# Patient Record
Sex: FEMALE | Race: WHITE | NOT HISPANIC OR LATINO | URBAN - METROPOLITAN AREA
[De-identification: names, ages, dates, MRNs, and addresses within clinical notes are randomized per-mention and may not be internally consistent; named-entity substitution may affect disease eponyms.]

---

## 2021-01-13 ENCOUNTER — EMERGENCY (EMERGENCY)
Facility: HOSPITAL | Age: 81
LOS: 0 days | Discharge: HOME | End: 2021-01-13
Attending: EMERGENCY MEDICINE | Admitting: EMERGENCY MEDICINE
Payer: MEDICAID

## 2021-01-13 VITALS — DIASTOLIC BLOOD PRESSURE: 83 MMHG | SYSTOLIC BLOOD PRESSURE: 176 MMHG | HEART RATE: 83 BPM

## 2021-01-13 VITALS
HEART RATE: 82 BPM | RESPIRATION RATE: 20 BRPM | TEMPERATURE: 98 F | SYSTOLIC BLOOD PRESSURE: 194 MMHG | WEIGHT: 149.91 LBS | OXYGEN SATURATION: 96 % | DIASTOLIC BLOOD PRESSURE: 100 MMHG

## 2021-01-13 DIAGNOSIS — M79.669 PAIN IN UNSPECIFIED LOWER LEG: ICD-10-CM

## 2021-01-13 DIAGNOSIS — M79.606 PAIN IN LEG, UNSPECIFIED: ICD-10-CM

## 2021-01-13 DIAGNOSIS — E11.9 TYPE 2 DIABETES MELLITUS WITHOUT COMPLICATIONS: ICD-10-CM

## 2021-01-13 DIAGNOSIS — R60.0 LOCALIZED EDEMA: ICD-10-CM

## 2021-01-13 DIAGNOSIS — I10 ESSENTIAL (PRIMARY) HYPERTENSION: ICD-10-CM

## 2021-01-13 LAB
ALBUMIN SERPL ELPH-MCNC: 4.2 G/DL — SIGNIFICANT CHANGE UP (ref 3.5–5.2)
ALP SERPL-CCNC: 71 U/L — SIGNIFICANT CHANGE UP (ref 30–115)
ALT FLD-CCNC: 14 U/L — SIGNIFICANT CHANGE UP (ref 0–41)
ANION GAP SERPL CALC-SCNC: 9 MMOL/L — SIGNIFICANT CHANGE UP (ref 7–14)
APTT BLD: 30 SEC — SIGNIFICANT CHANGE UP (ref 27–39.2)
AST SERPL-CCNC: 18 U/L — SIGNIFICANT CHANGE UP (ref 0–41)
BASOPHILS # BLD AUTO: 0.03 K/UL — SIGNIFICANT CHANGE UP (ref 0–0.2)
BASOPHILS NFR BLD AUTO: 0.4 % — SIGNIFICANT CHANGE UP (ref 0–1)
BILIRUB SERPL-MCNC: 0.4 MG/DL — SIGNIFICANT CHANGE UP (ref 0.2–1.2)
BUN SERPL-MCNC: 23 MG/DL — HIGH (ref 10–20)
CALCIUM SERPL-MCNC: 9.5 MG/DL — SIGNIFICANT CHANGE UP (ref 8.5–10.1)
CHLORIDE SERPL-SCNC: 101 MMOL/L — SIGNIFICANT CHANGE UP (ref 98–110)
CO2 SERPL-SCNC: 29 MMOL/L — SIGNIFICANT CHANGE UP (ref 17–32)
CREAT SERPL-MCNC: 1.2 MG/DL — SIGNIFICANT CHANGE UP (ref 0.7–1.5)
EOSINOPHIL # BLD AUTO: 0.24 K/UL — SIGNIFICANT CHANGE UP (ref 0–0.7)
EOSINOPHIL NFR BLD AUTO: 3 % — SIGNIFICANT CHANGE UP (ref 0–8)
GLUCOSE SERPL-MCNC: 161 MG/DL — HIGH (ref 70–99)
HCT VFR BLD CALC: 38.9 % — SIGNIFICANT CHANGE UP (ref 37–47)
HGB BLD-MCNC: 12.6 G/DL — SIGNIFICANT CHANGE UP (ref 12–16)
IMM GRANULOCYTES NFR BLD AUTO: 0.4 % — HIGH (ref 0.1–0.3)
INR BLD: 1.08 RATIO — SIGNIFICANT CHANGE UP (ref 0.65–1.3)
LYMPHOCYTES # BLD AUTO: 1.3 K/UL — SIGNIFICANT CHANGE UP (ref 1.2–3.4)
LYMPHOCYTES # BLD AUTO: 16 % — LOW (ref 20.5–51.1)
MCHC RBC-ENTMCNC: 28.3 PG — SIGNIFICANT CHANGE UP (ref 27–31)
MCHC RBC-ENTMCNC: 32.4 G/DL — SIGNIFICANT CHANGE UP (ref 32–37)
MCV RBC AUTO: 87.2 FL — SIGNIFICANT CHANGE UP (ref 81–99)
MONOCYTES # BLD AUTO: 0.57 K/UL — SIGNIFICANT CHANGE UP (ref 0.1–0.6)
MONOCYTES NFR BLD AUTO: 7 % — SIGNIFICANT CHANGE UP (ref 1.7–9.3)
NEUTROPHILS # BLD AUTO: 5.93 K/UL — SIGNIFICANT CHANGE UP (ref 1.4–6.5)
NEUTROPHILS NFR BLD AUTO: 73.2 % — SIGNIFICANT CHANGE UP (ref 42.2–75.2)
NRBC # BLD: 0 /100 WBCS — SIGNIFICANT CHANGE UP (ref 0–0)
NT-PROBNP SERPL-SCNC: 735 PG/ML — HIGH (ref 0–300)
PLATELET # BLD AUTO: 199 K/UL — SIGNIFICANT CHANGE UP (ref 130–400)
POTASSIUM SERPL-MCNC: 4.1 MMOL/L — SIGNIFICANT CHANGE UP (ref 3.5–5)
POTASSIUM SERPL-SCNC: 4.1 MMOL/L — SIGNIFICANT CHANGE UP (ref 3.5–5)
PROT SERPL-MCNC: 6.6 G/DL — SIGNIFICANT CHANGE UP (ref 6–8)
PROTHROM AB SERPL-ACNC: 12.4 SEC — SIGNIFICANT CHANGE UP (ref 9.95–12.87)
RBC # BLD: 4.46 M/UL — SIGNIFICANT CHANGE UP (ref 4.2–5.4)
RBC # FLD: 13.1 % — SIGNIFICANT CHANGE UP (ref 11.5–14.5)
SODIUM SERPL-SCNC: 139 MMOL/L — SIGNIFICANT CHANGE UP (ref 135–146)
WBC # BLD: 8.1 K/UL — SIGNIFICANT CHANGE UP (ref 4.8–10.8)
WBC # FLD AUTO: 8.1 K/UL — SIGNIFICANT CHANGE UP (ref 4.8–10.8)

## 2021-01-13 PROCEDURE — 93970 EXTREMITY STUDY: CPT | Mod: 26

## 2021-01-13 PROCEDURE — 99285 EMERGENCY DEPT VISIT HI MDM: CPT

## 2021-01-13 RX ORDER — LISINOPRIL 2.5 MG/1
5 TABLET ORAL DAILY
Refills: 0 | Status: DISCONTINUED | OUTPATIENT
Start: 2021-01-13 | End: 2021-01-13

## 2021-01-13 RX ADMIN — LISINOPRIL 5 MILLIGRAM(S): 2.5 TABLET ORAL at 16:11

## 2021-01-13 NOTE — ED PROVIDER NOTE - NS ED ROS FT
Review of Systems:  	•	CONSTITUTIONAL - no fever, no diaphoresis, no chills  	•	SKIN - no rash  	•	HEMATOLOGIC - no bleeding, no bruising  	•	EYES - no eye pain, no blurry vision  	•	ENT - no congestion  	•	RESPIRATORY - no shortness of breath, no cough  	•	CARDIAC - no chest pain, no palpitations  	•	GI - no abd pain, no nausea, no vomiting, no diarrhea, no constipation  	•	GENITO-URINARY - no dysuria; no hematuria, no increased urinary frequency  	•	MUSCULOSKELETAL - +leg pain, +leg swelling, no redness  	•	NEUROLOGIC - no weakness, no headache, no paresthesias, no LOC  	All other ROS are negative except as documented in HPI.

## 2021-01-13 NOTE — ED ADULT NURSE NOTE - OBJECTIVE STATEMENT
Patient presents to ED with RLE swelling for 1week sent in by PMD to r/o DVT. No CP, SOB, or any other symptoms.

## 2021-01-13 NOTE — ED PROVIDER NOTE - PROGRESS NOTE DETAILS
Duplex prelim negative Cas: Spoke to patient's pmd Dr. Jama Martinez who sent patient in to rule out dvt Discussed results with patient's son and pmd.

## 2021-01-13 NOTE — ED PROVIDER NOTE - PATIENT PORTAL LINK FT
You can access the FollowMyHealth Patient Portal offered by [facility] by registering at the following website: [email or web address]. By joining [employer]’s FollowMyHealth portal, you will also be able to view your health information using other applications (apps) compatible with our system.

## 2021-01-13 NOTE — ED ADULT TRIAGE NOTE - RESPIRATORY RATE (BREATHS/MIN)
C/o cough with fever going on for years, feel  He has temp did not take it, just feels warm no difficulty breathing has up coming appointments will need covid testing 20

## 2021-01-13 NOTE — ED PROVIDER NOTE - PHYSICAL EXAMINATION
VITAL SIGNS: I have reviewed nursing notes and confirm.  CONSTITUTIONAL: Well-developed; well-nourished; in no acute distress.  SKIN: Skin exam is warm and dry, no acute rash.  HEAD: Normocephalic; atraumatic.  EYES: PERRL, EOM intact; conjunctiva and sclera clear.  ENT: No nasal discharge; airway clear.   NECK: Supple; non tender.  CARD: S1, S2 normal; no murmurs, gallops, or rubs. Regular rate and rhythm.  RESP: Clear to auscultation bilaterally. No wheezes, rales or rhonchi.  ABD: Normal bowel sounds; soft; non-distended; non-tender.   EXT: Normal ROM. Bilateral LE edema R>L 1+ pitting edema. No calf tenderness.   LYMPH: No acute cervical adenopathy.  NEURO: Alert, oriented. Grossly unremarkable. No focal deficits.  PSYCH: Cooperative, appropriate.

## 2021-01-13 NOTE — ED PROVIDER NOTE - ATTENDING CONTRIBUTION TO CARE
JOSE Cardozo as  who received son assisting with hx. 80yoF with h/o HTN, HLD, DM, presents with RLE swelling x 1wk, sent in by PMD to r/o DVT. No CP, SOB, or any other symptoms. On exam, afebrile, hemodynamically stable, saturating well, NAD, well appearing, laying comfortably in bed, head NCAT, EOMI grossly, anicteric, MMM, no JVD, RRR, nml S1/S2, no m/r/g, lungs CTAB, no w/r/r, abd soft, NT, ND, nml BS, no rebound or guarding, AAO, CN's 3-12 grossly intact, CISNEROS spontaneously, b/l LE 1+ pitting edema R>L slightly, 2+ DP Pulses, <2sec cap refill, skin warm, dry, no rashes or hives. No e/o cellulitis, acute PAD. Neurovascularly intact extremity. Low suspicion for systemic fluid overload by exam. DVT US _______ JSOE Cardozo as  who received son assisting with hx. 80yoF with h/o HTN, HLD, DM, presents with RLE swelling x 1wk, sent in by PMD to r/o DVT. No CP, SOB, or any other symptoms. On exam, afebrile, hemodynamically stable, saturating well, NAD, well appearing, laying comfortably in bed, head NCAT, EOMI grossly, anicteric, MMM, no JVD, RRR, nml S1/S2, no m/r/g, lungs CTAB, no w/r/r, abd soft, NT, ND, nml BS, no rebound or guarding, AAO, CN's 3-12 grossly intact, CISNEROS spontaneously, b/l LE 1+ pitting edema R>L slightly, 2+ DP Pulses, <2sec cap refill, skin warm, dry, no rashes or hives. No e/o cellulitis, acute PAD. Neurovascularly intact extremity. Low suspicion for systemic fluid overload by exam. DVT US negative. Patient is well appearing, NAD, afebrile, hemodynamically stable. Any available tests and studies were discussed with patient and family. Discharged with instructions in further symptomatic care, return precautions, and need for PMD f/u.

## 2021-01-13 NOTE — ED PROVIDER NOTE - OBJECTIVE STATEMENT
79 yo F with pmhx of DM, HTN sent by pmd to rule out DVT after being found to have LE swelling at pmd office today. 81 yo F with pmhx of DM, HTN sent by pmd to rule out DVT after being found to have LE swelling at pmd office today. Reports having leg pain and increased swelling x 1 week. Symptoms are moderate. No alleviating/aggravating factors. No cp, sob, fevers, chills, paresthesias, or weakness.

## 2021-01-13 NOTE — ED PROVIDER NOTE - CLINICAL SUMMARY MEDICAL DECISION MAKING FREE TEXT BOX
JOSE Cardozo as  who received son assisting with hx. 80yoF with h/o HTN, HLD, DM, presents with RLE swelling x 1wk, sent in by PMD to r/o DVT. No CP, SOB, or any other symptoms. On exam, afebrile, hemodynamically stable, saturating well, NAD, well appearing, laying comfortably in bed, head NCAT, EOMI grossly, anicteric, MMM, no JVD, RRR, nml S1/S2, no m/r/g, lungs CTAB, no w/r/r, abd soft, NT, ND, nml BS, no rebound or guarding, AAO, CN's 3-12 grossly intact, CISNEROS spontaneously, b/l LE 1+ pitting edema R>L slightly, 2+ DP Pulses, <2sec cap refill, skin warm, dry, no rashes or hives. No e/o cellulitis, acute PAD. Neurovascularly intact extremity. Low suspicion for systemic fluid overload by exam. DVT US negative. Patient is well appearing, NAD, afebrile, hemodynamically stable. Any available tests and studies were discussed with patient and family. Discharged with instructions in further symptomatic care, return precautions, and need for PMD f/u.

## 2021-06-10 ENCOUNTER — INPATIENT (INPATIENT)
Facility: HOSPITAL | Age: 81
LOS: 7 days | Discharge: HOME HEALTH | End: 2021-06-18
Attending: HOSPITALIST | Admitting: HOSPITALIST
Payer: MEDICAID

## 2021-06-10 VITALS
RESPIRATION RATE: 18 BRPM | OXYGEN SATURATION: 95 % | HEART RATE: 91 BPM | TEMPERATURE: 98 F | WEIGHT: 139.99 LBS | HEIGHT: 67 IN | SYSTOLIC BLOOD PRESSURE: 173 MMHG | DIASTOLIC BLOOD PRESSURE: 89 MMHG

## 2021-06-10 DIAGNOSIS — Z98.890 OTHER SPECIFIED POSTPROCEDURAL STATES: Chronic | ICD-10-CM

## 2021-06-10 LAB
ALBUMIN SERPL ELPH-MCNC: 4.3 G/DL — SIGNIFICANT CHANGE UP (ref 3.5–5.2)
ALP SERPL-CCNC: 67 U/L — SIGNIFICANT CHANGE UP (ref 30–115)
ALT FLD-CCNC: 13 U/L — SIGNIFICANT CHANGE UP (ref 0–41)
ANION GAP SERPL CALC-SCNC: 11 MMOL/L — SIGNIFICANT CHANGE UP (ref 7–14)
APPEARANCE UR: CLEAR — SIGNIFICANT CHANGE UP
APTT BLD: 30 SEC — SIGNIFICANT CHANGE UP (ref 27–39.2)
AST SERPL-CCNC: 17 U/L — SIGNIFICANT CHANGE UP (ref 0–41)
BACTERIA # UR AUTO: NEGATIVE — SIGNIFICANT CHANGE UP
BASOPHILS # BLD AUTO: 0.03 K/UL — SIGNIFICANT CHANGE UP (ref 0–0.2)
BASOPHILS NFR BLD AUTO: 0.4 % — SIGNIFICANT CHANGE UP (ref 0–1)
BILIRUB SERPL-MCNC: 0.2 MG/DL — SIGNIFICANT CHANGE UP (ref 0.2–1.2)
BILIRUB UR-MCNC: NEGATIVE — SIGNIFICANT CHANGE UP
BLD GP AB SCN SERPL QL: SIGNIFICANT CHANGE UP
BUN SERPL-MCNC: 38 MG/DL — HIGH (ref 10–20)
CALCIUM SERPL-MCNC: 9.6 MG/DL — SIGNIFICANT CHANGE UP (ref 8.5–10.1)
CHLORIDE SERPL-SCNC: 103 MMOL/L — SIGNIFICANT CHANGE UP (ref 98–110)
CO2 SERPL-SCNC: 26 MMOL/L — SIGNIFICANT CHANGE UP (ref 17–32)
COLOR SPEC: SIGNIFICANT CHANGE UP
COMMENT - URINE: SIGNIFICANT CHANGE UP
COMMENT - URINE: SIGNIFICANT CHANGE UP
CREAT SERPL-MCNC: 1.3 MG/DL — SIGNIFICANT CHANGE UP (ref 0.7–1.5)
DIFF PNL FLD: NEGATIVE — SIGNIFICANT CHANGE UP
EOSINOPHIL # BLD AUTO: 0.25 K/UL — SIGNIFICANT CHANGE UP (ref 0–0.7)
EOSINOPHIL NFR BLD AUTO: 3.6 % — SIGNIFICANT CHANGE UP (ref 0–8)
EPI CELLS # UR: 0 /HPF — SIGNIFICANT CHANGE UP (ref 0–5)
GLUCOSE SERPL-MCNC: 148 MG/DL — HIGH (ref 70–99)
GLUCOSE UR QL: NEGATIVE — SIGNIFICANT CHANGE UP
HCT VFR BLD CALC: 37.9 % — SIGNIFICANT CHANGE UP (ref 37–47)
HGB BLD-MCNC: 11.9 G/DL — LOW (ref 12–16)
HYALINE CASTS # UR AUTO: 0 /LPF — SIGNIFICANT CHANGE UP (ref 0–7)
IMM GRANULOCYTES NFR BLD AUTO: 0.4 % — HIGH (ref 0.1–0.3)
INR BLD: 1.1 RATIO — SIGNIFICANT CHANGE UP (ref 0.65–1.3)
KETONES UR-MCNC: NEGATIVE — SIGNIFICANT CHANGE UP
LEUKOCYTE ESTERASE UR-ACNC: ABNORMAL
LYMPHOCYTES # BLD AUTO: 1.27 K/UL — SIGNIFICANT CHANGE UP (ref 1.2–3.4)
LYMPHOCYTES # BLD AUTO: 18.4 % — LOW (ref 20.5–51.1)
MCHC RBC-ENTMCNC: 27.1 PG — SIGNIFICANT CHANGE UP (ref 27–31)
MCHC RBC-ENTMCNC: 31.4 G/DL — LOW (ref 32–37)
MCV RBC AUTO: 86.3 FL — SIGNIFICANT CHANGE UP (ref 81–99)
MONOCYTES # BLD AUTO: 0.52 K/UL — SIGNIFICANT CHANGE UP (ref 0.1–0.6)
MONOCYTES NFR BLD AUTO: 7.5 % — SIGNIFICANT CHANGE UP (ref 1.7–9.3)
NEUTROPHILS # BLD AUTO: 4.82 K/UL — SIGNIFICANT CHANGE UP (ref 1.4–6.5)
NEUTROPHILS NFR BLD AUTO: 69.7 % — SIGNIFICANT CHANGE UP (ref 42.2–75.2)
NITRITE UR-MCNC: NEGATIVE — SIGNIFICANT CHANGE UP
NRBC # BLD: 0 /100 WBCS — SIGNIFICANT CHANGE UP (ref 0–0)
PH UR: 6 — SIGNIFICANT CHANGE UP (ref 5–8)
PLATELET # BLD AUTO: 214 K/UL — SIGNIFICANT CHANGE UP (ref 130–400)
POTASSIUM SERPL-MCNC: 4.5 MMOL/L — SIGNIFICANT CHANGE UP (ref 3.5–5)
POTASSIUM SERPL-SCNC: 4.5 MMOL/L — SIGNIFICANT CHANGE UP (ref 3.5–5)
PROT SERPL-MCNC: 6.5 G/DL — SIGNIFICANT CHANGE UP (ref 6–8)
PROT UR-MCNC: ABNORMAL
PROTHROM AB SERPL-ACNC: 12.7 SEC — SIGNIFICANT CHANGE UP (ref 9.95–12.87)
RBC # BLD: 4.39 M/UL — SIGNIFICANT CHANGE UP (ref 4.2–5.4)
RBC # FLD: 14.1 % — SIGNIFICANT CHANGE UP (ref 11.5–14.5)
RBC CASTS # UR COMP ASSIST: 1 /HPF — SIGNIFICANT CHANGE UP (ref 0–4)
SARS-COV-2 RNA SPEC QL NAA+PROBE: SIGNIFICANT CHANGE UP
SODIUM SERPL-SCNC: 140 MMOL/L — SIGNIFICANT CHANGE UP (ref 135–146)
SP GR SPEC: 1.01 — SIGNIFICANT CHANGE UP (ref 1.01–1.03)
UROBILINOGEN FLD QL: SIGNIFICANT CHANGE UP
WBC # BLD: 6.92 K/UL — SIGNIFICANT CHANGE UP (ref 4.8–10.8)
WBC # FLD AUTO: 6.92 K/UL — SIGNIFICANT CHANGE UP (ref 4.8–10.8)
WBC UR QL: 24 /HPF — HIGH (ref 0–5)

## 2021-06-10 PROCEDURE — 72170 X-RAY EXAM OF PELVIS: CPT | Mod: 26

## 2021-06-10 PROCEDURE — 71260 CT THORAX DX C+: CPT | Mod: 26,MA

## 2021-06-10 PROCEDURE — 72125 CT NECK SPINE W/O DYE: CPT | Mod: 26,MA

## 2021-06-10 PROCEDURE — 99223 1ST HOSP IP/OBS HIGH 75: CPT

## 2021-06-10 PROCEDURE — 99284 EMERGENCY DEPT VISIT MOD MDM: CPT

## 2021-06-10 PROCEDURE — 99285 EMERGENCY DEPT VISIT HI MDM: CPT

## 2021-06-10 PROCEDURE — 70450 CT HEAD/BRAIN W/O DYE: CPT | Mod: 26,MA

## 2021-06-10 PROCEDURE — 93010 ELECTROCARDIOGRAM REPORT: CPT

## 2021-06-10 PROCEDURE — 71045 X-RAY EXAM CHEST 1 VIEW: CPT | Mod: 26

## 2021-06-10 PROCEDURE — 74177 CT ABD & PELVIS W/CONTRAST: CPT | Mod: 26,MA

## 2021-06-10 RX ORDER — ACETAMINOPHEN 500 MG
650 TABLET ORAL ONCE
Refills: 0 | Status: COMPLETED | OUTPATIENT
Start: 2021-06-10 | End: 2021-06-10

## 2021-06-10 RX ORDER — ACETAMINOPHEN 500 MG
650 TABLET ORAL EVERY 6 HOURS
Refills: 0 | Status: DISCONTINUED | OUTPATIENT
Start: 2021-06-10 | End: 2021-06-18

## 2021-06-10 RX ORDER — ATORVASTATIN CALCIUM 80 MG/1
20 TABLET, FILM COATED ORAL AT BEDTIME
Refills: 0 | Status: DISCONTINUED | OUTPATIENT
Start: 2021-06-10 | End: 2021-06-10

## 2021-06-10 RX ORDER — LISINOPRIL 2.5 MG/1
5 TABLET ORAL DAILY
Refills: 0 | Status: DISCONTINUED | OUTPATIENT
Start: 2021-06-10 | End: 2021-06-11

## 2021-06-10 RX ORDER — ENOXAPARIN SODIUM 100 MG/ML
40 INJECTION SUBCUTANEOUS DAILY
Refills: 0 | Status: DISCONTINUED | OUTPATIENT
Start: 2021-06-10 | End: 2021-06-14

## 2021-06-10 RX ORDER — ASPIRIN/CALCIUM CARB/MAGNESIUM 324 MG
81 TABLET ORAL DAILY
Refills: 0 | Status: DISCONTINUED | OUTPATIENT
Start: 2021-06-10 | End: 2021-06-18

## 2021-06-10 RX ORDER — ATORVASTATIN CALCIUM 80 MG/1
40 TABLET, FILM COATED ORAL AT BEDTIME
Refills: 0 | Status: DISCONTINUED | OUTPATIENT
Start: 2021-06-10 | End: 2021-06-18

## 2021-06-10 RX ADMIN — Medication 650 MILLIGRAM(S): at 22:15

## 2021-06-10 NOTE — CONSULT NOTE ADULT - SUBJECTIVE AND OBJECTIVE BOX
Stroke Consult Note:    ARIAMYNOR JIMAMBERRIAZ    1. Chief Complaint: s/p mechanical fall    HPI:  80 year old female with past medical history of DM II, HTN, and DLD, on asa, presents after a fall.     Spoke with help of Syriac .  As per family and patient she was trying to sit on the bed and missed, fell on a chair and then to floor, hit lower back. She denies any head trauma, loss of consciousness, neck pain, abdominal pain, palpitations, nausea, vomiting, diarrhea, chest pain, fever, rigors or chills. As per the son she has had balance issues for some time now and has had recurrent falls, the only difference now is that this fall was worse and had her ended up in the hospital. He also endorse mild dementia and some forgetfulness but patient able to ambulate around with cane and perform all ADLs.    In ED patient hemodynamically stable, afebrile, trauma workup shows a T11 fracture (No further intervention as per Neurosurgery) and some Focal hypodensities noted within the inferior right cerebellar hemisphere possibly representing age-indeterminate infarcts. At time of interview patient endorses some hip and back pain. (10 Ye 2021 21:44)      2. Relevant PMH:   Prior ischemic stroke/TIA[ ], Afib [ ], CAD [ ], HTN [ ], DLD [ ], DM [ ], PVD [ ], Obesity [ ],   Sedentary lifestyle [ ], CHF [ ], KALI [ ], Cancer Hx [ ].    3. Social History: Smoking [ ], Drug Use [ ], Alcohol Use [ ], Other [ ]    4. Possible Location of Stroke:    5. Relevant Brain Tissue Imaging: < from: CT Head No Cont (06.10.21 @ 15:48) >    EXAM:  CT CERVICAL SPINE        EXAM:  CT BRAIN            PROCEDURE DATE:  06/10/2021            INTERPRETATION:  CLINICAL INDICATIONS:  Status post fall.    TECHNIQUE:  Noncontrast head and cervical spine CT was performed.    Multiple contiguous axial images were obtained from the skull base to the vertex without the use of intravenous contrast. Reformatted coronal and sagittal images were were subsequently obtained and reviewed.    Axial images were obtained through the cervical spine using multislice helical technique.  Reformatted coronal and sagittal images were were subsequently obtained and reviewed.    COMPARISON: None.    FINDINGS:    CT BRAIN:    There is no evidence for acute intracranial hemorrhage, mass effect or midline shift.    The basal cisterns are patent. There is no hydrocephalus.    There are focal hypodensities noted within the inferior right cerebellar hemisphere, best seen on sagittal image 8 image 55.    There is periventricular and scattered subcortical white matter hypodensity. There are bilateral basal ganglia hypodensities most consistent with age-indeterminate lacunar infarcts.    There is no extra-axial collection.    The visualized orbits are unremarkable.    The calvarium is intact, without depressed fracture.    There is a left sphenoid sinus mucous retention cyst or polyp. Visualized paranasal sinuses and tympanomastoid cavities are otherwise unremarkable. There is soft tissue filling the right external auditory canal likely represents cerumen.    CT CERVICAL SPINE:    There is no prevertebral soft tissue swelling. There is no splaying of the spinous processes. The occipital condyles are normal. Lateral masses of C1 align normally with C2. No lucent fracture line is identified. There is nospondylolisthesis.    Multilevel degenerative osteoarthritis and degenerative disc disease are present. Findings include marginal osteophytes, uncovertebral spurring, loss of normal disc space height, endplate sclerosis, and facet joint arthrosis.    At C2-C3 there is central disc herniation indenting on the ventral thecal sac without significant spinal canal or neuroforaminal narrowing.    At C3-C4 there is bilateral uncinate joint spurring, right greater than left as well as bilateral facet arthrosis resulting in severe right neuroforaminal narrowing and moderate left neuroforaminal narrowing. There is no spinal canal stenosis at this level.    At C4-C5 there is disc osteophyte ridging, bilateral uncinate joint spurring and left facet arthrosis resulting in severe bilateral neuroforaminal narrowing. There is no spinal canal stenosis at this level.    At C5-C6 there is left paracentral disc herniation with bilateral uncinate joint spurring resulting in moderate left neuroforaminal narrowing and mild right neuroforaminal narrowing. There is moderate spinal canal stenosis at this level.    At C6-C7 there is bilateral uncinate joint spurring and mild facet arthrosis resulting in moderate bilateral neuroforaminal narrowing.    There is no high-grade spinal canal stenosis, although the spinal canal contents are suboptimally evaluated inherent to CT technique.    The visualized lung apices are within normal limits.    Miscellaneous:  None.    IMPRESSION:    CT HEAD:  Focal hypodensities noted within the inferior right cerebellar hemisphere possibly representing age-indeterminate infarcts. Further evaluation can be obtained with MRI of the brain if not clinically contraindicated.    Moderate chronic microvascular-type changes as well as bilateral age-indeterminate basal ganglia lacunar infarcts.    CT CERVICAL SPINE:  No acute fracture or subluxation.    Multilevel degenerative changes as detailed above.            < end of copied text >      6. Relevant Cerebrovascular Imagin. Relevant blood tests:      8. Relevant cardiac rhythm monitorin. Relevant Cardiac Structure: (TTE/EVITA +/-):[ ]No intracardiac thrombus/[ ] no vegetation/[ ]no akynesia/EF:    Home Medications:  aspirin 81 mg oral tablet, chewable: 1 tab(s) orally once a day (10 Ye 2021 21:44)  atorvastatin 20 mg tablet: TAKE ONE TABLET BY MOUTH EVERY DAY (10 Ye 2021 21:44)  lisinopril 5 mg tablet: TAKE ONE TABLET every day (10 Ye 2021 21:44)  metformin 500 mg tablet: TAKE ONE TABLET BY MOUTH TWICE DAILY WITH MEALS (10 Ye 2021 21:44)      MEDICATIONS  (STANDING):  aspirin  chewable 81 milliGRAM(s) Oral daily  atorvastatin 40 milliGRAM(s) Oral at bedtime  enoxaparin Injectable 40 milliGRAM(s) SubCutaneous daily  lisinopril 5 milliGRAM(s) Oral daily      10. PT/OT/Speech/Rehab/S&Sw/ Cognitive eval results and recommendations:    11. Exam:    Vital Signs Last 24 Hrs  T(C): 36.8 (10 Ye 2021 20:44), Max: 36.9 (10 Ye 2021 18:58)  T(F): 98.2 (10 Ye 2021 20:44), Max: 98.4 (10 Ye 2021 18:58)  HR: 84 (10 Ye 2021 23:30) (84 - 91)  BP: 179/89 (10 Ye 2021 23:30) (173/89 - 193/99)  BP(mean): --  RR: 18 (10 Ye 2021 23:30) (18 - 18)  SpO2: 96% (10 Ye 2021 23:30) (95% - 96%)    12.   NIH STROKE SCALE  Item	                                                        Score  1 a.	Level of Consciousness	               	0  1 b. LOC Questions	                                2  1 c.	LOC Commands	                               	0  2.	Best Gaze	                                        0  3.	Visual	                                                0  4.	Facial Palsy	                                        0  5 a.	Motor Arm - Left	                                0  5 b.	Motor Arm - Right	                        0  6 a.	Motor Leg - Left	                                0  6 b.	Motor Leg - Right	                                0  7.	Limb Ataxia	                                        0  8.	Sensory	                                                0  9.	Language	                                        1  10.	Dysarthria	                                        0  11.	Extinction and Inattention  	        0  ______________________________________  TOTAL	                                                        0    Total NIHSS on admission:      NIHSS yesterday:      NIHSS today: 3 (baseline dementia)    mRS:  0 No symptoms at all  1 No significant disability despite symptoms; able to carry out all usual duties and activities without assistance  2 Slight disability; unable to carry out all previous activities, but able to look after own affairs  3 Moderate disability; requiring some help, but able to walk without assistance  4 Moderately severe disability; unable to walk without assistance and unable to attend to own bodily needs without assistance  5 Severe disability; bedridden, incontinent and requiring constant nursing care and attention  6 Dead      13. Impression: 80 year old female with past medical history of DM II, HTN, and DLD, on asa, presents after a fall. CTH with focal hypodensities noted within the inferior right cerebellar hemisphere possibly representing age-indeterminate infarcts as well as moderate chronic microvascular-type changes and bilateral age-indeterminate basal ganglia lacunar infarcts. Patient had difficulty with balance over the past 2 years as per family and has mild dementia. Family is not aware of stroke history.    Plan:  MRI brain NC  MRA head and neck  Echo  Lipid profile, HA1C  PT eval and treat  Continue Aspirin and Statin        Neuroattending note will follow                14. Probable cause/s of Stroke:      15. Suggestions:   Routine stroke workup includin. Disposition:   Stroke Consult Note:    ARIAMYNOR JIMAMBERRIAZ    1. Chief Complaint: s/p mechanical fall    HPI:  80 year old female with past medical history of DM II, HTN, and DLD, on asa, presents after a fall.     Spoke with help of Croatian .  As per family and patient she was trying to sit on the bed and missed, fell on a chair and then to floor, hit lower back. She denies any head trauma, loss of consciousness, neck pain, abdominal pain, palpitations, nausea, vomiting, diarrhea, chest pain, fever, rigors or chills. As per the son she has had balance issues for some time now and has had recurrent falls, the only difference now is that this fall was worse and had her ended up in the hospital. He also endorse mild dementia and some forgetfulness but patient able to ambulate around with cane and perform all ADLs.    In ED patient hemodynamically stable, afebrile, trauma workup shows a T11 fracture (No further intervention as per Neurosurgery) and some Focal hypodensities noted within the inferior right cerebellar hemisphere possibly representing age-indeterminate infarcts. At time of interview patient endorses some hip and back pain. (10 Ye 2021 21:44)    2. Relevant PMH:   Prior ischemic stroke/TIA[ ], Afib [ ], CAD [ ], HTN [ ], DLD [ ], DM [ ], PVD [ ], Obesity [ ],   Sedentary lifestyle [ ], CHF [ ], KALI [ ], Cancer Hx [ ].    3. Social History: Smoking [ ], Drug Use [ ], Alcohol Use [ ], Other [ ]    4. Possible Location of Stroke:    5. Relevant Brain Tissue Imaging: < from: CT Head No Cont (06.10.21 @ 15:48) >    EXAM:  CT CERVICAL SPINE        EXAM:  CT BRAIN          PROCEDURE DATE:  06/10/2021      INTERPRETATION:  CLINICAL INDICATIONS:  Status post fall.    TECHNIQUE:  Noncontrast head and cervical spine CT was performed.    Multiple contiguous axial images were obtained from the skull base to the vertex without the use of intravenous contrast. Reformatted coronal and sagittal images were were subsequently obtained and reviewed.    Axial images were obtained through the cervical spine using multislice helical technique.  Reformatted coronal and sagittal images were were subsequently obtained and reviewed.    COMPARISON: None.    FINDINGS:    CT BRAIN:    There is no evidence for acute intracranial hemorrhage, mass effect or midline shift.    The basal cisterns are patent. There is no hydrocephalus.    There are focal hypodensities noted within the inferior right cerebellar hemisphere, best seen on sagittal image 8 image 55.    There is periventricular and scattered subcortical white matter hypodensity. There are bilateral basal ganglia hypodensities most consistent with age-indeterminate lacunar infarcts.    There is no extra-axial collection.    The visualized orbits are unremarkable.    The calvarium is intact, without depressed fracture.    There is a left sphenoid sinus mucous retention cyst or polyp. Visualized paranasal sinuses and tympanomastoid cavities are otherwise unremarkable. There is soft tissue filling the right external auditory canal likely represents cerumen.    CT CERVICAL SPINE:    There is no prevertebral soft tissue swelling. There is no splaying of the spinous processes. The occipital condyles are normal. Lateral masses of C1 align normally with C2. No lucent fracture line is identified. There is nospondylolisthesis.    Multilevel degenerative osteoarthritis and degenerative disc disease are present. Findings include marginal osteophytes, uncovertebral spurring, loss of normal disc space height, endplate sclerosis, and facet joint arthrosis.    At C2-C3 there is central disc herniation indenting on the ventral thecal sac without significant spinal canal or neuroforaminal narrowing.    At C3-C4 there is bilateral uncinate joint spurring, right greater than left as well as bilateral facet arthrosis resulting in severe right neuroforaminal narrowing and moderate left neuroforaminal narrowing. There is no spinal canal stenosis at this level.    At C4-C5 there is disc osteophyte ridging, bilateral uncinate joint spurring and left facet arthrosis resulting in severe bilateral neuroforaminal narrowing. There is no spinal canal stenosis at this level.    At C5-C6 there is left paracentral disc herniation with bilateral uncinate joint spurring resulting in moderate left neuroforaminal narrowing and mild right neuroforaminal narrowing. There is moderate spinal canal stenosis at this level.    At C6-C7 there is bilateral uncinate joint spurring and mild facet arthrosis resulting in moderate bilateral neuroforaminal narrowing.    There is no high-grade spinal canal stenosis, although the spinal canal contents are suboptimally evaluated inherent to CT technique.    The visualized lung apices are within normal limits.    Miscellaneous:  None.    IMPRESSION:    CT HEAD:  Focal hypodensities noted within the inferior right cerebellar hemisphere possibly representing age-indeterminate infarcts. Further evaluation can be obtained with MRI of the brain if not clinically contraindicated.    Moderate chronic microvascular-type changes as well as bilateral age-indeterminate basal ganglia lacunar infarcts.    CT CERVICAL SPINE:  No acute fracture or subluxation.    Multilevel degenerative changes as detailed above.    < end of copied text >    6. Relevant Cerebrovascular Imagin. Relevant blood tests:      8. Relevant cardiac rhythm monitorin. Relevant Cardiac Structure: (TTE/EVITA +/-):[ ]No intracardiac thrombus/[ ] no vegetation/[ ]no akynesia/EF:    Home Medications:  aspirin 81 mg oral tablet, chewable: 1 tab(s) orally once a day (10 Ye 2021 21:44)  atorvastatin 20 mg tablet: TAKE ONE TABLET BY MOUTH EVERY DAY (10 Ye 2021 21:44)  lisinopril 5 mg tablet: TAKE ONE TABLET every day (10 Ye 2021 21:44)  metformin 500 mg tablet: TAKE ONE TABLET BY MOUTH TWICE DAILY WITH MEALS (10 Ye 2021 21:44)    MEDICATIONS  (STANDING):  aspirin  chewable 81 milliGRAM(s) Oral daily  atorvastatin 40 milliGRAM(s) Oral at bedtime  enoxaparin Injectable 40 milliGRAM(s) SubCutaneous daily  lisinopril 5 milliGRAM(s) Oral daily    10. PT/OT/Speech/Rehab/S&Sw/ Cognitive eval results and recommendations:    11. Exam:    Vital Signs Last 24 Hrs  T(C): 36.8 (10 Ye 2021 20:44), Max: 36.9 (10 Ye 2021 18:58)  T(F): 98.2 (10 Ye 2021 20:44), Max: 98.4 (10 Ye 2021 18:58)  HR: 84 (10 Ye 2021 23:30) (84 - 91)  BP: 179/89 (10 Ye 2021 23:30) (173/89 - 193/99)  BP(mean): --  RR: 18 (10 Ye 2021 23:30) (18 - 18)  SpO2: 96% (10 Ye 2021 23:30) (95% - 96%)    12.   NIH STROKE SCALE  Item	                                                        Score  1 a.	Level of Consciousness	               	0  1 b. LOC Questions	                                2  1 c.	LOC Commands	                               	0  2.	Best Gaze	                                        0  3.	Visual	                                                0  4.	Facial Palsy	                                        0  5 a.	Motor Arm - Left	                                0  5 b.	Motor Arm - Right	                        0  6 a.	Motor Leg - Left	                                0  6 b.	Motor Leg - Right	                                0  7.	Limb Ataxia	                                        0  8.	Sensory	                                                0  9.	Language	                                        1  10.	Dysarthria	                                        0  11.	Extinction and Inattention  	        0  ______________________________________  TOTAL	                                                        0    Total NIHSS on admission:      NIHSS yesterday:      NIHSS today: 3 (baseline dementia)    mRS:  0 No symptoms at all  1 No significant disability despite symptoms; able to carry out all usual duties and activities without assistance  2 Slight disability; unable to carry out all previous activities, but able to look after own affairs  3 Moderate disability; requiring some help, but able to walk without assistance  4 Moderately severe disability; unable to walk without assistance and unable to attend to own bodily needs without assistance  5 Severe disability; bedridden, incontinent and requiring constant nursing care and attention  6 Dead

## 2021-06-10 NOTE — H&P ADULT - ASSESSMENT
80 year old female with past medical history of DM II, HTN, and DLD, on asa, presents after a fall.     Patient has an age indeterminate fracture of T11, will get PT eval for frequent falls and will order an MRI for age indeterminate lesions on the CT scan. Spoke with son over the phone and answered all questions    # Frequent falls  # Age indeterminate T11 fracture  - Mechanical fall with history of poor balance  - CT Abdomen showed Age-indeterminate T11 superior endplate compression deformity.  - Rest of trauma work up negative except for cervical spinal stenosis and degenerative changes  - Will give Tylenol for pain  - No intervention as per Neurosurgery  - PT Evaluation    # Focal hypodensities in inferior right cerebellar hemisphere  # Multiple Lacunar infarcts  - CT Head remarkable for lacunar infarcts in periventricular area and focal hypodensities in inferior right cerebellar hemisphere  - Might explain the persistent poor balance that patient has  - Will increase statin to 40 daily  - Will continue with Aspirin  - Will order MR head (no contraindications as per son)  - Will get Neurology eval    # DM II  - On Metformin at home  - Son saying doctor told them to hold it  - Monitor fingersticks    # HTN  - Continue Lisinopril 5    # DLD  - Continue Statin    DVT: Lovenox  GI: Not indicated  Dispo: Pending work up 80 year old female with past medical history of DM II, HTN, and DLD, on asa, presents after a fall.     Patient has an age indeterminate fracture of T11, will get PT eval for frequent falls and will order an MRI for age indeterminate lesions on the CT scan. Spoke with son over the phone and answered all questions    # Frequent falls; physical deconditioning  # Age indeterminate T11 fracture  - Mechanical fall with history of poor balance  - CT Abdomen showed Age-indeterminate T11 superior endplate compression deformity.  - Rest of trauma work up negative except for cervical spinal stenosis and degenerative changes  - Will give Tylenol for pain  - No intervention as per Neurosurgery  - PT Evaluation    # Focal hypodensities in inferior right cerebellar hemisphere  # Multiple Lacunar infarcts  - CT Head remarkable for lacunar infarcts in periventricular area and focal hypodensities in inferior right cerebellar hemisphere  - Might explain the persistent poor balance that patient has  - Will increase statin to 40 daily  - Will continue with Aspirin  - Will order MR head (no contraindications as per son)  - Will get Neurology eval    # DM II w/ hyperglycemia & possibly neuropathy  - On Metformin at home  - Son saying doctor told them to hold it  - Monitor fingersticks    # HTN   - Continue Lisinopril 5    # DLD  - Continue Statin    # Possible CKD 3 likely diabetic nephropathy  - Stable renal function    DVT: Lovenox  GI: Not indicated  Dispo: Pending work up

## 2021-06-10 NOTE — ED PROVIDER NOTE - PHYSICAL EXAMINATION
CONST: Well appearing in NAD  EYES: PERRL, EOMI, Sclera and conjunctiva clear.   ENT: No nasal discharge. Oropharynx normal appearing, no erythema or exudates. No abscess or swelling. Uvula midline.   NECK: Non-tender, no meningeal signs. normal ROM. supple   CARD: Normal S1 S2; Normal rate and rhythm  RESP: Equal BS B/L, No wheezes, rhonchi or rales. No distress  GI: Soft, non-tender, non-distended. no cva tenderness. normal BS  MS: + tenderness to bilateral hips, and lower back. Normal ROM in all extremities. No midline spinal tenderness. pulses 2 +. no calf tenderness or swelling  SKIN: ~ 5 x 4 cm area of ecchymosis to left lower back.  Warm, dry, no acute rashes. Good turgor  NEURO: A&Ox3, No focal deficits. Strength 5/5 with no sensory deficits.

## 2021-06-10 NOTE — CONSULT NOTE ADULT - ATTENDING COMMENTS
I have personally seen and examined this patient.  I have fully participated in the care of this patient.  I have reviewed all pertinent clinical information, including history, physical exam, plan and note.   I have reviewed all pertinent clinical information and reviewed all relevant imaging and diagnostic studies personally.  Pt w/ ataxia x 2 years with ? age-determinant cerebellar infarcts on HCT.  Recommendations as above.  Agree with above assessment except as noted.

## 2021-06-10 NOTE — H&P ADULT - NSHPPHYSICALEXAM_GEN_ALL_CORE
GENERAL: NAD, lying in bed comfortably  HEAD:  Atraumatic, Normocephalic  EYES: EOMI, PERRLA, conjunctiva and sclera clear  HEART: Regular rate and rhythm  ABDOMEN: Bowel sounds present; Soft, Nontender, Nondistended  EXTREMITIES: Tenderness to palpation of right hip and thigh  NERVOUS SYSTEM:  Alert & Oriented X3, no focal deficit  MSK: FROM all 4 extremities, full and equal strength Vital Signs Last 24 Hrs  T(C): 36.8 (10 Ye 2021 20:44), Max: 36.9 (10 Ye 2021 18:58)  T(F): 98.2 (10 Ye 2021 20:44), Max: 98.4 (10 Ye 2021 18:58)  HR: 89 (10 Ye 2021 20:44) (89 - 91)  BP: 193/99 (10 Ye 2021 20:44) (173/89 - 193/99)  BP(mean): --  RR: 18 (10 Ye 2021 20:44) (18 - 18)  SpO2: 95% (10 Ye 2021 20:44) (95% - 95%)    GENERAL: lying in bed comfortably  HEAD:  Atraumatic, Normocephalic  EYES: EOMI, PERRLA, conjunctiva and sclera clear  HEART: Regular rate and rhythm; S1/S2; no MRG's  ABDOMEN: Bowel sounds present; Soft, Nontender, Nondistended  EXTREMITIES: Tenderness to palpation of right hip and thigh  PSYCH:  Alert & Oriented X3, appropriate affect  NEURO: Able to move all extremities; CN's grossly intact  MSK: FROM all 4 extremities, full and equal strength

## 2021-06-10 NOTE — H&P ADULT - NSHPLABSRESULTS_GEN_ALL_CORE
11.9   6.92  )-----------( 214      ( 10 Ye 2021 15:32 )             37.9       06-10    140  |  103  |  38<H>  ----------------------------<  148<H>  4.5   |  26  |  1.3    Ca    9.6      10 Ye 2021 15:32    TPro  6.5  /  Alb  4.3  /  TBili  0.2  /  DBili  x   /  AST  17  /  ALT  13  /  AlkPhos  67  06-10              Urinalysis Basic - ( 10 Ye 2021 16:42 )    Color: Light Yellow / Appearance: Clear / S.014 / pH: x  Gluc: x / Ketone: Negative  / Bili: Negative / Urobili: <2 mg/dL   Blood: x / Protein: 30 mg/dL / Nitrite: Negative   Leuk Esterase: Small / RBC: 1 /HPF / WBC 24 /HPF   Sq Epi: x / Non Sq Epi: 0 /HPF / Bacteria: Negative        PT/INR - ( 10 Ye 2021 15:32 )   PT: 12.70 sec;   INR: 1.10 ratio         PTT - ( 10 Ye 2021 15:32 )  PTT:30.0 sec          CAPILLARY BLOOD GLUCOSE      POCT Blood Glucose.: 157 mg/dL (10 Ye 2021 14:55) 11.9   6.92  )-----------( 214      ( 10 Ye 2021 15:32 )             37.9       06-10    140  |  103  |  38<H>  ----------------------------<  148<H>  4.5   |  26  |  1.3    Ca    9.6      10 Ye 2021 15:32    TPro  6.5  /  Alb  4.3  /  TBili  0.2  /  DBili  x   /  AST  17  /  ALT  13  /  AlkPhos  67  06-10              Urinalysis Basic - ( 10 Ye 2021 16:42 )    Color: Light Yellow / Appearance: Clear / S.014 / pH: x  Gluc: x / Ketone: Negative  / Bili: Negative / Urobili: <2 mg/dL   Blood: x / Protein: 30 mg/dL / Nitrite: Negative   Leuk Esterase: Small / RBC: 1 /HPF / WBC 24 /HPF   Sq Epi: x / Non Sq Epi: 0 /HPF / Bacteria: Negative        PT/INR - ( 10 Ye 2021 15:32 )   PT: 12.70 sec;   INR: 1.10 ratio         PTT - ( 10 Ye 2021 15:32 )  PTT:30.0 sec          CAPILLARY BLOOD GLUCOSE      POCT Blood Glucose.: 157 mg/dL (10 Ye 2021 14:55)      Imaging reviewed:     CT CHEST/ABD/PELVIS:  Age-indeterminate T11 superior endplate compression deformity. Please correlate for point tenderness. No pneumothorax or hemothorax. No evidence for acute abdominal trauma.    CT HEAD:  Focal hypodensities noted within the inferior right cerebellar hemisphere possibly representing age-indeterminate infarcts. Further evaluation can be obtained with MRI of the brain if not clinically contraindicated. Moderate chronic microvascular-type changes as well as bilateral age-indeterminate basal ganglia lacunar infarcts.    CT CERVICAL SPINE:  No acute fracture or subluxation. Multilevel degenerative changes as detailed above.      EKG reviewed:   Sinus rhythm with Premature supraventricular complexes  Otherwise normal ECG

## 2021-06-10 NOTE — ED ADULT TRIAGE NOTE - CHIEF COMPLAINT QUOTE
s/p mechanical fall yesterday morning, fell on her right hip, denies use of anticoagulant and head injury

## 2021-06-10 NOTE — CONSULT NOTE ADULT - ASSESSMENT
13. Impression: 80 year old female with past medical history of DM II, HTN, and DLD, on asa, presents after a fall. CTH with focal hypodensities noted within the inferior right cerebellar hemisphere possibly representing age-indeterminate infarcts as well as moderate chronic microvascular-type changes and bilateral age-indeterminate basal ganglia lacunar infarcts. Patient had difficulty with balance over the past 2 years as per family and has mild dementia. Family is not aware of stroke history.    Plan:  MRI brain NC  MRA head and neck  Echo  Lipid profile, HA1C  PT eval and treat  Continue Aspirin and Statin    Neuroattending note will follow

## 2021-06-10 NOTE — ED ADULT NURSE NOTE - OBJECTIVE STATEMENT
pt presents to ed after fall last night. pt tried to sit on her bed, and missed bed and fell onto chair, then to floor, hit lower back. complaining of lower back pain, and hip pain. pt denies head trauma, loc, neck pain, abd pain, nausea, vomiting, diarrhea, chest pain, sob, or fever.

## 2021-06-10 NOTE — ED PROVIDER NOTE - OBJECTIVE STATEMENT
80 year old female with pmhx of dm, htn, and hld, on asa, presents to ed after fall last night. pt tried to sit on her bed, and missed bed and fell onto chair, then to floor, hit lower back. complaining of lower back pain, and hip pain. pt denies head trauma, loc, neck pain, abd pain, nausea, vomiting, diarrhea, chest pain, sob, or fever.

## 2021-06-10 NOTE — CONSULT NOTE ADULT - SUBJECTIVE AND OBJECTIVE BOX
TRAUMA ACTIVATION LEVEL:  CODE / ALERT  / CONSULT  ACTIVATED BY: EMS**  /  ED**  INTUBATED: YES** / NO**    MECHANISM OF INJURY:   [] Blunt     [] MVC	  [X] Fall	  [] Pedestrian Struck	  [] Motorcycle     [] Assault     [] Bicycle collision    [] Sports injury    [] Penetrating    [] Gun Shot Wound      [] Stab Wound    GCS: 15 	E: 4	V: 5	M: 6    HPI:    80yF w/ PMHx of DM, HTN, and HLD seen as Trauma Consul s/p mechanical fall w/ -HT, -LOC, +aspirin 81. Trauma assessment in ED: ABCs intact , GCS 15 , AAOx3. Pt was trying to sit on her bed, missed, and fell onto a chair. External signs of injury: L flank hematoma. Complaints of R hip and lower back pain. Pan scan only remarkable for age-indeterminate T11 superior endplate compression deformity. No point tenderness on exam.     Allergies  No Known Allergies    ROS: 10-system review is otherwise negative except HPI above.      Primary Survey:    A - airway intact  B - bilateral breath sounds and good chest rise  C - palpable pulses in all extremities  D - GCS 15 on arrival, CISNEROS  Exposure obtained    Vital Signs Last 24 Hrs  T(C): 36.9 (10 Ye 2021 18:58), Max: 36.9 (10 Ye 2021 18:58)  T(F): 98.4 (10 Ye 2021 18:58), Max: 98.4 (10 Ye 2021 18:58)  HR: 89 (10 Ye 2021 18:58) (89 - 91)  BP: 192/115 (10 Ye 2021 18:58) (173/89 - 192/115)  RR: 18 (10 Ye 2021 18:58) (18 - 18)  SpO2: 95% (10 Ye 2021 18:58) (95% - 95%)    Secondary Survey:   General: NAD  HEENT: Normocephalic, atraumatic, EOMI, PEERLA. no scalp lacerations  Neck: Soft, midline trachea. no c-spine tenderness  Chest: No chest wall tenderness, no subcutaneous emphysema  Cardiac: S1, S2, RRR  Respiratory: Bilateral breath sounds, clear and equal bilaterally  Abdomen: Soft, non-distended, non-tender, no rebound, no guarding  Groin: Normal appearing, pelvis stable   Ext:  Moving b/l upper and lower extremities. Palpable Radial b/l UE, b/l DP palpable in LE.   Back: No T/L/S spine tenderness, No palpable runoff/stepoff/deformity    ACCESS / DEVICES:  [X] Peripheral IV  [ ] Central Venous Line	[ ] R	[ ] L	[ ] IJ	[ ] Fem	[ ] SC	Placed:   [ ] Arterial Line		[ ] R	[ ] L	[ ] Fem	[ ] Rad	[ ] Ax	Placed:   [ ] PICC:					[ ] Mediport  [ ] Urinary Catheter,  Date Placed:   [ ] Chest tube: [ ] Right, [ ] Left  [ ] JOYCELYN/Otf Drains    Labs:  POCT Blood Glucose.: 157 mg/dL (10 Ye 2021 14:55)                        11.9   6.92  )-----------( 214      ( 10 Ye 2021 15:32 )             37.9       Auto Neutrophil %: 69.7 % (06-10-21 @ 15:32)  Auto Immature Granulocyte %: 0.4 % (06-10-21 @ 15:32)    06-10    140  |  103  |  38<H>  ----------------------------<  148<H>  4.5   |  26  |  1.3    Calcium, Total Serum: 9.6 mg/dL (06-10-21 @ 15:32)    LFTs:             6.5  | 0.2  | 17       ------------------[67      ( 10 Ye 2021 15:32 )  4.3  | x    | 13          Coags:     12.70  ----< 1.10    ( 10 Ye 2021 15:32 )     30.0       Urinalysis Basic - ( 10 Ye 2021 16:42 )    Color: Light Yellow / Appearance: Clear / S.014 / pH: x  Gluc: x / Ketone: Negative  / Bili: Negative / Urobili: <2 mg/dL   Blood: x / Protein: 30 mg/dL / Nitrite: Negative   Leuk Esterase: Small / RBC: 1 /HPF / WBC 24 /HPF   Sq Epi: x / Non Sq Epi: 0 /HPF / Bacteria: Negative      RADIOLOGY & ADDITIONAL STUDIES:  ---------------------------------------------------------------------------------------    < from: CT Abdomen and Pelvis w/ IV Cont (06.10.21 @ 17:36) >  IMPRESSION: Age-indeterminate T11 superior endplate compression deformity. Please correlate for point tenderness.  No pneumothorax or hemothorax.  No evidence for acute abdominal trauma.  < end of copied text >      < from: Xray Pelvis AP only (06.10.21 @ 17:01) >  FINDINGS/IMPRESSION:  Osteopenia. No evidence of acute displaced fracture. Degenerative changes of the spine and hips.  < end of copied text >      < from: CT Cervical Spine No Cont (06.10.21 @ 15:48) >  IMPRESSION:    CT HEAD:  Focal hypodensities noted within the inferior right cerebellar hemisphere possibly representing age-indeterminate infarcts. Further evaluation can be obtained with MRI of the brain if not clinically contraindicated.  Moderate chronic microvascular-type changes as well as bilateral age-indeterminate basal ganglia lacunar infarcts.    CT CERVICAL SPINE:  No acute fracture or subluxation.  Multilevel degenerative changes as detailed above.  < end of copied text >   TRAUMA ACTIVATION LEVEL:  CODE / ALERT  / CONSULT  ACTIVATED BY: EMS**  /  ED**  INTUBATED: YES** / NO**    MECHANISM OF INJURY:   [] Blunt     [] MVC	  [X] Fall	  [] Pedestrian Struck	  [] Motorcycle     [] Assault     [] Bicycle collision    [] Sports injury    [] Penetrating    [] Gun Shot Wound      [] Stab Wound    GCS: 15 	E: 4	V: 5	M: 6    HPI:  80yF w/ PMHx of DM, HTN, and HLD seen as Trauma Consul s/p mechanical fall w/ -HT, -LOC, +aspirin 81. Trauma assessment in ED: ABCs intact , GCS 15 , AAOx3. Pt was trying to sit on her bed, missed, and fell onto a chair. External signs of injury: L flank hematoma. Complaints of R hip and lower back pain. Pan scan only remarkable for age-indeterminate T11 superior endplate compression deformity. No point tenderness on exam.     Allergies  No Known Allergies    ROS: 10-system review is otherwise negative except HPI above.      Primary Survey:    A - airway intact  B - bilateral breath sounds and good chest rise  C - palpable pulses in all extremities  D - GCS 15 on arrival, CISNEROS  Exposure obtained    Vital Signs Last 24 Hrs  T(C): 36.9 (10 Ye 2021 18:58), Max: 36.9 (10 Ye 2021 18:58)  T(F): 98.4 (10 Ye 2021 18:58), Max: 98.4 (10 Ye 2021 18:58)  HR: 89 (10 Ye 2021 18:58) (89 - 91)  BP: 192/115 (10 Ye 2021 18:58) (173/89 - 192/115)  RR: 18 (10 Ye 2021 18:58) (18 - 18)  SpO2: 95% (10 Ye 2021 18:58) (95% - 95%)    Secondary Survey:   General: NAD  HEENT: Normocephalic, atraumatic, EOMI, PEERLA. no scalp lacerations  Neck: Soft, midline trachea. no c-spine tenderness  Chest: No chest wall tenderness, no subcutaneous emphysema  Cardiac: S1, S2, RRR  Respiratory: Bilateral breath sounds, clear and equal bilaterally  Abdomen: Soft, non-distended, non-tender, no rebound, no guarding  Groin: Normal appearing, pelvis stable   Ext:  Moving b/l upper and lower extremities. Palpable Radial b/l UE, b/l DP palpable in LE.   Back: No T/L/S spine tenderness, No palpable runoff/stepoff/deformity    ACCESS / DEVICES:  [X] Peripheral IV  [ ] Central Venous Line	[ ] R	[ ] L	[ ] IJ	[ ] Fem	[ ] SC	Placed:   [ ] Arterial Line		[ ] R	[ ] L	[ ] Fem	[ ] Rad	[ ] Ax	Placed:   [ ] PICC:					[ ] Mediport  [ ] Urinary Catheter,  Date Placed:   [ ] Chest tube: [ ] Right, [ ] Left  [ ] JOYCELYN/Otf Drains    Labs:  POCT Blood Glucose.: 157 mg/dL (10 Ye 2021 14:55)                        11.9   6.92  )-----------( 214      ( 10 Ye 2021 15:32 )             37.9       Auto Neutrophil %: 69.7 % (06-10-21 @ 15:32)  Auto Immature Granulocyte %: 0.4 % (06-10-21 @ 15:32)    06-10    140  |  103  |  38<H>  ----------------------------<  148<H>  4.5   |  26  |  1.3    Calcium, Total Serum: 9.6 mg/dL (06-10-21 @ 15:32)    LFTs:             6.5  | 0.2  | 17       ------------------[67      ( 10 Ye 2021 15:32 )  4.3  | x    | 13          Coags:     12.70  ----< 1.10    ( 10 Ye 2021 15:32 )     30.0       Urinalysis Basic - ( 10 Ye 2021 16:42 )    Color: Light Yellow / Appearance: Clear / S.014 / pH: x  Gluc: x / Ketone: Negative  / Bili: Negative / Urobili: <2 mg/dL   Blood: x / Protein: 30 mg/dL / Nitrite: Negative   Leuk Esterase: Small / RBC: 1 /HPF / WBC 24 /HPF   Sq Epi: x / Non Sq Epi: 0 /HPF / Bacteria: Negative      RADIOLOGY & ADDITIONAL STUDIES:  ---------------------------------------------------------------------------------------    < from: CT Abdomen and Pelvis w/ IV Cont (06.10.21 @ 17:36) >  IMPRESSION: Age-indeterminate T11 superior endplate compression deformity. Please correlate for point tenderness.  No pneumothorax or hemothorax.  No evidence for acute abdominal trauma.  < end of copied text >      < from: Xray Pelvis AP only (06.10.21 @ 17:01) >  FINDINGS/IMPRESSION:  Osteopenia. No evidence of acute displaced fracture. Degenerative changes of the spine and hips.  < end of copied text >      < from: CT Cervical Spine No Cont (06.10.21 @ 15:48) >  IMPRESSION:    CT HEAD:  Focal hypodensities noted within the inferior right cerebellar hemisphere possibly representing age-indeterminate infarcts. Further evaluation can be obtained with MRI of the brain if not clinically contraindicated.  Moderate chronic microvascular-type changes as well as bilateral age-indeterminate basal ganglia lacunar infarcts.    CT CERVICAL SPINE:  No acute fracture or subluxation.  Multilevel degenerative changes as detailed above.  < end of copied text >

## 2021-06-10 NOTE — ED ADULT NURSE NOTE - NSFALLRSKASSESSTYPE_ED_ALL_ED
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      In the ED pt was intubated for airway protection.  He was given 2.2 L NS, 200 mcg phenylephrine, started on levophed gtt, started on vanc and cefepime.  Labs notable for WBC 11.65, VBG 7.18/45/56/15/77.7. lactate 10.8  CXR showed grossly clear lungs, view partly blocked by pacer pads, U/A negative, blood and urine cultures pending    Per sister (OB physician), pt was supposed to be on lifelong Bactrim for brain infection however Bactrim was discontinued at [place].  Pt received neurosurgical care at [facility] w/ Dr. Patrice Cantu ([phone])   (16 Dec 2020 18:16)      RADIOLOGY: FINDINGS:    Status post left parietal craniotomy with subjacent postsurgical changes. Aright frontal approach ventriculostomy terminates in the right lateral ventricle at the level of the foramen of Monro. Increased ventricular size as compared to prior study 12/22/2019. Basal cisterns are patent.    Left parietotemporal encephalomalacia and gliosis with associated ex vacuo dilatation of the left lateral ventricle compatible with postsurgical changes.    There is no acute intracranial mass-effect, hemorrhage, midline shift, or abnormal extra-axial fluid collection.    Mild paranasal sinus mucosal thickening. The mastoid air cells are clear.    IMPRESSION:    Status post left parietal craniotomy with subjacent left parietal temporal encephalomalacia and gliosis compatible with post surgical changes.    Right frontal approach ventriculostomy with increased ventricular size as compared to prior study 12/22/2019.    No acute intracranial hemorrhage, mass effect or midline shift.      MEDS:  chlorhexidine 0.12% Liquid 15 milliLiter(s) Oral Mucosa every 12 hours  chlorhexidine 4% Liquid 1 Application(s) Topical <User Schedule>  dexAMETHasone  Injectable 1 milliGRAM(s) IV Push daily  fentaNYL   Infusion. 0.5 MICROgram(s)/kG/Hr IV Continuous <Continuous>  heparin   Injectable 5000 Unit(s) SubCutaneous every 8 hours  influenza   Vaccine 0.5 milliLiter(s) IntraMuscular once  meropenem  IVPB 2000 milliGRAM(s) IV Intermittent every 8 hours  norepinephrine Infusion 0.05 MICROgram(s)/kG/Min IV Continuous <Continuous>  pantoprazole   Suspension 40 milliGRAM(s) Oral daily  propofol Infusion 30 MICROgram(s)/kG/Min IV Continuous <Continuous>  senna 2 Tablet(s) Oral at bedtime      PHYSICAL EXAM:  intubated sedated proopfol/ fentanyl  perrl  w/d in all 4e  incision clean, dry healed  Vital Signs Last 24 Hrs  T(C): 36.6 (16 Dec 2020 20:00), Max: 37.4 (16 Dec 2020 16:30)  T(F): 97.9 (16 Dec 2020 20:00), Max: 99.4 (16 Dec 2020 16:30)  HR: 132 (16 Dec 2020 20:30) (111 - 150)  BP: 98/77 (16 Dec 2020 20:30) (43/20 - 158/139)  BP(mean): 85 (16 Dec 2020 20:30) (51 - 87)  RR: 20 (16 Dec 2020 20:30) (20 - 30)  SpO2: 98% (16 Dec 2020 20:30) (98% - 100%)    LABS:                        13.3   11.65 )-----------( 262      ( 16 Dec 2020 16:26 )             41.2     12-16    143  |  108<H>  |  14  ----------------------------<  86  3.2<L>   |  17<L>  |  1.45<H>    Ca    9.0      16 Dec 2020 16:26    TPro  5.0<L>  /  Alb  3.2<L>  /  TBili  0.4  /  DBili  x   /  AST  15  /  ALT  18  /  AlkPhos  47  12-16    PT/INR - ( 16 Dec 2020 16:26 )   PT: 13.2 sec;   INR: 1.17 ratio         PTT - ( 16 Dec 2020 16:26 )  PTT:30.5 sec           Initial (On Arrival)

## 2021-06-10 NOTE — ED ADULT NURSE NOTE - CHPI ED NUR SYMPTOMS NEG
no abrasion/no bleeding/no confusion/no deformity/no fever/no loss of consciousness/no tingling/no vomiting/no weakness

## 2021-06-10 NOTE — H&P ADULT - ATTENDING COMMENTS
Patient seen and evaluated. Chart reviewed. Ms. Arroyo is coming in after a mechanical fall (witnessed by her son). She has a history of recurrent falls but this one prompted her to come in as she had associated back pain & difficulty ambulating (states that she was not able to move her RLE due to the pain). At the time of this evaluation, she reported symptomatic improvement and had no new concerns or complaints except as detailed above. Exam unchanged (again as detailed above). Blood work reviewed - unrevealing (stable renal function). Findings on imaging noted (cerebellar hypodensities on head CT). EKG unrevealing.    Impression:   Fall multifactorial likely 2/2 physical deconditioning, likely underlying peripheral neuropathy & possibly associated w/ a degree of dementia; cerebral ischemic disease (chronic microvascular, chronic infarcts & acute hypodensities as noted on head CT) also likely contributory    Plan:  Can obtain a MRI of the brain (if the patient is able to tolerate the test). However, management is primarily supportive regardless of MRI results: pain control and physical therapy. Future care can be aimed at risk factor reduction to the extent that is possible (long term BP control, glycemic control, cholesterol reduction, a healthy diet, exercise as tolerated & adequate sleep). Fall risk could possibly be mitigated to some extent but it cannot be obviated. Physical therapy can help.    Case and plan of care was discussed with the patient and with her son (at bedside) at length. Any questions were answered and concerns were addressed as they arose. Anticipate a need for 48 hrs of in-pt care pending MRI and PT eval. Ms. Arroyo is coming in after a mechanical fall (witnessed by her son). She has a history of recurrent falls but this one prompted her to come in as she had associated back pain & difficulty ambulating (states that she was not able to move her RLE due to the pain). At the time of this evaluation, she reported symptomatic improvement and had no new concerns or complaints except as detailed above. Exam unchanged (again as detailed above). Blood work reviewed - unrevealing (stable renal function). Findings on imaging noted (cerebellar hypodensities on head CT). EKG unrevealing.    Impression:   Fall multifactorial likely 2/2 physical deconditioning, likely underlying peripheral neuropathy & possibly associated w/ a degree of dementia; cerebral ischemic disease (chronic microvascular, chronic infarcts & ?hypodensities as noted on head CT) is also contributory    Plan:  Can obtain a MRI of the brain (if the patient is able to tolerate the test). However, management is primarily supportive regardless of MRI results: pain control and physical therapy. Future care can be aimed at risk factor reduction to the extent that is possible (long term BP control, glycemic control, cholesterol reduction, a healthy diet, exercise as tolerated & adequate sleep). Fall risk could possibly be mitigated to some extent but it cannot be obviated. Physical therapy can help.    Case and plan of care was discussed with the patient and with her son (at bedside) at length. Any questions were answered and concerns were addressed as they arose. Anticipate a need for 48 hrs of in-pt care pending MRI and PT eval.

## 2021-06-10 NOTE — ED PROVIDER NOTE - PROGRESS NOTE DETAILS
Trauma consult called neurosx consulted endorsed to mar trauma recommends admission to medicine. also consulted neurology regarding ct head findings -  recommends admission to medicine and will follow

## 2021-06-10 NOTE — H&P ADULT - HISTORY OF PRESENT ILLNESS
80 year old female with past medical history of DM II, HTN, and DLD, on asa, presents after a fall.     Spoke with help of Wagner .  As per family and patient she was trying to sit on the bed and missed, fell on a chair and then to floor, hit lower back. She denies any head trauma, loss of consciousness, neck pain, abdominal pain, palpitations, nausea, vomiting, diarrhea, chest pain, fever, rigors or chills. As per the son she has had balance issues for some time now and has had recurrent falls, the only difference now is that this fall was worse and had her ended up in the hospital. He also endorse mild dementia and some forgetfulness but patient able to ambulate around with cane and perform all ADLs.    In ED patient hemodynamically stable, afebrile, trauma workup shows a T11 fracture (No further intervention as per Neurosurgery) and some Focal hypodensities noted within the inferior right cerebellar hemisphere possibly representing age-indeterminate infarcts. At time of interview patient endorses some hip and back pain.

## 2021-06-10 NOTE — ED PROVIDER NOTE - ATTENDING CONTRIBUTION TO CARE
82 y/o female with above stated PMHx presents to ED s/p fall.  As per family and patient she was trying to sit on the bed and missed, fell on a chair and then to floor, hit lower back. She denies any head trauma, loss of consciousness, neck pain, abdominal pain, palpitations, nausea, vomiting, diarrhea, chest pain, fever, rigors or chills. As per the son she has had balance issues for some time now and has had recurrent falls, the only difference now is that this fall was worse and had her ended up in the hospital. He also endorse mild dementia and some forgetfulness but patient able to ambulate around with cane and perform all ADLs.  PE:  agree with above.  A/P:  Fall, r/o fracture.  Labs, meds and imaging.     In ED patient hemodynamically stable, afebrile, trauma workup shows a T11 fracture (No further intervention as per Neurosurgery) and some Focal hypodensities noted within the inferior right cerebellar hemisphere possibly representing age-indeterminate infarcts.

## 2021-06-10 NOTE — H&P ADULT - NSHPSOCIALHISTORY_GEN_ALL_CORE
Lives with family. Family reports no alcohol or illicit drug use Lives with family. Family reports no smoking, alcohol or illicit drug use

## 2021-06-10 NOTE — CONSULT NOTE ADULT - TIME BILLING
Majority of encounter utilized in review of chart, history, imaging, exam of patient and discussion of plan with PA.
examine the patient, review labs and images, discuss the plan of care

## 2021-06-10 NOTE — CONSULT NOTE ADULT - ASSESSMENT
ASSESSMENT:  80y Female  w/ PMHx of DM, HTN, and HLD seen as Trauma Consult s/p mechanical fall w/ -HT, -LOC, +aspirin 80 with complaint of R hip and lower back pain, external signs of trauma include L flank hematoma. Trauma assessment in ED: ABCs intact , GCS 15 , AAOx3,  CISNEROS.     Injuries identified:   - age-indeterminate T11 superior endplate compression deformity    PLAN:   - Trauma Labs: (CBC, BMP, Coags, T&S, UA, EtOH level)  Additional studies:  EKG    Trauma Imaging to include the following:  - CXR, Pelvic Xray  - CT Head,  CT C-spine, CT Chest, CT Abd/Pelvis  - Extremity films: None    Additional consultations:  - Neurosurgery: no acute intervention    Disposition pending results of above labs and imaging  Above plan discussed with Trauma attending, Dr. Rosales , patient, patient family, and ED team  --------------------------------------------------------------------------------------  06-10-21 @ 20:02 ASSESSMENT:  80y Female  w/ PMHx of DM, HTN, and HLD seen as Trauma Consult s/p mechanical fall w/ -HT, -LOC, +aspirin 80 with complaint of R hip and lower back pain, external signs of trauma include L flank hematoma. Trauma assessment in ED: ABCs intact , GCS 15 , AAOx3,  CISNEROS.     Injuries identified:   - age-indeterminate T11 superior endplate compression deformity    PLAN:   - Trauma Labs: (CBC, BMP, Coags, T&S, UA, EtOH level)  Additional studies:  EKG    Trauma Imaging to include the following:  - CXR, Pelvic Xray  - CT Head,  CT C-spine, CT Chest, CT Abd/Pelvis  - Extremity films: None    Additional consultations:  - Neurosurgery: no acute intervention    Disposition pending results of above labs and imaging  Above plan discussed with Trauma attending, Dr. Rosales, patient, patient family, and ED team  --------------------------------------------------------------------------------------  06-10-21 @ 20:02    CTs reviewed, as above. No acute traumatic findings  No acute trauma surgical intervention  Dispo as per ED  If pt admitted, trauma team to perform TTS in AM  Surgical Attending aware and agrees with plan

## 2021-06-10 NOTE — ED PROVIDER NOTE - CLINICAL SUMMARY MEDICAL DECISION MAKING FREE TEXT BOX
In ED patient hemodynamically stable, afebrile, trauma workup shows a T11 fracture (No further intervention as per Neurosurgery) and some Focal hypodensities noted within the inferior right cerebellar hemisphere possibly representing age-indeterminate infarcts. Case d/w Trauma team.  ADMIT medicine.

## 2021-06-10 NOTE — H&P ADULT - NSICDXPASTMEDICALHX_GEN_ALL_CORE_FT
PAST MEDICAL HISTORY:  Dyslipidemia     Hypertension     Recurrent falls     Type II diabetes mellitus

## 2021-06-10 NOTE — CONSULT NOTE ADULT - ATTENDING COMMENTS
This is 79 y/o female  w/ PMHx of DM, HTN, and HLD seen as Trauma Consul s/p mechanical fall w/ -HT, -LOC, +aspirin 80. Trauma assessment in ED: ABCs intact , GCS 15 , AAOx3. Pt was trying to sit on her bed, missed, and fell onto a chair. External signs of injury: L flank hematoma. Complaints of R hip and lower back pain. Pan scan only remarkable for age-indeterminate T11 superior endplate compression deformity. No point tenderness on exam.     PE:  AAO x3  GCS 15.  Neck : no pain to motion    Neuro: moves all 4 extremities.  Chest: clear.  CV : RRR  Abdomen: soft, nontender  Extr: no edema.    All images and labs were reviewed.    ASSESSMENT.  79 y/o female, S/P Fall.  Mild left flank hematoma.'    PLAN:  Patient was observed in ED over several hours and remained hemodynamically and neurologically stable.  Further disposition as per ED. This is 81 y/o female  w/ PMHx of DM, HTN, and HLD seen as Trauma Consul s/p mechanical fall w/ -HT, -LOC, +aspirin 80. Trauma assessment in ED: ABCs intact , GCS 15 , AAOx3. Pt was trying to sit on her bed, missed, and fell onto a chair. External signs of injury: L flank hematoma. Complaints of R hip and lower back pain. Pan scan only remarkable for age-indeterminate T11 superior endplate compression deformity. No point tenderness on exam.     PE:  AAO x3  GCS 15.  Neck : no pain to motion    Neuro: moves all 4 extremities.  Chest: clear.  CV : RRR  Abdomen: soft, nontender  Extr: no edema.    All images and labs were reviewed.    ASSESSMENT.  81 y/o female, S/P Fall.  Mild left flank hematoma.'    PLAN:  Patient was observed in ED over several hours and remained hemodynamically and neurologically stable.  Further disposition as per ED.    This note reflects my exam and care of this patient on 06/10/2021

## 2021-06-10 NOTE — ED PROVIDER NOTE - NS ED ROS FT
Review of Systems:  	•	CONSTITUTIONAL - no fever, no diaphoresis, no chills  	•	SKIN - no rash  	•	HEMATOLOGIC - no bleeding, no bruising  	•	EYES - no eye pain, no blurry vision  	•	ENT - no change in hearing, no sore throat, no ear pain or tinnitus  	•	RESPIRATORY - no shortness of breath, no cough  	•	CARDIAC - no chest pain, no palpitations  	•	GI - no abd pain, no nausea, no vomiting, no diarrhea, no constipation  	•	GENITO-URINARY - no discharge, no dysuria; no hematuria, no increased urinary frequency  	•	MUSCULOSKELETAL - + hip and back pain  	•	NEUROLOGIC - no weakness, no headache, no paresthesias, no LOC  	•	PSYCH - no anxiety, non suicidal, non homicidal, no hallucination, no depression

## 2021-06-10 NOTE — ED PROVIDER NOTE - CARE PLAN
Principal Discharge DX:	Compression fracture of T11 vertebra  Secondary Diagnosis:	Ambulatory dysfunction

## 2021-06-10 NOTE — CONSULT NOTE ADULT - SUBJECTIVE AND OBJECTIVE BOX
HPI:  80 year old female with pmhx of dm, htn, and hld, on asa, presents to ed after fall last night. pt tried to sit on her bed, and missed bed and fell onto chair, then to floor, hit lower back. complaining of lower back pain, and hip pain. pt denies head trauma, loc, neck pain, abd pain, nausea, vomiting, diarrhea.     - Neurosurgery consult placed for evaluation of T11 compression Fx.     PAST MEDICAL & SURGICAL HISTORY:    FAMILY HISTORY:    Allergies :   No Known Allergies    REVIEW OF SYSTEMS : All systems negative other than those listed in HPI  General:	-  Skin/Breast: -  Ophthalmologic: -   ENMT: -  Respiratory and Thorax: -  Cardiovascular: -	  Gastrointestinal: -  Genitourinary:-  Musculoskeletal:	-  Neurological:	-  Psychiatric:	-  Hematology/Lymphatics:	-  Endocrine:-  Allergic/Immunologic:	-    MEDICATIONS  (STANDING):    MEDICATIONS  (PRN):    Vital Signs Last 24 Hrs  T(C): 36.9 (10 Ye 2021 18:58), Max: 36.9 (10 Ye 2021 18:58)  T(F): 98.4 (10 Ye 2021 18:58), Max: 98.4 (10 Ye 2021 18:58)  HR: 89 (10 Ye 2021 18:58) (89 - 91)  BP: 192/115 (10 Ye 2021 18:58) (173/89 - 192/115)  BP(mean): --  RR: 18 (10 Ye 2021 18:58) (18 - 18)  SpO2: 95% (10 Ye 2021 18:58) (95% - 95%)    Physical Exam :  General :   A&O x  Tongue midline  Facial features symmetric, No droop  Speech clear and appropriate, no slur   Pt speaking in full sentences   Follows all commands   Occular :   PERRLA, no aniscoria or nystagmus  EOMI, no deviation or gaze preference   VA intact no diplopia   Motor :   MAEx4 b/l  strength 5/5  Shoulder shrug intact   Full ROM of neck   No spinal point tenderness   Withdraws / Extends to painful stimuli  Sensory :  Intact bilaterally   Cerbellar :  ALEA intact  Finger to nose intact   Pronator Drift :   Hoffmans :     LABS:                        11.9   6.92  )-----------( 214      ( 10 Ye 2021 15:32 )             37.9     06-10    140  |  103  |  38<H>  ----------------------------<  148<H>  4.5   |  26  |  1.3    Ca    9.6      10 Ye 2021 15:32    TPro  6.5  /  Alb  4.3  /  TBili  0.2  /  DBili  x   /  AST  17  /  ALT  13  /  AlkPhos  67  06-10  PT/INR - ( 10 Ye 2021 15:32 )   PT: 12.70 sec;   INR: 1.10 ratio    PTT - ( 10 Ye 2021 15:32 )  PTT:30.0 sec    Urinalysis Basic - ( 10 Ye 2021 16:42 )    Color: Light Yellow / Appearance: Clear / S.014 / pH: x  Gluc: x / Ketone: Negative  / Bili: Negative / Urobili: <2 mg/dL   Blood: x / Protein: 30 mg/dL / Nitrite: Negative   Leuk Esterase: Small / RBC: 1 /HPF / WBC 24 /HPF   Sq Epi: x / Non Sq Epi: 0 /HPF / Bacteria: Negative    RADIOLOGY & ADDITIONAL STUDIES:  < from: CT Head No Cont (06.10.21 @ 15:48) >  IMPRESSION:    CT HEAD:  Focal hypodensities noted within the inferior right cerebellar hemisphere possibly representing age-indeterminate infarcts. Further evaluation can be obtained with MRI of the brain if not clinically contraindicated.    Moderate chronic microvascular-type changes as well as bilateral age-indeterminate basal ganglia lacunar infarcts.    CT CERVICAL SPINE:  No acute fracture or subluxation.    Multilevel degenerative changes as detailed above.    ANETA LR M.D., ATTENDING RADIOLOGIST  This document has been electronically signed. Ye 10 2021  4:13PM    < end of copied text >  < from: CT Cervical Spine No Cont (06.10.21 @ 15:48) >    CT Chest w/ IV Cont   IMPRESSION: Age-indeterminate T11 superior endplate compression deformity. Please correlate for point tenderness.    No pneumothorax or hemothorax.  No evidence for acute abdominal trauma.    REBEKA JAIN MD; Attending Radiologist  This document has been electronically signed. Ye 10 2021  5:59PM    < end of copied text >    Assessment / Plan:   - No acute neurosurgical intervention indicated at this time  - Conservative therapy Pt, pain control   - Rec Neurology consult for new focal hyperdensities likely age indeterm infarcts noted on CTH.   - Will discuss with attending

## 2021-06-10 NOTE — CONSULT NOTE ADULT - ATTENDING COMMENTS
Patient seen and examined.     Significant thoracolumbar pain on palpation. Neurologically intact Lower extremity exam.     Imaging reviewed.  Recommend TLSO brace when OOB/weightbearing, pain control, PT.     IF fails conservative treatment and patient is open to surgical options, then can offer kyphoplasty.

## 2021-06-11 ENCOUNTER — TRANSCRIPTION ENCOUNTER (OUTPATIENT)
Age: 81
End: 2021-06-11

## 2021-06-11 LAB
A1C WITH ESTIMATED AVERAGE GLUCOSE RESULT: 7.1 % — HIGH (ref 4–5.6)
ALBUMIN SERPL ELPH-MCNC: 3.9 G/DL — SIGNIFICANT CHANGE UP (ref 3.5–5.2)
ALP SERPL-CCNC: 71 U/L — SIGNIFICANT CHANGE UP (ref 30–115)
ALT FLD-CCNC: 12 U/L — SIGNIFICANT CHANGE UP (ref 0–41)
ANION GAP SERPL CALC-SCNC: 10 MMOL/L — SIGNIFICANT CHANGE UP (ref 7–14)
AST SERPL-CCNC: 16 U/L — SIGNIFICANT CHANGE UP (ref 0–41)
BASOPHILS # BLD AUTO: 0.03 K/UL — SIGNIFICANT CHANGE UP (ref 0–0.2)
BASOPHILS NFR BLD AUTO: 0.4 % — SIGNIFICANT CHANGE UP (ref 0–1)
BILIRUB SERPL-MCNC: 0.3 MG/DL — SIGNIFICANT CHANGE UP (ref 0.2–1.2)
BUN SERPL-MCNC: 29 MG/DL — HIGH (ref 10–20)
CALCIUM SERPL-MCNC: 9.2 MG/DL — SIGNIFICANT CHANGE UP (ref 8.5–10.1)
CHLORIDE SERPL-SCNC: 102 MMOL/L — SIGNIFICANT CHANGE UP (ref 98–110)
CHOLEST SERPL-MCNC: 177 MG/DL — SIGNIFICANT CHANGE UP
CO2 SERPL-SCNC: 27 MMOL/L — SIGNIFICANT CHANGE UP (ref 17–32)
COVID-19 SPIKE DOMAIN AB INTERP: POSITIVE
COVID-19 SPIKE DOMAIN ANTIBODY RESULT: >250 U/ML — HIGH
CREAT SERPL-MCNC: 1 MG/DL — SIGNIFICANT CHANGE UP (ref 0.7–1.5)
EOSINOPHIL # BLD AUTO: 0.35 K/UL — SIGNIFICANT CHANGE UP (ref 0–0.7)
EOSINOPHIL NFR BLD AUTO: 4.5 % — SIGNIFICANT CHANGE UP (ref 0–8)
ESTIMATED AVERAGE GLUCOSE: 157 MG/DL — HIGH (ref 68–114)
GLUCOSE BLDC GLUCOMTR-MCNC: 128 MG/DL — HIGH (ref 70–99)
GLUCOSE BLDC GLUCOMTR-MCNC: 136 MG/DL — HIGH (ref 70–99)
GLUCOSE BLDC GLUCOMTR-MCNC: 197 MG/DL — HIGH (ref 70–99)
GLUCOSE BLDC GLUCOMTR-MCNC: 289 MG/DL — HIGH (ref 70–99)
GLUCOSE SERPL-MCNC: 116 MG/DL — HIGH (ref 70–99)
HCT VFR BLD CALC: 39.6 % — SIGNIFICANT CHANGE UP (ref 37–47)
HDLC SERPL-MCNC: 48 MG/DL — LOW
HGB BLD-MCNC: 12.6 G/DL — SIGNIFICANT CHANGE UP (ref 12–16)
IMM GRANULOCYTES NFR BLD AUTO: 0.3 % — SIGNIFICANT CHANGE UP (ref 0.1–0.3)
LIPID PNL WITH DIRECT LDL SERPL: 109 MG/DL — HIGH
LYMPHOCYTES # BLD AUTO: 1.31 K/UL — SIGNIFICANT CHANGE UP (ref 1.2–3.4)
LYMPHOCYTES # BLD AUTO: 16.8 % — LOW (ref 20.5–51.1)
MAGNESIUM SERPL-MCNC: 2 MG/DL — SIGNIFICANT CHANGE UP (ref 1.8–2.4)
MCHC RBC-ENTMCNC: 27.5 PG — SIGNIFICANT CHANGE UP (ref 27–31)
MCHC RBC-ENTMCNC: 31.8 G/DL — LOW (ref 32–37)
MCV RBC AUTO: 86.3 FL — SIGNIFICANT CHANGE UP (ref 81–99)
MONOCYTES # BLD AUTO: 0.6 K/UL — SIGNIFICANT CHANGE UP (ref 0.1–0.6)
MONOCYTES NFR BLD AUTO: 7.7 % — SIGNIFICANT CHANGE UP (ref 1.7–9.3)
NEUTROPHILS # BLD AUTO: 5.51 K/UL — SIGNIFICANT CHANGE UP (ref 1.4–6.5)
NEUTROPHILS NFR BLD AUTO: 70.3 % — SIGNIFICANT CHANGE UP (ref 42.2–75.2)
NON HDL CHOLESTEROL: 129 MG/DL — SIGNIFICANT CHANGE UP
NRBC # BLD: 0 /100 WBCS — SIGNIFICANT CHANGE UP (ref 0–0)
PHOSPHATE SERPL-MCNC: 3.7 MG/DL — SIGNIFICANT CHANGE UP (ref 2.1–4.9)
PLATELET # BLD AUTO: 208 K/UL — SIGNIFICANT CHANGE UP (ref 130–400)
POTASSIUM SERPL-MCNC: 4.3 MMOL/L — SIGNIFICANT CHANGE UP (ref 3.5–5)
POTASSIUM SERPL-SCNC: 4.3 MMOL/L — SIGNIFICANT CHANGE UP (ref 3.5–5)
PROT SERPL-MCNC: 6.2 G/DL — SIGNIFICANT CHANGE UP (ref 6–8)
RBC # BLD: 4.59 M/UL — SIGNIFICANT CHANGE UP (ref 4.2–5.4)
RBC # FLD: 14.1 % — SIGNIFICANT CHANGE UP (ref 11.5–14.5)
SARS-COV-2 IGG+IGM SERPL QL IA: >250 U/ML — HIGH
SARS-COV-2 IGG+IGM SERPL QL IA: POSITIVE
SODIUM SERPL-SCNC: 139 MMOL/L — SIGNIFICANT CHANGE UP (ref 135–146)
TRIGL SERPL-MCNC: 128 MG/DL — SIGNIFICANT CHANGE UP
WBC # BLD: 7.82 K/UL — SIGNIFICANT CHANGE UP (ref 4.8–10.8)
WBC # FLD AUTO: 7.82 K/UL — SIGNIFICANT CHANGE UP (ref 4.8–10.8)

## 2021-06-11 PROCEDURE — 70549 MR ANGIOGRAPH NECK W/O&W/DYE: CPT | Mod: 26

## 2021-06-11 PROCEDURE — 70544 MR ANGIOGRAPHY HEAD W/O DYE: CPT | Mod: 26,59

## 2021-06-11 PROCEDURE — 99233 SBSQ HOSP IP/OBS HIGH 50: CPT

## 2021-06-11 PROCEDURE — 99254 IP/OBS CNSLTJ NEW/EST MOD 60: CPT

## 2021-06-11 PROCEDURE — 70551 MRI BRAIN STEM W/O DYE: CPT | Mod: 26

## 2021-06-11 RX ORDER — ATORVASTATIN CALCIUM 80 MG/1
1 TABLET, FILM COATED ORAL
Qty: 30 | Refills: 0
Start: 2021-06-11 | End: 2021-07-10

## 2021-06-11 RX ORDER — LISINOPRIL 2.5 MG/1
5 TABLET ORAL ONCE
Refills: 0 | Status: COMPLETED | OUTPATIENT
Start: 2021-06-11 | End: 2021-06-11

## 2021-06-11 RX ORDER — METHOCARBAMOL 500 MG/1
1 TABLET, FILM COATED ORAL
Qty: 28 | Refills: 0
Start: 2021-06-11 | End: 2021-06-24

## 2021-06-11 RX ORDER — ACETAMINOPHEN 500 MG
2 TABLET ORAL
Qty: 112 | Refills: 0
Start: 2021-06-11 | End: 2021-06-24

## 2021-06-11 RX ORDER — LISINOPRIL 2.5 MG/1
10 TABLET ORAL DAILY
Refills: 0 | Status: DISCONTINUED | OUTPATIENT
Start: 2021-06-12 | End: 2021-06-15

## 2021-06-11 RX ORDER — HYDRALAZINE HCL 50 MG
10 TABLET ORAL ONCE
Refills: 0 | Status: COMPLETED | OUTPATIENT
Start: 2021-06-11 | End: 2021-06-11

## 2021-06-11 RX ORDER — METHOCARBAMOL 500 MG/1
500 TABLET, FILM COATED ORAL
Refills: 0 | Status: DISCONTINUED | OUTPATIENT
Start: 2021-06-11 | End: 2021-06-18

## 2021-06-11 RX ORDER — ATORVASTATIN CALCIUM 80 MG/1
0 TABLET, FILM COATED ORAL
Qty: 0 | Refills: 3 | DISCHARGE

## 2021-06-11 RX ADMIN — LISINOPRIL 5 MILLIGRAM(S): 2.5 TABLET ORAL at 05:16

## 2021-06-11 RX ADMIN — Medication 10 MILLIGRAM(S): at 02:37

## 2021-06-11 RX ADMIN — ENOXAPARIN SODIUM 40 MILLIGRAM(S): 100 INJECTION SUBCUTANEOUS at 12:16

## 2021-06-11 RX ADMIN — ATORVASTATIN CALCIUM 40 MILLIGRAM(S): 80 TABLET, FILM COATED ORAL at 21:54

## 2021-06-11 RX ADMIN — Medication 81 MILLIGRAM(S): at 12:16

## 2021-06-11 RX ADMIN — LISINOPRIL 5 MILLIGRAM(S): 2.5 TABLET ORAL at 09:15

## 2021-06-11 RX ADMIN — METHOCARBAMOL 500 MILLIGRAM(S): 500 TABLET, FILM COATED ORAL at 17:11

## 2021-06-11 NOTE — PHYSICAL THERAPY INITIAL EVALUATION ADULT - ADDITIONAL COMMENTS
Pt has 4 steps to enter, 0 Step inside. Pt used Standard Cane at home. Will require rolling walker. Son present for translation.

## 2021-06-11 NOTE — DISCHARGE NOTE PROVIDER - NSDCCPCAREPLAN_GEN_ALL_CORE_FT
PRINCIPAL DISCHARGE DIAGNOSIS  Diagnosis: Compression fracture of T11 vertebra  Assessment and Plan of Treatment: You presented for a fall on your back. Imaging showed a fracture of t11 vertebra. Neurosurgeons assessed you and there was no need for surgical intervention at this point. You will managed conservatilvely, however of you have any difficulty walking or moving your lower extremities please head to the nearest ED.      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 This area of the brain is responsible for the movement coordination and that might contribute to your falls. The neurologist assessed you and ordered few more images.     PRINCIPAL DISCHARGE DIAGNOSIS  Diagnosis: Compression fracture of T11 vertebra  Assessment and Plan of Treatment: You presented for a fall on your back. Imaging showed a fracture of t11 vertebra. Neurosurgeons assessed you and there was no need for surgical intervention at this point. You will managed conservatilvely, however of you have any difficulty walking or moving your lower extremities please head to the nearest ED. Please keep the abdominal binder and follow up with neuerosurgery.      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 This area of the brain is responsible for the movement coordination and that might contribute to your falls. The neurologist assessed you and ordered few more images.     PRINCIPAL DISCHARGE DIAGNOSIS  Diagnosis: Compression fracture of T11 vertebra  Assessment and Plan of Treatment: You presented for a fall on your back. Imaging showed a fracture of T11 vertebra. Neurosurgeons assessed you and there was no need for surgical intervention at this point. You will be managed conservatilvely. However, if you have any difficulty walking or moving your lower extremities, please head to the nearest ED. You will be discharged with a TLSO brace.      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 No intervention was able to done at this time. Please follow up with neurosurgery, neuroendovascular, and vascular surgery as an outpatient.

## 2021-06-11 NOTE — DISCHARGE NOTE PROVIDER - PROVIDER TOKENS
PROVIDER:[TOKEN:[[id number]],FOLLOWUP:[Routine]],PROVIDER:[TOKEN:[[id number]],FOLLOWUP:[Routine]] PROVIDER:[TOKEN:[[id number]],FOLLOWUP:[2 weeks],ESTABLISHEDPATIENT:[T]],PROVIDER:[TOKEN:[[id number]],FOLLOWUP:[2 weeks],ESTABLISHEDPATIENT:[T]],PROVIDER:[TOKEN:[[id number]],FOLLOWUP:[1 week],ESTABLISHEDPATIENT:[T]],FREE:[LAST:[Primary Care Provider],PHONE:[(   )    -],FAX:[(   )    -],FOLLOWUP:[1 week],ESTABLISHEDPATIENT:[T]]

## 2021-06-11 NOTE — CHART NOTE - NSCHARTNOTEFT_GEN_A_CORE
Tertiary Trauma Survey (TTS)    Date of TTS: 21 @ 12:21   Admit Date: 06-10-21  Trauma Activation: Trauma Consult   Admit GCS:   15        HPI:  80 year old female with past medical history of DM II, HTN, and DLD, on asa, presents after a fall -HT, -LOC, -AC.    Spoke to patient with Son present who speaks english. Vietnamese  used.   As per son who was present during fall- patient was trying to sit on the bed and missed, fell on a chair and then to floor, hit lower back. She denies any head trauma, loss of consciousness, neck pain, abdominal pain, palpitations, nausea, vomiting, diarrhea, chest pain, fever, rigors or chills. As per the son she has had balance issues for some time now and has had recurrent falls, the only difference now is that this fall was worse and had her ended up in the hospital. He also endorse mild dementia and some forgetfulness but patient able to ambulate around with cane and perform all ADLs.    In ED patient hemodynamically stable, afebrile, trauma workup shows a T11 fracture (No further intervention as per Neurosurgery) and some Focal hypodensities noted within the inferior right cerebellar hemisphere possibly representing age-indeterminate infarcts. At time of interview patient endorses some hip and back pain. (10 Ye 2021 21:44)    Patient seen and examined.     PHYSICAL EXAM:  General: NAD  HEENT: Normocephalic, atraumatic, EOMI, PEERLA. no scalp lacerations   Neck: Soft, midline trachea. no c-spine tenderness, full ROM of neck   Chest: No chest wall tenderness, no subcutaneous emphysema   Cardiac: S1, S2, RRR  Respiratory: Bilateral breath sounds, clear and equal bilaterally, symmetric chest rise, no labored breathing   Abdomen: Soft, non-distended, non-tender, no rebound, no guarding.  Groin: Normal appearing, pelvis stable   Ext:  Moving b/l upper and lower extremities. Palpable Radial b/l UE, b/l DP palpable in LE.   Back: No T/L/S spine tenderness, No palpable runoff/stepoff/deformity      Vital Signs Last 24 Hrs  T(C): 36.3 (2021 08:36), Max: 36.9 (10 Ye 2021 18:58)  T(F): 97.4 (2021 08:36), Max: 98.4 (10 Ye 2021 18:58)  HR: 88 (2021 09:14) (74 - 91)  BP: 158/81 (2021 09:14) (158/81 - 202/100)  RR: 18 (2021 09:14) (18 - 18)  SpO2: 96% (10 Ye 2021 23:30) (95% - 96%)    Labs:  CAPILLARY BLOOD GLUCOSE      POCT Blood Glucose.: 289 mg/dL (2021 11:22)  POCT Blood Glucose.: 128 mg/dL (2021 08:09)  POCT Blood Glucose.: 157 mg/dL (10 Ye 2021 14:55)                          12.6   7.82  )-----------( 208      ( 2021 06:02 )             39.6       Auto Neutrophil %: 70.3 % (21 @ 06:02)  Auto Immature Granulocyte %: 0.3 % (21 @ 06:02)  Auto Neutrophil %: 69.7 % (06-10-21 @ 15:32)  Auto Immature Granulocyte %: 0.4 % (06-10-21 @ 15:32)        139  |  102  |  29<H>  ----------------------------<  116<H>  4.3   |  27  |  1.0      Calcium, Total Serum: 9.2 mg/dL (21 @ 06:02)      LFTs:             6.2  | 0.3  | 16       ------------------[71      ( 2021 06:02 )  3.9  | x    | 12          Lipase:x      Amylase:x          Coags:     12.70  ----< 1.10    ( 10 Ye 2021 15:32 )     30.0        Urinalysis Basic - ( 10 Ye 2021 16:42 )    Color: Light Yellow / Appearance: Clear / S.014 / pH: x  Gluc: x / Ketone: Negative  / Bili: Negative / Urobili: <2 mg/dL   Blood: x / Protein: 30 mg/dL / Nitrite: Negative   Leuk Esterase: Small / RBC: 1 /HPF / WBC 24 /HPF   Sq Epi: x / Non Sq Epi: 0 /HPF / Bacteria: Negative      RADIOLOGICAL FINDINGS REVIEW:  CXR: < from: Xray Chest 1 View-PORTABLE IMMEDIATE (Xray Chest 1 View-PORTABLE IMMEDIATE .) (06.10.21 @ 17:01) >  Left midlung linear atelectasis.      Pelvis Films: < from: Xray Pelvis AP only (06.10.21 @ 17:01) >  Osteopenia. No evidence of acute displaced fracture. Degenerative changes of the spine and hips.    C-Spine Films: < from: CT Cervical Spine No Cont (06.10.21 @ 15:48) >    CT HEAD:  Focal hypodensities noted within the inferior right cerebellar hemisphere possibly representing age-indeterminate infarcts. Further evaluation can be obtained with MRI of the brain if not clinically contraindicated.    Moderate chronic microvascular-type changes as well as bilateral age-indeterminate basal ganglia lacunar infarcts.    CT CERVICAL SPINE:  No acute fracture or subluxation.  Multilevel degenerative changes as detailed above.      Chest CT: < from: CT Chest w/ IV Cont (06.10.21 @ 17:35) >    IMPRESSION: Age-indeterminate T11 superior endplate compression deformity. Please correlate for point tenderness.  No pneumothorax or hemothorax.  No evidence for acute abdominal trauma.        ASSESSMENT/ PLAN:   80yF  seen as  Trauma Consult s/p fall from standing with -HT, -LOC, -AC.  Trauma assessment in ED: ABCs intact , GCS 15 , AAOx3 , with physical exam findings, imaging, and labs as documented above, injuries are identified: age indeterminate T11 superior endplate compression fx.       - All images/reports reviewed. No further traumatic work-up warranted.

## 2021-06-11 NOTE — DISCHARGE NOTE PROVIDER - NSDCFUADDINST_GEN_ALL_CORE_FT
Please follow up with your primary care provider within 1 week of discharge.    Please follow up with neuroendovascular Dr. Byers within 1 week of discharge.    Please follow up with neurosurgery Dr. Guadarrama within 1-2 weeks of discharge.    Please follow up with vascular surgery Dr. Trimble within 1-2 weeks of discharge.    Return to the ED immediately if you experience chest pain, shortness of breath, severe headache, slurred speech, facial droop, weakness, numbness, difficulty ambulating, or another fall.

## 2021-06-11 NOTE — PHYSICAL THERAPY INITIAL EVALUATION ADULT - PERTINENT HX OF CURRENT PROBLEM, REHAB EVAL
Pt adm for fall at home when she tried to turn and sit in the bed, workup + T11 Compression fx, no intervention as per NS.

## 2021-06-11 NOTE — DISCHARGE NOTE NURSING/CASE MANAGEMENT/SOCIAL WORK - PATIENT PORTAL LINK FT
You can access the FollowMyHealth Patient Portal offered by [facility] by registering at the following website: [email or web address]. By joining [facility]’s FollowMyHealth portal, you will also be able to view your health information using other applications (apps) compatible with our system.

## 2021-06-11 NOTE — DISCHARGE NOTE PROVIDER - NSDCMRMEDTOKEN_GEN_ALL_CORE_FT
aspirin 81 mg oral tablet, chewable: 1 tab(s) orally once a day  atorvastatin 40 mg oral tablet: 1 tab(s) orally once a day (at bedtime)  lisinopril 5 mg tablet: TAKE ONE TABLET every day  metformin 500 mg tablet: TAKE ONE TABLET BY MOUTH TWICE DAILY WITH MEALS  methocarbamol 500 mg oral tablet: 1 tab(s) orally 2 times a day  Tylenol 325 mg oral tablet: 2 tab(s) orally every 6 hours, As needed, Moderate Pain (4 - 6)   aspirin 81 mg oral tablet, chewable: 1 tab(s) orally once a day  atorvastatin 40 mg oral tablet: 1 tab(s) orally once a day (at bedtime)  lisinopril 10 mg oral tablet: 1 tab(s) orally once a day  metformin 500 mg tablet: TAKE ONE TABLET BY MOUTH TWICE DAILY WITH MEALS  methocarbamol 500 mg oral tablet: 1 tab(s) orally 2 times a day  NIFEdipine 30 mg oral tablet, extended release: 1 tab(s) orally once a day  Tylenol 325 mg oral tablet: 2 tab(s) orally every 6 hours, As needed, Moderate Pain (4 - 6)

## 2021-06-11 NOTE — PROGRESS NOTE ADULT - ASSESSMENT
· Assessment	  80 year old female with past medical history of DM II, HTN, and DLD, on asa, presents after a fall.     Patient has an age indeterminate fracture of T11, will get PT eval for frequent falls and will order an MRI for age indeterminate lesions on the CT scan. Spoke with son over the phone and answered all questions    # Frequent falls; physical deconditioning  # Age indeterminate T11 fracture  - Mechanical fall with history of poor balance  - CT Abdomen showed Age-indeterminate T11 superior endplate compression deformity.  - Rest of trauma work up negative except for cervical spinal stenosis and degenerative changes  -pain management   - No intervention as per Neurosurgery  - PT Evaluation    # Focal hypodensities in inferior right cerebellar hemisphere  # Multiple Lacunar infarcts  - CT Head remarkable for lacunar infarcts in periventricular area and focal hypodensities in inferior right cerebellar hemisphere  - Might explain the persistent poor balance that patient has  - on aspirin   cs neurology   mri , mra and stroke w/up     # DM II w/ hyperglycemia & possibly neuropathy  - On Metformin at home  - Son saying doctor told them to hold it  - Monitor fingersticks    # HTN   - Continue Lisinopril 5    # DLD  - Continue Statin    # Possible CKD 3 likely diabetic nephropathy  - Stable renal function    DVT: Lovenox  GI: Not indicated  Dispo: Pending work up for strokes

## 2021-06-11 NOTE — DISCHARGE NOTE PROVIDER - CARE PROVIDER_API CALL
Stewart Carson)  Neuromuscular Medicine  [address] [place] [address], [address]  [place], [place] [id number]  Phone: [phone]  Fax: [phone]  Follow Up Time: Routine    Jennifer Guadarrama)  [facility]  [address] [place] [address]  Phone: [phone]  Fax: [phone]  Follow Up Time: Routine   Jennifer Guadarrama)  [facility]  [facility] [place] [address]  Phone: [phone]  Fax: [phone]  Established Patient  Follow Up Time: 2 weeks    Iam Trimble  Vascular Surgery  [address] [place] [address]  Phone: [phone]  Fax: [phone]  Established Patient  Follow Up Time: 2 weeks    Rajinder Byers)  Neurology; Vascular Neurology  [address] [place] [address], [address] [place] [id number]  Phone: [phone]  Fax: [phone]  Established Patient  Follow Up Time: 1 week    Primary Care Provider,   Phone: (   )    -  Fax: (   )    -  Established Patient  Follow Up Time: 1 week

## 2021-06-11 NOTE — DISCHARGE NOTE PROVIDER - CARE PROVIDERS DIRECT ADDRESSES
,[email or web address],DirectAddress_Unknown ,DirectAddress_Unknown,[email or web address],DirectAddress_Unknown

## 2021-06-11 NOTE — DISCHARGE NOTE PROVIDER - HOSPITAL COURSE
80 year old female with past medical history of DM II, HTN, and DLD, on asa, presents after a fall.     Spoke with help of [place] .  As per family and patient she was trying to sit on the bed and missed, fell on a chair and then to floor, hit lower back. She denies any head trauma, loss of consciousness, neck pain, abdominal pain, palpitations, nausea, vomiting, diarrhea, chest pain, fever, rigors or chills. As per the son she has had balance issues for some time now and has had recurrent falls, the only difference now is that this fall was worse and had her ended up in the hospital. He also endorse mild dementia and some forgetfulness but patient able to ambulate around with cane and perform all ADLs.    In ED patient hemodynamically stable, afebrile, trauma workup shows a T11 fracture (No further intervention as per Neurosurgery) and some Focal hypodensities noted within the inferior right cerebellar hemisphere possibly representing age-indeterminate infarcts. At time of interview patient endorses some hip and back pain.   80 F with PMH of HTN, HLD, and DM presented after a mechanical fall (witnessed by her son). Admitted to medicine with an age-indeterminate T11 superior endplate compression deformity. Evaluated by neurosurgery -> no acute neurosurgical intervention. Evaluated by PT and will be discharged with TLSO brace. CT head N/C showed focal hypodensities in inferior right cerebellar hemisphere but MRI brain N/C showed no evidence of recent infarct or acute intracranial hemorrhage. Found to have a 12 mm left MCA aneurysm as part of stroke workup. Neuroendovascular was consulted. Went for intracranial aneurysm embolization with neuro-IR but no intervention was done. Patient to follow up with neurology, vascular surgery, and neurosurgery as outpatient. The patient is medically stable for discharge. Medication reconciliation and plan discussed with attending on day of discharge.               80 F with PMH of HTN, HLD, and DM presented after a mechanical fall (witnessed by her son). Admitted to medicine with an age-indeterminate T11 superior endplate compression deformity. Evaluated by neurosurgery -> no acute neurosurgical intervention. Evaluated by PT and will be discharged with TLSO brace. CT head N/C showed focal hypodensities in inferior right cerebellar hemisphere but MRI brain N/C showed no evidence of recent infarct or acute intracranial hemorrhage. Found to have a 12 mm left MCA aneurysm on CTA as part of stroke workup. Neuroendovascular was consulted. Went for intracranial aneurysm embolization with neuro-IR but no intervention was done. Patient to follow up with neurology, vascular surgery, and neurosurgery as outpatient. The patient is medically stable for discharge. Medication reconciliation and plan discussed with attending on day of discharge.               80 F with PMH of HTN, HLD, and DM presented after a mechanical fall (witnessed by her son). Admitted to medicine with an age-indeterminate T11 superior endplate compression deformity. Evaluated by neurosurgery -> no acute neurosurgical intervention. Evaluated by PT and will be discharged with TLSO brace. CT head N/C showed focal hypodensities in inferior right cerebellar hemisphere but MRI brain N/C showed no evidence of recent infarct or acute intracranial hemorrhage. Found to have a 12 mm left MCA aneurysm on CTA as part of stroke workup. Neuroendovascular was consulted. Went for intracranial aneurysm embolization with neuro-IR but no intervention was done. Patient to follow up with neurology, vascular surgery, and neurosurgery as outpatient. The patient is medically stable for discharge. Medication reconciliation and plan discussed with attending on day of discharge.  Attending Attestation:  Patient was seen & examined independently. At least 10 systems were reviewed in ROS. All systems reviewed  are within normal limits. Latest vital signs and labs were reviewed today. Case was discussed with house staff in morning rounds for assessment and plan.  Patient is medically stable for discharge . About 32 mins spent on discharge disposition.

## 2021-06-11 NOTE — PHYSICAL THERAPY INITIAL EVALUATION ADULT - PLANNED THERAPY INTERVENTIONS, PT EVAL
balance training/bed mobility training/gait training/joint mobilization/ROM/strengthening/transfer training

## 2021-06-11 NOTE — PHYSICAL THERAPY INITIAL EVALUATION ADULT - IMPAIRMENTS FOUND, PT EVAL
aerobic capacity/endurance/fine motor/gait, locomotion, and balance/gross motor/integumentary integrity/muscle strength

## 2021-06-12 LAB
ALBUMIN SERPL ELPH-MCNC: 4.1 G/DL — SIGNIFICANT CHANGE UP (ref 3.5–5.2)
ALP SERPL-CCNC: 73 U/L — SIGNIFICANT CHANGE UP (ref 30–115)
ALT FLD-CCNC: 12 U/L — SIGNIFICANT CHANGE UP (ref 0–41)
ANION GAP SERPL CALC-SCNC: 9 MMOL/L — SIGNIFICANT CHANGE UP (ref 7–14)
AST SERPL-CCNC: 16 U/L — SIGNIFICANT CHANGE UP (ref 0–41)
BILIRUB SERPL-MCNC: 0.4 MG/DL — SIGNIFICANT CHANGE UP (ref 0.2–1.2)
BUN SERPL-MCNC: 31 MG/DL — HIGH (ref 10–20)
CALCIUM SERPL-MCNC: 9.4 MG/DL — SIGNIFICANT CHANGE UP (ref 8.5–10.1)
CHLORIDE SERPL-SCNC: 97 MMOL/L — LOW (ref 98–110)
CO2 SERPL-SCNC: 29 MMOL/L — SIGNIFICANT CHANGE UP (ref 17–32)
CREAT SERPL-MCNC: 1.2 MG/DL — SIGNIFICANT CHANGE UP (ref 0.7–1.5)
GLUCOSE BLDC GLUCOMTR-MCNC: 134 MG/DL — HIGH (ref 70–99)
GLUCOSE BLDC GLUCOMTR-MCNC: 144 MG/DL — HIGH (ref 70–99)
GLUCOSE BLDC GLUCOMTR-MCNC: 173 MG/DL — HIGH (ref 70–99)
GLUCOSE BLDC GLUCOMTR-MCNC: 214 MG/DL — HIGH (ref 70–99)
GLUCOSE SERPL-MCNC: 134 MG/DL — HIGH (ref 70–99)
HCT VFR BLD CALC: 40.7 % — SIGNIFICANT CHANGE UP (ref 37–47)
HGB BLD-MCNC: 12.9 G/DL — SIGNIFICANT CHANGE UP (ref 12–16)
MCHC RBC-ENTMCNC: 27.1 PG — SIGNIFICANT CHANGE UP (ref 27–31)
MCHC RBC-ENTMCNC: 31.7 G/DL — LOW (ref 32–37)
MCV RBC AUTO: 85.5 FL — SIGNIFICANT CHANGE UP (ref 81–99)
NRBC # BLD: 0 /100 WBCS — SIGNIFICANT CHANGE UP (ref 0–0)
PLATELET # BLD AUTO: 216 K/UL — SIGNIFICANT CHANGE UP (ref 130–400)
POTASSIUM SERPL-MCNC: 4.4 MMOL/L — SIGNIFICANT CHANGE UP (ref 3.5–5)
POTASSIUM SERPL-SCNC: 4.4 MMOL/L — SIGNIFICANT CHANGE UP (ref 3.5–5)
PROT SERPL-MCNC: 6.5 G/DL — SIGNIFICANT CHANGE UP (ref 6–8)
RBC # BLD: 4.76 M/UL — SIGNIFICANT CHANGE UP (ref 4.2–5.4)
RBC # FLD: 14 % — SIGNIFICANT CHANGE UP (ref 11.5–14.5)
SODIUM SERPL-SCNC: 135 MMOL/L — SIGNIFICANT CHANGE UP (ref 135–146)
WBC # BLD: 8.57 K/UL — SIGNIFICANT CHANGE UP (ref 4.8–10.8)
WBC # FLD AUTO: 8.57 K/UL — SIGNIFICANT CHANGE UP (ref 4.8–10.8)

## 2021-06-12 PROCEDURE — 93306 TTE W/DOPPLER COMPLETE: CPT | Mod: 26

## 2021-06-12 PROCEDURE — 70496 CT ANGIOGRAPHY HEAD: CPT | Mod: 26

## 2021-06-12 PROCEDURE — 99233 SBSQ HOSP IP/OBS HIGH 50: CPT

## 2021-06-12 PROCEDURE — 70498 CT ANGIOGRAPHY NECK: CPT | Mod: 26

## 2021-06-12 RX ORDER — SODIUM CHLORIDE 9 MG/ML
1000 INJECTION INTRAMUSCULAR; INTRAVENOUS; SUBCUTANEOUS
Refills: 0 | Status: DISCONTINUED | OUTPATIENT
Start: 2021-06-12 | End: 2021-06-13

## 2021-06-12 RX ADMIN — SODIUM CHLORIDE 70 MILLILITER(S): 9 INJECTION INTRAMUSCULAR; INTRAVENOUS; SUBCUTANEOUS at 19:05

## 2021-06-12 RX ADMIN — Medication 81 MILLIGRAM(S): at 11:04

## 2021-06-12 RX ADMIN — METHOCARBAMOL 500 MILLIGRAM(S): 500 TABLET, FILM COATED ORAL at 05:16

## 2021-06-12 RX ADMIN — LISINOPRIL 10 MILLIGRAM(S): 2.5 TABLET ORAL at 05:16

## 2021-06-12 RX ADMIN — ATORVASTATIN CALCIUM 40 MILLIGRAM(S): 80 TABLET, FILM COATED ORAL at 21:07

## 2021-06-12 RX ADMIN — ENOXAPARIN SODIUM 40 MILLIGRAM(S): 100 INJECTION SUBCUTANEOUS at 11:05

## 2021-06-12 RX ADMIN — METHOCARBAMOL 500 MILLIGRAM(S): 500 TABLET, FILM COATED ORAL at 17:00

## 2021-06-12 NOTE — CHART NOTE - NSCHARTNOTEFT_GEN_A_CORE
PT seen and examined by myself and Dr. Guadarrama at bedside. PTs exam remains unchanged from ED.   - Recommend TLSO brace   - PT may follow up outpatient with Dr. Guadarrama after discharge  - No neurosurgical intervention indicated at this time  - Spoke with medical resident and made aware

## 2021-06-12 NOTE — PROGRESS NOTE ADULT - ASSESSMENT
80 year old female with past medical history of DM II, HTN, and DLD, on asa, presents after a fall.     Patient has an age indeterminate fracture of T11, will get PT eval for frequent falls and will order an MRI for age indeterminate lesions on the CT scan. Spoke with son over the phone and answered all questions    # Frequent falls; physical deconditioning  # Age indeterminate T11 fracture  - Mechanical fall with history of poor balance  - CT Abdomen showed Age-indeterminate T11 superior endplate compression deformity.  - Rest of trauma work up negative except for cervical spinal stenosis and degenerative changes  -pain management   - No intervention as per Neurosurgery  - PT Evaluation    # Focal hypodensities in inferior right cerebellar hemisphere  # Multiple Lacunar infarcts  - CT Head remarkable for lacunar infarcts in periventricular area and focal hypodensities in inferior right cerebellar hemisphere  - Might explain the persistent poor balance that patient has  - on aspirin   cs neurology   mri , mra and stroke w/up  pending results     # DM II w/ hyperglycemia & possibly neuropathy  - On Metformin at home  - Son saying doctor told them to hold it  - Monitor fingersticks    # HTN   - Continue Lisinopril 5    # DLD  - Continue Statin    # Possible CKD 3 likely diabetic nephropathy  - Stable renal function    DVT: Lovenox  GI: Not indicated  Dispo: Pending work up for strokes

## 2021-06-12 NOTE — CONSULT NOTE ADULT - SUBJECTIVE AND OBJECTIVE BOX
GENERAL SURGERY CONSULT NOTE    Patient: REYNA FOWLER , 80y (39)Female   MRN: [id number]  Location: [address]  Visit: 06-10-21 Inpatient  Date: 21 @ 13:54    HPI:  80 year old female with past medical history of DM II, HTN, and DLD, on asa, presents after a fall.     Spoke with help of Ukrainian .  As per family and patient she was trying to sit on the bed and missed, fell on a chair and then to floor, hit lower back. She denies any head trauma, loss of consciousness, neck pain, abdominal pain, palpitations, nausea, vomiting, diarrhea, chest pain, fever, rigors or chills. As per the son she has had balance issues for some time now and has had recurrent falls, the only difference now is that this fall was worse and had her ended up in the hospital. He also endorse mild dementia and some forgetfulness but patient able to ambulate around with cane and perform all ADLs.    In ED patient hemodynamically stable, afebrile, trauma workup shows a T11 fracture (No further intervention as per Neurosurgery) and some Focal hypodensities noted within the inferior right cerebellar hemisphere possibly representing age-indeterminate infarcts. At time of interview patient endorses some hip and back pain. (10 Ye 2021 21:44)      PAST MEDICAL & SURGICAL HISTORY:  Type II diabetes mellitus    Hypertension    Dyslipidemia    Recurrent falls    History of hernia surgery        Home Medications:  aspirin 81 mg oral tablet, chewable: 1 tab(s) orally once a day (10 Ye 2021 21:44)  lisinopril 5 mg tablet: TAKE ONE TABLET every day (10 Ye 2021 21:44)  metformin 500 mg tablet: TAKE ONE TABLET BY MOUTH TWICE DAILY WITH MEALS (10 Ye 2021 21:44)        VITALS:  T(F): 97.5 (21 @ 08:15), Max: 97.7 (21 @ 00:00)  HR: 80 (21 @ 08:15) (78 - 86)  BP: 150/87 (21 @ 08:15) (134/71 - 150/87)  RR: 19 (21 @ 08:15) (18 - 19)  SpO2: --    PHYSICAL EXAM:  General: NAD, AAOx3, calm and cooperative  HEENT: NCAT, ABBY, EOMI, Trachea ML, Neck supple  Cardiac: RRR S1, S2, no Murmurs, rubs or gallops  Respiratory: CTAB, normal respiratory effort, breath sounds equal BL, no wheeze, rhonchi or crackles  Abdomen: Soft, non-distended, non-tender, no rebound, no guarding. +BS.      MEDICATIONS  (STANDING):  aspirin  chewable 81 milliGRAM(s) Oral daily  atorvastatin 40 milliGRAM(s) Oral at bedtime  enoxaparin Injectable 40 milliGRAM(s) SubCutaneous daily  lisinopril 10 milliGRAM(s) Oral daily  methocarbamol 500 milliGRAM(s) Oral two times a day    MEDICATIONS  (PRN):  acetaminophen   Tablet .. 650 milliGRAM(s) Oral every 6 hours PRN Moderate Pain (4 - 6)      LAB/STUDIES:                        12.9   8.57  )-----------( 216      ( 2021 07:35 )             40.7     06-12    135  |  97<L>  |  31<H>  ----------------------------<  134<H>  4.4   |  29  |  1.2    Ca    9.4      2021 07:35  Phos  3.7     06-11  Mg     2.0     06-11    TPro  6.5  /  Alb  4.1  /  TBili  0.4  /  DBili  x   /  AST  16  /  ALT  12  /  AlkPhos  73  06-12    PT/INR - ( 10 Ye 2021 15:32 )   PT: 12.70 sec;   INR: 1.10 ratio         PTT - ( 10 Ye 2021 15:32 )  PTT:30.0 sec  LIVER FUNCTIONS - ( 2021 07:35 )  Alb: 4.1 g/dL / Pro: 6.5 g/dL / ALK PHOS: 73 U/L / ALT: 12 U/L / AST: 16 U/L / GGT: x           Urinalysis Basic - ( 10 Ye 2021 16:42 )    Color: Light Yellow / Appearance: Clear / S.014 / pH: x  Gluc: x / Ketone: Negative  / Bili: Negative / Urobili: <2 mg/dL   Blood: x / Protein: 30 mg/dL / Nitrite: Negative   Leuk Esterase: Small / RBC: 1 /HPF / WBC 24 /HPF   Sq Epi: x / Non Sq Epi: 0 /HPF / Bacteria: Negative                  IMAGING:  < from: MR Angio Neck w/wo IV Cont (21 @ 18:49) >  IMPRESSION:    1.  MRI brain-  -No evidence of recent infarct or acute intracranial hemorrhage.  -9 mm superficial ovoid structure within the left anterior/lateral temporal region adjacent to a left MCA M2 branch suspicious for aneurysm, as above. Recommend CTA of the brain for further confirmation.  -Moderate/severe chronic microvascular changes and multiple small chronic infarcts (white matter, deep gray nuclei, right cerebellar hemisphere).    2.  MRA brain- No evidence of major vascular stenosis or occlusion.    3.  MRA neck- Technically limited study. Probable moderate/severe stenosis of the distal left common carotid artery extending into the ICA origin.  Probable mild stenosis of the right ICA origin.    4.  Other- Left sphenoid sinus polypoid opacity with signal suggesting inspissated or fungal contents.        < end of copied text >

## 2021-06-13 LAB
ALBUMIN SERPL ELPH-MCNC: 4 G/DL — SIGNIFICANT CHANGE UP (ref 3.5–5.2)
ALP SERPL-CCNC: 75 U/L — SIGNIFICANT CHANGE UP (ref 30–115)
ALT FLD-CCNC: 11 U/L — SIGNIFICANT CHANGE UP (ref 0–41)
ANION GAP SERPL CALC-SCNC: 12 MMOL/L — SIGNIFICANT CHANGE UP (ref 7–14)
AST SERPL-CCNC: 16 U/L — SIGNIFICANT CHANGE UP (ref 0–41)
BILIRUB SERPL-MCNC: 0.4 MG/DL — SIGNIFICANT CHANGE UP (ref 0.2–1.2)
BUN SERPL-MCNC: 31 MG/DL — HIGH (ref 10–20)
CALCIUM SERPL-MCNC: 9.3 MG/DL — SIGNIFICANT CHANGE UP (ref 8.5–10.1)
CHLORIDE SERPL-SCNC: 102 MMOL/L — SIGNIFICANT CHANGE UP (ref 98–110)
CO2 SERPL-SCNC: 24 MMOL/L — SIGNIFICANT CHANGE UP (ref 17–32)
CREAT SERPL-MCNC: 1.2 MG/DL — SIGNIFICANT CHANGE UP (ref 0.7–1.5)
GLUCOSE BLDC GLUCOMTR-MCNC: 135 MG/DL — HIGH (ref 70–99)
GLUCOSE BLDC GLUCOMTR-MCNC: 141 MG/DL — HIGH (ref 70–99)
GLUCOSE BLDC GLUCOMTR-MCNC: 178 MG/DL — HIGH (ref 70–99)
GLUCOSE BLDC GLUCOMTR-MCNC: 182 MG/DL — HIGH (ref 70–99)
GLUCOSE SERPL-MCNC: 147 MG/DL — HIGH (ref 70–99)
HCT VFR BLD CALC: 40.8 % — SIGNIFICANT CHANGE UP (ref 37–47)
HGB BLD-MCNC: 13.1 G/DL — SIGNIFICANT CHANGE UP (ref 12–16)
MAGNESIUM SERPL-MCNC: 2 MG/DL — SIGNIFICANT CHANGE UP (ref 1.8–2.4)
MCHC RBC-ENTMCNC: 27.2 PG — SIGNIFICANT CHANGE UP (ref 27–31)
MCHC RBC-ENTMCNC: 32.1 G/DL — SIGNIFICANT CHANGE UP (ref 32–37)
MCV RBC AUTO: 84.6 FL — SIGNIFICANT CHANGE UP (ref 81–99)
NRBC # BLD: 0 /100 WBCS — SIGNIFICANT CHANGE UP (ref 0–0)
PLATELET # BLD AUTO: 203 K/UL — SIGNIFICANT CHANGE UP (ref 130–400)
POTASSIUM SERPL-MCNC: 4.9 MMOL/L — SIGNIFICANT CHANGE UP (ref 3.5–5)
POTASSIUM SERPL-SCNC: 4.9 MMOL/L — SIGNIFICANT CHANGE UP (ref 3.5–5)
PROT SERPL-MCNC: 6.5 G/DL — SIGNIFICANT CHANGE UP (ref 6–8)
RBC # BLD: 4.82 M/UL — SIGNIFICANT CHANGE UP (ref 4.2–5.4)
RBC # FLD: 14.2 % — SIGNIFICANT CHANGE UP (ref 11.5–14.5)
SODIUM SERPL-SCNC: 138 MMOL/L — SIGNIFICANT CHANGE UP (ref 135–146)
WBC # BLD: 7.75 K/UL — SIGNIFICANT CHANGE UP (ref 4.8–10.8)
WBC # FLD AUTO: 7.75 K/UL — SIGNIFICANT CHANGE UP (ref 4.8–10.8)

## 2021-06-13 PROCEDURE — 99233 SBSQ HOSP IP/OBS HIGH 50: CPT

## 2021-06-13 RX ORDER — NIFEDIPINE 30 MG
30 TABLET, EXTENDED RELEASE 24 HR ORAL DAILY
Refills: 0 | Status: DISCONTINUED | OUTPATIENT
Start: 2021-06-13 | End: 2021-06-15

## 2021-06-13 RX ADMIN — METHOCARBAMOL 500 MILLIGRAM(S): 500 TABLET, FILM COATED ORAL at 05:24

## 2021-06-13 RX ADMIN — METHOCARBAMOL 500 MILLIGRAM(S): 500 TABLET, FILM COATED ORAL at 17:14

## 2021-06-13 RX ADMIN — Medication 81 MILLIGRAM(S): at 11:18

## 2021-06-13 RX ADMIN — ATORVASTATIN CALCIUM 40 MILLIGRAM(S): 80 TABLET, FILM COATED ORAL at 21:09

## 2021-06-13 RX ADMIN — LISINOPRIL 10 MILLIGRAM(S): 2.5 TABLET ORAL at 05:24

## 2021-06-13 RX ADMIN — ENOXAPARIN SODIUM 40 MILLIGRAM(S): 100 INJECTION SUBCUTANEOUS at 11:18

## 2021-06-13 RX ADMIN — Medication 30 MILLIGRAM(S): at 17:14

## 2021-06-13 NOTE — PROGRESS NOTE ADULT - ASSESSMENT
80 yr old female presented after a mechanical fall (witnessed by her son). She has a history of recurrent falls but this one prompted her to come in as she had associated back pain & difficulty ambulating (states that she was not able to move her RLE due to the pain).    # T11 fracture--- can get TLSO brace and PT eval -- continue tylenol and added methocarbamol.  # HTN--  lisinopril to 10mg daily now added nifedipine XL 30mg daily    #DM-- blood glucose is mildly elevated-- takes metformin at home.  # r/o stroke-- CT showed focal hypodensities in inf rt cerebellar hemisphere-- MRI of head-- < from: MR Head No Cont (06.11.21 @ 18:48) >     MRI brain-  -No evidence of recent infarct or acute intracranial hemorrhage.  -9 mm superficial ovoid structure within the left anterior/lateral temporal region adjacent to a left MCA M2 branch suspicious for aneurysm, as above. Recommend CTA of the brain for further confirmation.  -Moderate/severe chronic microvascular changes and multiple small chronic infarcts (white matter, deep gray nuclei, right cerebellar hemisphere).  2.  MRA brain- No evidence of major vascular stenosis or occlusion.  3.  MRA neck- Technically limited study. Probable moderate/severe stenosis of the distal left common carotid artery extending into the ICA origin.  Probable mild stenosis of the right ICA origin.  4.  Other- Left sphenoid sinus polypoid opacity with signal suggesting inspissated or fungal contents.      vascular surgery eval done and they wanted CT angio--  c< from: CT Angio Head w/ IV Cont (06.12.21 @ 23:26) >  CTA neck: Atherosclerotic disease involving the origin of the left internal carotid artery results in approximately 38% stenosis. Atherosclerotic disease involving the origin of the right internal carotid artery results in approximately 22% stenosis. No dissection.    CTA brain: There is a 1 cm saccular laterally-directed aneurysm arising from the left M1 terminus, incorporating the two M2 branches. No stenosis.     I discussed case with neurosurgery and they wanted neurovascular consult        takes lipitor at home and is unsteady in her gait.    case discussed with son at bedside.

## 2021-06-14 LAB
ALBUMIN SERPL ELPH-MCNC: 3.7 G/DL — SIGNIFICANT CHANGE UP (ref 3.5–5.2)
ALP SERPL-CCNC: 69 U/L — SIGNIFICANT CHANGE UP (ref 30–115)
ALT FLD-CCNC: 15 U/L — SIGNIFICANT CHANGE UP (ref 0–41)
ANION GAP SERPL CALC-SCNC: 12 MMOL/L — SIGNIFICANT CHANGE UP (ref 7–14)
AST SERPL-CCNC: 18 U/L — SIGNIFICANT CHANGE UP (ref 0–41)
BILIRUB SERPL-MCNC: 0.4 MG/DL — SIGNIFICANT CHANGE UP (ref 0.2–1.2)
BUN SERPL-MCNC: 35 MG/DL — HIGH (ref 10–20)
CALCIUM SERPL-MCNC: 9.2 MG/DL — SIGNIFICANT CHANGE UP (ref 8.5–10.1)
CHLORIDE SERPL-SCNC: 101 MMOL/L — SIGNIFICANT CHANGE UP (ref 98–110)
CO2 SERPL-SCNC: 24 MMOL/L — SIGNIFICANT CHANGE UP (ref 17–32)
CREAT SERPL-MCNC: 1.1 MG/DL — SIGNIFICANT CHANGE UP (ref 0.7–1.5)
GLUCOSE BLDC GLUCOMTR-MCNC: 118 MG/DL — HIGH (ref 70–99)
GLUCOSE BLDC GLUCOMTR-MCNC: 174 MG/DL — HIGH (ref 70–99)
GLUCOSE BLDC GLUCOMTR-MCNC: 210 MG/DL — HIGH (ref 70–99)
GLUCOSE BLDC GLUCOMTR-MCNC: 218 MG/DL — HIGH (ref 70–99)
GLUCOSE SERPL-MCNC: 130 MG/DL — HIGH (ref 70–99)
HCT VFR BLD CALC: 39.1 % — SIGNIFICANT CHANGE UP (ref 37–47)
HGB BLD-MCNC: 12.3 G/DL — SIGNIFICANT CHANGE UP (ref 12–16)
MAGNESIUM SERPL-MCNC: 2.1 MG/DL — SIGNIFICANT CHANGE UP (ref 1.8–2.4)
MCHC RBC-ENTMCNC: 27 PG — SIGNIFICANT CHANGE UP (ref 27–31)
MCHC RBC-ENTMCNC: 31.5 G/DL — LOW (ref 32–37)
MCV RBC AUTO: 85.7 FL — SIGNIFICANT CHANGE UP (ref 81–99)
NRBC # BLD: 0 /100 WBCS — SIGNIFICANT CHANGE UP (ref 0–0)
PLATELET # BLD AUTO: 199 K/UL — SIGNIFICANT CHANGE UP (ref 130–400)
POTASSIUM SERPL-MCNC: 4.6 MMOL/L — SIGNIFICANT CHANGE UP (ref 3.5–5)
POTASSIUM SERPL-SCNC: 4.6 MMOL/L — SIGNIFICANT CHANGE UP (ref 3.5–5)
PROT SERPL-MCNC: 6 G/DL — SIGNIFICANT CHANGE UP (ref 6–8)
RBC # BLD: 4.56 M/UL — SIGNIFICANT CHANGE UP (ref 4.2–5.4)
RBC # FLD: 14.2 % — SIGNIFICANT CHANGE UP (ref 11.5–14.5)
SODIUM SERPL-SCNC: 137 MMOL/L — SIGNIFICANT CHANGE UP (ref 135–146)
WBC # BLD: 7.77 K/UL — SIGNIFICANT CHANGE UP (ref 4.8–10.8)
WBC # FLD AUTO: 7.77 K/UL — SIGNIFICANT CHANGE UP (ref 4.8–10.8)

## 2021-06-14 PROCEDURE — 99232 SBSQ HOSP IP/OBS MODERATE 35: CPT

## 2021-06-14 PROCEDURE — 99223 1ST HOSP IP/OBS HIGH 75: CPT

## 2021-06-14 RX ORDER — GLUCAGON INJECTION, SOLUTION 0.5 MG/.1ML
1 INJECTION, SOLUTION SUBCUTANEOUS ONCE
Refills: 0 | Status: DISCONTINUED | OUTPATIENT
Start: 2021-06-14 | End: 2021-06-18

## 2021-06-14 RX ORDER — DEXTROSE 50 % IN WATER 50 %
25 SYRINGE (ML) INTRAVENOUS ONCE
Refills: 0 | Status: DISCONTINUED | OUTPATIENT
Start: 2021-06-14 | End: 2021-06-18

## 2021-06-14 RX ORDER — LISINOPRIL 2.5 MG/1
1 TABLET ORAL
Qty: 30 | Refills: 0
Start: 2021-06-14 | End: 2021-07-13

## 2021-06-14 RX ORDER — ENOXAPARIN SODIUM 100 MG/ML
40 INJECTION SUBCUTANEOUS DAILY
Refills: 0 | Status: DISCONTINUED | OUTPATIENT
Start: 2021-06-15 | End: 2021-06-18

## 2021-06-14 RX ORDER — DEXTROSE 50 % IN WATER 50 %
12.5 SYRINGE (ML) INTRAVENOUS ONCE
Refills: 0 | Status: DISCONTINUED | OUTPATIENT
Start: 2021-06-14 | End: 2021-06-18

## 2021-06-14 RX ORDER — NIFEDIPINE 30 MG
1 TABLET, EXTENDED RELEASE 24 HR ORAL
Qty: 30 | Refills: 0
Start: 2021-06-14 | End: 2021-07-13

## 2021-06-14 RX ORDER — SODIUM CHLORIDE 9 MG/ML
1000 INJECTION, SOLUTION INTRAVENOUS
Refills: 0 | Status: DISCONTINUED | OUTPATIENT
Start: 2021-06-14 | End: 2021-06-18

## 2021-06-14 RX ORDER — INSULIN LISPRO 100/ML
VIAL (ML) SUBCUTANEOUS
Refills: 0 | Status: DISCONTINUED | OUTPATIENT
Start: 2021-06-14 | End: 2021-06-18

## 2021-06-14 RX ORDER — DEXTROSE 50 % IN WATER 50 %
15 SYRINGE (ML) INTRAVENOUS ONCE
Refills: 0 | Status: DISCONTINUED | OUTPATIENT
Start: 2021-06-14 | End: 2021-06-18

## 2021-06-14 RX ORDER — LISINOPRIL 2.5 MG/1
0 TABLET ORAL
Qty: 0 | Refills: 1 | DISCHARGE

## 2021-06-14 RX ADMIN — Medication 2: at 11:41

## 2021-06-14 RX ADMIN — METHOCARBAMOL 500 MILLIGRAM(S): 500 TABLET, FILM COATED ORAL at 17:01

## 2021-06-14 RX ADMIN — ENOXAPARIN SODIUM 40 MILLIGRAM(S): 100 INJECTION SUBCUTANEOUS at 11:01

## 2021-06-14 RX ADMIN — Medication 81 MILLIGRAM(S): at 11:01

## 2021-06-14 RX ADMIN — Medication 30 MILLIGRAM(S): at 05:14

## 2021-06-14 RX ADMIN — LISINOPRIL 10 MILLIGRAM(S): 2.5 TABLET ORAL at 05:14

## 2021-06-14 RX ADMIN — METHOCARBAMOL 500 MILLIGRAM(S): 500 TABLET, FILM COATED ORAL at 05:14

## 2021-06-14 RX ADMIN — ATORVASTATIN CALCIUM 40 MILLIGRAM(S): 80 TABLET, FILM COATED ORAL at 21:42

## 2021-06-14 NOTE — PROGRESS NOTE ADULT - ASSESSMENT
80 F with PMH of HTN, HLD, and DM presented after a mechanical fall (witnessed by her son). Admitted to medicine with an age-indeterminate T11 superior endplate compression deformity. Found to have a 1 cm saccular cerebral aneurysm.    #Saccular cerebral aneurysm  - As seen on CTA head/neck  - Evaluated by vascular surgery -> no acute vascular surgery intervention at this time, follow up with vascular surgery Dr. Trimble as outpatient  - Pending neurovascular eval    #R/O stroke  - CT head N/C showed focal hypodensities in inferior right cerebellar hemisphere but MRI brain N/C showed no evidence of recent infarct or acute intracranial hemorrhage    #T11 compression deformity  - Evaluated by PT  - Can get TLSO brace  - C/w Tylenol and methocarbamol  - As per son, pt has poor balance    #HTN, controlled  - C/w lisinopril 10 QD and Procardia 30 QD    #DM  - On metformin at home  - A1C 7.1 on 6/11/21  - Will give insulin sliding scale  - Monitor FS    #HLD  - C/w Lipitor 40    #Misc  Diet: DASH/TLC  DVT PPx: Lovenox  Dispo: pending neurovascular eval 80 F with PMH of HTN, HLD, and DM presented after a mechanical fall (witnessed by her son). Admitted to medicine with an age-indeterminate T11 superior endplate compression deformity. Found to have a 1 cm saccular cerebral aneurysm.    #Saccular cerebral aneurysm  - As seen on CTA head/neck  - Evaluated by vascular surgery -> no acute vascular surgery intervention at this time, follow up with vascular surgery Dr. Trimble as outpatient  - Evaluated by neurosurgery -> no acute neurosurgical intervention at this time, can follow up with neurosurgery Dr. Guadarrama as outpatient  - Pending neurovascular eval    #R/O stroke  - CT head N/C showed focal hypodensities in inferior right cerebellar hemisphere but MRI brain N/C showed no evidence of recent infarct or acute intracranial hemorrhage    #T11 compression deformity  - Evaluated by PT  - Can get TLSO brace  - C/w Tylenol and methocarbamol  - As per son, pt has poor balance    #HTN, controlled  - C/w lisinopril 10 QD and Procardia 30 QD    #DM  - On metformin at home  - A1C 7.1 on 6/11/21  - Will give insulin sliding scale  - Monitor FS    #HLD  - C/w Lipitor 40    #Misc  Diet: DASH/TLC  DVT PPx: Lovenox  Dispo: pending neurovascular eval

## 2021-06-14 NOTE — CHART NOTE - NSCHARTNOTEFT_GEN_A_CORE
CTA neck reviewed:    < from: CT Angio Neck w/ IV Cont (06.12.21 @ 23:25) >    CTA neck: Atherosclerotic disease involving the origin of the left internal carotid artery results in approximately 38% stenosis. Atherosclerotic disease involving the origin of the right internal carotid artery results in approximately 22% stenosis. No dissection.    CTA brain: There is a 1 cm saccular laterally-directed aneurysm arising from the left M1 terminus, incorporating the two M2 branches. No stenosis.        No further intervention at this time    Pt should follow up with Dr. Trimble as outpt in 2-3 weeks after discharge

## 2021-06-15 LAB
ALBUMIN SERPL ELPH-MCNC: 3.6 G/DL — SIGNIFICANT CHANGE UP (ref 3.5–5.2)
ALP SERPL-CCNC: 68 U/L — SIGNIFICANT CHANGE UP (ref 30–115)
ALT FLD-CCNC: 27 U/L — SIGNIFICANT CHANGE UP (ref 0–41)
ANION GAP SERPL CALC-SCNC: 9 MMOL/L — SIGNIFICANT CHANGE UP (ref 7–14)
APTT BLD: 35.4 SEC — SIGNIFICANT CHANGE UP (ref 27–39.2)
AST SERPL-CCNC: 27 U/L — SIGNIFICANT CHANGE UP (ref 0–41)
BILIRUB SERPL-MCNC: 0.3 MG/DL — SIGNIFICANT CHANGE UP (ref 0.2–1.2)
BLD GP AB SCN SERPL QL: SIGNIFICANT CHANGE UP
BUN SERPL-MCNC: 35 MG/DL — HIGH (ref 10–20)
CALCIUM SERPL-MCNC: 9.3 MG/DL — SIGNIFICANT CHANGE UP (ref 8.5–10.1)
CHLORIDE SERPL-SCNC: 102 MMOL/L — SIGNIFICANT CHANGE UP (ref 98–110)
CO2 SERPL-SCNC: 27 MMOL/L — SIGNIFICANT CHANGE UP (ref 17–32)
CREAT SERPL-MCNC: 1.4 MG/DL — SIGNIFICANT CHANGE UP (ref 0.7–1.5)
GLUCOSE BLDC GLUCOMTR-MCNC: 125 MG/DL — HIGH (ref 70–99)
GLUCOSE BLDC GLUCOMTR-MCNC: 128 MG/DL — HIGH (ref 70–99)
GLUCOSE BLDC GLUCOMTR-MCNC: 208 MG/DL — HIGH (ref 70–99)
GLUCOSE BLDC GLUCOMTR-MCNC: 210 MG/DL — HIGH (ref 70–99)
GLUCOSE SERPL-MCNC: 135 MG/DL — HIGH (ref 70–99)
HCT VFR BLD CALC: 38.3 % — SIGNIFICANT CHANGE UP (ref 37–47)
HGB BLD-MCNC: 11.8 G/DL — LOW (ref 12–16)
INR BLD: 1.04 RATIO — SIGNIFICANT CHANGE UP (ref 0.65–1.3)
MAGNESIUM SERPL-MCNC: 2.1 MG/DL — SIGNIFICANT CHANGE UP (ref 1.8–2.4)
MCHC RBC-ENTMCNC: 26.5 PG — LOW (ref 27–31)
MCHC RBC-ENTMCNC: 30.8 G/DL — LOW (ref 32–37)
MCV RBC AUTO: 86.1 FL — SIGNIFICANT CHANGE UP (ref 81–99)
NRBC # BLD: 0 /100 WBCS — SIGNIFICANT CHANGE UP (ref 0–0)
PLATELET # BLD AUTO: 197 K/UL — SIGNIFICANT CHANGE UP (ref 130–400)
POTASSIUM SERPL-MCNC: 4.7 MMOL/L — SIGNIFICANT CHANGE UP (ref 3.5–5)
POTASSIUM SERPL-SCNC: 4.7 MMOL/L — SIGNIFICANT CHANGE UP (ref 3.5–5)
PROT SERPL-MCNC: 5.9 G/DL — LOW (ref 6–8)
PROTHROM AB SERPL-ACNC: 12 SEC — SIGNIFICANT CHANGE UP (ref 9.95–12.87)
RBC # BLD: 4.45 M/UL — SIGNIFICANT CHANGE UP (ref 4.2–5.4)
RBC # FLD: 14.1 % — SIGNIFICANT CHANGE UP (ref 11.5–14.5)
SODIUM SERPL-SCNC: 138 MMOL/L — SIGNIFICANT CHANGE UP (ref 135–146)
WBC # BLD: 6.72 K/UL — SIGNIFICANT CHANGE UP (ref 4.8–10.8)
WBC # FLD AUTO: 6.72 K/UL — SIGNIFICANT CHANGE UP (ref 4.8–10.8)

## 2021-06-15 PROCEDURE — 99233 SBSQ HOSP IP/OBS HIGH 50: CPT

## 2021-06-15 RX ORDER — NIFEDIPINE 30 MG
60 TABLET, EXTENDED RELEASE 24 HR ORAL DAILY
Refills: 0 | Status: DISCONTINUED | OUTPATIENT
Start: 2021-06-16 | End: 2021-06-18

## 2021-06-15 RX ORDER — LINEZOLID 600 MG/300ML
1 INJECTION, SOLUTION INTRAVENOUS
Qty: 14 | Refills: 0
Start: 2021-06-15 | End: 2021-06-21

## 2021-06-15 RX ORDER — SODIUM CHLORIDE 9 MG/ML
1000 INJECTION, SOLUTION INTRAVENOUS
Refills: 0 | Status: DISCONTINUED | OUTPATIENT
Start: 2021-06-15 | End: 2021-06-17

## 2021-06-15 RX ADMIN — METHOCARBAMOL 500 MILLIGRAM(S): 500 TABLET, FILM COATED ORAL at 17:00

## 2021-06-15 RX ADMIN — METHOCARBAMOL 500 MILLIGRAM(S): 500 TABLET, FILM COATED ORAL at 06:06

## 2021-06-15 RX ADMIN — SODIUM CHLORIDE 100 MILLILITER(S): 9 INJECTION, SOLUTION INTRAVENOUS at 17:07

## 2021-06-15 RX ADMIN — Medication 2: at 11:38

## 2021-06-15 RX ADMIN — LISINOPRIL 10 MILLIGRAM(S): 2.5 TABLET ORAL at 06:05

## 2021-06-15 RX ADMIN — Medication 30 MILLIGRAM(S): at 06:05

## 2021-06-15 RX ADMIN — ATORVASTATIN CALCIUM 40 MILLIGRAM(S): 80 TABLET, FILM COATED ORAL at 21:07

## 2021-06-15 RX ADMIN — ENOXAPARIN SODIUM 40 MILLIGRAM(S): 100 INJECTION SUBCUTANEOUS at 11:38

## 2021-06-15 RX ADMIN — Medication 81 MILLIGRAM(S): at 11:38

## 2021-06-15 NOTE — CONSULT NOTE ADULT - CONSULT REQUESTED DATE/TIME
10-Ye-2021 20:02
11-Jun-2021 00:01
10-Ye-2021 19:47
15-Ye-2021 12:22
12-Jun-2021 13:53
15-Ye-2021 16:11

## 2021-06-15 NOTE — CONSULT NOTE ADULT - ASSESSMENT
Impression;  80 year old female with past medical history of DM II, HTN, and DLD, on asa, presents after a fall.   As per family and patient she was trying to sit on the bed and missed, fell on a chair and then to floor, hit lower back. Found to have left MCA saccular aneurysm on CTA. Called by medicine to address intracranial aneurysm.   Discussed with patient and son, risks and benefits explained and they agree to undergo procedure    Suggestion:  NPO after midnight except meds for intracranial aneurysm embolization tomorrow.   While NPO please make sure patient is on IV fluids if medically able.     Paul Singh NP  [id number]

## 2021-06-15 NOTE — PROGRESS NOTE ADULT - ASSESSMENT
80 yr old female presented after a mechanical fall (witnessed by her son). She has a history of recurrent falls but this one prompted her to come in as she had associated back pain & difficulty ambulating (states that she was not able to move her RLE due to the pain).    # T11 fracture--- can get TLSO brace and PT eval -- continue tylenol and added methocarbamol.  # HTN--  lisinopril to 10mg daily, added nifedipine XL 30mg daily-- BP better today    #DM-- blood glucose is mildly elevated-- takes metformin at home. hbA1c is 7.1  # r/o stroke-- CT showed focal hypodensities in inf rt cerebellar hemisphere-- MRI of head-- < from: MR Head No Cont (06.11.21 @ 18:48) >     MRI brain-  -No evidence of recent infarct or acute intracranial hemorrhage.  -9 mm superficial ovoid structure within the left anterior/lateral temporal region adjacent to a left MCA M2 branch suspicious for aneurysm, as above. Recommend CTA of the brain for further confirmation.  -Moderate/severe chronic microvascular changes and multiple small chronic infarcts (white matter, deep gray nuclei, right cerebellar hemisphere).  2.  MRA brain- No evidence of major vascular stenosis or occlusion.  3.  MRA neck- Technically limited study. Probable moderate/severe stenosis of the distal left common carotid artery extending into the ICA origin.  Probable mild stenosis of the right ICA origin.  4.  Other- Left sphenoid sinus polypoid opacity with signal suggesting inspissated or fungal contents.      vascular surgery eval done and they wanted CT angio--  < from: CT Angio Head w/ IV Cont (06.12.21 @ 23:26) >  CTA neck: Atherosclerotic disease involving the origin of the left internal carotid artery results in approximately 38% stenosis. Atherosclerotic disease involving the origin of the right internal carotid artery results in approximately 22% stenosis. No dissection.    CTA brain: There is a 1 cm saccular laterally-directed aneurysm arising from the left M1 terminus, incorporating the two M2 branches. No stenosis.    # MCA aneurysm-- neurovascular consult planning to do diagnostic cerebral angiogram +/- embolization AM  discussed with Dr Jones and son at bedside.    # COLE-- Dr Jones wants saline at 100cc/ HR  -- TLSO brace is to be delivered at bedside needs a prescription.              case discussed with son at bedside.

## 2021-06-15 NOTE — CONSULT NOTE ADULT - SUBJECTIVE AND OBJECTIVE BOX
NeuroENDOvascular Consult    Patient is a 80y old  Female who presents with a chief complaint of fall (14 Jun 2021 11:10)      HPI:  80 year old female with past medical history of DM II, HTN, and DLD, on asa, presents after a fall.   As per family and patient she was trying to sit on the bed and missed, fell on a chair and then to floor, hit lower back. Found to have left MCA saccular aneurysm on CTA. Called by medicine to address intracranial aneurysm.       PAST MEDICAL & SURGICAL HISTORY:  Type II diabetes mellitus    Hypertension    Dyslipidemia    Recurrent falls    History of hernia surgery        FAMILY HISTORY:  FHx: diabetes mellitus        Social History: (-) x 3    Allergies    No Known Allergies    Intolerances        MEDICATIONS  (STANDING):  aspirin  chewable 81 milliGRAM(s) Oral daily  atorvastatin 40 milliGRAM(s) Oral at bedtime  dextrose 40% Gel 15 Gram(s) Oral once  dextrose 5%. 1000 milliLiter(s) (50 mL/Hr) IV Continuous <Continuous>  dextrose 5%. 1000 milliLiter(s) (100 mL/Hr) IV Continuous <Continuous>  dextrose 50% Injectable 25 Gram(s) IV Push once  dextrose 50% Injectable 12.5 Gram(s) IV Push once  dextrose 50% Injectable 25 Gram(s) IV Push once  enoxaparin Injectable 40 milliGRAM(s) SubCutaneous daily  glucagon  Injectable 1 milliGRAM(s) IntraMuscular once  insulin lispro (ADMELOG) corrective regimen sliding scale   SubCutaneous three times a day before meals  lisinopril 10 milliGRAM(s) Oral daily  methocarbamol 500 milliGRAM(s) Oral two times a day  NIFEdipine XL 30 milliGRAM(s) Oral daily    MEDICATIONS  (PRN):  acetaminophen   Tablet .. 650 milliGRAM(s) Oral every 6 hours PRN Moderate Pain (4 - 6)      Vital Signs Last 24 Hrs  T(C): 36.3 (15 Ye 2021 07:59), Max: 36.3 (14 Jun 2021 16:00)  T(F): 97.4 (15 Ye 2021 07:59), Max: 97.4 (14 Jun 2021 16:00)  HR: 69 (15 Ye 2021 07:59) (69 - 91)  BP: 157/84 (15 Ye 2021 07:59) (146/72 - 164/84)  BP(mean): --  RR: 18 (15 Ye 2021 07:59) (18 - 18)  SpO2: --    Examination: Prefers son for translation   Cognitive/Language:  The patient is oriented to person, place, time and date.  Recent and remote memory intact.  Fund of knowledge is intact and normal.  Language with normal repetition, comprehension and naming.  Nondysarthric.    Eyes: intact VA, VFF.  EOMI w/o nystagmus, skew or reported double vision.  PERRL.  Face:  Facial sensation normal V1 - 3, no facial asymmetry.    Formal Muscle Strength Testing: (MRC grade R/L) 5/5 UE; 5/5 LE.  No observable drift.  Sensory examination:   Intact to light touch in all extremities.  Cerebellum:   FTN/HKS intact with normal ALEA in all limbs.  No dysmetria or dysdiadokinesia.  Gait unsteady    Labs:   CBC Full  -  ( 15 Ye 2021 07:18 )  WBC Count : 6.72 K/uL  RBC Count : 4.45 M/uL  Hemoglobin : 11.8 g/dL  Hematocrit : 38.3 %  Platelet Count - Automated : 197 K/uL  Mean Cell Volume : 86.1 fL  Mean Cell Hemoglobin : 26.5 pg  Mean Cell Hemoglobin Concentration : 30.8 g/dL  Auto Neutrophil # : x  Auto Lymphocyte # : x  Auto Monocyte # : x  Auto Eosinophil # : x  Auto Basophil # : x  Auto Neutrophil % : x  Auto Lymphocyte % : x  Auto Monocyte % : x  Auto Eosinophil % : x  Auto Basophil % : x    06-15    138  |  102  |  35<H>  ----------------------------<  135<H>  4.7   |  27  |  1.4    Ca    9.3      15 Ye 2021 07:18  Mg     2.1     06-15    TPro  5.9<L>  /  Alb  3.6  /  TBili  0.3  /  DBili  x   /  AST  27  /  ALT  27  /  AlkPhos  68  06-15    LIVER FUNCTIONS - ( 15 Ye 2021 07:18 )  Alb: 3.6 g/dL / Pro: 5.9 g/dL / ALK PHOS: 68 U/L / ALT: 27 U/L / AST: 27 U/L / GGT: x                   Neuroimaging:  NCHCT:   < from: CT Angio Head w/ IV Cont (06.12.21 @ 23:26) >  IMPRESSION:  CTA neck: Atherosclerotic disease involving the origin of the left internal carotid artery results in approximately 38% stenosis. Atherosclerotic disease involving the origin of the right internal carotid artery results in approximately 22% stenosis. No dissection.    CTA brain: There is a 1 cm saccular laterally-directed aneurysm arising from the left M1 terminus, incorporating the two M2 branches. No stenosis.    COMMENT:  Reference per NASCET criteria for degree of stenosis: Mild: less than 50% stenosis. Moderate: 50-69% stenosis. Severe: 70-94% stenosis. Near occlusion: 95-99% stenosis.          < end of copied text >    06-15-21 @ 12:23

## 2021-06-15 NOTE — PROGRESS NOTE ADULT - ATTENDING COMMENTS
80 yr old female presented after a mechanical fall (witnessed by her son). She has a history of recurrent falls but this one prompted her to come in as she had associated back pain & difficulty ambulating (states that she was not able to move her RLE due to the pain).    # T11 fracture--- can get TLSO brace and PT eval -- continue tylenol and added methocarbamol.  # HTN--  lisinopril to 10mg daily now added nifedipine XL 30mg daily-- better today-- can increase nifedipine XL to 60mg    #DM-- blood glucose is mildly elevated-- takes metformin at home.  # r/o stroke-- CT showed focal hypodensities in inf rt cerebellar hemisphere-- MRI of head-- < from: MR Head No Cont (06.11.21 @ 18:48) >     MRI brain-  -No evidence of recent infarct or acute intracranial hemorrhage.  -9 mm superficial ovoid structure within the left anterior/lateral temporal region adjacent to a left MCA M2 branch suspicious for aneurysm, as above. Recommend CTA of the brain for further confirmation.  -Moderate/severe chronic microvascular changes and multiple small chronic infarcts (white matter, deep gray nuclei, right cerebellar hemisphere).  2.  MRA brain- No evidence of major vascular stenosis or occlusion.  3. MRA neck-- suggested carotid stenosis    vascular surgery eval done and they wanted CT angio--  < from: CT Angio Head w/ IV Cont (06.12.21 @ 23:26) >  CTA neck: Atherosclerotic disease involving the origin of the left internal carotid artery results in approximately 38% stenosis. Atherosclerotic disease involving the origin of the right internal carotid artery results in approximately 22% stenosis. No dissection.    CTA brain: There is a 1 cm saccular laterally-directed aneurysm arising from the left M1 terminus, incorporating the two M2 branches. No stenosis.  # MCA aneurysm-- seen by neuroendvascular--  diagnostic cerebral angiogram +/- embolization IN AM  # COLE-- started on fluids-- Dr Jones IS aware-- had CT with contrast x2-- though was hydrated before. DC lisinopril.  plan discussed with son and grandson at bedside.  cardiology consult requested for clearance    patient needs TLSO brace for ambulation as has T11 fracture-- please give script to nurse manager tomorrow.
80 yr old female presented after a mechanical fall (witnessed by her son). She has a history of recurrent falls but this one prompted her to come in as she had associated back pain & difficulty ambulating (states that she was not able to move her RLE due to the pain).    # T11 fracture--- can get TLSO brace and PT eval -- continue tylenol and added methocarbamol.  # HTN-- increased dose of lisinopril to 10mg daily    #DM-- blood glucose is mildly elevated-- takes metformin at home.  # r/o stroke-- CT showed focal hypodensities in inf rt cerebellar hemisphere-- MRI of head-- < from: MR Head No Cont (06.11.21 @ 18:48) >     MRI brain-  -No evidence of recent infarct or acute intracranial hemorrhage.  -9 mm superficial ovoid structure within the left anterior/lateral temporal region adjacent to a left MCA M2 branch suspicious for aneurysm, as above. Recommend CTA of the brain for further confirmation.  -Moderate/severe chronic microvascular changes and multiple small chronic infarcts (white matter, deep gray nuclei, right cerebellar hemisphere).  2.  MRA brain- No evidence of major vascular stenosis or occlusion.  3.  MRA neck- Technically limited study. Probable moderate/severe stenosis of the distal left common carotid artery extending into the ICA origin.  Probable mild stenosis of the right ICA origin.  4.  Other- Left sphenoid sinus polypoid opacity with signal suggesting inspissated or fungal contents.     will get vascular surgery eval.-- I spoke with them    takes lipitor at home and is unsteady in her gait.    case discussed with son at bedside.  PT eval.
80 yr old female presented after a mechanical fall (witnessed by her son). She has a history of recurrent falls but this one prompted her to come in as she had associated back pain & difficulty ambulating (states that she was not able to move her RLE due to the pain).    # T11 fracture--- can get TLSO brace and PT cathyal -- continue tylenol and added methocarbamol.  # HTN-- increased dose of lisinopril to 10mg daily    #DM-- blood glucose is mildly elevated-- takes metformin at home.  # r/o stroke-- CT showed focal hypodensities in inf rt cerebellar hemisphere-- will get MRI of head-- takes lipitor at home and is unstaedy in her gait.    case discussed with son at bedside.  PT edilberto.
80 yr old female presented after a mechanical fall (witnessed by her son). She has a history of recurrent falls but this one prompted her to come in as she had associated back pain & difficulty ambulating (states that she was not able to move her RLE due to the pain).    # T11 fracture--- can get TLSO brace and PT eval -- continue tylenol and added methocarbamol.  # HTN--  lisinopril to 10mg daily, added nifedipine XL 30mg daily-- BP better today    #DM-- blood glucose is mildly elevated-- takes metformin at home. hbA1c is 7.1  # r/o stroke-- CT showed focal hypodensities in inf rt cerebellar hemisphere-- MRI of head-- < from: MR Head No Cont (06.11.21 @ 18:48) >     MRI brain-  -No evidence of recent infarct or acute intracranial hemorrhage.  -9 mm superficial ovoid structure within the left anterior/lateral temporal region adjacent to a left MCA M2 branch suspicious for aneurysm, as above. Recommend CTA of the brain for further confirmation.  -Moderate/severe chronic microvascular changes and multiple small chronic infarcts (white matter, deep gray nuclei, right cerebellar hemisphere).  2.  MRA brain- No evidence of major vascular stenosis or occlusion.  3.  MRA neck- Technically limited study. Probable moderate/severe stenosis of the distal left common carotid artery extending into the ICA origin.  Probable mild stenosis of the right ICA origin.  4.  Other- Left sphenoid sinus polypoid opacity with signal suggesting inspissated or fungal contents.      vascular surgery eval done and they wanted CT angio--  c< from: CT Angio Head w/ IV Cont (06.12.21 @ 23:26) >  CTA neck: Atherosclerotic disease involving the origin of the left internal carotid artery results in approximately 38% stenosis. Atherosclerotic disease involving the origin of the right internal carotid artery results in approximately 22% stenosis. No dissection.    CTA brain: There is a 1 cm saccular laterally-directed aneurysm arising from the left M1 terminus, incorporating the two M2 branches. No stenosis.     neurovascular consult is pending  PT eval is also not complete-- TLSO brace is to be delivered.  DC planning--maynot happen today.        takes lipitor at home and is unsteady in her gait.    case discussed with son at bedside.

## 2021-06-15 NOTE — CONSULT NOTE ADULT - CONSULT REASON
Pre- Op Risk Stratification
left common carotid artery stenosis
trauma consult
cerebral aneurysm
age indeterminate infarcts on CTH
T11 Compression Deformity

## 2021-06-15 NOTE — PROGRESS NOTE ADULT - ASSESSMENT
80 F with PMH of HTN, HLD, and DM presented after a mechanical fall (witnessed by her son). Admitted to medicine with an age-indeterminate T11 superior endplate compression deformity. Found to have a 12 mm left MCA aneurysm. Plan for intracranial aneurysm embolization tomorrow.    #Left MCA 12 mm aneurysm  - As seen on CTA head/neck  - Evaluated by vascular surgery -> no acute vascular surgery intervention at this time, follow up with vascular surgery Dr. Trimble as outpatient  - Evaluated by neurosurgery -> no acute neurosurgical intervention at this time, can follow up with neurosurgery Dr. Guadarrama as outpatient  - Evaluated by neuroendovascular -> plan for intracranial aneurysm embolization tomorrow    #R/O stroke  - CT head N/C showed focal hypodensities in inferior right cerebellar hemisphere but MRI brain N/C showed no evidence of recent infarct or acute intracranial hemorrhage    #T11 compression deformity  - Evaluated by PT  - Can get TLSO brace  - C/w Tylenol and methocarbamol  - As per son, pt has poor balance    #HTN, fairly controlled  - C/w lisinopril 10 QD and Procardia 30 QD    #DM  - On metformin at home  - A1C 7.1 on 6/11/21  - Will give insulin sliding scale  - Monitor FS    #HLD  - C/w Lipitor 40    #Misc  Diet: DASH/TLC, NPO after MN  DVT PPx: Lovenox  Dispo: pending intracranial aneurysm embolization tomorrow 80 F with PMH of HTN, HLD, and DM presented after a mechanical fall (witnessed by her son). Admitted to medicine with an age-indeterminate T11 superior endplate compression deformity. Found to have a 12 mm left MCA aneurysm. Plan for intracranial aneurysm embolization tomorrow.    #Left MCA 12 mm aneurysm  - As seen on CTA head/neck  - Evaluated by vascular surgery -> no acute vascular surgery intervention at this time, follow up with vascular surgery Dr. Trimble as outpatient  - Evaluated by neurosurgery -> no acute neurosurgical intervention at this time, can follow up with neurosurgery Dr. Guadarrama as outpatient  - Evaluated by neuroendovascular -> plan for intracranial aneurysm embolization tomorrow  - Cardiology consulted for risk stratification  - Recent ECHO on file -> EF 60-65%, G1DD    #R/O stroke  - CT head N/C showed focal hypodensities in inferior right cerebellar hemisphere but MRI brain N/C showed no evidence of recent infarct or acute intracranial hemorrhage    #T11 compression deformity  - Evaluated by PT  - Can get TLSO brace  - C/w Tylenol and methocarbamol  - As per son, pt has poor balance    #HTN, fairly controlled  - C/w lisinopril 10 QD and Procardia 30 QD    #DM  - On metformin at home  - A1C 7.1 on 6/11/21  - Will give insulin sliding scale  - Monitor FS    #HLD  - C/w Lipitor 40    #Misc  Diet: DASH/TLC, NPO after MN  DVT PPx: Lovenox  Dispo: pending intracranial aneurysm embolization tomorrow 80 F with PMH of HTN, HLD, and DM presented after a mechanical fall (witnessed by her son). Admitted to medicine with an age-indeterminate T11 superior endplate compression deformity. Found to have a 12 mm left MCA aneurysm. Plan for intracranial aneurysm embolization tomorrow.    #Left MCA 12 mm aneurysm  - As seen on CTA head/neck  - Evaluated by vascular surgery -> no acute vascular surgery intervention at this time, follow up with vascular surgery Dr. Trimble as outpatient  - Evaluated by neurosurgery -> no acute neurosurgical intervention at this time, can follow up with neurosurgery Dr. Guadarrama as outpatient  - Evaluated by neuroendovascular -> plan for intracranial aneurysm embolization tomorrow  - Cardiology consulted for risk stratification  - Recent ECHO on file -> EF 60-65%, G1DD  - LR at 75 cc/hr while NPO    #R/O stroke  - CT head N/C showed focal hypodensities in inferior right cerebellar hemisphere but MRI brain N/C showed no evidence of recent infarct or acute intracranial hemorrhage    #T11 compression deformity  - Evaluated by PT  - Can get TLSO brace  - C/w Tylenol and methocarbamol  - As per son, pt has poor balance    #HTN, fairly controlled  - C/w lisinopril 10 QD and Procardia 30 QD    #DM  - On metformin at home  - A1C 7.1 on 6/11/21  - Will give insulin sliding scale  - Monitor FS    #HLD  - C/w Lipitor 40    #Misc  Diet: DASH/TLC, NPO after MN  DVT PPx: Lovenox  Dispo: pending intracranial aneurysm embolization tomorrow 80 F with PMH of HTN, HLD, and DM presented after a mechanical fall (witnessed by her son). Admitted to medicine with an age-indeterminate T11 superior endplate compression deformity. Found to have a 12 mm left MCA aneurysm. Plan for intracranial aneurysm embolization tomorrow.    #Left MCA 12 mm aneurysm  - As seen on CTA head/neck  - Evaluated by vascular surgery -> no acute vascular surgery intervention at this time, follow up with vascular surgery Dr. Trimble as outpatient  - Evaluated by neurosurgery -> no acute neurosurgical intervention at this time, can follow up with neurosurgery Dr. Guadarrama as outpatient  - Evaluated by neuroendovascular -> plan for intracranial aneurysm embolization tomorrow  - Cardiology consulted for risk stratification  - Recent ECHO on file -> EF 60-65%, G1DD    #COLE  - Started on 0.45% NS at 100 cc/hr  - Monitor BMP    #R/O stroke  - CT head N/C showed focal hypodensities in inferior right cerebellar hemisphere but MRI brain N/C showed no evidence of recent infarct or acute intracranial hemorrhage    #T11 compression deformity  - Evaluated by PT  - Can get TLSO brace  - C/w Tylenol and methocarbamol  - As per son, pt has poor balance    #HTN, fairly controlled  - C/w lisinopril 10 QD and Procardia 30 QD    #DM  - On metformin at home  - A1C 7.1 on 6/11/21  - Will give insulin sliding scale  - Monitor FS    #HLD  - C/w Lipitor 40    #Misc  Diet: DASH/TLC, NPO after MN  DVT PPx: Lovenox  Dispo: pending intracranial aneurysm embolization tomorrow 80 F with PMH of HTN, HLD, and DM presented after a mechanical fall (witnessed by her son). Admitted to medicine with an age-indeterminate T11 superior endplate compression deformity. Found to have a 12 mm left MCA aneurysm. Plan for intracranial aneurysm embolization tomorrow.    #Left MCA 12 mm aneurysm  - As seen on CTA head/neck  - Evaluated by vascular surgery -> no acute vascular surgery intervention at this time, follow up with vascular surgery Dr. Trimble as outpatient  - Evaluated by neurosurgery -> no acute neurosurgical intervention at this time, can follow up with neurosurgery Dr. Guadarrama as outpatient  - Evaluated by neuroendovascular -> plan for intracranial aneurysm embolization tomorrow  - Cardiology consulted for risk stratification  - Recent ECHO on file -> EF 60-65%, G1DD    #COLE  - Started on 0.45% NS at 100 cc/hr  - Monitor BMP    #R/O stroke  - CT head N/C showed focal hypodensities in inferior right cerebellar hemisphere but MRI brain N/C showed no evidence of recent infarct or acute intracranial hemorrhage    #T11 compression deformity  - Evaluated by PT  - Can get TLSO brace  - C/w Tylenol and methocarbamol  - As per son, pt has poor balance    #HTN, fairly controlled  - Hold lisinopril 10mg QD for COLE  - Increase Procardia to 60mg QD starting tomorrow    #DM  - On metformin at home  - A1C 7.1 on 6/11/21  - Will give insulin sliding scale  - Monitor FS    #HLD  - C/w Lipitor 40    #Misc  Diet: DASH/TLC, NPO after MN  DVT PPx: Lovenox  Dispo: pending intracranial aneurysm embolization tomorrow

## 2021-06-15 NOTE — CONSULT NOTE ADULT - SUBJECTIVE AND OBJECTIVE BOX
HPI:  80 year old female with past medical history of DM II, HTN, and DLD, on asa, presents after a fall.     Spoke with help of [place] .  As per family and patient she was trying to sit on the bed and missed, fell on a chair and then to floor, hit lower back. She denies any head trauma, loss of consciousness, neck pain, abdominal pain, palpitations, nausea, vomiting, diarrhea, chest pain, fever, rigors or chills. As per the son she has had balance issues for some time now and has had recurrent falls, the only difference now is that this fall was worse and had her ended up in the hospital. He also endorse mild dementia and some forgetfulness but patient able to ambulate around with cane and perform all ADLs.    In ED patient hemodynamically stable, afebrile, trauma workup shows a T11 fracture (No further intervention as per Neurosurgery) and some Focal hypodensities noted within the inferior right cerebellar hemisphere possibly representing age-indeterminate infarcts. At time of interview patient endorses some hip and back pain. (10 Ey 2021 21:44)      PAST MEDICAL & SURGICAL HISTORY  Type II diabetes mellitus    Hypertension    Dyslipidemia    Recurrent falls    History of hernia surgery        FAMILY HISTORY:  FAMILY HISTORY:  FHx: diabetes mellitus        SOCIAL HISTORY:  []smoker  []Alcohol  []Drug    ALLERGIES:  No Known Allergies      MEDICATIONS:  MEDICATIONS  (STANDING):  aspirin  chewable 81 milliGRAM(s) Oral daily  atorvastatin 40 milliGRAM(s) Oral at bedtime  dextrose 40% Gel 15 Gram(s) Oral once  dextrose 5%. 1000 milliLiter(s) (50 mL/Hr) IV Continuous <Continuous>  dextrose 5%. 1000 milliLiter(s) (100 mL/Hr) IV Continuous <Continuous>  dextrose 50% Injectable 25 Gram(s) IV Push once  dextrose 50% Injectable 12.5 Gram(s) IV Push once  dextrose 50% Injectable 25 Gram(s) IV Push once  enoxaparin Injectable 40 milliGRAM(s) SubCutaneous daily  glucagon  Injectable 1 milliGRAM(s) IntraMuscular once  insulin lispro (ADMELOG) corrective regimen sliding scale   SubCutaneous three times a day before meals  lisinopril 10 milliGRAM(s) Oral daily  methocarbamol 500 milliGRAM(s) Oral two times a day  NIFEdipine XL 30 milliGRAM(s) Oral daily    MEDICATIONS  (PRN):  acetaminophen   Tablet .. 650 milliGRAM(s) Oral every 6 hours PRN Moderate Pain (4 - 6)      HOME MEDICATIONS:  Home Medications:  aspirin 81 mg oral tablet, chewable: 1 tab(s) orally once a day (10 Ye 2021 21:44)  metformin 500 mg tablet: TAKE ONE TABLET BY MOUTH TWICE DAILY WITH MEALS (10 Ye 2021 21:44)      VITALS:   T(F): 97.4 (06-15 @ 07:59), Max: 98.6 (06-12 @ 16:27)  HR: 69 (06-15 @ 07:59) (69 - 101)  BP: 157/84 (06-15 @ 07:59) (122/66 - 164/84)  BP(mean): --  RR: 18 (06-15 @ 07:59) (16 - 18)  SpO2: --    I&O's Summary    14 Jun 2021 07:01  -  15 Ye 2021 07:00  --------------------------------------------------------  IN: 840 mL / OUT: 0 mL / NET: 840 mL        REVIEW OF SYSTEMS:  CONSTITUTIONAL: No weakness, fevers or chills  EYES: No visual changes  ENT: No vertigo or throat pain   NECK: No pain or stiffness  RESPIRATORY: No cough, wheezing, hemoptysis; No shortness of breath  CARDIOVASCULAR: No chest pain or palpitations  GASTROINTESTINAL: No abdominal or epigastric pain. No nausea, vomiting, or hematemesis; No diarrhea or constipation. No melena or hematochezia.  GENITOURINARY: No dysuria, frequency or hematuria  NEUROLOGICAL: No numbness or weakness  SKIN: No itching, no rashes  MSK: no    PHYSICAL EXAM:  NEURO: patient is awake , alert and oriented  GEN: Not in acute distress  NECK: no thyroid enlargement, no JVD  LUNGS: Clear to auscultation bilaterally   CARDIOVASCULAR: S1/S2 present, RRR , no murmurs or rubs, no carotid bruits,  + PP bilaterally  ABD: Soft, non-tender, non-distended, +BS  EXT: No RENEE  SKIN: Intact    LABS:                        11.8   6.72  )-----------( 197      ( 15 Ye 2021 07:18 )             38.3     06-15    138  |  102  |  35<H>  ----------------------------<  135<H>  4.7   |  27  |  1.4    Ca    9.3      15 Ye 2021 07:18  Mg     2.1     06-15    TPro  5.9<L>  /  Alb  3.6  /  TBili  0.3  /  DBili  x   /  AST  27  /  ALT  27  /  AlkPhos  68  06-15              Troponin trend:      06-11 Chol 177 LDL -- HDL 48<L> Trig 128      RADIOLOGY:  -CXR:  < from: Xray Chest 1 View-PORTABLE IMMEDIATE (Xray Chest 1 View-PORTABLE IMMEDIATE .) (06.10.21 @ 17:01) >  Skeleton/soft tissues: No acute abnormality.    Impression:    Left midlung linear atelectasis.    < end of copied text >    -TTE:  `< from: TTE Echo Complete w/o Contrast w/ Doppler (06.12.21 @ 13:42) >    Summary:   1. Left ventricular ejection fraction, by visualestimation, is 60 to 65%.   2. Normal global left ventricular systolic function.   3. Mild left ventricular hypertrophy.   4. Spectral Doppler shows impaired relaxation pattern of left ventricular myocardial filling (Grade I diastolic dysfunction).  5. Moderately enlarged left atrium.   6. Sclerotic aortic valve with normal opening.   7. Moderate mitral annular calcification.   8. Moderate thickening and calcification of the anterior mitral valve leaflet.   9. Mild mitral regurgitation.  10. Mild tricuspid regurgitation.    < end of copied text >    ECG: NSR    TELEMETRY EVENTS:   HPI:  80 year old female with past medical history of DM II, HTN, and DLD, on asa, presents after a fall.      Wagner Son  for further  information,  phone does not contain dialect   As per family and patient she was trying to sit on the bed and missed, fell on a chair and then to floor, hit lower back. She denies any head trauma, loss of consciousness, neck pain, abdominal pain, palpitations, nausea, vomiting, diarrhea, chest pain, fever, rigors or chills. As per the son she has had balance issues for some time now and has had recurrent falls, the only difference now is that this fall was worse and had her ended up in the hospital. He also endorse mild dementia and some forgetfulness but patient able to ambulate around with cane and perform all ADLs.    In ED patient hemodynamically stable, afebrile, trauma workup shows a T11 fracture (No further intervention as per Neurosurgery) and some Focal hypodensity noted within the inferior right cerebellar hemisphere possibly representing age-indeterminate infarcts. At time of interview patient endorses some hip and back pain. (10 Ye 2021 21:44)      PAST MEDICAL & SURGICAL HISTORY  Type II diabetes mellitus    Hypertension    Dyslipidemia    Recurrent falls    History of hernia surgery        FAMILY HISTORY:  FAMILY HISTORY:  FHx: diabetes mellitus        SOCIAL HISTORY:  [x] Quit smoking 5 years ago   []Alcohol  []Drug    ALLERGIES:  No Known Allergies      MEDICATIONS:  MEDICATIONS  (STANDING):  aspirin  chewable 81 milliGRAM(s) Oral daily  atorvastatin 40 milliGRAM(s) Oral at bedtime  dextrose 40% Gel 15 Gram(s) Oral once  dextrose 5%. 1000 milliLiter(s) (50 mL/Hr) IV Continuous <Continuous>  dextrose 5%. 1000 milliLiter(s) (100 mL/Hr) IV Continuous <Continuous>  dextrose 50% Injectable 25 Gram(s) IV Push once  dextrose 50% Injectable 12.5 Gram(s) IV Push once  dextrose 50% Injectable 25 Gram(s) IV Push once  enoxaparin Injectable 40 milliGRAM(s) SubCutaneous daily  glucagon  Injectable 1 milliGRAM(s) IntraMuscular once  insulin lispro (ADMELOG) corrective regimen sliding scale   SubCutaneous three times a day before meals  lisinopril 10 milliGRAM(s) Oral daily  methocarbamol 500 milliGRAM(s) Oral two times a day  NIFEdipine XL 30 milliGRAM(s) Oral daily    MEDICATIONS  (PRN):  acetaminophen   Tablet .. 650 milliGRAM(s) Oral every 6 hours PRN Moderate Pain (4 - 6)      HOME MEDICATIONS:  Home Medications:  aspirin 81 mg oral tablet, chewable: 1 tab(s) orally once a day (10 Ye 2021 21:44)  metformin 500 mg tablet: TAKE ONE TABLET BY MOUTH TWICE DAILY WITH MEALS (10 Ye 2021 21:44)      VITALS:   T(F): 97.4 (06-15 @ 07:59), Max: 98.6 (06-12 @ 16:27)  HR: 69 (06-15 @ 07:59) (69 - 101)  BP: 157/84 (06-15 @ 07:59) (122/66 - 164/84)  BP(mean): --  RR: 18 (06-15 @ 07:59) (16 - 18)  SpO2: --    I&O's Summary    14 Jun 2021 07:01  -  15 Ye 2021 07:00  --------------------------------------------------------  IN: 840 mL / OUT: 0 mL / NET: 840 mL        REVIEW OF SYSTEMS:  RESPIRATORY:  No shortness of breath  CARDIOVASCULAR: No chest pain or palpitations    PHYSICAL EXAM:  NEURO: patient is awake , alert and oriented  GEN: Not in acute distress  NECK: no thyroid enlargement, no JVD  LUNGS: Clear to auscultation bilaterally   CARDIOVASCULAR: S1/S2 present, RRR , no murmurs or rubs, no carotid bruits,  + PP bilaterally  ABD: Soft, non-tender, non-distended, +BS  EXT: No RENEE  SKIN: Intact    LABS:                        11.8   6.72  )-----------( 197      ( 15 Ye 2021 07:18 )             38.3     06-15    138  |  102  |  35<H>  ----------------------------<  135<H>  4.7   |  27  |  1.4    Ca    9.3      15 Ye 2021 07:18  Mg     2.1     06-15    TPro  5.9<L>  /  Alb  3.6  /  TBili  0.3  /  DBili  x   /  AST  27  /  ALT  27  /  AlkPhos  68  06-15              Troponin trend:      06-11 Chol 177 LDL -- HDL 48<L> Trig 128      RADIOLOGY:  -CXR:  < from: Xray Chest 1 View-PORTABLE IMMEDIATE (Xray Chest 1 View-PORTABLE IMMEDIATE .) (06.10.21 @ 17:01) >  Skeleton/soft tissues: No acute abnormality.    Impression:    Left midlung linear atelectasis.    < end of copied text >    -TTE:  `< from: TTE Echo Complete w/o Contrast w/ Doppler (06.12.21 @ 13:42) >    Summary:   1. Left ventricular ejection fraction, by visualestimation, is 60 to 65%.   2. Normal global left ventricular systolic function.   3. Mild left ventricular hypertrophy.   4. Spectral Doppler shows impaired relaxation pattern of left ventricular myocardial filling (Grade I diastolic dysfunction).  5. Moderately enlarged left atrium.   6. Sclerotic aortic valve with normal opening.   7. Moderate mitral annular calcification.   8. Moderate thickening and calcification of the anterior mitral valve leaflet.   9. Mild mitral regurgitation.  10. Mild tricuspid regurgitation.    < end of copied text >    ECG: NSR

## 2021-06-15 NOTE — CONSULT NOTE ADULT - ATTENDING COMMENTS
80 year old female with past medical history of smoking, DM II, HTN, and DLD with incidental finding of left MCA 12mm aneurysm was seen and examined today. We spoke to his son the risk of the aneurysm and risk and benefits of diagnostic cerebral angiogram +/- embolization. He spoke to his mother and then both agreed to undergo the procedure.

## 2021-06-15 NOTE — CONSULT NOTE ADULT - ASSESSMENT
IMPRESSION  * SUMMARY:    * Patient-based characteristics (Functional capacity)  Patient is able to achieve more than 4 MET (walk 4 blocks, climb 2 flights of stairs, etc...)          Y [X] / N []    High-risk patient:   Recent (<30 days) or active MI          Y [] / N [X]                                    Unstable or severe angina          Y [] / N [X]                                    Decompensated heart failure, or worsening or new-onset heart failure          Y [] / N [X]                                    Severe valvular disease          Y [] / N [X]                                    Significant arrhythmia (Tachy- or Bradyarrhythmia)          Y [] / N [X]    * Surgery/Procedure-based characteristics (Type of surgery)  - Low-risk procedure (outpatient procedure, elective, endoscopy, etc...)          Y [] / N []  - Elevated or Moderate-risk procedure (Inpatient)          Y [] / N []  - High-risk procedure (urgent/emergent procedure, Intrathoracic, vascular, etc...)          Y [] / N []    * Revised Cardiac Risk Index (RCRI)  1- History of ischemic heart disease          Y [] / N [X]  2- History of congestive heart failure          Y [] / N [X]  3- History of stroke/TIA          Y [] / N [X]  4- History of insulin-dependent diabetes          Y [] / N [X]  5- Chronic kidney disease (Cr >2mg/dL)          Y [] / N [X]  6- Undergoing suprainguinal vascular, intraperitoneal, or intrathoracic surgery          Y [] / N [X]    Class I risk (Zero factors) --> 3.9% (30-day risk of death, MI, or cardiac arrest)  Class II risk (One factor) --> 6% risk  Class III risk (Two factors) --> 10.1% risk  Class IV risk (Three factors or more) --> >15% risk    * IMPRESSION & RECOMMENDATIONS:  Low (<1%) / Moderate / High (>25%) -risk patient for a Low (<1%) / Moderate / High (>7%) -risk surgery/procedure  No further cardiac work-up is needed at the moment. There are no current cardiac contraindications to prevent from proceeding with the scheduled surgery/procedure.    - This consult serves only as a [place]-operative cardiac risk stratification and evaluation to predict 30-days cardiac complications risk and mortality. The decision to proceed with the surgery/procedure is made by the performing physician and the patient -   IMPRESSION Information gathered from family via Phone  Pre -Op  Risk For Cerebral Aneurysm  Embolization     Patient is able to achieve more than 4 MET (walk 4 blocks, climb 2 flights of stairs, etc...)          Y [X] / N []    High-risk patient:   Recent (<30 days) or active MI          Y [] / N [X]                                    Unstable or severe angina          Y [] / N [X]                                    Decompensated heart failure, or worsening or new-onset heart failure          Y [] / N [X]                                    Severe valvular disease          Y [] / N [X]                                    Significant arrhythmia (Tachy- or Bradyarrhythmia)          Y [] / N [X]    High-risk procedure (urgent/emergent procedure, Intrathoracic, vascular, etc...)          Y [X] / N []    * Revised Cardiac Risk Index (RCRI)  1- History of ischemic heart disease          Y [] / N [X]  2- History of congestive heart failure          Y [] / N [X]  3- History of stroke/TIA          Y [] / N [X]  4- History of insulin-dependent diabetes          Y [x] / N []  5- Chronic kidney disease (Cr >2mg/dL)          Y [] / N [X]  6- Undergoing suprainguinal vascular, intraperitoneal, or intrathoracic surgery          Y [] / N [X]    Class II risk (One factor) --> 6% risk    IMPRESSION & RECOMMENDATIONS:  Low (<1%) -risk patient for a Moderate / High (>7%) -risk surgery/procedure  No further cardiac work-up is needed at the moment. There are no current cardiac contraindications to prevent from proceeding with the scheduled surgery/procedure.    - This consult serves only as a [place]-operative cardiac risk stratification and evaluation to predict 30-days cardiac complications risk and mortality. The decision to proceed with the surgery/procedure is made by the performing physician and the patient -

## 2021-06-16 ENCOUNTER — APPOINTMENT (OUTPATIENT)
Dept: NEUROLOGY | Facility: HOSPITAL | Age: 81
End: 2021-06-16
Payer: MEDICAID

## 2021-06-16 PROCEDURE — 36224 PLACE CATH CAROTD ART: CPT | Mod: LT

## 2021-06-16 PROCEDURE — 76937 US GUIDE VASCULAR ACCESS: CPT | Mod: 26

## 2021-06-16 PROCEDURE — 76377 3D RENDER W/INTRP POSTPROCES: CPT | Mod: 26

## 2021-06-16 PROCEDURE — ZZZZZ: CPT

## 2021-06-16 PROCEDURE — 36226 PLACE CATH VERTEBRAL ART: CPT | Mod: LT

## 2021-06-16 RX ADMIN — METHOCARBAMOL 500 MILLIGRAM(S): 500 TABLET, FILM COATED ORAL at 21:23

## 2021-06-16 RX ADMIN — Medication 650 MILLIGRAM(S): at 05:08

## 2021-06-16 RX ADMIN — METHOCARBAMOL 500 MILLIGRAM(S): 500 TABLET, FILM COATED ORAL at 05:08

## 2021-06-16 RX ADMIN — ATORVASTATIN CALCIUM 40 MILLIGRAM(S): 80 TABLET, FILM COATED ORAL at 21:23

## 2021-06-16 RX ADMIN — Medication 60 MILLIGRAM(S): at 05:08

## 2021-06-16 NOTE — CHART NOTE - NSCHARTNOTEFT_GEN_A_CORE
NeuroENDOvascular.     Patient post diagnostic cerebral angiogram. Patient did not undergo embolization of intracranial aneurysm.   NIHSS 0 post procedure.   Right groin intact.     Suggestion:  Blood pressure should be kept less than 140/80   Discussed with son.   Can follow as outpatient.       Paul Singh NP  [phone]

## 2021-06-16 NOTE — PROGRESS NOTE ADULT - ASSESSMENT
80 F with PMH of HTN, HLD, and DM presented after a mechanical fall (witnessed by her son). Admitted to medicine with an age-indeterminate T11 superior endplate compression deformity. Found to have a 12 mm left MCA aneurysm. Went for intracranial aneurysm embolization with neuro-IR today but no intervention was done.    #12mm left MCA aneurysm  - Went for intracranial aneurysm embolization with neuro-IR today but no intervention was done  - Maintain normotension  - Evaluated by vascular surgery -> no acute vascular surgery intervention at this time, follow up with vascular surgery Dr. Trimble as outpatient  - Evaluated by neurosurgery -> no acute neurosurgical intervention at this time, can follow up with neurosurgery Dr. Guadarrama as outpatient  - Recent ECHO on file -> EF 60-65%, G1DD    #COLE  - C/w 0.45% NS at 100 cc/hr  - Monitor BMP    #R/O stroke  - CT head N/C showed focal hypodensities in inferior right cerebellar hemisphere but MRI brain N/C showed no evidence of recent infarct or acute intracranial hemorrhage    #T11 compression deformity  - Evaluated by PT  - Can get TLSO brace  - C/w Tylenol and methocarbamol  - As per son, pt has poor balance    #HTN, fairly controlled  - Hold lisinopril 10mg QD for COLE  - Increased Procardia to 60mg QD    #DM  - On metformin at home  - A1C 7.1 on 6/11/21  - C/w insulin sliding scale  - Monitor FS    #HLD  - C/w Lipitor 40    #Misc  Diet: DASH/TLC  DVT PPx: Lovenox  Dispo: anticipated for discharge

## 2021-06-16 NOTE — PROCEDURE NOTE - PLAN
Bedrest and right leg strait for 4 hours.   Neuro checks and right lower extremity neurovascular checks per institutional protocol.

## 2021-06-16 NOTE — CHART NOTE - NSCHARTNOTEFT_GEN_A_CORE
Transfer Note    Transfer from: 4B  Transfer to: MICU  Accepting physician:    KATRINA COURSE:    80 F with PMH of HTN, HLD, and DM presented after a mechanical fall (witnessed by her son). Admitted to medicine with an age-indeterminate T11 superior endplate compression deformity. Found to have a 12 mm left MCA aneurysm. S/p intracranial aneurysm embolization on 6/16/21. To be upgraded to MICU for monitoring post-op and neuro checks.    ASSESSMENT AND PLAN:    #12mm left MCA aneurysm  - S/p intracranial aneurysm embolization on 6/16/21  - Evaluated by vascular surgery -> no acute vascular surgery intervention at this time, follow up with vascular surgery Dr. Trimble as outpatient  - Evaluated by neurosurgery -> no acute neurosurgical intervention at this time, can follow up with neurosurgery Dr. Guadarrama as outpatient  - Recent ECHO on file -> EF 60-65%, G1DD  - To be upgraded to MICU for monitoring post-op and neuro checks    #COLE  - Started on 0.45% NS at 100 cc/hr  - Monitor BMP    #R/O stroke  - CT head N/C showed focal hypodensities in inferior right cerebellar hemisphere but MRI brain N/C showed no evidence of recent infarct or acute intracranial hemorrhage    #T11 compression deformity  - Evaluated by PT  - Can get TLSO brace  - C/w Tylenol and methocarbamol  - As per son, pt has poor balance    #HTN, fairly controlled  - Hold lisinopril 10mg QD for COLE  - Increased Procardia to 60mg QD    #DM  - On metformin at home  - A1C 7.1 on 6/11/21  - Will give insulin sliding scale  - Monitor FS    #HLD  - C/w Lipitor 40    #Misc  Diet: DASH/TLC  DVT PPx: Lovenox  Dispo: upgrade to MICU for monitoring post-op and neuro checks      For Follow-Up:          Vital Signs Last 24 Hrs  T(C): 36.9 (16 Jun 2021 01:15), Max: 36.9 (16 Jun 2021 01:15)  T(F): 98.4 (16 Jun 2021 01:15), Max: 98.4 (16 Jun 2021 01:15)  HR: 80 (16 Jun 2021 07:23) (80 - 87)  BP: 163/84 (16 Jun 2021 07:23) (148/78 - 175/89)  BP(mean): --  RR: 18 (16 Jun 2021 01:15) (18 - 18)  SpO2: --  I&O's Summary    15 Ye 2021 07:01  -  16 Jun 2021 07:00  --------------------------------------------------------  IN: 900 mL / OUT: 0 mL / NET: 900 mL          MEDICATIONS  (STANDING):  aspirin  chewable 81 milliGRAM(s) Oral daily  atorvastatin 40 milliGRAM(s) Oral at bedtime  dextrose 40% Gel 15 Gram(s) Oral once  dextrose 5%. 1000 milliLiter(s) (50 mL/Hr) IV Continuous <Continuous>  dextrose 5%. 1000 milliLiter(s) (100 mL/Hr) IV Continuous <Continuous>  dextrose 50% Injectable 25 Gram(s) IV Push once  dextrose 50% Injectable 12.5 Gram(s) IV Push once  dextrose 50% Injectable 25 Gram(s) IV Push once  enoxaparin Injectable 40 milliGRAM(s) SubCutaneous daily  glucagon  Injectable 1 milliGRAM(s) IntraMuscular once  insulin lispro (ADMELOG) corrective regimen sliding scale   SubCutaneous three times a day before meals  methocarbamol 500 milliGRAM(s) Oral two times a day  NIFEdipine XL 60 milliGRAM(s) Oral daily  sodium chloride 0.45%. 1000 milliLiter(s) (100 mL/Hr) IV Continuous <Continuous>    MEDICATIONS  (PRN):  acetaminophen   Tablet .. 650 milliGRAM(s) Oral every 6 hours PRN Moderate Pain (4 - 6)        LABS        ( 06-15 @ 18:54 )   PT: 12.00 sec;   INR: 1.04 ratio  aPTT: 35.4 sec Transfer Note    Transfer from: 4B  Transfer to: MICU  Accepting physician:    KATRINA COURSE:    80 F with PMH of HTN, HLD, and DM presented after a mechanical fall (witnessed by her son). Admitted to medicine with an age-indeterminate T11 superior endplate compression deformity. Found to have a 12 mm left MCA aneurysm. S/p intracranial aneurysm embolization on 6/16/21. To be upgraded to MICU for monitoring post-op and neuro checks.    ASSESSMENT AND PLAN:    #12mm left MCA aneurysm  - S/p intracranial aneurysm embolization on 6/16/21  - BP parameters and neuro checks as per neuroendovascular  - Evaluated by vascular surgery -> no acute vascular surgery intervention at this time, follow up with vascular surgery Dr. Trimble as outpatient  - Evaluated by neurosurgery -> no acute neurosurgical intervention at this time, can follow up with neurosurgery Dr. Guadarrama as outpatient  - Recent ECHO on file -> EF 60-65%, G1DD  - To be upgraded to MICU for monitoring post-op and neuro checks    #COLE  - Started on 0.45% NS at 100 cc/hr  - Monitor BMP    #R/O stroke  - CT head N/C showed focal hypodensities in inferior right cerebellar hemisphere but MRI brain N/C showed no evidence of recent infarct or acute intracranial hemorrhage    #T11 compression deformity  - Evaluated by PT  - Can get TLSO brace  - C/w Tylenol and methocarbamol  - As per son, pt has poor balance    #HTN, fairly controlled  - Hold lisinopril 10mg QD for COLE  - Increased Procardia to 60mg QD    #DM  - On metformin at home  - A1C 7.1 on 6/11/21  - Will give insulin sliding scale  - Monitor FS    #HLD  - C/w Lipitor 40    #Misc  Diet: DASH/TLC  DVT PPx: Lovenox  Dispo: upgrade to MICU for monitoring post-op and neuro checks      For Follow-Up:          Vital Signs Last 24 Hrs  T(C): 36.9 (16 Jun 2021 01:15), Max: 36.9 (16 Jun 2021 01:15)  T(F): 98.4 (16 Jun 2021 01:15), Max: 98.4 (16 Jun 2021 01:15)  HR: 80 (16 Jun 2021 07:23) (80 - 87)  BP: 163/84 (16 Jun 2021 07:23) (148/78 - 175/89)  BP(mean): --  RR: 18 (16 Jun 2021 01:15) (18 - 18)  SpO2: --  I&O's Summary    15 Ye 2021 07:01  -  16 Jun 2021 07:00  --------------------------------------------------------  IN: 900 mL / OUT: 0 mL / NET: 900 mL          MEDICATIONS  (STANDING):  aspirin  chewable 81 milliGRAM(s) Oral daily  atorvastatin 40 milliGRAM(s) Oral at bedtime  dextrose 40% Gel 15 Gram(s) Oral once  dextrose 5%. 1000 milliLiter(s) (50 mL/Hr) IV Continuous <Continuous>  dextrose 5%. 1000 milliLiter(s) (100 mL/Hr) IV Continuous <Continuous>  dextrose 50% Injectable 25 Gram(s) IV Push once  dextrose 50% Injectable 12.5 Gram(s) IV Push once  dextrose 50% Injectable 25 Gram(s) IV Push once  enoxaparin Injectable 40 milliGRAM(s) SubCutaneous daily  glucagon  Injectable 1 milliGRAM(s) IntraMuscular once  insulin lispro (ADMELOG) corrective regimen sliding scale   SubCutaneous three times a day before meals  methocarbamol 500 milliGRAM(s) Oral two times a day  NIFEdipine XL 60 milliGRAM(s) Oral daily  sodium chloride 0.45%. 1000 milliLiter(s) (100 mL/Hr) IV Continuous <Continuous>    MEDICATIONS  (PRN):  acetaminophen   Tablet .. 650 milliGRAM(s) Oral every 6 hours PRN Moderate Pain (4 - 6)        LABS        ( 06-15 @ 18:54 )   PT: 12.00 sec;   INR: 1.04 ratio  aPTT: 35.4 sec Transfer Note    Transfer from: 4B  Transfer to: MICU  Accepting physician:    KATRINA COURSE:    80 F with PMH of HTN, HLD, and DM presented after a mechanical fall (witnessed by her son). Admitted to medicine with an age-indeterminate T11 superior endplate compression deformity. Found to have a 12 mm left MCA aneurysm. S/p intracranial aneurysm embolization on 6/16/21. To be upgraded to MICU for monitoring post-op and neuro checks.    ASSESSMENT AND PLAN:    #12mm left MCA aneurysm  - S/p intracranial aneurysm embolization on 6/16/21  - BP parameters and neuro checks as per neuroendovascular  - Evaluated by vascular surgery -> no acute vascular surgery intervention at this time, follow up with vascular surgery Dr. Trimble as outpatient  - Evaluated by neurosurgery -> no acute neurosurgical intervention at this time, can follow up with neurosurgery Dr. Guadarrama as outpatient  - Recent ECHO on file -> EF 60-65%, G1DD  - To be upgraded to MICU for monitoring post-op and neuro checks    #COLE  - Started on 0.45% NS at 100 cc/hr  - Monitor BMP    #R/O stroke  - CT head N/C showed focal hypodensities in inferior right cerebellar hemisphere but MRI brain N/C showed no evidence of recent infarct or acute intracranial hemorrhage    #T11 compression deformity  - Evaluated by PT  - Can get TLSO brace  - C/w Tylenol and methocarbamol  - As per son, pt has poor balance    #HTN, fairly controlled  - Hold lisinopril 10mg QD for COLE  - Increased Procardia to 60mg QD    #DM  - On metformin at home  - A1C 7.1 on 6/11/21  - Will give insulin sliding scale  - Monitor FS    #HLD  - C/w Lipitor 40    #Misc  Diet: DASH/TLC  DVT PPx: Lovenox  Dispo: upgrade to MICU for monitoring post-op and neuro checks      For Follow-Up:          Vital Signs Last 24 Hrs  T(C): 36.9 (16 Jun 2021 01:15), Max: 36.9 (16 Jun 2021 01:15)  T(F): 98.4 (16 Jun 2021 01:15), Max: 98.4 (16 Jun 2021 01:15)  HR: 80 (16 Jun 2021 07:23) (80 - 87)  BP: 163/84 (16 Jun 2021 07:23) (148/78 - 175/89)  BP(mean): --  RR: 18 (16 Jun 2021 01:15) (18 - 18)  SpO2: --  I&O's Summary    15 Ye 2021 07:01  -  16 Jun 2021 07:00  --------------------------------------------------------  IN: 900 mL / OUT: 0 mL / NET: 900 mL          MEDICATIONS  (STANDING):  aspirin  chewable 81 milliGRAM(s) Oral daily  atorvastatin 40 milliGRAM(s) Oral at bedtime  dextrose 40% Gel 15 Gram(s) Oral once  dextrose 5%. 1000 milliLiter(s) (50 mL/Hr) IV Continuous <Continuous>  dextrose 5%. 1000 milliLiter(s) (100 mL/Hr) IV Continuous <Continuous>  dextrose 50% Injectable 25 Gram(s) IV Push once  dextrose 50% Injectable 12.5 Gram(s) IV Push once  dextrose 50% Injectable 25 Gram(s) IV Push once  enoxaparin Injectable 40 milliGRAM(s) SubCutaneous daily  glucagon  Injectable 1 milliGRAM(s) IntraMuscular once  insulin lispro (ADMELOG) corrective regimen sliding scale   SubCutaneous three times a day before meals  methocarbamol 500 milliGRAM(s) Oral two times a day  NIFEdipine XL 60 milliGRAM(s) Oral daily  sodium chloride 0.45%. 1000 milliLiter(s) (100 mL/Hr) IV Continuous <Continuous>    MEDICATIONS  (PRN):  acetaminophen   Tablet .. 650 milliGRAM(s) Oral every 6 hours PRN Moderate Pain (4 - 6)        LABS        ( 06-15 @ 18:54 )   PT: 12.00 sec;   INR: 1.04 ratio  aPTT: 35.4 sec    Sign out given to Dr. Urbina (Spectra [phone]) at [phone]

## 2021-06-16 NOTE — CHART NOTE - NSCHARTNOTEFT_GEN_A_CORE
Neuroendovascular Pre op    Patient NIHSS 0 and at baseline as per son at bedside.     Pre op Intracranial aneurysm embolization    Paul Singh NP  [phone] [Normal Growth] : growth [Normal Development] : development [None] : No known medical problems [No Elimination Concerns] : elimination [No Feeding Concerns] : feeding [No Skin Concerns] : skin [Normal Sleep Pattern] : sleep [No Medications] : ~He/She~ is not on any medications [Patient] : patient [School] : school [Development and Mental Health] : development and mental health [Nutrition and Physical Activity] : nutrition and physical activity [Oral Health] : oral health [Safety] : safety [Full Activity without restrictions including Physical Education & Athletics] : Full Activity without restrictions including Physical Education & Athletics [I have examined the above-named student and completed the preparticipation physical evaluation. The athlete does not present apparent clinical contraindications to practice and participate in sport(s) as outlined above. A copy of the physical exam is on r] : I have examined the above-named student and completed the preparticipation physical evaluation. The athlete does not present apparent clinical contraindications to practice and participate in sport(s) as outlined above. A copy of the physical exam is on record in my office and can be made available to the school at the request of the parents. If conditions arise after the athlete has been cleared for participation, the physician may rescind the clearance until the problem is resolved and the potential consequences are completely explained to the athlete (and parents/guardians). [] : The components of the vaccine(s) to be administered today are listed in the plan of care. The disease(s) for which the vaccine(s) are intended to prevent and the risks have been discussed with the caretaker.  The risks are also included in the appropriate vaccination information statements which have been provided to the patient's caregiver.  The caregiver has given consent to vaccinate. [FreeTextEntry1] : Routine care was discussed. Yearly exam. sooner if any concerns. Followup with dermatology.

## 2021-06-16 NOTE — PROCEDURE NOTE - PROCEDURE FINDINGS AND DETAILS
Left CCA angiogram demonstrated focal tight stenosis of distal CCA, just proximal to the bifurcation. 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 Multiple angiograms demonstrated M2 branches arising from the aneurysm, and it was deemed unsafe for coil placement. No intervention was performed. Right groin hemostasis achieved with Angioseal closure device.

## 2021-06-16 NOTE — CHART NOTE - NSCHARTNOTEFT_GEN_A_CORE
PACU ANESTHESIA ADMISSION NOTE      Procedure:   Post op diagnosis:      ____  Intubated  TV:______       Rate: ______      FiO2: ______    _x___  Patent Airway    __x__  Full return of protective reflexes    _x___  Full recovery from anesthesia / back to baseline     Mental Status:  ___x_ Awake   _____ Alert   _____ Drowsy   _____ Sedated    Nausea/Vomiting:  __x__ NO  ______Yes,   See Post - Op Orders          Pain Scale (0-10):  0____    Treatment: ____ None    ___x_ See Post - Op/PCA Orders    Post - Operative Fluids:   ____ Oral   ___x_ See Post - Op Orders    Plan: Discharge:   ____Home       _____Floor     _____Critical Care    _____  Other:_________________    Comments: Pt has recovered from anesthesia, no known complications.     Vitals:   T:        98.1   R:         16         BP:         132/73         Sat:    100               P: 74

## 2021-06-17 LAB
ALBUMIN SERPL ELPH-MCNC: 3.8 G/DL — SIGNIFICANT CHANGE UP (ref 3.5–5.2)
ALP SERPL-CCNC: 76 U/L — SIGNIFICANT CHANGE UP (ref 30–115)
ALT FLD-CCNC: 30 U/L — SIGNIFICANT CHANGE UP (ref 0–41)
ANION GAP SERPL CALC-SCNC: 12 MMOL/L — SIGNIFICANT CHANGE UP (ref 7–14)
AST SERPL-CCNC: 27 U/L — SIGNIFICANT CHANGE UP (ref 0–41)
BILIRUB SERPL-MCNC: 0.4 MG/DL — SIGNIFICANT CHANGE UP (ref 0.2–1.2)
BUN SERPL-MCNC: 27 MG/DL — HIGH (ref 10–20)
CALCIUM SERPL-MCNC: 9.3 MG/DL — SIGNIFICANT CHANGE UP (ref 8.5–10.1)
CHLORIDE SERPL-SCNC: 103 MMOL/L — SIGNIFICANT CHANGE UP (ref 98–110)
CO2 SERPL-SCNC: 25 MMOL/L — SIGNIFICANT CHANGE UP (ref 17–32)
CREAT SERPL-MCNC: 1.3 MG/DL — SIGNIFICANT CHANGE UP (ref 0.7–1.5)
GLUCOSE BLDC GLUCOMTR-MCNC: 130 MG/DL — HIGH (ref 70–99)
GLUCOSE BLDC GLUCOMTR-MCNC: 187 MG/DL — HIGH (ref 70–99)
GLUCOSE SERPL-MCNC: 130 MG/DL — HIGH (ref 70–99)
HCT VFR BLD CALC: 39.9 % — SIGNIFICANT CHANGE UP (ref 37–47)
HGB BLD-MCNC: 12.6 G/DL — SIGNIFICANT CHANGE UP (ref 12–16)
MCHC RBC-ENTMCNC: 27.1 PG — SIGNIFICANT CHANGE UP (ref 27–31)
MCHC RBC-ENTMCNC: 31.6 G/DL — LOW (ref 32–37)
MCV RBC AUTO: 85.8 FL — SIGNIFICANT CHANGE UP (ref 81–99)
NRBC # BLD: 0 /100 WBCS — SIGNIFICANT CHANGE UP (ref 0–0)
PLATELET # BLD AUTO: 197 K/UL — SIGNIFICANT CHANGE UP (ref 130–400)
POTASSIUM SERPL-MCNC: 4.6 MMOL/L — SIGNIFICANT CHANGE UP (ref 3.5–5)
POTASSIUM SERPL-SCNC: 4.6 MMOL/L — SIGNIFICANT CHANGE UP (ref 3.5–5)
PROT SERPL-MCNC: 6.3 G/DL — SIGNIFICANT CHANGE UP (ref 6–8)
RBC # BLD: 4.65 M/UL — SIGNIFICANT CHANGE UP (ref 4.2–5.4)
RBC # FLD: 14.6 % — HIGH (ref 11.5–14.5)
SODIUM SERPL-SCNC: 140 MMOL/L — SIGNIFICANT CHANGE UP (ref 135–146)
WBC # BLD: 8.39 K/UL — SIGNIFICANT CHANGE UP (ref 4.8–10.8)
WBC # FLD AUTO: 8.39 K/UL — SIGNIFICANT CHANGE UP (ref 4.8–10.8)

## 2021-06-17 PROCEDURE — 99233 SBSQ HOSP IP/OBS HIGH 50: CPT

## 2021-06-17 RX ADMIN — METHOCARBAMOL 500 MILLIGRAM(S): 500 TABLET, FILM COATED ORAL at 17:35

## 2021-06-17 RX ADMIN — METHOCARBAMOL 500 MILLIGRAM(S): 500 TABLET, FILM COATED ORAL at 05:26

## 2021-06-17 RX ADMIN — Medication 1: at 11:50

## 2021-06-17 RX ADMIN — Medication 81 MILLIGRAM(S): at 11:28

## 2021-06-17 RX ADMIN — ENOXAPARIN SODIUM 40 MILLIGRAM(S): 100 INJECTION SUBCUTANEOUS at 11:28

## 2021-06-17 RX ADMIN — ATORVASTATIN CALCIUM 40 MILLIGRAM(S): 80 TABLET, FILM COATED ORAL at 21:04

## 2021-06-17 RX ADMIN — Medication 1: at 17:35

## 2021-06-17 RX ADMIN — Medication 60 MILLIGRAM(S): at 05:26

## 2021-06-17 NOTE — PROGRESS NOTE ADULT - ASSESSMENT
80 F with PMH of HTN, HLD, and DM presented after a mechanical fall (witnessed by her son). Admitted to medicine with an age-indeterminate T11 superior endplate compression deformity. Found to have a 12 mm left MCA aneurysm. Went for intracranial aneurysm embolization with neuro-IR but no intervention was done.    #12mm left MCA aneurysm  - Went for intracranial aneurysm embolization with neuro-IR but no intervention was done  - BP should be kept less than 140/80, as per neurology  - F/u with neurology as outpatient  - Evaluated by vascular surgery -> no acute vascular surgery intervention at this time, follow up with vascular surgery Dr. Trimble as outpatient  - Spoke with neurosurgery today -> pt can follow up with neurosurgery Dr. Sanchez as outpatient within 2 weeks  - Recent ECHO on file -> EF 60-65%, G1DD    #COLE, improving  - S/p IVF 0.45% NS at 100 cc/hr  - Monitor BMP    #R/O stroke  - CT head N/C showed focal hypodensities in inferior right cerebellar hemisphere but MRI brain N/C showed no evidence of recent infarct or acute intracranial hemorrhage    #T11 compression deformity  - Evaluated by PT  - Evaluated by neurosurgery -> no acute neurosurgical intervention, f/u with Dr. Guadarrama as outpatient  - Can get TLSO brace -> script given to CM  - C/w Tylenol and methocarbamol  - As per son, pt has poor balance  - Pt needs to ambulate with PT while wearing TLSO brace    #HTN, controlled  - Holding lisinopril 10mg QD for COLE  - Increased Procardia to 60mg QD    #DM  - On metformin at home  - A1C 7.1 on 6/11/21  - C/w insulin sliding scale  - Monitor FS    #HLD  - C/w Lipitor 40    #Misc  Diet: DASH/TLC  DVT PPx: Lovenox  Dispo: anticipated for discharge; keep BP less than 140/80

## 2021-06-18 VITALS
HEART RATE: 93 BPM | DIASTOLIC BLOOD PRESSURE: 68 MMHG | TEMPERATURE: 97 F | RESPIRATION RATE: 16 BRPM | SYSTOLIC BLOOD PRESSURE: 137 MMHG

## 2021-06-18 LAB
ANION GAP SERPL CALC-SCNC: 10 MMOL/L — SIGNIFICANT CHANGE UP (ref 7–14)
BUN SERPL-MCNC: 27 MG/DL — HIGH (ref 10–20)
CALCIUM SERPL-MCNC: 9.5 MG/DL — SIGNIFICANT CHANGE UP (ref 8.5–10.1)
CHLORIDE SERPL-SCNC: 104 MMOL/L — SIGNIFICANT CHANGE UP (ref 98–110)
CO2 SERPL-SCNC: 26 MMOL/L — SIGNIFICANT CHANGE UP (ref 17–32)
CREAT SERPL-MCNC: 1.1 MG/DL — SIGNIFICANT CHANGE UP (ref 0.7–1.5)
GLUCOSE BLDC GLUCOMTR-MCNC: 128 MG/DL — HIGH (ref 70–99)
GLUCOSE BLDC GLUCOMTR-MCNC: 149 MG/DL — HIGH (ref 70–99)
GLUCOSE BLDC GLUCOMTR-MCNC: 185 MG/DL — HIGH (ref 70–99)
GLUCOSE SERPL-MCNC: 143 MG/DL — HIGH (ref 70–99)
HCT VFR BLD CALC: 37.6 % — SIGNIFICANT CHANGE UP (ref 37–47)
HGB BLD-MCNC: 11.9 G/DL — LOW (ref 12–16)
MCHC RBC-ENTMCNC: 27.3 PG — SIGNIFICANT CHANGE UP (ref 27–31)
MCHC RBC-ENTMCNC: 31.6 G/DL — LOW (ref 32–37)
MCV RBC AUTO: 86.2 FL — SIGNIFICANT CHANGE UP (ref 81–99)
NRBC # BLD: 0 /100 WBCS — SIGNIFICANT CHANGE UP (ref 0–0)
PLATELET # BLD AUTO: 194 K/UL — SIGNIFICANT CHANGE UP (ref 130–400)
POTASSIUM SERPL-MCNC: 4.9 MMOL/L — SIGNIFICANT CHANGE UP (ref 3.5–5)
POTASSIUM SERPL-SCNC: 4.9 MMOL/L — SIGNIFICANT CHANGE UP (ref 3.5–5)
RBC # BLD: 4.36 M/UL — SIGNIFICANT CHANGE UP (ref 4.2–5.4)
RBC # FLD: 14.5 % — SIGNIFICANT CHANGE UP (ref 11.5–14.5)
SODIUM SERPL-SCNC: 140 MMOL/L — SIGNIFICANT CHANGE UP (ref 135–146)
WBC # BLD: 7.33 K/UL — SIGNIFICANT CHANGE UP (ref 4.8–10.8)
WBC # FLD AUTO: 7.33 K/UL — SIGNIFICANT CHANGE UP (ref 4.8–10.8)

## 2021-06-18 PROCEDURE — 99239 HOSP IP/OBS DSCHRG MGMT >30: CPT

## 2021-06-18 RX ORDER — LISINOPRIL 2.5 MG/1
1 TABLET ORAL
Qty: 30 | Refills: 0
Start: 2021-06-18 | End: 2021-07-17

## 2021-06-18 RX ADMIN — ENOXAPARIN SODIUM 40 MILLIGRAM(S): 100 INJECTION SUBCUTANEOUS at 12:06

## 2021-06-18 RX ADMIN — Medication 81 MILLIGRAM(S): at 12:06

## 2021-06-18 RX ADMIN — METHOCARBAMOL 500 MILLIGRAM(S): 500 TABLET, FILM COATED ORAL at 05:17

## 2021-06-18 RX ADMIN — Medication 1: at 12:06

## 2021-06-18 RX ADMIN — Medication 60 MILLIGRAM(S): at 05:17

## 2021-06-18 RX ADMIN — Medication 650 MILLIGRAM(S): at 09:02

## 2021-06-18 RX ADMIN — Medication 650 MILLIGRAM(S): at 09:32

## 2021-06-18 NOTE — CHART NOTE - NSCHARTNOTESELECT_GEN_ALL_CORE
Neurosurgery/Event Note
Discharge/Event Note
Event Note
Transfer Note
Trauma
Upgrade to MICU/Transfer Note
vascular surgery - addendum/Event Note

## 2021-06-18 NOTE — DIETITIAN INITIAL EVALUATION ADULT. - OTHER CALCULATIONS
Dosing wt: 140#/64kg-->Calories: 5376-8187 kcal/day (MSJ x 1-1.2 AF)--advanced age considered; Protein: 58-70 gm/day (0.9-1.1 gm/kg CBW)--same as above; Fluid: 1 ml/kcal

## 2021-06-18 NOTE — PROGRESS NOTE ADULT - SUBJECTIVE AND OBJECTIVE BOX
SUBJECTIVE:    Patient is a 80y old Female who presents with a chief complaint of fall (13 Jun 2021 16:20)    Currently admitted to medicine with the primary diagnosis of Compression fracture of T11 vertebra, cerebral aneurysm     Today is hospital day 5d. Patient seen and examined at bedside this AM. No acute overnight events.     PAST MEDICAL & SURGICAL HISTORY  Type II diabetes mellitus    Hypertension    Dyslipidemia    Recurrent falls    History of hernia surgery      SOCIAL HISTORY:  Negative for smoking/alcohol/drug use.     ALLERGIES:  No Known Allergies    MEDICATIONS:  STANDING MEDICATIONS  aspirin  chewable 81 milliGRAM(s) Oral daily  atorvastatin 40 milliGRAM(s) Oral at bedtime  enoxaparin Injectable 40 milliGRAM(s) SubCutaneous daily  lisinopril 10 milliGRAM(s) Oral daily  methocarbamol 500 milliGRAM(s) Oral two times a day  NIFEdipine XL 30 milliGRAM(s) Oral daily    PRN MEDICATIONS  acetaminophen   Tablet .. 650 milliGRAM(s) Oral every 6 hours PRN    VITALS:   T(F): 96.2  HR: 91  BP: 138/73  RR: 18  SpO2: --    LABS:                        12.3   7.77  )-----------( 199      ( 14 Jun 2021 06:47 )             39.1     06-14    137  |  101  |  35<H>  ----------------------------<  130<H>  4.6   |  24  |  1.1    Ca    9.2      14 Jun 2021 06:47  Mg     2.1     06-14    TPro  6.0  /  Alb  3.7  /  TBili  0.4  /  DBili  x   /  AST  18  /  ALT  15  /  AlkPhos  69  06-14    PHYSICAL EXAM:  GEN: No acute distress  LUNGS: bilateral breath sounds, no respiratory distress  HEART: S1/S2 present, not tachycardic  ABD: Soft, non-tender, non-distended  EXT: no LE edema  NEURO: awake and alert    Intravenous access:   NG tube:   Manzano Catheter:       
SUBJECTIVE:    Patient is a 80y old Female who presents with a chief complaint of fall (15 Ye 2021 15:47)    Currently admitted to medicine with the primary diagnosis of Compression fracture of T11 vertebra       Today is hospital day 5d.     PAST MEDICAL & SURGICAL HISTORY  Type II diabetes mellitus    Hypertension    Dyslipidemia    Recurrent falls    History of hernia surgery      ALLERGIES:  No Known Allergies    MEDICATIONS:  STANDING MEDICATIONS  aspirin  chewable 81 milliGRAM(s) Oral daily  atorvastatin 40 milliGRAM(s) Oral at bedtime  dextrose 40% Gel 15 Gram(s) Oral once  dextrose 5%. 1000 milliLiter(s) IV Continuous <Continuous>  dextrose 5%. 1000 milliLiter(s) IV Continuous <Continuous>  dextrose 50% Injectable 25 Gram(s) IV Push once  dextrose 50% Injectable 12.5 Gram(s) IV Push once  dextrose 50% Injectable 25 Gram(s) IV Push once  enoxaparin Injectable 40 milliGRAM(s) SubCutaneous daily  glucagon  Injectable 1 milliGRAM(s) IntraMuscular once  insulin lispro (ADMELOG) corrective regimen sliding scale   SubCutaneous three times a day before meals  lisinopril 10 milliGRAM(s) Oral daily  methocarbamol 500 milliGRAM(s) Oral two times a day  NIFEdipine XL 30 milliGRAM(s) Oral daily    PRN MEDICATIONS  acetaminophen   Tablet .. 650 milliGRAM(s) Oral every 6 hours PRN    VITALS:   T(F): 97.4  HR: 69  BP: 157/84  RR: 18  SpO2: --    LABS:                        11.8   6.72  )-----------( 197      ( 15 Ye 2021 07:18 )             38.3     06-15    138  |  102  |  35<H>  ----------------------------<  135<H>  4.7   |  27  |  1.4    Ca    9.3      15 Ye 2021 07:18  Mg     2.1     06-15    TPro  5.9<L>  /  Alb  3.6  /  TBili  0.3  /  DBili  x   /  AST  27  /  ALT  27  /  AlkPhos  68  06-15                  RADIOLOGY:    PHYSICAL EXAM:  GEN: No acute distress  LUNGS: Clear to auscultation bilaterally   HEART: S1/S2 present. RRR.   ABD/ GI: Soft, non-tender, non-distended. Bowel sounds present  EXT: NC/NC/NE/2+PP/CISNEROS  NEURO: AAOX3    
SUBJECTIVE:    Patient is a 80y old Female who presents with a chief complaint of fall (2021 12:58)    Currently admitted to medicine with the primary diagnosis of Compression fracture of T11 vertebra       Today is hospital day 2d. This morning she is resting comfortably in bed and reports no new issues or overnight events.     PAST MEDICAL & SURGICAL HISTORY  Type II diabetes mellitus    Hypertension    Dyslipidemia    Recurrent falls    History of hernia surgery      SOCIAL HISTORY:  Negative for smoking/alcohol/drug use.     ALLERGIES:  No Known Allergies    MEDICATIONS:  STANDING MEDICATIONS  aspirin  chewable 81 milliGRAM(s) Oral daily  atorvastatin 40 milliGRAM(s) Oral at bedtime  enoxaparin Injectable 40 milliGRAM(s) SubCutaneous daily  lisinopril 10 milliGRAM(s) Oral daily  methocarbamol 500 milliGRAM(s) Oral two times a day    PRN MEDICATIONS  acetaminophen   Tablet .. 650 milliGRAM(s) Oral every 6 hours PRN    VITALS:   T(F): 97.5  HR: 80  BP: 150/87  RR: 19  SpO2: --    LABS:                        12.6   7.82  )-----------( 208      ( 2021 06:02 )             39.6     06-11    139  |  102  |  29<H>  ----------------------------<  116<H>  4.3   |  27  |  1.0    Ca    9.2      2021 06:02  Phos  3.7     06-11  Mg     2.0     06-11    TPro  6.2  /  Alb  3.9  /  TBili  0.3  /  DBili  x   /  AST  16  /  ALT  12  /  AlkPhos  71  06-11    PT/INR - ( 10 Ye 2021 15:32 )   PT: 12.70 sec;   INR: 1.10 ratio         PTT - ( 10 Ye 2021 15:32 )  PTT:30.0 sec  Urinalysis Basic - ( 10 Ye 2021 16:42 )    Color: Light Yellow / Appearance: Clear / S.014 / pH: x  Gluc: x / Ketone: Negative  / Bili: Negative / Urobili: <2 mg/dL   Blood: x / Protein: 30 mg/dL / Nitrite: Negative   Leuk Esterase: Small / RBC: 1 /HPF / WBC 24 /HPF   Sq Epi: x / Non Sq Epi: 0 /HPF / Bacteria: Negative                RADIOLOGY:    PHYSICAL EXAM:  GEN: No acute distress  LUNGS: Clear to auscultation bilaterally   HEART: S1/S2 present. RRR.   ABD: Soft, non-tender, non-distended. Bowel sounds present  EXT: NC/NC/NE/2+PP/CISNEROS/Skin Intact.   NEURO: AAOX3    Intravenous access:   NG tube:   Manzano Catheter:         
Progress Note:  Provider Speciality                            Hospitalist      REYNA FOWLER MRN-[id number] 80y Female     CHIEF PRESENTING COMPLAINT:  Patient is a 80y old  Female who presents with a chief complaint of fall (16 Jun 2021 16:56)        SUBJECTIVE:  Patient was seen and examined at bedside. No new complaints described by patient in morning rounds.   . No significant overnight events reported.     HISTORY OF PRESENTING ILLNESS:  HPI:  80 year old female with past medical history of DM II, HTN, and DLD, on asa, presents after a fall.     Spoke with help of [place] .  As per family and patient she was trying to sit on the bed and missed, fell on a chair and then to floor, hit lower back. She denies any head trauma, loss of consciousness, neck pain, abdominal pain, palpitations, nausea, vomiting, diarrhea, chest pain, fever, rigors or chills. As per the son she has had balance issues for some time now and has had recurrent falls, the only difference now is that this fall was worse and had her ended up in the hospital. He also endorse mild dementia and some forgetfulness but patient able to ambulate around with cane and perform all ADLs.    In ED patient hemodynamically stable, afebrile, trauma workup shows a T11 fracture (No further intervention as per Neurosurgery) and some Focal hypodensities noted within the inferior right cerebellar hemisphere possibly representing age-indeterminate infarcts. At time of interview patient endorses some hip and back pain. (10 Ye 2021 21:44)        REVIEW OF SYSTEMS:  Patient denies any headache, any vision complaints, runny nose, fever, chills, sore throat. Denies chest pain, shortness of breath, palpitation. Denies nausea, vomiting, abdominal pain, diarrhoea, Denies urinary burning, urgency, frequency, dysuria.  At least 10 systems were reviewed in ROS. All systems reviewed  are within normal limits except for the complaints as described in Subjective.    PAST MEDICAL & SURGICAL HISTORY:  PAST MEDICAL & SURGICAL HISTORY:  Type II diabetes mellitus    Hypertension    Dyslipidemia    Recurrent falls    History of hernia surgery            VITAL SIGNS:  Vital Signs Last 24 Hrs  T(C): 36.4 (17 Jun 2021 08:00), Max: 36.4 (17 Jun 2021 08:00)  T(F): 97.5 (17 Jun 2021 08:00), Max: 97.5 (17 Jun 2021 08:00)  HR: 87 (17 Jun 2021 08:00) (86 - 87)  BP: 136/61 (17 Jun 2021 08:00) (136/61 - 148/84)  BP(mean): --  RR: 19 (17 Jun 2021 08:00) (18 - 19)  SpO2: --          PHYSICAL EXAMINATION:  Not in acute distress  General: No pallor, no icterus  HEENT:   EOMI, no JVD.  Heart: S1+S2 audible  Lungs: bilateral  fair air entry, no wheezing, no crepitations.  Abdomen: Soft, non-tender, non-distended , no  rigidity or guarding.  CNS: Awake alert, CN  grossly intact.  Extremities:  No edema            CONSULTS:  Consultant(s) Notes Reviewed by me.   Care Discussed with Consultants/Other Providers where required.        MEDICATIONS:  MEDICATIONS  (STANDING):  aspirin  chewable 81 milliGRAM(s) Oral daily  atorvastatin 40 milliGRAM(s) Oral at bedtime  dextrose 40% Gel 15 Gram(s) Oral once  dextrose 5%. 1000 milliLiter(s) (50 mL/Hr) IV Continuous <Continuous>  dextrose 5%. 1000 milliLiter(s) (100 mL/Hr) IV Continuous <Continuous>  dextrose 50% Injectable 25 Gram(s) IV Push once  dextrose 50% Injectable 12.5 Gram(s) IV Push once  dextrose 50% Injectable 25 Gram(s) IV Push once  enoxaparin Injectable 40 milliGRAM(s) SubCutaneous daily  glucagon  Injectable 1 milliGRAM(s) IntraMuscular once  insulin lispro (ADMELOG) corrective regimen sliding scale   SubCutaneous three times a day before meals  methocarbamol 500 milliGRAM(s) Oral two times a day  NIFEdipine XL 60 milliGRAM(s) Oral daily  sodium chloride 0.45%. 1000 milliLiter(s) (100 mL/Hr) IV Continuous <Continuous>    MEDICATIONS  (PRN):  acetaminophen   Tablet .. 650 milliGRAM(s) Oral every 6 hours PRN Moderate Pain (4 - 6)            ASSESSMENT:      80 F with PMH of HTN, HLD, and DM presented after a mechanical fall (witnessed by her son). Admitted to medicine with an age-indeterminate T11 superior endplate compression deformity. Found to have a 12 mm left MCA aneurysm. Went for intracranial aneurysm embolization with neuro-IR today but no intervention was done.    Left MCA aneurysm  T11 compression deformity  Stroke ruled out  Dm type 2  Dyslipidemia    S/p diagnostic cerebral angiogram. Patient did not undergo embolization of intracranial   Neurosurgery as outpatient if warranted  Vascular surgery -> no acute intervention  Maintain bP systolic <140  MRI brain showed no evidence of recent infarct or acute intracranial hemorrhage  T11 compression deformity-TLSO brace  Evaluation by PT after getting brace  TN-well controlled  Handoff: anticipated for discharge, possible HHA tomorrow vs STR depending on PT recs        
SUBJECTIVE:    Patient is a 80y old Female who presents with a chief complaint of fall (12 Jun 2021 13:53)    Currently admitted to medicine with the primary diagnosis of Compression fracture of T11 vertebra       Today is hospital day 3d.     PAST MEDICAL & SURGICAL HISTORY  Type II diabetes mellitus    Hypertension    Dyslipidemia    Recurrent falls    History of hernia surgery      ALLERGIES:  No Known Allergies    MEDICATIONS:  STANDING MEDICATIONS  aspirin  chewable 81 milliGRAM(s) Oral daily  atorvastatin 40 milliGRAM(s) Oral at bedtime  enoxaparin Injectable 40 milliGRAM(s) SubCutaneous daily  lisinopril 10 milliGRAM(s) Oral daily  methocarbamol 500 milliGRAM(s) Oral two times a day    PRN MEDICATIONS  acetaminophen   Tablet .. 650 milliGRAM(s) Oral every 6 hours PRN    VITALS:   T(F): 98.4  HR: 77  BP: 163/86  RR: 18  SpO2: --    LABS:                        13.1   7.75  )-----------( 203      ( 13 Jun 2021 05:44 )             40.8     06-13    138  |  102  |  31<H>  ----------------------------<  147<H>  4.9   |  24  |  1.2    Ca    9.3      13 Jun 2021 05:44  Mg     2.0     06-13    TPro  6.5  /  Alb  4.0  /  TBili  0.4  /  DBili  x   /  AST  16  /  ALT  11  /  AlkPhos  75  06-13                  RADIOLOGY:    PHYSICAL EXAM:  GEN: No acute distress  LUNGS: Clear to auscultation bilaterally   HEART: S1/S2 present. RRR.   ABD/ GI: Soft, non-tender, non-distended. Bowel sounds present  EXT: NC/NC/NE/2+PP/CISNEROS  NEURO: AAOX3    
Progress Note:  Provider Speciality                            Hospitalist      REYNA FOWLER MRN-[id number] 80y Female     CHIEF PRESENTING COMPLAINT:  Patient is a 80y old  Female who presents with a chief complaint of fall (16 Jun 2021 16:56)        SUBJECTIVE:  Patient was seen and examined at bedside. No new complaints described by patient in morning rounds.   . No significant overnight events reported.     HISTORY OF PRESENTING ILLNESS:  HPI:  80 year old female with past medical history of DM II, HTN, and DLD, on asa, presents after a fall.     Spoke with help of [place] .  As per family and patient she was trying to sit on the bed and missed, fell on a chair and then to floor, hit lower back. She denies any head trauma, loss of consciousness, neck pain, abdominal pain, palpitations, nausea, vomiting, diarrhea, chest pain, fever, rigors or chills. As per the son she has had balance issues for some time now and has had recurrent falls, the only difference now is that this fall was worse and had her ended up in the hospital. He also endorse mild dementia and some forgetfulness but patient able to ambulate around with cane and perform all ADLs.    In ED patient hemodynamically stable, afebrile, trauma workup shows a T11 fracture (No further intervention as per Neurosurgery) and some Focal hypodensities noted within the inferior right cerebellar hemisphere possibly representing age-indeterminate infarcts. At time of interview patient endorses some hip and back pain. (10 Ye 2021 21:44)        REVIEW OF SYSTEMS:  Patient denies any headache, any vision complaints, runny nose, fever, chills, sore throat. Denies chest pain, shortness of breath, palpitation. Denies nausea, vomiting, abdominal pain, diarrhoea, Denies urinary burning, urgency, frequency, dysuria.  At least 10 systems were reviewed in ROS. All systems reviewed  are within normal limits except for the complaints as described in Subjective.    PAST MEDICAL & SURGICAL HISTORY:  PAST MEDICAL & SURGICAL HISTORY:  Type II diabetes mellitus    Hypertension    Dyslipidemia    Recurrent falls    History of hernia surgery            VITAL SIGNS:  Vital Signs Last 24 Hrs  T(C): 36.3 (18 Jun 2021 07:40), Max: 36.8 (18 Jun 2021 00:00)  T(F): 97.4 (18 Jun 2021 07:40), Max: 98.2 (18 Jun 2021 00:00)  HR: 82 (18 Jun 2021 07:40) (80 - 84)  BP: 149/69 (18 Jun 2021 07:40) (131/78 - 149/69)  BP(mean): --  RR: 18 (18 Jun 2021 07:40) (17 - 18)  SpO2: --        PHYSICAL EXAMINATION:  Not in acute distress  General: No pallor, no icterus  HEENT:   EOMI, no JVD.  Heart: S1+S2 audible  Lungs: bilateral  fair air entry, no wheezing, no crepitations.  Abdomen: Soft, non-tender, non-distended , no  rigidity or guarding.  CNS: Awake alert, CN  grossly intact.  Extremities:  No edema            CONSULTS:  Consultant(s) Notes Reviewed by me.   Care Discussed with Consultants/Other Providers where required.        MEDICATIONS:  MEDICATIONS  (STANDING):  aspirin  chewable 81 milliGRAM(s) Oral daily  atorvastatin 40 milliGRAM(s) Oral at bedtime  dextrose 40% Gel 15 Gram(s) Oral once  dextrose 5%. 1000 milliLiter(s) (50 mL/Hr) IV Continuous <Continuous>  dextrose 5%. 1000 milliLiter(s) (100 mL/Hr) IV Continuous <Continuous>  dextrose 50% Injectable 25 Gram(s) IV Push once  dextrose 50% Injectable 12.5 Gram(s) IV Push once  dextrose 50% Injectable 25 Gram(s) IV Push once  enoxaparin Injectable 40 milliGRAM(s) SubCutaneous daily  glucagon  Injectable 1 milliGRAM(s) IntraMuscular once  insulin lispro (ADMELOG) corrective regimen sliding scale   SubCutaneous three times a day before meals  methocarbamol 500 milliGRAM(s) Oral two times a day  NIFEdipine XL 60 milliGRAM(s) Oral daily  sodium chloride 0.45%. 1000 milliLiter(s) (100 mL/Hr) IV Continuous <Continuous>    MEDICATIONS  (PRN):  acetaminophen   Tablet .. 650 milliGRAM(s) Oral every 6 hours PRN Moderate Pain (4 - 6)            ASSESSMENT:      80 F with PMH of HTN, HLD, and DM presented after a mechanical fall (witnessed by her son). Admitted to medicine with an age-indeterminate T11 superior endplate compression deformity. Found to have a 12 mm left MCA aneurysm. Went for intracranial aneurysm embolization with neuro-IR today but no intervention was done.    Left MCA aneurysm  T11 compression deformity  Stroke ruled out  Dm type 2  Dyslipidemia    S/p diagnostic cerebral angiogram. Patient did not undergo embolization of intracranial   Neurosurgery as outpatient if warranted  Vascular surgery -> no acute intervention  Maintain bP systolic <140  MRI brain showed no evidence of recent infarct or acute intracranial hemorrhage  T11 compression deformity-TLSO brace  Evaluation by PT after getting brace  TN-well controlled  Handoff: anticipated for discharge today with A         
SUBJECTIVE:    Patient is a 80y old Female who presents with a chief complaint of fall (15 Ye 2021 12:22)    Currently admitted to medicine with the primary diagnosis of Compression fracture of T11 vertebra, cerebral aneurysm     Today is hospital day 6d. Patient seen and examined at bedside this AM. No acute overnight events.    PAST MEDICAL & SURGICAL HISTORY  Type II diabetes mellitus    Hypertension    Dyslipidemia    Recurrent falls    History of hernia surgery      SOCIAL HISTORY:  Negative for smoking/alcohol/drug use.     ALLERGIES:  No Known Allergies    MEDICATIONS:  STANDING MEDICATIONS  aspirin  chewable 81 milliGRAM(s) Oral daily  atorvastatin 40 milliGRAM(s) Oral at bedtime  dextrose 40% Gel 15 Gram(s) Oral once  dextrose 5%. 1000 milliLiter(s) IV Continuous <Continuous>  dextrose 5%. 1000 milliLiter(s) IV Continuous <Continuous>  dextrose 50% Injectable 25 Gram(s) IV Push once  dextrose 50% Injectable 12.5 Gram(s) IV Push once  dextrose 50% Injectable 25 Gram(s) IV Push once  enoxaparin Injectable 40 milliGRAM(s) SubCutaneous daily  glucagon  Injectable 1 milliGRAM(s) IntraMuscular once  insulin lispro (ADMELOG) corrective regimen sliding scale   SubCutaneous three times a day before meals  lisinopril 10 milliGRAM(s) Oral daily  methocarbamol 500 milliGRAM(s) Oral two times a day  NIFEdipine XL 30 milliGRAM(s) Oral daily    PRN MEDICATIONS  acetaminophen   Tablet .. 650 milliGRAM(s) Oral every 6 hours PRN    VITALS:   T(F): 97.4  HR: 69  BP: 157/84  RR: 18  SpO2: --    LABS:                        11.8   6.72  )-----------( 197      ( 15 Ye 2021 07:18 )             38.3     06-15    138  |  102  |  35<H>  ----------------------------<  135<H>  4.7   |  27  |  1.4    Ca    9.3      15 Ye 2021 07:18  Mg     2.1     06-15    TPro  5.9<L>  /  Alb  3.6  /  TBili  0.3  /  DBili  x   /  AST  27  /  ALT  27  /  AlkPhos  68  06-15    PHYSICAL EXAM:  GEN: No acute distress  LUNGS: b/l breath sounds, no respiratory distress  HEART: S1/S2 present, not tachycardic  ABD: Soft, non-tender, non-distended  EXT: trace LE edema   NEURO: awake and alert    Intravenous access:   NG tube:   Manzano Catheter:       
SUBJECTIVE:    Patient is a 80y old Female who presents with a chief complaint of fall (15 Ye 2021 16:10)    Currently admitted to medicine with the primary diagnosis of Compression fracture of T11 vertebra, intracranial aneurysm     Today is hospital day 7d. No acute overnight events. Went for neuro-IR procedure earlier today.    PAST MEDICAL & SURGICAL HISTORY  Type II diabetes mellitus    Hypertension    Dyslipidemia    Recurrent falls    History of hernia surgery      SOCIAL HISTORY:  Negative for smoking/alcohol/drug use.     ALLERGIES:  No Known Allergies    MEDICATIONS:  STANDING MEDICATIONS  aspirin  chewable 81 milliGRAM(s) Oral daily  atorvastatin 40 milliGRAM(s) Oral at bedtime  dextrose 40% Gel 15 Gram(s) Oral once  dextrose 5%. 1000 milliLiter(s) IV Continuous <Continuous>  dextrose 5%. 1000 milliLiter(s) IV Continuous <Continuous>  dextrose 50% Injectable 25 Gram(s) IV Push once  dextrose 50% Injectable 12.5 Gram(s) IV Push once  dextrose 50% Injectable 25 Gram(s) IV Push once  enoxaparin Injectable 40 milliGRAM(s) SubCutaneous daily  glucagon  Injectable 1 milliGRAM(s) IntraMuscular once  insulin lispro (ADMELOG) corrective regimen sliding scale   SubCutaneous three times a day before meals  methocarbamol 500 milliGRAM(s) Oral two times a day  NIFEdipine XL 60 milliGRAM(s) Oral daily  sodium chloride 0.45%. 1000 milliLiter(s) IV Continuous <Continuous>    PRN MEDICATIONS  acetaminophen   Tablet .. 650 milliGRAM(s) Oral every 6 hours PRN    VITALS:   T(F): 98.4  HR: 80  BP: 163/84  RR: 18  SpO2: --    LABS:                        11.8   6.72  )-----------( 197      ( 15 Ye 2021 07:18 )             38.3     06-15    138  |  102  |  35<H>  ----------------------------<  135<H>  4.7   |  27  |  1.4    Ca    9.3      15 Ye 2021 07:18  Mg     2.1     06-15    TPro  5.9<L>  /  Alb  3.6  /  TBili  0.3  /  DBili  x   /  AST  27  /  ALT  27  /  AlkPhos  68  06-15    PT/INR - ( 15 Ye 2021 18:54 )   PT: 12.00 sec;   INR: 1.04 ratio         PTT - ( 15 Ye 2021 18:54 )  PTT:35.4 sec    PHYSICAL EXAM: unable to perform as patient not on unit since she had gone for neuro-IR procedure earlier today    Intravenous access:   NG tube:   Manzano Catheter:       
SUBJECTIVE:    Patient is a 80y old Female who presents with a chief complaint of fall (10 Ye 2021 23:59)    Currently admitted to medicine with the primary diagnosis of Compression fracture of T11 vertebra       Today is hospital day 1d. This morning she is resting comfortably in bed and reports no new issues or overnight events.     PAST MEDICAL & SURGICAL HISTORY  Type II diabetes mellitus    Hypertension    Dyslipidemia    Recurrent falls    History of hernia surgery      SOCIAL HISTORY:  Negative for smoking/alcohol/drug use.     ALLERGIES:  No Known Allergies    MEDICATIONS:  STANDING MEDICATIONS  aspirin  chewable 81 milliGRAM(s) Oral daily  atorvastatin 40 milliGRAM(s) Oral at bedtime  enoxaparin Injectable 40 milliGRAM(s) SubCutaneous daily  lisinopril 5 milliGRAM(s) Oral daily    PRN MEDICATIONS  acetaminophen   Tablet .. 650 milliGRAM(s) Oral every 6 hours PRN    VITALS:   T(F): 97.2  HR: 74  BP: 165/90  RR: 18  SpO2: 96%    LABS:                        11.9   6.92  )-----------( 214      ( 10 Ye 2021 15:32 )             37.9     06-11    139  |  102  |  29<H>  ----------------------------<  116<H>  4.3   |  27  |  1.0    Ca    9.2      2021 06:02  Phos  3.7     06-11  Mg     2.0     06-11    TPro  6.2  /  Alb  3.9  /  TBili  0.3  /  DBili  x   /  AST  16  /  ALT  12  /  AlkPhos  71  06-11    PT/INR - ( 10 Ye 2021 15:32 )   PT: 12.70 sec;   INR: 1.10 ratio         PTT - ( 10 Ye 2021 15:32 )  PTT:30.0 sec  Urinalysis Basic - ( 10 Ye 2021 16:42 )    Color: Light Yellow / Appearance: Clear / S.014 / pH: x  Gluc: x / Ketone: Negative  / Bili: Negative / Urobili: <2 mg/dL   Blood: x / Protein: 30 mg/dL / Nitrite: Negative   Leuk Esterase: Small / RBC: 1 /HPF / WBC 24 /HPF   Sq Epi: x / Non Sq Epi: 0 /HPF / Bacteria: Negative                RADIOLOGY:    PHYSICAL EXAM:  GEN: No acute distress  LUNGS: Clear to auscultation bilaterally   HEART: S1/S2 present. RRR.   ABD: Soft, non-tender, non-distended. Bowel sounds present  EXT: NC/NC/NE/2+PP/CISNEROS/Skin Intact.   NEURO: AAOX3    Intravenous access:   NG tube:   Manzano Catheter:         
SUBJECTIVE:    Patient is a 80y old Female who presents with a chief complaint of fall (17 Jun 2021 14:34)    Currently admitted to medicine with the primary diagnosis of Compression fracture of T11 vertebra, intracranial aneurysm     Today is hospital day 8d. Patient seen and examined at bedside today. No acute overnight events.    PAST MEDICAL & SURGICAL HISTORY  Type II diabetes mellitus    Hypertension    Dyslipidemia    Recurrent falls    History of hernia surgery      SOCIAL HISTORY:  Negative for smoking/alcohol/drug use.     ALLERGIES:  No Known Allergies    MEDICATIONS:  STANDING MEDICATIONS  aspirin  chewable 81 milliGRAM(s) Oral daily  atorvastatin 40 milliGRAM(s) Oral at bedtime  dextrose 40% Gel 15 Gram(s) Oral once  dextrose 5%. 1000 milliLiter(s) IV Continuous <Continuous>  dextrose 5%. 1000 milliLiter(s) IV Continuous <Continuous>  dextrose 50% Injectable 25 Gram(s) IV Push once  dextrose 50% Injectable 12.5 Gram(s) IV Push once  dextrose 50% Injectable 25 Gram(s) IV Push once  enoxaparin Injectable 40 milliGRAM(s) SubCutaneous daily  glucagon  Injectable 1 milliGRAM(s) IntraMuscular once  insulin lispro (ADMELOG) corrective regimen sliding scale   SubCutaneous three times a day before meals  methocarbamol 500 milliGRAM(s) Oral two times a day  NIFEdipine XL 60 milliGRAM(s) Oral daily  sodium chloride 0.45%. 1000 milliLiter(s) IV Continuous <Continuous>    PRN MEDICATIONS  acetaminophen   Tablet .. 650 milliGRAM(s) Oral every 6 hours PRN    VITALS:   T(F): 97.5  HR: 87  BP: 136/61  RR: 19  SpO2: --    LABS:                        12.6   8.39  )-----------( 197      ( 17 Jun 2021 06:57 )             39.9     06-17    140  |  103  |  27<H>  ----------------------------<  130<H>  4.6   |  25  |  1.3    Ca    9.3      17 Jun 2021 06:57    TPro  6.3  /  Alb  3.8  /  TBili  0.4  /  DBili  x   /  AST  27  /  ALT  30  /  AlkPhos  76  06-17    PT/INR - ( 15 Ye 2021 18:54 )   PT: 12.00 sec;   INR: 1.04 ratio         PTT - ( 15 Ye 2021 18:54 )  PTT:35.4 sec    PHYSICAL EXAM:  GEN: No acute distress  LUNGS: b/l breath sounds, no respiratory distress  HEART: S1/S2 present, not tachycardic  ABD: Soft, non-tender, non-distended  EXT: no LE edema   NEURO: awake and alert    Intravenous access:   NG tube:   Manzano Catheter:

## 2021-06-18 NOTE — DIETITIAN INITIAL EVALUATION ADULT. - REASON FOR ADMISSION
Pt presented s/p mechanical fall, admitted with an age-indeterminate T11 superior endplate compression deformity; found to have a 12 mm left MCA aneurysm. Went for intracranial aneurysm embolization with neuro-IR but no intervention was done. F/u with neurology as outpatient per progress notes.

## 2021-06-18 NOTE — DIETITIAN INITIAL EVALUATION ADULT. - NAME AND PHONE
Pt to maintain % of meals and snacks over the next 7 days. RD to monitor energy intake, renal/glucose profiles, NFPF

## 2021-06-18 NOTE — CHART NOTE - NSCHARTNOTEFT_GEN_A_CORE
<<<RESIDENT DISCHARGE NOTE>>>     REYNA FOWLER  MRN-[id number]    Subjective: Patient seen and examined at bedside this AM. No acute overnight events.    VITAL SIGNS:  T(F): 97.4 (06-18-21 @ 07:40), Max: 98.2 (06-18-21 @ 00:00)  HR: 82 (06-18-21 @ 07:40)  BP: 149/69 (06-18-21 @ 07:40)  SpO2: --      PHYSICAL EXAMINATION:  General:   Head & Neck:  Pulmonary:  Cardiovascular:  Gastrointestinal/Abdomen & Pelvis:  Neurologic/Motor:    TEST RESULTS:                        11.9   7.33  )-----------( 194      ( 18 Jun 2021 07:16 )             37.6       06-18    140  |  104  |  27<H>  ----------------------------<  143<H>  4.9   |  26  |  1.1    Ca    9.5      18 Jun 2021 07:16    TPro  6.3  /  Alb  3.8  /  TBili  0.4  /  DBili  x   /  AST  27  /  ALT  30  /  AlkPhos  76  06-17      FINAL DISCHARGE INTERVIEW:  Resident(s) Present: (Name:_____________), RN Present: (Name:  ___________)    DISCHARGE MEDICATION RECONCILIATION  reviewed with Attending (Name:___________)    DISPOSITION:   [  ] Home,    [  ] Home with Visiting Nursing Services,   [    ]  SNF/ NH,    [   ] Acute Rehab (4A),   [   ] Other (Specify:_________) <<<RESIDENT DISCHARGE NOTE>>>     REYNA FOWLER  MRN-[id number]    Subjective: Patient seen and examined at bedside this AM. No acute overnight events.    VITAL SIGNS:  T(F): 97.4 (06-18-21 @ 07:40), Max: 98.2 (06-18-21 @ 00:00)  HR: 82 (06-18-21 @ 07:40)  BP: 149/69 (06-18-21 @ 07:40)  SpO2: --      PHYSICAL EXAMINATION:  General: no acute distress  Pulmonary: b/l breath sounds, no respiratory distress  Cardiovascular: S1/S2 present, not tachycardic  Gastrointestinal/Abdomen & Pelvis: soft, non-tender, non-distended  Neurologic: awake and alert, follows commands, responds appropriately to questions  Extremities: no LE edema    TEST RESULTS:                        11.9   7.33  )-----------( 194      ( 18 Jun 2021 07:16 )             37.6       06-18    140  |  104  |  27<H>  ----------------------------<  143<H>  4.9   |  26  |  1.1    Ca    9.5      18 Jun 2021 07:16    TPro  6.3  /  Alb  3.8  /  TBili  0.4  /  DBili  x   /  AST  27  /  ALT  30  /  AlkPhos  76  06-17      FINAL DISCHARGE INTERVIEW:  Resident(s) Present: Dr. Snow    DISCHARGE MEDICATION RECONCILIATION  reviewed with Attending Dr. Baker    DISPOSITION:   [  ] Home,    [ X ] Home with Visiting Nursing Services,   [    ]  SNF/ NH,    [   ] Acute Rehab (4A),   [   ] Other (Specify:_________)

## 2021-06-18 NOTE — PROGRESS NOTE ADULT - PROVIDER SPECIALTY LIST ADULT
Hospitalist
Hospitalist
Internal Medicine
Hospitalist
Hospitalist
Internal Medicine
Internal Medicine

## 2021-06-21 PROBLEM — Z00.00 ENCOUNTER FOR PREVENTIVE HEALTH EXAMINATION: Status: ACTIVE | Noted: 2021-06-21

## 2021-06-22 PROBLEM — E11.9 TYPE 2 DIABETES MELLITUS WITHOUT COMPLICATIONS: Chronic | Status: ACTIVE | Noted: 2021-06-10

## 2021-06-22 PROBLEM — I10 ESSENTIAL (PRIMARY) HYPERTENSION: Chronic | Status: ACTIVE | Noted: 2021-06-10

## 2021-06-22 PROBLEM — R29.6 REPEATED FALLS: Chronic | Status: ACTIVE | Noted: 2021-06-10

## 2021-06-22 PROBLEM — E78.5 HYPERLIPIDEMIA, UNSPECIFIED: Chronic | Status: ACTIVE | Noted: 2021-06-10
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
## 2021-06-29 DIAGNOSIS — N17.9 ACUTE KIDNEY FAILURE, UNSPECIFIED: ICD-10-CM

## 2021-06-29 DIAGNOSIS — E11.21 TYPE 2 DIABETES MELLITUS WITH DIABETIC NEPHROPATHY: ICD-10-CM

## 2021-06-29 DIAGNOSIS — E11.65 TYPE 2 DIABETES MELLITUS WITH HYPERGLYCEMIA: ICD-10-CM

## 2021-06-29 DIAGNOSIS — I67.1 CEREBRAL ANEURYSM, NONRUPTURED: ICD-10-CM

## 2021-06-29 DIAGNOSIS — S22.089A UNSPECIFIED FRACTURE OF T11-T12 VERTEBRA, INITIAL ENCOUNTER FOR CLOSED FRACTURE: ICD-10-CM

## 2021-06-29 DIAGNOSIS — E78.5 HYPERLIPIDEMIA, UNSPECIFIED: ICD-10-CM

## 2021-06-29 DIAGNOSIS — I10 ESSENTIAL (PRIMARY) HYPERTENSION: ICD-10-CM

## 2021-06-29 DIAGNOSIS — Z79.84 LONG TERM (CURRENT) USE OF ORAL HYPOGLYCEMIC DRUGS: ICD-10-CM

## 2021-06-29 DIAGNOSIS — Z79.82 LONG TERM (CURRENT) USE OF ASPIRIN: ICD-10-CM

## 2021-06-29 DIAGNOSIS — E11.40 TYPE 2 DIABETES MELLITUS WITH DIABETIC NEUROPATHY, UNSPECIFIED: ICD-10-CM
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
## 2021-06-29 DIAGNOSIS — E11.22 TYPE 2 DIABETES MELLITUS WITH DIABETIC CHRONIC KIDNEY DISEASE: ICD-10-CM
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
## 2021-06-29 DIAGNOSIS — R26.9 UNSPECIFIED ABNORMALITIES OF GAIT AND MOBILITY: ICD-10-CM

## 2021-06-29 DIAGNOSIS — N18.30 CHRONIC KIDNEY DISEASE, STAGE 3 UNSPECIFIED: ICD-10-CM

## 2021-08-31 NOTE — PRE-ANESTHESIA EVALUATION ADULT - NSPROPOSEDPROCEDFT_GEN_ALL_CORE
Dr. Xavier Espinoza completed the forms, patient is informed that the forms have been completed. Patient verified address. Forms, AEP, sent back to [facility]. Embolization Embolization, Angiogram

## 2021-09-01 NOTE — ED ADULT NURSE NOTE - NSFALLRSKHARMRISK_ED_ALL_ED
19-year-old male status post laparoscopic completion proctectomy and ileal anal J-pouch with diverting ileostomy.  Overall doing very well abdomen is soft with some mild incisional tenderness. pelvic drain about 60 cc.  We will begin to advance diet. As above. Looks well, home today Looks well, occasional N/V but tolerates most large meals, abd benign, stoma healthy and working, no pain. D/C TPN, continue to monitor, cont PPI Pt without nausea or emesis this am. Clara small amount of clears.  Stoma with stool and air.  Abd- soft, not tender, mild distension, stoma-viable.  Pt and mother counseled regarding CTA with some dilated loops noted, but clinically with stoma functioning.  Will continue to monitor exam and consider placing catheter in stoma if local obstruction concerns persist.  No drainable collection.  WBC nl. Some emesis and tachycardia last night.  Will obtain CT of abdomen and pelvis. as above    no acute events POD 1  c/o some pain, no emesis  afeb, -120  adeq clear UOP via gonzalez  RADHA with 200 cc clear serosang drainage  abd soft, nt, nd  incisions c/d/i  stoma pink/viable with thin bilious output    OOB to chair  cont pain control  cont gonzalez for 48 hours  cont abx for 24 hours  cont RADHA for self sunction  SQH for DVT prophylaxis  local stoma care  OK to start clears looks well,. stoma working very nicely, eating, some N/V overnight but non bilious, ambulating, pain minimal. Found to have kidney stone, urology called. Wean TPN,. dispo planning as above    overall feels better, no on PCA  2 episodes of emesis  stoma with liquid output  afebrile HR , adeq UOP  RADHA ~100cc clear serosang drainage  abd soft/nt/nd  incisions c/d/i  stoma pink/viable  AXR with some dilated loops    emesis likely ileus   OK to continue slow sips of clears  cont RADHA for now  cont gonzalez until tonight  fluid bolus for concentrated urine  OOB/ambulate  cont PCA  cont sqh for dvt prophylaxis  cont stoma care - awaiting coordinated bowel function and thicker stool  plan discussed in detail with Juan and his mother CT abdomen shows no evidence of anastomotic leak.  Stoma output variable.  Will start protonix for empiric gastritis as cause for emesis. Doing well.  Ostomy productive.  Minimal drainage from RADHA.  Will advance diet. yes

## 2021-11-05 ENCOUNTER — EMERGENCY (EMERGENCY)
Facility: HOSPITAL | Age: 81
LOS: 0 days | Discharge: HOME | End: 2021-11-05
Attending: EMERGENCY MEDICINE | Admitting: EMERGENCY MEDICINE
Payer: MEDICAID

## 2021-11-05 VITALS
RESPIRATION RATE: 20 BRPM | HEIGHT: 67 IN | OXYGEN SATURATION: 95 % | DIASTOLIC BLOOD PRESSURE: 70 MMHG | TEMPERATURE: 97 F | SYSTOLIC BLOOD PRESSURE: 146 MMHG | HEART RATE: 97 BPM

## 2021-11-05 DIAGNOSIS — Z23 ENCOUNTER FOR IMMUNIZATION: ICD-10-CM

## 2021-11-05 DIAGNOSIS — I10 ESSENTIAL (PRIMARY) HYPERTENSION: ICD-10-CM
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
## 2021-11-05 DIAGNOSIS — E11.9 TYPE 2 DIABETES MELLITUS WITHOUT COMPLICATIONS: ICD-10-CM

## 2021-11-05 DIAGNOSIS — Z98.890 OTHER SPECIFIED POSTPROCEDURAL STATES: Chronic | ICD-10-CM
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
## 2021-11-05 DIAGNOSIS — S01.01XA LACERATION WITHOUT FOREIGN BODY OF SCALP, INITIAL ENCOUNTER: ICD-10-CM

## 2021-11-05 DIAGNOSIS — E78.5 HYPERLIPIDEMIA, UNSPECIFIED: ICD-10-CM

## 2021-11-05 PROCEDURE — 99284 EMERGENCY DEPT VISIT MOD MDM: CPT | Mod: 25

## 2021-11-05 PROCEDURE — 70450 CT HEAD/BRAIN W/O DYE: CPT | Mod: 26,MA

## 2021-11-05 PROCEDURE — 12001 RPR S/N/AX/GEN/TRNK 2.5CM/<: CPT

## 2021-11-05 RX ORDER — TETANUS TOXOID, REDUCED DIPHTHERIA TOXOID AND ACELLULAR PERTUSSIS VACCINE, ADSORBED 5; 2.5; 8; 8; 2.5 [IU]/.5ML; [IU]/.5ML; UG/.5ML; UG/.5ML; UG/.5ML
0.5 SUSPENSION INTRAMUSCULAR ONCE
Refills: 0 | Status: COMPLETED | OUTPATIENT
Start: 2021-11-05 | End: 2021-11-05

## 2021-11-05 RX ADMIN — TETANUS TOXOID, REDUCED DIPHTHERIA TOXOID AND ACELLULAR PERTUSSIS VACCINE, ADSORBED 0.5 MILLILITER(S): 5; 2.5; 8; 8; 2.5 SUSPENSION INTRAMUSCULAR at 10:36

## 2021-11-05 NOTE — ED PROVIDER NOTE - OBJECTIVE STATEMENT
The pt is an 81y F w/ hx of HTN, DM presenting s/p mechanical fall. Pt was getting up from a chair when she slipped and hit the back of her head against the corner of a piece of furniture. Son (present at bedside) was home and picked her up. No LOC, not on AC. Pt remembers all events. Pt noted to have lac to posterior scalp. In the ED, complains of pain near the lac. Otherwise denies blurry vision, chest pain, SOB, N/V, abdominal pain.

## 2021-11-05 NOTE — ED PROVIDER NOTE - CARE PROVIDER_API CALL
Magdalena Cardoza  [address] [address]  Phone: [phone]  Fax: [phone]  Follow Up Time: 7-10 Days

## 2021-11-05 NOTE — ED PROVIDER NOTE - PATIENT PORTAL LINK FT
You can access the FollowMyHealth Patient Portal offered by [facility] by registering at the following website: [email or web address]. By joining [facility]’s FollowMyHealth portal, you will also be able to view your health information using other applications (apps) compatible with our system.

## 2021-11-05 NOTE — ED ADULT NURSE NOTE - NSIMPLEMENTINTERV_GEN_ALL_ED
Implemented All Fall with Harm Risk Interventions:  [place] to call system. Call bell, personal items and telephone within reach. Instruct patient to call for assistance. Room bathroom lighting operational. Non-slip footwear when patient is off stretcher. Physically safe environment: no spills, clutter or unnecessary equipment. Stretcher in lowest position, wheels locked, appropriate side rails in place. Provide visual cue, wrist band, yellow gown, etc. Monitor gait and stability. Monitor for mental status changes and reorient to person, place, and time. Review medications for side effects contributing to fall risk. Reinforce activity limits and safety measures with patient and family. Provide visual clues: red socks.

## 2021-11-05 NOTE — ED PROVIDER NOTE - ATTENDING CONTRIBUTION TO CARE
81 F to ED with [facility]  slipped at home and hit back of head while sitting,   no loc, no extrem injury, lives with son and no AC  AVSS, exam as noted, CTAB, RRR, abdomen soft NTND, (+) bowel sounds, + 3cm scalp lac

## 2021-11-05 NOTE — ED ADULT TRIAGE NOTE - INTERNATIONAL TRAVEL
Denisai. Patient called back to schedule virtual visit with provider. Went to [facility] for covid testing and was told results would take 7-10 days, so he left without being tested. Patient job puts him at high risk for contacting covid, but denies confirmed exposure.  VV scheduled 7/21
No

## 2021-11-05 NOTE — ED PROVIDER NOTE - PHYSICAL EXAMINATION
Vital Signs: I have reviewed the initial vital signs.  Constitutional: well-nourished, appears stated age, no acute distress  HEENT: (+) 2.5 cm lac posterior scalp   Cardiovascular: regular rate, regular rhythm, well-perfused extremities  Respiratory: unlabored respiratory effort, clear to auscultation bilaterally  Gastrointestinal: soft, non-tender abdomen  Musculoskeletal: supple neck, no lower extremity edema  Integumentary: warm, dry, no rash  Neurologic: awake, alert, extremities’ motor and sensory functions grossly intact  Psychiatric: appropriate mood, appropriate affect

## 2021-11-05 NOTE — ED PROVIDER NOTE - NS ED ROS FT
Constitutional: (-) fever, (+) fall   HEENT: (-) blurry vision, (-) epistaxis, (+) lac posterior scalp   Cardiovascular: (-) chest pain, (-) syncope  Respiratory: (-) cough, (-) shortness of breath  Gastrointestinal: (-) vomiting, (-) diarrhea  Musculoskeletal: (-) neck pain, (-) back pain, (-) joint pain  Integumentary: (-) rash, (-) edema  Neurological: (-) headache, (-) altered mental status  Psychiatric: (-) hallucinations  Allergic/Immunologic: (-) pruritus

## 2021-11-05 NOTE — ED ADULT NURSE NOTE - OBJECTIVE STATEMENT
pt here s/p fall after getting OOB. pt awake and alert. fell ~6am . pt with laceration to backj of head. no active bleeding on arrival. denies loc pt here s/p fall after getting OOB. pt awake and alert. Oriented to self. Disoriented to time/birthday. fell ~6am . pt with laceration to back of head. no active bleeding on arrival. denies loc. Ambulatory with cane

## 2021-11-18 ENCOUNTER — EMERGENCY (EMERGENCY)
Facility: HOSPITAL | Age: 81
LOS: 0 days | Discharge: HOME | End: 2021-11-18
Attending: EMERGENCY MEDICINE | Admitting: EMERGENCY MEDICINE
Payer: MEDICAID

## 2021-11-18 VITALS
DIASTOLIC BLOOD PRESSURE: 80 MMHG | WEIGHT: 139.99 LBS | TEMPERATURE: 97 F | HEIGHT: 67 IN | RESPIRATION RATE: 18 BRPM | OXYGEN SATURATION: 94 % | HEART RATE: 98 BPM | SYSTOLIC BLOOD PRESSURE: 130 MMHG
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
## 2021-11-18 DIAGNOSIS — S01.01XD LACERATION WITHOUT FOREIGN BODY OF SCALP, SUBSEQUENT ENCOUNTER: ICD-10-CM

## 2021-11-18 DIAGNOSIS — Z98.890 OTHER SPECIFIED POSTPROCEDURAL STATES: Chronic | ICD-10-CM
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
## 2021-11-18 PROCEDURE — L9995: CPT

## 2021-11-18 NOTE — ED PROVIDER NOTE - SKIN, MLM
wound to Left occipital region well-healed, clean, dry, intact, with 4 staples in place (see procedure note)

## 2021-11-18 NOTE — ED PROVIDER NOTE - CONSTITUTIONAL NEGATIVE STATEMENT, MLM
PALOMO, JENNIFER  89y  Male    Patient is a 89y old Male who presents with a chief complaint of decreased qureshi output and abdominal pain (11 Feb 2019 10:51)      INTERVAL HPI/OVERNIGHT EVENTS:  No interval events.   Patient has no complaints.      REVIEW OF SYSTEMS:  CONSTITUTIONAL: No fever or fatigue  EYES: No eye pain, visual disturbances, or discharge  ENMT:  No difficulty hearing, tinnitus, vertigo; No sinus or throat pain  NECK: No pain or stiffness  RESPIRATORY: No cough, wheezing, chills or hemoptysis; No shortness of breath  CARDIOVASCULAR: No chest pain, palpitations, dizziness, or leg swelling  GASTROINTESTINAL: No abdominal or epigastric pain. No nausea, vomiting, or hematemesis; No diarrhea or constipation. No melena or hematochezia.  GENITOURINARY: No dysuria, frequency, hematuria, or incontinence  NEUROLOGICAL: No headaches, memory loss, loss of strength, numbness, or tremors  MUSCULOSKELETAL: No joint pain or swelling; No muscle, back, or extremity pain  PSYCHIATRIC: No depression, anxiety, mood swings, or difficulty sleeping      VITALS:  T(F): 97.2 (02-14-19 @ 14:00), Max: 97.7 (02-13-19 @ 22:12)  HR: 68 (02-14-19 @ 14:00) (68 - 73)  BP: 151/68 (02-14-19 @ 14:00) (131/77 - 167/77)  RR: 16 (02-14-19 @ 14:00) (16 - 16)  SpO2: --      PHYSICAL EXAM:  GENERAL: NAD, well-groomed, well-developed  HEAD:  Atraumatic, Normocephalic  EYES: conjunctiva and sclera clear  ENMT: Moist mucous membranes, Good dentition, No lesions  NECK: Supple, Normal thyroid  NERVOUS SYSTEM:  Awake & Alert, Moves all extremities  CHEST/LUNG: Clear to auscultation, bilaterally; No rales, rhonchi, wheezing, or rubs  HEART: Regular rate and rhythm; No murmurs, rubs, or gallops  ABDOMEN: Soft, Nontender, Nondistended; Bowel sounds present, left sided colostomy with Loose stool.  Anterior abdominal wall dressing with erythema and stool soaked dressing.  EXTREMITIES:  2+ Peripheral Pulses, No clubbing, cyanosis, or edema  LYMPH: No lymphadenopathy noted      Consultant(s) Notes Reviewed:  [x ] YES  [ ] NO  Care Discussed with Consultants/Other Providers [ x] YES  [ ] NO    LABS:    MICROBIOLOGY:      RADIOLOGY & ADDITIONAL TESTS:    Imaging Personally Reviewed:  [ ] YES  [ ] NO  MEDICATION:  amoxicillin  875 milliGRAM(s)/clavulanate 1 Tablet(s) Oral two times a day  chlorhexidine 4% Liquid 1 Application(s) Topical <User Schedule>  ferrous    sulfate 325 milliGRAM(s) Oral daily  finasteride 5 milliGRAM(s) Oral daily  heparin  Injectable 5000 Unit(s) SubCutaneous every 12 hours  sodium bicarbonate 650 milliGRAM(s) Oral three times a day  tamsulosin 0.4 milliGRAM(s) Oral at bedtime      HEALTH ISSUES:  HEALTH ISSUES - PROBLEM Dx:  Abdominal aortic aneurysm (AAA) greater than 5.5 cm in diameter in male: Abdominal aortic aneurysm (AAA) greater than 5.5 cm in diameter in male  Colostomy care: Colostomy care  Enterocutaneous fistula: Enterocutaneous fistula  Cystitis: Cystitis  JOSE (iron deficiency anemia): JOSE (iron deficiency anemia)  Enlarged prostate: Enlarged prostate  CKD (chronic kidney disease): CKD (chronic kidney disease)  Social problem: Social problem PALOMO, JENNIFER  89y  Male    Patient is a 89y old Male who presents with a chief complaint of decreased qureshi output and abdominal pain (11 Feb 2019 10:51)      INTERVAL HPI/OVERNIGHT EVENTS:  No interval events.   Patient has no complaints.      REVIEW OF SYSTEMS:  CONSTITUTIONAL: No fever or fatigue  EYES: No eye pain, visual disturbances, or discharge  ENMT:  No difficulty hearing, tinnitus, vertigo; No sinus or throat pain  NECK: No pain or stiffness  RESPIRATORY: No cough, wheezing, chills or hemoptysis; No shortness of breath  CARDIOVASCULAR: No chest pain, palpitations, dizziness, or leg swelling  GASTROINTESTINAL: No abdominal or epigastric pain. No nausea, vomiting, or hematemesis; No diarrhea or constipation. No melena or hematochezia.  GENITOURINARY: No dysuria, frequency, hematuria, or incontinence  NEUROLOGICAL: No headaches, memory loss, loss of strength, numbness, or tremors  MUSCULOSKELETAL: No joint pain or swelling; No muscle, back, or extremity pain  PSYCHIATRIC: No depression, anxiety, mood swings, or difficulty sleeping      VITALS:  T(F): 97.2 (02-14-19 @ 14:00), Max: 97.7 (02-13-19 @ 22:12)  HR: 68 (02-14-19 @ 14:00) (68 - 73)  BP: 151/68 (02-14-19 @ 14:00) (131/77 - 167/77)  RR: 16 (02-14-19 @ 14:00) (16 - 16)  SpO2: --      PHYSICAL EXAM:  GENERAL: NAD, well-groomed, well-developed  HEAD:  Atraumatic, Normocephalic  EYES: conjunctiva and sclera clear  ENMT: Moist mucous membranes  NECK: Supple, Normal thyroid  NERVOUS SYSTEM:  Awake & Alert, Moves all extremities  CHEST/LUNG: Clear to auscultation, bilaterally; No rales, rhonchi, wheezing, or rubs  HEART: Regular rate and rhythm; No murmurs, rubs, or gallops  ABDOMEN: Soft, Nontender, Nondistended; Bowel sounds present, left sided colostomy with Loose stool.  Anterior abdominal wall dressing with erythema and stool soaked dressing.  EXTREMITIES:  2+ Peripheral Pulses, No clubbing, cyanosis, or edema  LYMPH: No lymphadenopathy noted      Consultant(s) Notes Reviewed:  [x ] YES  [ ] NO  Care Discussed with Consultants/Other Providers [ x] YES  [ ] NO      LABS: No new labs since 2/9.    Iron with Total Binding Capacity in AM (02.09.19 @ 07:48)    Iron - Total Binding Capacity.: 169 ug/dL    % Saturation, Iron: 17 %    Iron Total, Serum: 29 ug/dL    Unsaturated Iron Binding Capacity: 140 ug/dL      MICROBIOLOGY:   Culture - Urine (02.06.19 @ 14:30)    -  Amikacin: S <=16    -  Ampicillin/Sulbactam: S <=8/4    -  Cefepime: I 16    -  Ceftazidime: R >16    -  Ceftriaxone: R >32    -  Ciprofloxacin: R >2    -  Gentamicin: S <=4    -  Imipenem: S    -  Levofloxacin: R >4    -  Meropenem: S 2    -  Piperacillin/Tazobactam: R    -  Tobramycin: S <=4    -  Trimethoprim/Sulfamethoxazole: S <=2/38    Specimen Source: .Urine Clean Catch (Midstream)    Culture Results:   >100,000 CFU/ml Acinetobacter baumannii    Organism Identification: Acinetobacter baumannii  Acinetobacter baumannii    Organism: Acinetobacter baumannii    Organism: Acinetobacter baumannii    Method Type: PRESLEY    Method Type: KB        Culture - Blood (02.06.19 @ 11:45)    Specimen Source: .Blood Blood    Culture Results:   No growth at 5 days.      Culture - Blood (02.06.19 @ 11:45)    Specimen Source: .Blood Blood    Culture Results:   No growth at 5 days.      RADIOLOGY & ADDITIONAL TESTS:  CT Abdomen and Pelvis No Cont (02.06.19 @ 16:50)   Diffuse wall thickening and perivesicular fat stranding of   the urinary bladder, most consistent with cystitis. Correlate with   urinalysis.    Qureshi catheter in place.    Stable appearance of postoperative changes of the large bowel and small   bowel, with left lower quadrant ostomy, and a parastomal hernia   containing nonobstructive small bowel loops.    Stable appearance of ventral hernia containing multiple loops of   non-obstructed small bowel.    Again noted post endograft repair of infrarenal abdominal aortic aneurysm   with interval further mild dilatation of the native aneurysm sac   measuring 5.7 cm (previously 5.2 cm in 2015).      X-ray Chest 1 View-PORTABLE IMMEDIATE (02.06.19 @ 10:48)   Left basilar discoid atelectasis/scarring.  Vertically orientated reticular densities, peripheral right lung field,   likely scarring..    No consolidation effusion or pneumothorax          Imaging Personally Reviewed:  [x] YES  [ ] NO    MEDICATIONS  (STANDING):  amoxicillin  875 milliGRAM(s)/clavulanate 1 Tablet(s) Oral two times a day  chlorhexidine 4% Liquid 1 Application(s) Topical <User Schedule>  ferrous    sulfate 325 milliGRAM(s) Oral daily  finasteride 5 milliGRAM(s) Oral daily  heparin  Injectable 5000 Unit(s) SubCutaneous every 12 hours  sodium bicarbonate 650 milliGRAM(s) Oral three times a day  tamsulosin 0.4 milliGRAM(s) Oral at bedtime    MEDICATIONS  (PRN): None      HEALTH ISSUES - PROBLEM Dx:  Abdominal aortic aneurysm (AAA) greater than 5.5 cm in diameter in male  Colostomy care  Enterocutaneous fistula  Cystitis  JOSE (iron deficiency anemia)  Enlarged prostate  CKD (chronic kidney disease)  Social problem no fever and no chills.

## 2021-11-18 NOTE — ED PROVIDER NOTE - OBJECTIVE STATEMENT
81y.o. female with a PMH of HTN, DM, and hyperlipidemia presented to the ER c/o staple removal to scalp.  Tetanus up to date.  Staples placed on 11/5/21.  No complaints.

## 2022-04-12 ENCOUNTER — INPATIENT (INPATIENT)
Facility: HOSPITAL | Age: 82
LOS: 6 days | Discharge: NURSING, REHAB OR LONG-TERM CARE | End: 2022-04-19
Attending: STUDENT IN AN ORGANIZED HEALTH CARE EDUCATION/TRAINING PROGRAM | Admitting: STUDENT IN AN ORGANIZED HEALTH CARE EDUCATION/TRAINING PROGRAM
Payer: MEDICAID

## 2022-04-12 VITALS
SYSTOLIC BLOOD PRESSURE: 169 MMHG | OXYGEN SATURATION: 96 % | DIASTOLIC BLOOD PRESSURE: 79 MMHG | TEMPERATURE: 97 F | HEART RATE: 85 BPM | HEIGHT: 67 IN | RESPIRATION RATE: 18 BRPM

## 2022-04-12 DIAGNOSIS — F03.90 UNSPECIFIED DEMENTIA WITHOUT BEHAVIORAL DISTURBANCE: ICD-10-CM

## 2022-04-12 DIAGNOSIS — Z98.890 OTHER SPECIFIED POSTPROCEDURAL STATES: Chronic | ICD-10-CM

## 2022-04-12 LAB
ALBUMIN SERPL ELPH-MCNC: 4.4 G/DL — SIGNIFICANT CHANGE UP (ref 3.5–5.2)
ALP SERPL-CCNC: 76 U/L — SIGNIFICANT CHANGE UP (ref 30–115)
ALT FLD-CCNC: 13 U/L — SIGNIFICANT CHANGE UP (ref 0–41)
ANION GAP SERPL CALC-SCNC: 14 MMOL/L — SIGNIFICANT CHANGE UP (ref 7–14)
APPEARANCE UR: ABNORMAL
AST SERPL-CCNC: 17 U/L — SIGNIFICANT CHANGE UP (ref 0–41)
BASOPHILS # BLD AUTO: 0.04 K/UL — SIGNIFICANT CHANGE UP (ref 0–0.2)
BASOPHILS NFR BLD AUTO: 0.5 % — SIGNIFICANT CHANGE UP (ref 0–1)
BILIRUB SERPL-MCNC: 0.3 MG/DL — SIGNIFICANT CHANGE UP (ref 0.2–1.2)
BILIRUB UR-MCNC: NEGATIVE — SIGNIFICANT CHANGE UP
BUN SERPL-MCNC: 34 MG/DL — HIGH (ref 10–20)
CALCIUM SERPL-MCNC: 9.7 MG/DL — SIGNIFICANT CHANGE UP (ref 8.5–10.1)
CHLORIDE SERPL-SCNC: 102 MMOL/L — SIGNIFICANT CHANGE UP (ref 98–110)
CK MB CFR SERPL CALC: 3.6 NG/ML — SIGNIFICANT CHANGE UP (ref 0.6–6.3)
CK SERPL-CCNC: 115 U/L — SIGNIFICANT CHANGE UP (ref 0–225)
CO2 SERPL-SCNC: 23 MMOL/L — SIGNIFICANT CHANGE UP (ref 17–32)
COLOR SPEC: COLORLESS — SIGNIFICANT CHANGE UP
CREAT SERPL-MCNC: 1.2 MG/DL — SIGNIFICANT CHANGE UP (ref 0.7–1.5)
DIFF PNL FLD: ABNORMAL
EGFR: 45 ML/MIN/1.73M2 — LOW
EOSINOPHIL # BLD AUTO: 0.24 K/UL — SIGNIFICANT CHANGE UP (ref 0–0.7)
EOSINOPHIL NFR BLD AUTO: 2.8 % — SIGNIFICANT CHANGE UP (ref 0–8)
GLUCOSE BLDC GLUCOMTR-MCNC: 144 MG/DL — HIGH (ref 70–99)
GLUCOSE SERPL-MCNC: 172 MG/DL — HIGH (ref 70–99)
GLUCOSE UR QL: NEGATIVE — SIGNIFICANT CHANGE UP
HCT VFR BLD CALC: 40.8 % — SIGNIFICANT CHANGE UP (ref 37–47)
HGB BLD-MCNC: 13.2 G/DL — SIGNIFICANT CHANGE UP (ref 12–16)
IMM GRANULOCYTES NFR BLD AUTO: 0.2 % — SIGNIFICANT CHANGE UP (ref 0.1–0.3)
KETONES UR-MCNC: NEGATIVE — SIGNIFICANT CHANGE UP
LEUKOCYTE ESTERASE UR-ACNC: ABNORMAL
LYMPHOCYTES # BLD AUTO: 1.26 K/UL — SIGNIFICANT CHANGE UP (ref 1.2–3.4)
LYMPHOCYTES # BLD AUTO: 14.9 % — LOW (ref 20.5–51.1)
MAGNESIUM SERPL-MCNC: 2 MG/DL — SIGNIFICANT CHANGE UP (ref 1.8–2.4)
MCHC RBC-ENTMCNC: 29.1 PG — SIGNIFICANT CHANGE UP (ref 27–31)
MCHC RBC-ENTMCNC: 32.4 G/DL — SIGNIFICANT CHANGE UP (ref 32–37)
MCV RBC AUTO: 90.1 FL — SIGNIFICANT CHANGE UP (ref 81–99)
MONOCYTES # BLD AUTO: 0.66 K/UL — HIGH (ref 0.1–0.6)
MONOCYTES NFR BLD AUTO: 7.8 % — SIGNIFICANT CHANGE UP (ref 1.7–9.3)
NEUTROPHILS # BLD AUTO: 6.21 K/UL — SIGNIFICANT CHANGE UP (ref 1.4–6.5)
NEUTROPHILS NFR BLD AUTO: 73.8 % — SIGNIFICANT CHANGE UP (ref 42.2–75.2)
NITRITE UR-MCNC: POSITIVE
NRBC # BLD: 0 /100 WBCS — SIGNIFICANT CHANGE UP (ref 0–0)
PH UR: 6.5 — SIGNIFICANT CHANGE UP (ref 5–8)
PLATELET # BLD AUTO: 236 K/UL — SIGNIFICANT CHANGE UP (ref 130–400)
POTASSIUM SERPL-MCNC: 5 MMOL/L — SIGNIFICANT CHANGE UP (ref 3.5–5)
POTASSIUM SERPL-SCNC: 5 MMOL/L — SIGNIFICANT CHANGE UP (ref 3.5–5)
PROT SERPL-MCNC: 7.1 G/DL — SIGNIFICANT CHANGE UP (ref 6–8)
PROT UR-MCNC: NEGATIVE — SIGNIFICANT CHANGE UP
RBC # BLD: 4.53 M/UL — SIGNIFICANT CHANGE UP (ref 4.2–5.4)
RBC # FLD: 14 % — SIGNIFICANT CHANGE UP (ref 11.5–14.5)
SARS-COV-2 RNA SPEC QL NAA+PROBE: SIGNIFICANT CHANGE UP
SODIUM SERPL-SCNC: 139 MMOL/L — SIGNIFICANT CHANGE UP (ref 135–146)
SP GR SPEC: 1.01 — SIGNIFICANT CHANGE UP (ref 1.01–1.03)
TROPONIN T SERPL-MCNC: 0.04 NG/ML — CRITICAL HIGH
TROPONIN T SERPL-MCNC: 0.05 NG/ML — CRITICAL HIGH
UROBILINOGEN FLD QL: SIGNIFICANT CHANGE UP
WBC # BLD: 8.43 K/UL — SIGNIFICANT CHANGE UP (ref 4.8–10.8)
WBC # FLD AUTO: 8.43 K/UL — SIGNIFICANT CHANGE UP (ref 4.8–10.8)

## 2022-04-12 PROCEDURE — 72170 X-RAY EXAM OF PELVIS: CPT | Mod: 26

## 2022-04-12 PROCEDURE — 70450 CT HEAD/BRAIN W/O DYE: CPT | Mod: 26,MA

## 2022-04-12 PROCEDURE — 72125 CT NECK SPINE W/O DYE: CPT | Mod: 26,MA

## 2022-04-12 PROCEDURE — 99223 1ST HOSP IP/OBS HIGH 75: CPT

## 2022-04-12 PROCEDURE — 73562 X-RAY EXAM OF KNEE 3: CPT | Mod: 26,LT

## 2022-04-12 PROCEDURE — 93010 ELECTROCARDIOGRAM REPORT: CPT | Mod: 76

## 2022-04-12 PROCEDURE — 99285 EMERGENCY DEPT VISIT HI MDM: CPT

## 2022-04-12 PROCEDURE — 71045 X-RAY EXAM CHEST 1 VIEW: CPT | Mod: 26

## 2022-04-12 RX ORDER — SODIUM CHLORIDE 9 MG/ML
1000 INJECTION, SOLUTION INTRAVENOUS
Refills: 0 | Status: DISCONTINUED | OUTPATIENT
Start: 2022-04-12 | End: 2022-04-19

## 2022-04-12 RX ORDER — SODIUM CHLORIDE 9 MG/ML
1000 INJECTION, SOLUTION INTRAVENOUS ONCE
Refills: 0 | Status: COMPLETED | OUTPATIENT
Start: 2022-04-12 | End: 2022-04-12

## 2022-04-12 RX ORDER — SERTRALINE 25 MG/1
25 TABLET, FILM COATED ORAL DAILY
Refills: 0 | Status: DISCONTINUED | OUTPATIENT
Start: 2022-04-12 | End: 2022-04-19

## 2022-04-12 RX ORDER — DEXTROSE 50 % IN WATER 50 %
12.5 SYRINGE (ML) INTRAVENOUS ONCE
Refills: 0 | Status: DISCONTINUED | OUTPATIENT
Start: 2022-04-12 | End: 2022-04-19

## 2022-04-12 RX ORDER — INSULIN LISPRO 100/ML
VIAL (ML) SUBCUTANEOUS
Refills: 0 | Status: DISCONTINUED | OUTPATIENT
Start: 2022-04-12 | End: 2022-04-19

## 2022-04-12 RX ORDER — GLUCAGON INJECTION, SOLUTION 0.5 MG/.1ML
1 INJECTION, SOLUTION SUBCUTANEOUS ONCE
Refills: 0 | Status: DISCONTINUED | OUTPATIENT
Start: 2022-04-12 | End: 2022-04-19

## 2022-04-12 RX ORDER — NIFEDIPINE 30 MG
30 TABLET, EXTENDED RELEASE 24 HR ORAL DAILY
Refills: 0 | Status: DISCONTINUED | OUTPATIENT
Start: 2022-04-12 | End: 2022-04-19

## 2022-04-12 RX ORDER — DEXTROSE 50 % IN WATER 50 %
25 SYRINGE (ML) INTRAVENOUS ONCE
Refills: 0 | Status: DISCONTINUED | OUTPATIENT
Start: 2022-04-12 | End: 2022-04-19

## 2022-04-12 RX ORDER — LISINOPRIL 2.5 MG/1
5 TABLET ORAL DAILY
Refills: 0 | Status: DISCONTINUED | OUTPATIENT
Start: 2022-04-12 | End: 2022-04-19

## 2022-04-12 RX ORDER — METFORMIN HYDROCHLORIDE 850 MG/1
0 TABLET ORAL
Qty: 0 | Refills: 3 | DISCHARGE

## 2022-04-12 RX ORDER — DEXTROSE 50 % IN WATER 50 %
15 SYRINGE (ML) INTRAVENOUS ONCE
Refills: 0 | Status: DISCONTINUED | OUTPATIENT
Start: 2022-04-12 | End: 2022-04-19

## 2022-04-12 RX ORDER — ASPIRIN/CALCIUM CARB/MAGNESIUM 324 MG
81 TABLET ORAL DAILY
Refills: 0 | Status: DISCONTINUED | OUTPATIENT
Start: 2022-04-12 | End: 2022-04-18

## 2022-04-12 RX ORDER — HEPARIN SODIUM 5000 [USP'U]/ML
5000 INJECTION INTRAVENOUS; SUBCUTANEOUS EVERY 8 HOURS
Refills: 0 | Status: DISCONTINUED | OUTPATIENT
Start: 2022-04-12 | End: 2022-04-14

## 2022-04-12 RX ADMIN — HEPARIN SODIUM 5000 UNIT(S): 5000 INJECTION INTRAVENOUS; SUBCUTANEOUS at 21:16

## 2022-04-12 RX ADMIN — SODIUM CHLORIDE 1000 MILLILITER(S): 9 INJECTION, SOLUTION INTRAVENOUS at 11:33

## 2022-04-12 NOTE — ED PROVIDER NOTE - OBJECTIVE STATEMENT
81yF pmhx HTN, HLD, DM presents to ED after fall at home shortly PTA; constant, stable; per son at bedside, pt has been c/o weakness for a while as well as frequent falls; states he was sleeping outside her room due to falls when she called out to him saying she had fallen. Unsure if head trauma, denies LOC, not on AC. Denies recent fever, URI, cough, chest pain, SOB, abd pain, n/v/d.

## 2022-04-12 NOTE — ED PROVIDER NOTE - NS ED ROS FT
Review of Systems:  	•	CONSTITUTIONAL - no fever, no diaphoresis, +weakness  	•	SKIN - no rash, no lesions  	•	HEMATOLOGIC - no bleeding, no bruising  	•	EYES - no discharge, no injection  	•	ENT - no sore throat, no runny nose  	•	RESPIRATORY - no shortness of breath, no cough  	•	CARDIAC - no chest pain, no palpitations  	•	GI - no abd pain, no nausea, no vomiting, no diarrhea  	•	GENITO-URINARY - no dysuria, no hematuria  	•	MUSCULOSKELETAL - no joint pain, no muscle aches  	•	NEUROLOGIC - no dizziness, no headache

## 2022-04-12 NOTE — ED ADULT TRIAGE NOTE - CHIEF COMPLAINT QUOTE
S/p fall out of bed at 3am. C/o difficulty ambulating. Denies head injury. GCS 15. Not on anticoagulant.

## 2022-04-12 NOTE — H&P ADULT - NSHPPHYSICALEXAM_GEN_ALL_CORE
LOS:     VITALS:   T(C): 36.3 (04-12-22 @ 11:11), Max: 36.3 (04-12-22 @ 11:11)  HR: 85 (04-12-22 @ 11:11) (85 - 85)  BP: 169/79 (04-12-22 @ 11:11) (169/79 - 169/79)  RR: 18 (04-12-22 @ 11:11) (18 - 18)  SpO2: 96% (04-12-22 @ 11:11) (96% - 96%)    GENERAL: NAD, lying in bed comfortably  HEAD:  Atraumatic, Normocephalic  EYES: EOMI, PERRLA, conjunctiva and sclera clear  ENT: Moist mucous membranes  NECK: Supple, No JVD  CHEST/LUNG: Clear to auscultation bilaterally; No rales, rhonchi, wheezing, or rubs. Unlabored respirations  HEART: Regular rate and rhythm; No murmurs, rubs, or gallops  ABDOMEN: BSx4; Soft, nontender, nondistended  EXTREMITIES:  2+ Peripheral Pulses, brisk capillary refill. No clubbing, cyanosis, or edema  NERVOUS SYSTEM:  A&Ox3, no focal deficits   SKIN: No rashes or lesions VITALS:   T(C): 36.3 (04-12-22 @ 11:11), Max: 36.3 (04-12-22 @ 11:11)  HR: 85 (04-12-22 @ 11:11) (85 - 85)  BP: 169/79 (04-12-22 @ 11:11) (169/79 - 169/79)  RR: 18 (04-12-22 @ 11:11) (18 - 18)  SpO2: 96% (04-12-22 @ 11:11) (96% - 96%)    GENERAL: NAD, lying in bed comfortably, elderly female   HEAD:  Atraumatic, Normocephalic  EYES: EOMI, PERRLA, conjunctiva and sclera clear  CHEST/LUNG: Clear to auscultation bilaterally, no wheezing noted  HEART: Regular rate and rhythm; No murmurs, rubs, or gallops  ABDOMEN: BSx4; Soft, nontender, nondistended  EXTREMITIES:  no LE edema, small bruise below left knee  NERVOUS SYSTEM:  A&Ox2 (unable to recall date)

## 2022-04-12 NOTE — ED PROVIDER NOTE - PHYSICAL EXAMINATION
Vital Signs: Reviewed  GEN: alert, NAD, speaks full sentences  HEAD:  normocephalic, atraumatic  EYES:  PERRLA; conjunctivae without injection, drainage or discharge  ENMT:  nasal mucosa moist; mouth moist without ulcerations or lesions; throat moist without erythema, exudate, ulcerations or lesions  NECK:  supple, no midline tenderness, FROM  CARDIAC:  regular rate, normal S1 and S2, no murmurs  RESP:  respiratory rate and effort appear normal for age; lungs are clear to auscultation bilaterally; no rales or wheezes  ABDOMEN:  soft, nontender, nondistended  MUSCULOSKELETAL/NEURO: no midline spinal tenderness, no chest wall tenderness, pelvis stable, FROM all 4 extremities; normal movement, normal tone  SKIN:  normal skin color for age and race, well-perfused; warm and dry

## 2022-04-12 NOTE — H&P ADULT - ASSESSMENT
80 y/o Macanese speaking female with PMHx of HTN, HLD, DM, hx of Left MCA aneurysm presented to the ED after fall at home.    #Unwitnessed Fall   #Hx of recurrent falls   - pt unable to recall events   - son reports generalized fatigue and weakness  - no loss of urine/bowel, no jerking of limbs   - EKG shows ?Afib rate controlled   - Trop 0.05, f/u Repeat Trop  - Echo (6/2021) shows EF 60 - 65%, GIDD, mild LVH, moderately enlarged left atrium   - CT Head no acute intracranial hemorrhage, stable exam since CT in 11/2021  -   - check UA, orthostatic vitals  - PT eval     #New-onset Afib  - EKG shows Afib, none noted on previous EKG  - CHADVASC 5, but given recurrent falls, may need to defer A/C    #HTN  - continue with Nifedipine and Lisinopril  - continue with ASA 81mg   - check lipid panel    #DM  - hold oral medications  - monitor FS, if FS>180 start basal/bolus   - check A1c    #Dementia  - continue with sertraline     #Hx of L MCA aneurysm   - never followed up as outpatient    #DVT PPx: Lovenox  #GI PPx: not indicated  #DASH       82 y/o Ethiopian speaking female with PMHx of HTN, HLD, DM, hx of Left MCA aneurysm presented to the ED after fall at home.    #Unwitnessed Fall   #Hx of recurrent falls   - pt unable to recall events   - son reports generalized fatigue and weakness  - no loss of urine/bowel, no jerking of limbs   - EKG shows ?Afib rate controlled   - Trop 0.05, f/u Repeat Trop  - Echo (6/2021) shows EF 60 - 65%, GIDD, mild LVH, moderately enlarged left atrium   - CT Head no acute intracranial hemorrhage, stable exam since CT in 11/2021. Stable ischemic changes in right cerebellar hemisphere with mild cortical/cerebellar atrophy  -   - check UA, orthostatic vitals  - repeat Echo   - PT eval     #New-onset Afib  - EKG shows Afib, none noted on previous EKG  - CHADVASC 5, but given recurrent falls, may need to defer A/C    #HTN  - continue with Nifedipine and Lisinopril  - continue with ASA 81mg   - check lipid panel    #DM  - hold oral medications  - monitor FS, if FS>180 start basal/bolus   - check A1c    #Dementia  - continue with sertraline     #Hx of L MCA aneurysm   - never followed up as outpatient    #DVT PPx: Lovenox  #GI PPx: not indicated  #DASH

## 2022-04-12 NOTE — H&P ADULT - HISTORY OF PRESENT ILLNESS
81yF pmhx HTN, HLD, DM presents to ED after fall at home shortly PTA; constant, stable; per son at bedside, pt has been c/o weakness for a while as well as frequent falls; states he was sleeping outside her room due to falls when she called out to him saying she had fallen. Unsure if head trauma, denies LOC, not on AC. Denies recent fever, URI, cough, chest pain, SOB, abd pain, n/v/d 80 y/o Danish speaking female with PMHx of HTN, HLD, DM, hx of Left MCA aneurysm presented to the ED after fall at home. As per son, he woke up at 3am to his mother calling out for help and stating she couldn't get up. When he had gone into the room, he found her sitting on the floor, awake, and scared. No loss of urine or stools. Patient is unable to state how she fell. Son reports that for the past couple of days she has been feeling weak and fatigue, this morning she had trouble standing on her right leg. No recent fevers, chills, chest pain, SOB, abdominal pain, nausea, vomiting, diarrhea, constipation, dysuria.     In the ED, VS noted for T        80 y/o Slovak speaking female with PMHx of HTN, HLD, DM, hx of Left MCA aneurysm presented to the ED after fall at home. As per son, he woke up at 3am to his mother calling out for help and stating she couldn't get up. When he had gone into the room, he found her sitting on the floor, awake, and scared. No loss of urine or stools. Patient is unable to state how she fell. Son reports that for the past couple of days she has been feeling weak and fatigue, this morning she had trouble standing on her right leg. No recent fevers, chills, chest pain, SOB, abdominal pain, nausea, vomiting, diarrhea, constipation, dysuria. Currently not complaining of any pain.     In the ED, VS noted for T 97.4, HR 85, /79, SpO2 96% on RA. Labs noted for WBC 8, Cr 1.2, Trop 0.05. CT head shows mild degree of cortical atrophy, mild degree of cerebellar atrophy, ischemic changes noted in periventricular white matter basal ganglia, thalami, and right cerebellar hemisphere and left occipital lobe.   CT c-spine no acute displaced c-spine fracture.

## 2022-04-12 NOTE — H&P ADULT - NSHPLABSRESULTS_GEN_ALL_CORE
13.2   8.43  )-----------( 236      ( 12 Apr 2022 11:32 )             40.8     04-12    139  |  102  |  34<H>  ----------------------------<  172<H>  5.0   |  23  |  1.2    Ca    9.7      12 Apr 2022 11:32  Mg     2.0     04-12    TPro  7.1  /  Alb  4.4  /  TBili  0.3  /  DBili  x   /  AST  17  /  ALT  13  /  AlkPhos  76  04-12        CARDIAC MARKERS ( 12 Apr 2022 11:32 )  x     / 0.05 ng/mL / 115 U/L / x     / x            LIVER FUNCTIONS - ( 12 Apr 2022 11:32 )  Alb: 4.4 g/dL / Pro: 7.1 g/dL / ALK PHOS: 76 U/L / ALT: 13 U/L / AST: 17 U/L / GGT: x                     RADIOLOGY & ADDITIONAL STUDIES:    ACC: [id number] EXAM:  CT BRAIN                          PROCEDURE DATE:  04/12/2022          INTERPRETATION:  Clinical History / Reason for exam: Trauma.    CT SCAN OF THE BRAIN WITHOUT CONTRAST    TECHNIQUE:    Multiple transaxial noncontrast CT images of the brain were obtained from   base to vertex. Sagittal and coronal reformatted images were obtained.    Comparison:    Noncontrast CT scan of the brain dated November 5, 2021.    FINDINGS:    Motion artifact limits the examination.    The third and lateral ventricles are mildly enlarged as are the cortical   sulci consistent with a mild degree of cortical atrophy.  The fourth   ventricle is normal in size and position.    There is no shift of the midline structures, evidence of acute   intracranial hemorrhage, or depressed skull fracture.    Mild widening of the cerebellopontine angle cisterns and mild prominence   of the cerebellar folia consistent with a mild degree of cerebellar   atrophy.    Patchy foci of diminished attenuation in the periventricular white matter   basal ganglia and thalami likely representing ischemic change.    Patchy foci of diminished attenuation in the right cerebellar hemisphere   and left occipital lobe consistent with ischemic change.    Vascular calcifications are noted.    In the lateral aspect of the left sylvian region there is a 1.1 cm   rounded focus of hyperdensity corresponding to the site previously   reported cerebral aneurysm.    Mucosal thickening in the left sphenoid sinus.    IMPRESSION:    In comparison with the prior CT scan of the brain dated November 5, 2021:    No acute intracranial hemorrhage.    Stable examination.

## 2022-04-12 NOTE — ED PROVIDER NOTE - ATTENDING CONTRIBUTION TO CARE
-year-old female to ED status post fall.  Brought in by her son.  Weakness and recent fall.  Prior visit to ED for similar episodes.  Patient states she did a bronchus with feeling weak for the past 24 hours afebrile vital signs stable exam as noted clear lungs bilaterally conjunctiva pink HEENT normal, regular rate and rhythm abdomen soft nontender and neuro nonfocal.  Bruising noted left knee.

## 2022-04-13 LAB
A1C WITH ESTIMATED AVERAGE GLUCOSE RESULT: 6.4 % — HIGH (ref 4–5.6)
ALBUMIN SERPL ELPH-MCNC: 4.1 G/DL — SIGNIFICANT CHANGE UP (ref 3.5–5.2)
ALP SERPL-CCNC: 72 U/L — SIGNIFICANT CHANGE UP (ref 30–115)
ALT FLD-CCNC: 12 U/L — SIGNIFICANT CHANGE UP (ref 0–41)
ANION GAP SERPL CALC-SCNC: 9 MMOL/L — SIGNIFICANT CHANGE UP (ref 7–14)
AST SERPL-CCNC: 15 U/L — SIGNIFICANT CHANGE UP (ref 0–41)
BASOPHILS # BLD AUTO: 0.04 K/UL — SIGNIFICANT CHANGE UP (ref 0–0.2)
BASOPHILS NFR BLD AUTO: 0.5 % — SIGNIFICANT CHANGE UP (ref 0–1)
BILIRUB SERPL-MCNC: 0.4 MG/DL — SIGNIFICANT CHANGE UP (ref 0.2–1.2)
BUN SERPL-MCNC: 23 MG/DL — HIGH (ref 10–20)
CALCIUM SERPL-MCNC: 9.8 MG/DL — SIGNIFICANT CHANGE UP (ref 8.5–10.1)
CHLORIDE SERPL-SCNC: 103 MMOL/L — SIGNIFICANT CHANGE UP (ref 98–110)
CHOLEST SERPL-MCNC: 257 MG/DL — HIGH
CO2 SERPL-SCNC: 29 MMOL/L — SIGNIFICANT CHANGE UP (ref 17–32)
CREAT SERPL-MCNC: 1 MG/DL — SIGNIFICANT CHANGE UP (ref 0.7–1.5)
EGFR: 56 ML/MIN/1.73M2 — LOW
EOSINOPHIL # BLD AUTO: 0.27 K/UL — SIGNIFICANT CHANGE UP (ref 0–0.7)
EOSINOPHIL NFR BLD AUTO: 3.3 % — SIGNIFICANT CHANGE UP (ref 0–8)
ESTIMATED AVERAGE GLUCOSE: 137 MG/DL — HIGH (ref 68–114)
GLUCOSE BLDC GLUCOMTR-MCNC: 129 MG/DL — HIGH (ref 70–99)
GLUCOSE BLDC GLUCOMTR-MCNC: 194 MG/DL — HIGH (ref 70–99)
GLUCOSE BLDC GLUCOMTR-MCNC: 244 MG/DL — HIGH (ref 70–99)
GLUCOSE BLDC GLUCOMTR-MCNC: 322 MG/DL — HIGH (ref 70–99)
GLUCOSE SERPL-MCNC: 129 MG/DL — HIGH (ref 70–99)
HCT VFR BLD CALC: 39.6 % — SIGNIFICANT CHANGE UP (ref 37–47)
HDLC SERPL-MCNC: 59 MG/DL — SIGNIFICANT CHANGE UP
HGB BLD-MCNC: 12.9 G/DL — SIGNIFICANT CHANGE UP (ref 12–16)
IMM GRANULOCYTES NFR BLD AUTO: 0.1 % — SIGNIFICANT CHANGE UP (ref 0.1–0.3)
LIPID PNL WITH DIRECT LDL SERPL: 166 MG/DL — HIGH
LYMPHOCYTES # BLD AUTO: 1.22 K/UL — SIGNIFICANT CHANGE UP (ref 1.2–3.4)
LYMPHOCYTES # BLD AUTO: 15.1 % — LOW (ref 20.5–51.1)
MCHC RBC-ENTMCNC: 29.2 PG — SIGNIFICANT CHANGE UP (ref 27–31)
MCHC RBC-ENTMCNC: 32.6 G/DL — SIGNIFICANT CHANGE UP (ref 32–37)
MCV RBC AUTO: 89.6 FL — SIGNIFICANT CHANGE UP (ref 81–99)
MONOCYTES # BLD AUTO: 0.56 K/UL — SIGNIFICANT CHANGE UP (ref 0.1–0.6)
MONOCYTES NFR BLD AUTO: 6.9 % — SIGNIFICANT CHANGE UP (ref 1.7–9.3)
NEUTROPHILS # BLD AUTO: 5.98 K/UL — SIGNIFICANT CHANGE UP (ref 1.4–6.5)
NEUTROPHILS NFR BLD AUTO: 74.1 % — SIGNIFICANT CHANGE UP (ref 42.2–75.2)
NON HDL CHOLESTEROL: 198 MG/DL — HIGH
NRBC # BLD: 0 /100 WBCS — SIGNIFICANT CHANGE UP (ref 0–0)
PLATELET # BLD AUTO: 214 K/UL — SIGNIFICANT CHANGE UP (ref 130–400)
POTASSIUM SERPL-MCNC: 4.6 MMOL/L — SIGNIFICANT CHANGE UP (ref 3.5–5)
POTASSIUM SERPL-SCNC: 4.6 MMOL/L — SIGNIFICANT CHANGE UP (ref 3.5–5)
PROT SERPL-MCNC: 6.8 G/DL — SIGNIFICANT CHANGE UP (ref 6–8)
RBC # BLD: 4.42 M/UL — SIGNIFICANT CHANGE UP (ref 4.2–5.4)
RBC # FLD: 13.9 % — SIGNIFICANT CHANGE UP (ref 11.5–14.5)
SODIUM SERPL-SCNC: 141 MMOL/L — SIGNIFICANT CHANGE UP (ref 135–146)
TRIGL SERPL-MCNC: 162 MG/DL — HIGH
WBC # BLD: 8.08 K/UL — SIGNIFICANT CHANGE UP (ref 4.8–10.8)
WBC # FLD AUTO: 8.08 K/UL — SIGNIFICANT CHANGE UP (ref 4.8–10.8)

## 2022-04-13 PROCEDURE — 93306 TTE W/DOPPLER COMPLETE: CPT | Mod: 26

## 2022-04-13 PROCEDURE — 93010 ELECTROCARDIOGRAM REPORT: CPT

## 2022-04-13 PROCEDURE — 99222 1ST HOSP IP/OBS MODERATE 55: CPT

## 2022-04-13 PROCEDURE — 99233 SBSQ HOSP IP/OBS HIGH 50: CPT

## 2022-04-13 PROCEDURE — 99221 1ST HOSP IP/OBS SF/LOW 40: CPT

## 2022-04-13 RX ORDER — METOPROLOL TARTRATE 50 MG
25 TABLET ORAL
Refills: 0 | Status: DISCONTINUED | OUTPATIENT
Start: 2022-04-13 | End: 2022-04-19

## 2022-04-13 RX ADMIN — Medication 81 MILLIGRAM(S): at 11:47

## 2022-04-13 RX ADMIN — LISINOPRIL 5 MILLIGRAM(S): 2.5 TABLET ORAL at 06:10

## 2022-04-13 RX ADMIN — SERTRALINE 25 MILLIGRAM(S): 25 TABLET, FILM COATED ORAL at 11:47

## 2022-04-13 RX ADMIN — HEPARIN SODIUM 5000 UNIT(S): 5000 INJECTION INTRAVENOUS; SUBCUTANEOUS at 21:57

## 2022-04-13 RX ADMIN — HEPARIN SODIUM 5000 UNIT(S): 5000 INJECTION INTRAVENOUS; SUBCUTANEOUS at 06:10

## 2022-04-13 RX ADMIN — HEPARIN SODIUM 5000 UNIT(S): 5000 INJECTION INTRAVENOUS; SUBCUTANEOUS at 13:48

## 2022-04-13 RX ADMIN — Medication 30 MILLIGRAM(S): at 06:10

## 2022-04-13 RX ADMIN — Medication 4: at 17:17

## 2022-04-13 RX ADMIN — Medication 25 MILLIGRAM(S): at 17:17

## 2022-04-13 RX ADMIN — Medication 1: at 11:43

## 2022-04-13 NOTE — CONSULT NOTE ADULT - SUBJECTIVE AND OBJECTIVE BOX
Neurology Consult Note    HPI:  This is a 82 yo Egyptian speaking F with PMHx of HTN, HLD, DM, hx of Left MCA aneurysm presented to the ED after fall at home. As per son, he woke up at 3am to his mother calling out for help and stating she couldn't get up. When he had gone into the room, he found her sitting on the floor, awake, and scared. No loss of urine or stools. Patient is unable to state how she fell. Son reports that for the past couple of days she has been feeling weak and fatigue, this morning she had trouble standing on her right leg. No recent fevers, chills, chest pain, SOB, abdominal pain, nausea, vomiting, diarrhea, constipation, dysuria. Currently not complaining of any pain.     In the ED, VS noted for T 97.4, HR 85, /79, SpO2 96% on RA. Labs noted for WBC 8, Cr 1.2, Trop 0.05. CT head shows mild degree of cortical atrophy, mild degree of cerebellar atrophy, ischemic changes noted in periventricular white matter basal ganglia, thalami, and right cerebellar hemisphere and left occipital lobe.   CT c-spine no acute displaced c-spine fracture.      PAST MEDICAL & SURGICAL HISTORY:  Type II diabetes mellitus  Hypertension  Dyslipidemia  Recurrent falls  History of hernia surgery      Medications:  aspirin  chewable 81 milliGRAM(s) Oral daily  dextrose 5%. 1000 milliLiter(s) IV Continuous <Continuous>  dextrose 5%. 1000 milliLiter(s) IV Continuous <Continuous>  dextrose 50% Injectable 25 Gram(s) IV Push once  dextrose 50% Injectable 12.5 Gram(s) IV Push once  dextrose 50% Injectable 25 Gram(s) IV Push once  dextrose Oral Gel 15 Gram(s) Oral once PRN  glucagon  Injectable 1 milliGRAM(s) IntraMuscular once  heparin   Injectable 5000 Unit(s) SubCutaneous every 8 hours  insulin lispro (ADMELOG) corrective regimen sliding scale   SubCutaneous three times a day before meals  lisinopril 5 milliGRAM(s) Oral daily  metoprolol tartrate 25 milliGRAM(s) Oral two times a day  NIFEdipine XL 30 milliGRAM(s) Oral daily  sertraline 25 milliGRAM(s) Oral daily      Vital Signs Last 24 Hrs  T(C): 36.2 (2022 07:54), Max: 36.6 (2022 16:22)  T(F): 97.2 (2022 07:54), Max: 97.8 (2022 16:22)  HR: 77 (2022 07:54) (77 - 92)  BP: 169/92 (2022 07:54) (149/87 - 169/92)  RR: 18 (2022 07:54) (18 - 18)  SpO2: 97% (2022 07:54) (95% - 97%)      Neurological Exam:   Mental status: Awake, alert and oriented x2 to self, location. Egyptian speaking. No dysarthria, no aphasia.   Cranial nerves: Pupils equally round and reactive to light, no nystagmus, extraocular muscles intact, V1 through V3 intact bilaterally and symmetric, face symmetric, tongue was midline.  Motor: MRC grading 5/5 B/L UE/LE.  strength 5/5.  Normal tone and bulk.  No abnormal movements.    Sensation: Intact to light touch, proprioception, and pinprick.   Coordination: No dysmetria on finger-to-nose and heel-to-shin.  Reflexes: 3+ in bilateral UE/LE, downgoing toes bilaterally.  Gait: Deferred      Labs:  CBC Full  -  ( 2022 04:30 )  WBC Count : 8.08 K/uL  RBC Count : 4.42 M/uL  Hemoglobin : 12.9 g/dL  Hematocrit : 39.6 %  Platelet Count - Automated : 214 K/uL  Mean Cell Volume : 89.6 fL  Mean Cell Hemoglobin : 29.2 pg  Mean Cell Hemoglobin Concentration : 32.6 g/dL  Auto Neutrophil # : 5.98 K/uL  Auto Lymphocyte # : 1.22 K/uL  Auto Monocyte # : 0.56 K/uL  Auto Eosinophil # : 0.27 K/uL  Auto Basophil # : 0.04 K/uL  Auto Neutrophil % : 74.1 %  Auto Lymphocyte % : 15.1 %  Auto Monocyte % : 6.9 %  Auto Eosinophil % : 3.3 %  Auto Basophil % : 0.5 %        141  |  103  |  23<H>  ----------------------------<  129<H>  4.6   |  29  |  1.0    Ca    9.8      2022 04:30  Mg     2.0     04-12    TPro  6.8  /  Alb  4.1  /  TBili  0.4  /  DBili  x   /  AST  15  /  ALT  12  /  AlkPhos  72  04-13    LIVER FUNCTIONS - ( 2022 04:30 )  Alb: 4.1 g/dL / Pro: 6.8 g/dL / ALK PHOS: 72 U/L / ALT: 12 U/L / AST: 15 U/L / GGT: x             Urinalysis Basic - ( 2022 19:15 )    Color: Colorless / Appearance: Slightly Turbid / S.015 / pH: x  Gluc: x / Ketone: Negative  / Bili: Negative / Urobili: <2 mg/dL   Blood: x / Protein: Negative / Nitrite: Positive   Leuk Esterase: Small / RBC: x / WBC x   Sq Epi: x / Non Sq Epi: x / Bacteria: x   Neurology Consult Note    HPI:  This is a 82 yo Singaporean speaking F with PMHx of HTN, HLD, DM, hx of Left MCA aneurysm presented to the ED after fall at home. As per son, he woke up at 3am to his mother calling out for help and stating she couldn't get up. When he had gone into the room, he found her sitting on the floor, awake, and scared. No loss of urine or stools. Patient is unable to state how she fell. Son reports that for the past couple of days she has been feeling weak and fatigue, this morning she had trouble standing on her right leg. No recent fevers, chills, chest pain, SOB, abdominal pain, nausea, vomiting, diarrhea, constipation, dysuria. Currently not complaining of any pain.     In the ED, VS noted for T 97.4, HR 85, /79, SpO2 96% on RA. Labs noted for WBC 8, Cr 1.2, Trop 0.05. CT head shows mild degree of cortical atrophy, mild degree of cerebellar atrophy, ischemic changes noted in periventricular white matter basal ganglia, thalami, and right cerebellar hemisphere and left occipital lobe.   CT c-spine no acute displaced c-spine fracture.      PAST MEDICAL & SURGICAL HISTORY:  Type II diabetes mellitus  Hypertension  Dyslipidemia  Recurrent falls  History of hernia surgery      Medications:  aspirin  chewable 81 milliGRAM(s) Oral daily  dextrose 5%. 1000 milliLiter(s) IV Continuous <Continuous>  dextrose 5%. 1000 milliLiter(s) IV Continuous <Continuous>  dextrose 50% Injectable 25 Gram(s) IV Push once  dextrose 50% Injectable 12.5 Gram(s) IV Push once  dextrose 50% Injectable 25 Gram(s) IV Push once  dextrose Oral Gel 15 Gram(s) Oral once PRN  glucagon  Injectable 1 milliGRAM(s) IntraMuscular once  heparin   Injectable 5000 Unit(s) SubCutaneous every 8 hours  insulin lispro (ADMELOG) corrective regimen sliding scale   SubCutaneous three times a day before meals  lisinopril 5 milliGRAM(s) Oral daily  metoprolol tartrate 25 milliGRAM(s) Oral two times a day  NIFEdipine XL 30 milliGRAM(s) Oral daily  sertraline 25 milliGRAM(s) Oral daily      Vital Signs Last 24 Hrs  T(C): 36.2 (2022 07:54), Max: 36.6 (2022 16:22)  T(F): 97.2 (2022 07:54), Max: 97.8 (2022 16:22)  HR: 77 (2022 07:54) (77 - 92)  BP: 169/92 (2022 07:54) (149/87 - 169/92)  RR: 18 (2022 07:54) (18 - 18)  SpO2: 97% (2022 07:54) (95% - 97%)      Neurological Exam:   Mental status: Awake, alert and oriented x2 to self, location. Singaporean speaking. No dysarthria, no aphasia.   Cranial nerves: Pupils equally round and reactive to light, no nystagmus, extraocular muscles intact, V1 through V3 intact bilaterally and symmetric, face symmetric, tongue was midline.  Motor: MRC grading 5/5 B/L UE, 5/5 B/L hip flexion/ext. Unable to assess plantarflexion/dorsiflexion due to limited patient cooperation.  strength 5/5.  Normal tone and bulk.  No abnormal movements.    Sensation: Intact to light touch, proprioception, and pinprick.   Coordination: No dysmetria on finger-to-nose and heel-to-shin.  Reflexes: 3+ in bilateral UE/LE, downgoing toes bilaterally.  Gait: Deferred      Labs:  CBC Full  -  ( 2022 04:30 )  WBC Count : 8.08 K/uL  RBC Count : 4.42 M/uL  Hemoglobin : 12.9 g/dL  Hematocrit : 39.6 %  Platelet Count - Automated : 214 K/uL  Mean Cell Volume : 89.6 fL  Mean Cell Hemoglobin : 29.2 pg  Mean Cell Hemoglobin Concentration : 32.6 g/dL  Auto Neutrophil # : 5.98 K/uL  Auto Lymphocyte # : 1.22 K/uL  Auto Monocyte # : 0.56 K/uL  Auto Eosinophil # : 0.27 K/uL  Auto Basophil # : 0.04 K/uL  Auto Neutrophil % : 74.1 %  Auto Lymphocyte % : 15.1 %  Auto Monocyte % : 6.9 %  Auto Eosinophil % : 3.3 %  Auto Basophil % : 0.5 %    13    141  |  103  |  23<H>  ----------------------------<  129<H>  4.6   |  29  |  1.0    Ca    9.8      2022 04:30  Mg     2.0     04-12    TPro  6.8  /  Alb  4.1  /  TBili  0.4  /  DBili  x   /  AST  15  /  ALT  12  /  AlkPhos  72  04-13    LIVER FUNCTIONS - ( 2022 04:30 )  Alb: 4.1 g/dL / Pro: 6.8 g/dL / ALK PHOS: 72 U/L / ALT: 12 U/L / AST: 15 U/L / GGT: x             Urinalysis Basic - ( 2022 19:15 )    Color: Colorless / Appearance: Slightly Turbid / S.015 / pH: x  Gluc: x / Ketone: Negative  / Bili: Negative / Urobili: <2 mg/dL   Blood: x / Protein: Negative / Nitrite: Positive   Leuk Esterase: Small / RBC: x / WBC x   Sq Epi: x / Non Sq Epi: x / Bacteria: x

## 2022-04-13 NOTE — PROGRESS NOTE ADULT - ATTENDING COMMENTS
80 y/o New Zealander speaking female with PMHx of HTN, HLD, DM, hx of Left MCA aneurysm presented to the ED after fall at home.    #Unwitnessed Fall   #Hx of recurrent falls likely due to deconditioning  pt unable to recall events   son reports generalized fatigue and weakness at home  Troponins stable, elevated likely due to demand ischemia  Echo 04/13: Normal EF, Mild TR, mild pHTN, mild AS, mild MS  CT Head no acute intracranial hemorrhage, stable exam since CT in 11/2021. Stable ischemic changes in right cerebellar hemisphere with mild cortical/cerebellar atrophy  - orthostatic and PT eval   - Anticipate DC in 24h after tele monitoring, might need STR    #Suspected Paroxysmal afib vs Sinus with PACs  EKG reading as afib, but unclear due to artifact  - Start metoprolol 25 BID  - EP f/u, repeat EKG to confirm Afib  - May need to defer AC given hx of recurrent falls    #HTN  - Cont lisinopril 5mg qD  - Cont nifedipine XL 30    #DM  - Monitor FS    #Dementia  - continue with sertraline     #Hx of L MCA aneurysm   s/p cerebral angiogram and clipping with neuro IR on 06/16/2021  - Outpt f/u with [facility]    DVT PPX, Heparin

## 2022-04-13 NOTE — PHYSICAL THERAPY INITIAL EVALUATION ADULT - GENERAL OBSERVATIONS, REHAB EVAL
pt. encountered in bed in NAD with son present at bedside. Pt is Ecuadorean speaking. Son provided translation. pt was unable to maintain sitting for 1 minute without physical assistance to maintain midline. pt slowly falls back or to  the Left. [phone]

## 2022-04-13 NOTE — CONSULT NOTE ADULT - ASSESSMENT
This is a 80 yo Mexican speaking F with PMHx of HTN, HLD, DM, hx of Left MCA aneurysm presented to the ED after fall at home. Patient has had multiple falls and walks with a walker at times as per family. Neurology was consulted for further evaluation. Likely related to age related changes vs peripheral neuropathy vs stroke less likely vs spinal cord disease less likely.    Plan:  - CT Head, C spine reviewed, stable since prior with no acute changes  - Recommend MRI Brain w/o contrast  - Recommend MRI C-Spine w/o contrast  - Recommend rEEG  - Check B12/Folate, f/u  - Fall precautions

## 2022-04-13 NOTE — CONSULT NOTE ADULT - SUBJECTIVE AND OBJECTIVE BOX
HPI:  80 y/o Urdu speaking female with PMHx of HTN, HLD, DM, hx of Left MCA aneurysm presented to the ED after fall at home. As per son, he woke up at 3am to his mother calling out for help and stating she couldn't get up. When he had gone into the room, he found her sitting on the floor, awake, and scared. No loss of urine or stools. Patient is unable to state how she fell. Son reports that for the past couple of days she has been feeling weak and fatigue, this morning she had trouble standing on her right leg. No recent fevers, chills, chest pain, SOB, abdominal pain, nausea, vomiting, diarrhea, constipation, dysuria. Currently not complaining of any pain.     In the ED, VS noted for T 97.4, HR 85, /79, SpO2 96% on RA. Labs noted for WBC 8, Cr 1.2, Trop 0.05. CT head shows mild degree of cortical atrophy, mild degree of cerebellar atrophy, ischemic changes noted in periventricular white matter basal ganglia, thalami, and right cerebellar hemisphere and left occipital lobe.   CT c-spine no acute displaced c-spine fracture.      PAST MEDICAL & SURGICAL HISTORY  Type II diabetes mellitus    Hypertension    Dyslipidemia    Recurrent falls    History of hernia surgery      FAMILY HISTORY:  FHx: diabetes mellitus    SOCIAL HISTORY:  []smoker  []Alcohol  []Drug    ALLERGIES:  No Known Allergies      MEDICATIONS:  MEDICATIONS  (STANDING):  aspirin  chewable 81 milliGRAM(s) Oral daily  dextrose 5%. 1000 milliLiter(s) (50 mL/Hr) IV Continuous <Continuous>  dextrose 5%. 1000 milliLiter(s) (100 mL/Hr) IV Continuous <Continuous>  dextrose 50% Injectable 25 Gram(s) IV Push once  dextrose 50% Injectable 12.5 Gram(s) IV Push once  dextrose 50% Injectable 25 Gram(s) IV Push once  glucagon  Injectable 1 milliGRAM(s) IntraMuscular once  heparin   Injectable 5000 Unit(s) SubCutaneous every 8 hours  insulin lispro (ADMELOG) corrective regimen sliding scale   SubCutaneous three times a day before meals  lisinopril 5 milliGRAM(s) Oral daily  metoprolol tartrate 25 milliGRAM(s) Oral two times a day  NIFEdipine XL 30 milliGRAM(s) Oral daily  sertraline 25 milliGRAM(s) Oral daily    MEDICATIONS  (PRN):  dextrose Oral Gel 15 Gram(s) Oral once PRN Blood Glucose LESS THAN 70 milliGRAM(s)/deciliter      HOME MEDICATIONS:  aspirin 81 mg oral tablet, chewable: 1 tab(s) orally once a day (12 Apr 2022 15:55)  metformin 500 mg tablet: 1 each orally once a day  sertraline 25 mg oral tablet: 1 tab(s) orally once a day (12 Apr 2022 15:54)  lisinopril 5  nifedipine 30    VITALS:   T(F): 97.2 (04-13 @ 07:54), Max: 97.8 (04-12 @ 16:22)  HR: 77 (04-13 @ 07:54) (77 - 92)  BP: 169/92 (04-13 @ 07:54) (149/87 - 169/92)  BP(mean): --  RR: 18 (04-13 @ 07:54) (18 - 18)  SpO2: 97% (04-13 @ 07:54) (95% - 97%)    I&O's Summary      REVIEW OF SYSTEMS:  CONSTITUTIONAL: No weakness, fevers or chills  EYES: No visual changes  ENT: No vertigo or throat pain   NECK: No pain or stiffness  RESPIRATORY: No cough, wheezing, hemoptysis; No shortness of breath  CARDIOVASCULAR: No chest pain or palpitations  GASTROINTESTINAL: No abdominal or epigastric pain. No nausea, vomiting, or hematemesis  GENITOURINARY: No dysuria, frequency or hematuria  NEUROLOGICAL: No numbness or weakness  SKIN: No itching, no rashes  MSK: No pain, recurrent falls    PHYSICAL EXAM:  NEURO: patient is awake , alert and oriented  GEN: Not in acute distress  NECK: no thyroid enlargement, no JVD  LUNGS: minimal crackles   CARDIOVASCULAR: S1/S2 present, irregular, soft systolic murmur  ABD: Soft, non-tender, non-distended, +BS  EXT: trace RENEE    LABS:                        12.9   8.08  )-----------( 214      ( 13 Apr 2022 04:30 )             39.6     04-13    141  |  103  |  23<H>  ----------------------------<  129<H>  4.6   |  29  |  1.0    Ca    9.8      13 Apr 2022 04:30  Mg     2.0     04-12    TPro  6.8  /  Alb  4.1  /  TBili  0.4  /  DBili  x   /  AST  15  /  ALT  12  /  AlkPhos  72  04-13      Troponin T, Serum: 0.04 ng/mL *HH* (04-12-22 @ 16:00)  Troponin T, Serum: 0.05 ng/mL *HH* (04-12-22 @ 11:32)  Creatine Kinase, Serum: 115 U/L (04-12-22 @ 11:32)    CARDIAC MARKERS ( 12 Apr 2022 20:00 )  x     / x     / x     / x     / 3.6 ng/mL  CARDIAC MARKERS ( 12 Apr 2022 16:00 )  x     / 0.04 ng/mL / x     / x     / x      CARDIAC MARKERS ( 12 Apr 2022 11:32 )  x     / 0.05 ng/mL / 115 U/L / x     / x        04-13 Chol 257<H> LDL -- HDL 59 Trig 162<H>      RADIOLOGY:  -CXR: bilateral opacities    -TTE: < from: TTE Echo Complete w/o Contrast w/ Doppler (04.13.22 @ 08:31) >  Summary:   1. Normal global left ventricular systolic function.   2. LV Ejection Fraction by Harrington's Method with a biplane EF of 68 %.   3. Normal left ventricular internal cavity size.   4. The left ventricular diastolic function could not be assessed in this   study.   5. Normal left atrial size.   6. Normal right atrial size.   7. Mild thickening and calcification of the anterior and posterior   mitral valve leaflets.   8. Moderate mitral annular calcification.   9. No evidence of mitral valve regurgitation.  10. Mild tricuspid regurgitation.  11. Estimated pulmonary artery systolic pressure is 42.3 mmHg assuming a   right atrial pressure of 8 mmHg, which is consistent with mild pulmonary   hypertension.  12. Pulmonary hypertension is present.  13. LA volume Index is 23.8 ml/m² ml/m2.  14. Mitral valve mean gradient is 3.7 mmHg consistent with mild mitral   stenosis.  15. Peak transaortic gradient equals 9.5 mmHg, mean transaortic gradient   equals 4.3 mmHg, the calculated aortic valve area equals 2.00 cm² by the   continuity equation consistent with mild aortic stenosis.    < end of copied text >    ECG:  probably sinus rhythm with PACs (artifact)    TELEMETRY EVENTS:  sinus rhythm with frequent PACs

## 2022-04-13 NOTE — PHYSICAL THERAPY INITIAL EVALUATION ADULT - PLANNED THERAPY INTERVENTIONS, PT EVAL
STAIR ASSESSMENT AND TRAINING AS APPROPRIATE. Goal: TBD when stair status is assessed./balance training/bed mobility training/gait training/strengthening/transfer training

## 2022-04-13 NOTE — CONSULT NOTE ADULT - ASSESSMENT
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

## 2022-04-13 NOTE — PROGRESS NOTE ADULT - ASSESSMENT
82 y/o Macanese speaking female with PMHx of HTN, HLD, DM, hx of Left MCA aneurysm presented to the ED after fall at home.    #Unwitnessed Fall   #Hx of recurrent falls   - pt unable to recall events   - son reports generalized fatigue and weakness  - no loss of urine/bowel, no jerking of limbs   - EKG shows Afib rate controlled   - Trop 0.05, 0.04  - TTE EF 68%, no significant valvular abnormalities   - CT Head no acute intracranial hemorrhage, stable exam since CT in 11/2021. Stable ischemic changes in right cerebellar hemisphere with mild cortical/cerebellar atrophy  -   - check  orthostatic vitals  - PT eval     #New-onset Afib  - EKG shows Afib, none noted on previous EKG  - CHADVASC 5, but given recurrent falls, may need to defer A/C  - EP consult  - Start Lopressor 25 BID    #HTN  - continue with Nifedipine and Lisinopril  - continue with ASA 81mg   - check lipid panel    #DM  - hold oral medications  - monitor FS, if FS>180 start basal/bolus   - check A1c    #Dementia  - continue with sertraline     #Hx of L MCA aneurysm   - never followed up as outpatient    #DVT PPx: Lovenox  #GI PPx: not indicated  #DASH

## 2022-04-13 NOTE — PHYSICAL THERAPY INITIAL EVALUATION ADULT - LEVEL OF INDEPENDENCE: BED TO CHAIR, REHAB EVAL
NA, not safe to assess standing due to ED stretcher too high.  pt has poor sitting tolerance./unable to perform

## 2022-04-13 NOTE — PHYSICAL THERAPY INITIAL EVALUATION ADULT - PERTINENT HX OF CURRENT PROBLEM, REHAB EVAL
80 y/o Maced[place] speaking female with PMHx of HTN, HLD, DM, hx of Left MCA aneurysm presented to the ED after fall at homePatient is unable to state how she fell. Son reports that for the past couple of days she has been feeling weak and fatigue, on the morning of presentation,  she had trouble standing on her right leg..

## 2022-04-13 NOTE — PATIENT PROFILE ADULT - FALL HARM RISK - HARM RISK INTERVENTIONS

## 2022-04-14 LAB
ANION GAP SERPL CALC-SCNC: 9 MMOL/L — SIGNIFICANT CHANGE UP (ref 7–14)
BASOPHILS # BLD AUTO: 0.04 K/UL — SIGNIFICANT CHANGE UP (ref 0–0.2)
BASOPHILS NFR BLD AUTO: 0.5 % — SIGNIFICANT CHANGE UP (ref 0–1)
BUN SERPL-MCNC: 19 MG/DL — SIGNIFICANT CHANGE UP (ref 10–20)
CALCIUM SERPL-MCNC: 9.3 MG/DL — SIGNIFICANT CHANGE UP (ref 8.5–10.1)
CHLORIDE SERPL-SCNC: 101 MMOL/L — SIGNIFICANT CHANGE UP (ref 98–110)
CO2 SERPL-SCNC: 29 MMOL/L — SIGNIFICANT CHANGE UP (ref 17–32)
CREAT SERPL-MCNC: 1 MG/DL — SIGNIFICANT CHANGE UP (ref 0.7–1.5)
EGFR: 56 ML/MIN/1.73M2 — LOW
EOSINOPHIL # BLD AUTO: 0.43 K/UL — SIGNIFICANT CHANGE UP (ref 0–0.7)
EOSINOPHIL NFR BLD AUTO: 5.8 % — SIGNIFICANT CHANGE UP (ref 0–8)
FOLATE SERPL-MCNC: 19.5 NG/ML — SIGNIFICANT CHANGE UP
GLUCOSE BLDC GLUCOMTR-MCNC: 119 MG/DL — HIGH (ref 70–99)
GLUCOSE BLDC GLUCOMTR-MCNC: 119 MG/DL — HIGH (ref 70–99)
GLUCOSE BLDC GLUCOMTR-MCNC: 217 MG/DL — HIGH (ref 70–99)
GLUCOSE SERPL-MCNC: 125 MG/DL — HIGH (ref 70–99)
HCT VFR BLD CALC: 38.8 % — SIGNIFICANT CHANGE UP (ref 37–47)
HGB BLD-MCNC: 12.6 G/DL — SIGNIFICANT CHANGE UP (ref 12–16)
IMM GRANULOCYTES NFR BLD AUTO: 0.4 % — HIGH (ref 0.1–0.3)
LYMPHOCYTES # BLD AUTO: 0.95 K/UL — LOW (ref 1.2–3.4)
LYMPHOCYTES # BLD AUTO: 12.7 % — LOW (ref 20.5–51.1)
MAGNESIUM SERPL-MCNC: 1.7 MG/DL — LOW (ref 1.8–2.4)
MCHC RBC-ENTMCNC: 28.9 PG — SIGNIFICANT CHANGE UP (ref 27–31)
MCHC RBC-ENTMCNC: 32.5 G/DL — SIGNIFICANT CHANGE UP (ref 32–37)
MCV RBC AUTO: 89 FL — SIGNIFICANT CHANGE UP (ref 81–99)
MONOCYTES # BLD AUTO: 0.48 K/UL — SIGNIFICANT CHANGE UP (ref 0.1–0.6)
MONOCYTES NFR BLD AUTO: 6.4 % — SIGNIFICANT CHANGE UP (ref 1.7–9.3)
NEUTROPHILS # BLD AUTO: 5.54 K/UL — SIGNIFICANT CHANGE UP (ref 1.4–6.5)
NEUTROPHILS NFR BLD AUTO: 74.2 % — SIGNIFICANT CHANGE UP (ref 42.2–75.2)
NRBC # BLD: 0 /100 WBCS — SIGNIFICANT CHANGE UP (ref 0–0)
NT-PROBNP SERPL-SCNC: 763 PG/ML — HIGH (ref 0–300)
PLATELET # BLD AUTO: 202 K/UL — SIGNIFICANT CHANGE UP (ref 130–400)
POTASSIUM SERPL-MCNC: 4.3 MMOL/L — SIGNIFICANT CHANGE UP (ref 3.5–5)
POTASSIUM SERPL-SCNC: 4.3 MMOL/L — SIGNIFICANT CHANGE UP (ref 3.5–5)
RBC # BLD: 4.36 M/UL — SIGNIFICANT CHANGE UP (ref 4.2–5.4)
RBC # FLD: 13.6 % — SIGNIFICANT CHANGE UP (ref 11.5–14.5)
SODIUM SERPL-SCNC: 139 MMOL/L — SIGNIFICANT CHANGE UP (ref 135–146)
TROPONIN T SERPL-MCNC: 0.06 NG/ML — CRITICAL HIGH
VIT B12 SERPL-MCNC: 700 PG/ML — SIGNIFICANT CHANGE UP (ref 232–1245)
WBC # BLD: 7.47 K/UL — SIGNIFICANT CHANGE UP (ref 4.8–10.8)
WBC # FLD AUTO: 7.47 K/UL — SIGNIFICANT CHANGE UP (ref 4.8–10.8)

## 2022-04-14 PROCEDURE — 70551 MRI BRAIN STEM W/O DYE: CPT | Mod: 26

## 2022-04-14 PROCEDURE — 99233 SBSQ HOSP IP/OBS HIGH 50: CPT

## 2022-04-14 RX ORDER — CEFTRIAXONE 500 MG/1
1000 INJECTION, POWDER, FOR SOLUTION INTRAMUSCULAR; INTRAVENOUS EVERY 24 HOURS
Refills: 0 | Status: COMPLETED | OUTPATIENT
Start: 2022-04-14 | End: 2022-04-16

## 2022-04-14 RX ORDER — MAGNESIUM SULFATE 500 MG/ML
2 VIAL (ML) INJECTION ONCE
Refills: 0 | Status: COMPLETED | OUTPATIENT
Start: 2022-04-14 | End: 2022-04-14

## 2022-04-14 RX ORDER — ENOXAPARIN SODIUM 100 MG/ML
40 INJECTION SUBCUTANEOUS EVERY 24 HOURS
Refills: 0 | Status: DISCONTINUED | OUTPATIENT
Start: 2022-04-14 | End: 2022-04-17

## 2022-04-14 RX ADMIN — Medication 25 MILLIGRAM(S): at 05:15

## 2022-04-14 RX ADMIN — ENOXAPARIN SODIUM 40 MILLIGRAM(S): 100 INJECTION SUBCUTANEOUS at 14:55

## 2022-04-14 RX ADMIN — Medication 25 GRAM(S): at 09:06

## 2022-04-14 RX ADMIN — LISINOPRIL 5 MILLIGRAM(S): 2.5 TABLET ORAL at 05:15

## 2022-04-14 RX ADMIN — HEPARIN SODIUM 5000 UNIT(S): 5000 INJECTION INTRAVENOUS; SUBCUTANEOUS at 05:16

## 2022-04-14 RX ADMIN — Medication 2: at 12:15

## 2022-04-14 RX ADMIN — Medication 25 MILLIGRAM(S): at 17:09

## 2022-04-14 RX ADMIN — CEFTRIAXONE 100 MILLIGRAM(S): 500 INJECTION, POWDER, FOR SOLUTION INTRAMUSCULAR; INTRAVENOUS at 12:07

## 2022-04-14 RX ADMIN — SERTRALINE 25 MILLIGRAM(S): 25 TABLET, FILM COATED ORAL at 12:07

## 2022-04-14 RX ADMIN — Medication 81 MILLIGRAM(S): at 12:07

## 2022-04-14 RX ADMIN — Medication 30 MILLIGRAM(S): at 05:15

## 2022-04-14 NOTE — OCCUPATIONAL THERAPY INITIAL EVALUATION ADULT - GENERAL OBSERVATIONS, REHAB EVAL
Pt received semi fung in bed in NAD, utilized certified [employer] Interpretor # [id number] throughout evaluation, however  states pt was difficult to understand and at times was not making sense.  pt with +IV lock, left semi fung in bed in NAD

## 2022-04-14 NOTE — OCCUPATIONAL THERAPY INITIAL EVALUATION ADULT - ADDITIONAL COMMENTS
Per care coordination assessment, pt required assistance with ADLs, utilized assistive devices at baseline, grandjerri is pt's CDPAP 7 days 6.5 hrs

## 2022-04-14 NOTE — PROGRESS NOTE ADULT - ASSESSMENT
Assessment and Plan  Case of an 81 year old female patient with Depression, HTN, DL, DM, and history of L MCA Aneurysm who was brought to the ED on 04/12 following an unwitnessed fall, found to have cerebral and cerebellar atrophy with ventricular dilation and UTI, admitted for further investigations and monitoring. Currently hemodynamically stable.      Fall Likely Due to Age-Related Changes  Rule Out Peripheral Neuropathy VS NPH VS Stroke   Unlikely Spinal Cord Related Etiology  History of 1.1 L MCA Aneurysm  * CT Head No Cont (04.12.22 @ 13:23) The third and lateral ventricles are mildly enlarged as are the cortical sulci consistent with a mild degree of cortical atrophy, mild degree of cerebellar atrophy. Patchy foci of diminished attenuation in the periventricular white matter  basal ganglia and thalami likely representing ischemic change. Patchy foci of diminished attenuation in the right cerebellar hemisphere  and left occipital lobe consistent with ischemic change.  * CT C spine 04/12 no dislocation or fracture  * rEEG 04/13 normal  * XR L Knee osteopenia  * XR pelvis no fracture  * TTE 04/13 EF 68%, mild TR, mild AS, mild PHTN    - Neurology Dr Tejeda on board  - Will perform MR head and MR C-spine without contrast tonight: MR team contacted and provided with Son (Mr Lehman's) number since he would like to be present for translation during MR  - Check folate and B12 (both received on 04/13)  - Continue Aspirin 81mg QD  - PT/OT evaluation  - Check orthostatics  - Fall precautions  - TTE noted above      Urinary Tract Infection  * Symptoms mainly frequency but patient voids via diapers (incontinent)  Culture - Urine (04.12.22 @ 19:15)    Specimen Source: Clean Catch Clean Catch (Midstream)    Culture Results:   >100,000 CFU/ml Gram Negative Rods  - Monitor T  - Trend WBC  - Start IV Rocephin 1g QD 04/14  - Follow up final urine culture results and S  - Will send blood cultures      Premature Atrial Contractions  Atrial Fibrillation Ruled Out  * ED HR 85bpm  * ECG revealed afib per report -> reviewed and revealed NSR with PACs and artifacts  * TTE 04/13 EF 68%, mild TR, mild AS, mild PHTN    - EP Dr Cloud on board  - Monitor Tele: NSR  - Monitor HR  - No indication for therapeutic anticoagulation  - Continue PO Lopressor 25mg BID (ordered for HTN)  - TTE noted above      Hypertension  * ED /79 mmHg  - Monitor BP   - Continue Lopressor 25mg BID  - Continue Lisinopril 5mg QD but trend BUN and Cr daily  - Continue Nifedipine 30mg QD      Diabetes Mellitus Type II  * A1C with Estimated Average Glucose (04.13.22 @ 04:30)    A1C with Estimated Average Glucose Result: 6.4  - Monitor POCT premeals and at bedtime  - Continue Sliding Scale A for now      Others  - DVT Prophylaxis: Lovenox 40mg Subcutaneously daily  - GI Prophylaxis: not on Pantoprazole 40mg PO QD  - Diet: DASH/ TLC  - Code Status: Full      Barriers to learning: NO  Discharge Planning: Patient will be discharged once stable   Plan was communicated with patient and medical team       Aurelio Prather MD  PGY - 2 Internal Medicine   [facility]

## 2022-04-14 NOTE — PROGRESS NOTE ADULT - TIME BILLING
#Fall, unwitnessed  pt denies syncope; found by son immediately after incident  tte unrevealing, mild as  monitor on tele  check orthostatics  ucx with kleb, f/u sensitivities  cont ceftriaxone  cth unchanged; trauma w/u otherwise neg  check mri head, c-spine  reeg  appreciate neuro, ep  #Elevated cardiac enzymes  ekg noted  no cp  tte no wall motion abnl  trend    #Progress Note Handoff:  Pending (specify):  Consults_________, Tests________, Test Results_______, Other_____mri____  Family discussion: d/w son at bedside re: treatment plan, primary dx  Disposition: Home___/SNF___/Other________/Unknown at this time____x____ #Fall, unwitnessed  pt denies syncope; found by son immediately after incident  tte unrevealing, mild as  monitor on tele  check orthostatics  ucx with kleb, f/u sensitivities  cont ceftriaxone  cth unchanged; trauma w/u otherwise neg  check mri head, c-spine  reeg  appreciate neuro, ep  #Elevated cardiac enzymes  ekg q waves inf  no cp  tte no wall motion abnl  trend    #Progress Note Handoff:  Pending (specify):  Consults_________, Tests________, Test Results_______, Other_____mri____  Family discussion: d/w son at bedside re: treatment plan, primary dx  Disposition: Home___/SNF___/Other________/Unknown at this time____x____

## 2022-04-14 NOTE — OCCUPATIONAL THERAPY INITIAL EVALUATION ADULT - PERTINENT HX OF CURRENT PROBLEM, REHAB EVAL
Offered and patient declined 81 year old female patient with Depression, HTN, DL, DM, and history of L MCA Aneurysm who was brought to the ED on 04/12 following an unwitnessed fall, found to have cerebral and cerebellar atrophy with ventricular dilation and UTI, admitted for further investigations and monitoring.

## 2022-04-15 LAB
-  AMIKACIN: SIGNIFICANT CHANGE UP
-  AMOXICILLIN/CLAVULANIC ACID: SIGNIFICANT CHANGE UP
-  AMPICILLIN/SULBACTAM: SIGNIFICANT CHANGE UP
-  AMPICILLIN: SIGNIFICANT CHANGE UP
-  AZTREONAM: SIGNIFICANT CHANGE UP
-  CEFAZOLIN: SIGNIFICANT CHANGE UP
-  CEFEPIME: SIGNIFICANT CHANGE UP
-  CEFOXITIN: SIGNIFICANT CHANGE UP
-  CEFTRIAXONE: SIGNIFICANT CHANGE UP
-  CIPROFLOXACIN: SIGNIFICANT CHANGE UP
-  ERTAPENEM: SIGNIFICANT CHANGE UP
-  GENTAMICIN: SIGNIFICANT CHANGE UP
-  IMIPENEM: SIGNIFICANT CHANGE UP
-  LEVOFLOXACIN: SIGNIFICANT CHANGE UP
-  MEROPENEM: SIGNIFICANT CHANGE UP
-  NITROFURANTOIN: SIGNIFICANT CHANGE UP
-  PIPERACILLIN/TAZOBACTAM: SIGNIFICANT CHANGE UP
-  TIGECYCLINE: SIGNIFICANT CHANGE UP
-  TOBRAMYCIN: SIGNIFICANT CHANGE UP
-  TRIMETHOPRIM/SULFAMETHOXAZOLE: SIGNIFICANT CHANGE UP
ALBUMIN SERPL ELPH-MCNC: 3.9 G/DL — SIGNIFICANT CHANGE UP (ref 3.5–5.2)
ALP SERPL-CCNC: 70 U/L — SIGNIFICANT CHANGE UP (ref 30–115)
ALT FLD-CCNC: 14 U/L — SIGNIFICANT CHANGE UP (ref 0–41)
ANION GAP SERPL CALC-SCNC: 11 MMOL/L — SIGNIFICANT CHANGE UP (ref 7–14)
AST SERPL-CCNC: 15 U/L — SIGNIFICANT CHANGE UP (ref 0–41)
BASOPHILS # BLD AUTO: 0.04 K/UL — SIGNIFICANT CHANGE UP (ref 0–0.2)
BASOPHILS NFR BLD AUTO: 0.5 % — SIGNIFICANT CHANGE UP (ref 0–1)
BILIRUB SERPL-MCNC: 0.3 MG/DL — SIGNIFICANT CHANGE UP (ref 0.2–1.2)
BUN SERPL-MCNC: 24 MG/DL — HIGH (ref 10–20)
CALCIUM SERPL-MCNC: 9.7 MG/DL — SIGNIFICANT CHANGE UP (ref 8.5–10.1)
CHLORIDE SERPL-SCNC: 100 MMOL/L — SIGNIFICANT CHANGE UP (ref 98–110)
CO2 SERPL-SCNC: 26 MMOL/L — SIGNIFICANT CHANGE UP (ref 17–32)
CREAT SERPL-MCNC: 1.1 MG/DL — SIGNIFICANT CHANGE UP (ref 0.7–1.5)
CULTURE RESULTS: SIGNIFICANT CHANGE UP
EGFR: 50 ML/MIN/1.73M2 — LOW
EOSINOPHIL # BLD AUTO: 0.34 K/UL — SIGNIFICANT CHANGE UP (ref 0–0.7)
EOSINOPHIL NFR BLD AUTO: 4.4 % — SIGNIFICANT CHANGE UP (ref 0–8)
GLUCOSE BLDC GLUCOMTR-MCNC: 119 MG/DL — HIGH (ref 70–99)
GLUCOSE BLDC GLUCOMTR-MCNC: 129 MG/DL — HIGH (ref 70–99)
GLUCOSE BLDC GLUCOMTR-MCNC: 136 MG/DL — HIGH (ref 70–99)
GLUCOSE BLDC GLUCOMTR-MCNC: 191 MG/DL — HIGH (ref 70–99)
GLUCOSE SERPL-MCNC: 140 MG/DL — HIGH (ref 70–99)
HCT VFR BLD CALC: 40.4 % — SIGNIFICANT CHANGE UP (ref 37–47)
HGB BLD-MCNC: 13.2 G/DL — SIGNIFICANT CHANGE UP (ref 12–16)
IMM GRANULOCYTES NFR BLD AUTO: 0.3 % — SIGNIFICANT CHANGE UP (ref 0.1–0.3)
LYMPHOCYTES # BLD AUTO: 0.98 K/UL — LOW (ref 1.2–3.4)
LYMPHOCYTES # BLD AUTO: 12.7 % — LOW (ref 20.5–51.1)
MAGNESIUM SERPL-MCNC: 2.1 MG/DL — SIGNIFICANT CHANGE UP (ref 1.8–2.4)
MCHC RBC-ENTMCNC: 28.9 PG — SIGNIFICANT CHANGE UP (ref 27–31)
MCHC RBC-ENTMCNC: 32.7 G/DL — SIGNIFICANT CHANGE UP (ref 32–37)
MCV RBC AUTO: 88.6 FL — SIGNIFICANT CHANGE UP (ref 81–99)
METHOD TYPE: SIGNIFICANT CHANGE UP
MONOCYTES # BLD AUTO: 0.56 K/UL — SIGNIFICANT CHANGE UP (ref 0.1–0.6)
MONOCYTES NFR BLD AUTO: 7.3 % — SIGNIFICANT CHANGE UP (ref 1.7–9.3)
NEUTROPHILS # BLD AUTO: 5.75 K/UL — SIGNIFICANT CHANGE UP (ref 1.4–6.5)
NEUTROPHILS NFR BLD AUTO: 74.8 % — SIGNIFICANT CHANGE UP (ref 42.2–75.2)
NRBC # BLD: 0 /100 WBCS — SIGNIFICANT CHANGE UP (ref 0–0)
ORGANISM # SPEC MICROSCOPIC CNT: SIGNIFICANT CHANGE UP
ORGANISM # SPEC MICROSCOPIC CNT: SIGNIFICANT CHANGE UP
PLATELET # BLD AUTO: 219 K/UL — SIGNIFICANT CHANGE UP (ref 130–400)
POTASSIUM SERPL-MCNC: 4.4 MMOL/L — SIGNIFICANT CHANGE UP (ref 3.5–5)
POTASSIUM SERPL-SCNC: 4.4 MMOL/L — SIGNIFICANT CHANGE UP (ref 3.5–5)
PROT SERPL-MCNC: 6.4 G/DL — SIGNIFICANT CHANGE UP (ref 6–8)
RBC # BLD: 4.56 M/UL — SIGNIFICANT CHANGE UP (ref 4.2–5.4)
RBC # FLD: 13.6 % — SIGNIFICANT CHANGE UP (ref 11.5–14.5)
SODIUM SERPL-SCNC: 137 MMOL/L — SIGNIFICANT CHANGE UP (ref 135–146)
SPECIMEN SOURCE: SIGNIFICANT CHANGE UP
TROPONIN T SERPL-MCNC: 0.05 NG/ML — CRITICAL HIGH
TROPONIN T SERPL-MCNC: 0.05 NG/ML — CRITICAL HIGH
TSH SERPL-MCNC: 0.96 UIU/ML — SIGNIFICANT CHANGE UP (ref 0.27–4.2)
WBC # BLD: 7.69 K/UL — SIGNIFICANT CHANGE UP (ref 4.8–10.8)
WBC # FLD AUTO: 7.69 K/UL — SIGNIFICANT CHANGE UP (ref 4.8–10.8)

## 2022-04-15 PROCEDURE — 99233 SBSQ HOSP IP/OBS HIGH 50: CPT

## 2022-04-15 PROCEDURE — 70496 CT ANGIOGRAPHY HEAD: CPT | Mod: 26

## 2022-04-15 PROCEDURE — 70498 CT ANGIOGRAPHY NECK: CPT | Mod: 26

## 2022-04-15 RX ORDER — ATORVASTATIN CALCIUM 80 MG/1
80 TABLET, FILM COATED ORAL AT BEDTIME
Refills: 0 | Status: DISCONTINUED | OUTPATIENT
Start: 2022-04-15 | End: 2022-04-19

## 2022-04-15 RX ORDER — CLOPIDOGREL BISULFATE 75 MG/1
75 TABLET, FILM COATED ORAL DAILY
Refills: 0 | Status: DISCONTINUED | OUTPATIENT
Start: 2022-04-15 | End: 2022-04-17

## 2022-04-15 RX ORDER — ATORVASTATIN CALCIUM 80 MG/1
40 TABLET, FILM COATED ORAL AT BEDTIME
Refills: 0 | Status: DISCONTINUED | OUTPATIENT
Start: 2022-04-15 | End: 2022-04-15

## 2022-04-15 RX ADMIN — ENOXAPARIN SODIUM 40 MILLIGRAM(S): 100 INJECTION SUBCUTANEOUS at 15:29

## 2022-04-15 RX ADMIN — LISINOPRIL 5 MILLIGRAM(S): 2.5 TABLET ORAL at 05:15

## 2022-04-15 RX ADMIN — ATORVASTATIN CALCIUM 80 MILLIGRAM(S): 80 TABLET, FILM COATED ORAL at 21:29

## 2022-04-15 RX ADMIN — Medication 30 MILLIGRAM(S): at 05:15

## 2022-04-15 RX ADMIN — Medication 81 MILLIGRAM(S): at 11:44

## 2022-04-15 RX ADMIN — Medication 25 MILLIGRAM(S): at 18:23

## 2022-04-15 RX ADMIN — Medication 1: at 11:44

## 2022-04-15 RX ADMIN — SERTRALINE 25 MILLIGRAM(S): 25 TABLET, FILM COATED ORAL at 11:44

## 2022-04-15 RX ADMIN — CLOPIDOGREL BISULFATE 75 MILLIGRAM(S): 75 TABLET, FILM COATED ORAL at 15:29

## 2022-04-15 RX ADMIN — Medication 25 MILLIGRAM(S): at 05:15

## 2022-04-15 RX ADMIN — CEFTRIAXONE 100 MILLIGRAM(S): 500 INJECTION, POWDER, FOR SOLUTION INTRAMUSCULAR; INTRAVENOUS at 11:43

## 2022-04-15 NOTE — PROGRESS NOTE ADULT - TIME BILLING
81F PMHx depression, HTN, DM2, CVA here with unwitnessed fall.    #Fall, unwitnessed  pt denies syncope; found by son immediately after incident  tte unrevealing, mild as  orthostatics neg  ucx with kleb  cont ceftriaxone plan for 5 days total  cth unchanged; trauma w/u otherwise neg  mri head with concern for acute cva R kiara, cerebellum  d/w neuro to transfer to stroke unit  asa, add plavix  lipitor 40  s+s  npo for now  cta head, neck  #Elevated cardiac enzymes  ekg q waves inf  no cp  tte no wall motion abnl  trend  #HTN  lopressor 25 bid  lisinopril 5  nifedipine 30  #DM2  ssi  #Depression  zoloft 25  #DVT ppx  lovenox    #Progress Note Handoff:  Pending (specify):  Consults_________, Tests________, Test Results_______, Other_____cta head neck____  Family discussion: d/w son at bedside re: treatment plan, primary dx  Disposition: Home___/SNF___/Other________/Unknown at this time____x____.

## 2022-04-15 NOTE — CHART NOTE - NSCHARTNOTEFT_GEN_A_CORE
Location: [facility] F9 (3COVF) 010 A ([facility] F9 (3COVF))  Patient Name: REYNA FOWLER  Age: 81y  Gender: Female      Transfer Note      Mr Fowler is an 81 year old female patient:  - Depression  - HTN  - DL  - DM  - History of L MCA Aneurysm     She was brought to the ED on 04/12 following an unwitnessed fall.    In the ED, patient was hypertensive but otherwise stable.  No Stroke Code was called.    ED Labs were unremarkable  Urinalysis was positive and cultures grew Klebsella oxytoca currently on IV Rocephin 1g QD  CT Head No Cont (04.12.22 @ 13:23) The third and lateral ventricles are mildly enlarged as are the cortical sulci consistent with a mild degree of cortical atrophy, mild degree of cerebellar atrophy. Patchy foci of diminished attenuation in the periventricular white matter  basal ganglia and thalami likely representing ischemic change. Patchy foci of diminished attenuation in the right cerebellar hemisphere  and left occipital lobe consistent with ischemic change.  CT C spine 04/12 no dislocation or fracture    Neurology team was on board and recommended MR head without contrast  MR Head No Cont (04.14.22 @ 21:51) Limited and incomplete examination reveals increased DWI signal within the central aspect of the kiara as well as the right cerebellar hemisphere  likely representing acute ischemic change.    After MR report was shared with Neurology team, decision was made to transfer patient to Stroke Unit for neurochecks.  CT angio head and neck will be performed  Atorvastatin was added  Will monitor BP closely  Please refer to below A/P section for more details of hospital course      Vital Signs in the last 24 hours   Vitals Summary T(C): 36.2 (04-15-22 @ 14:46), Max: 36.2 (04-14-22 @ 20:45)  HR: 84 (04-15-22 @ 14:46) (73 - 84)  BP: 154/79 (04-15-22 @ 14:46) (153/72 - 154/82)  RR: 18 (04-15-22 @ 14:46) (18 - 18)  SpO2: 95% (04-15-22 @ 05:10) (95% - 95%)  Vent Data   Intake/ Output   04-14-22 @ 07:01  -  04-15-22 @ 07:00  --------------------------------------------------------  IN: 728 mL / OUT: 900 mL / NET: -172 mL    04-15-22 @ 07:01  -  04-15-22 @ 15:22  --------------------------------------------------------  IN: 230 mL / OUT: 200 mL / NET: 30 mL        Physical Exam  * General Appearance: Alert, cooperative, interactive, well-groomed  * Head: Normocephalic, without obvious abnormality, atraumatic  * Eyes: PERRL, conjunctiva/corneas clear, EOM's intact, fundi benign, both eyes  * Lungs: Respirations unlabored, reduced bilateral air entry due to reduced effort, no audible wheezes, crackles, or rhonchi  * Heart: Regular Rate and Rhythm, normal S1 and S2, no audible murmur, rub, or gallop  * Abdomen: Symmetric, non-distended, no scar, soft, non-tender, bowel sounds active all four quadrants, no masses, no organomegaly (no hepatosplenomegaly)  * Extremities: Extremities normal, atraumatic, no cyanosis, no lower extremity pitting edema bilaterally, adequate dorsalis pedis pulses  * Pulses: 2+ and symmetric all extremities  * Skin: Skin color, texture, turgor normal, no rashes or lesions  * Lymph nodes: Cervical, supraclavicular, and axillary nodes normal      Investigations   Laboratory Workup  - CBC:                        13.2   7.69  )-----------( 219      ( 15 Apr 2022 07:39 )             40.4     - Chemistry:  04-15    137  |  100  |  24<H>  ----------------------------<  140<H>  4.4   |  26  |  1.1    Ca    9.7      15 Apr 2022 07:39  Mg     2.1     04-15    TPro  6.4  /  Alb  3.9  /  TBili  0.3  /  DBili  x   /  AST  15  /  ALT  14  /  AlkPhos  70  04-15    - Cardiac Markers:  CARDIAC MARKERS ( 15 Apr 2022 12:27 )  x     / 0.05 ng/mL / x     / x     / x      CARDIAC MARKERS ( 15 Apr 2022 07:39 )  x     / 0.05 ng/mL / x     / x     / x      CARDIAC MARKERS ( 14 Apr 2022 11:00 )  x     / 0.06 ng/mL / x     / x     / x          Microbiological Workup  Culture - Urine (collected 12 Apr 2022 19:15)  Source: Clean Catch Clean Catch (Midstream)  Final Report (15 Apr 2022 10:07):    >100,000 CFU/ml Klebsiella oxytoca/Raoutella ornithinolytica  Organism: Klebsiella oxytoca /Raoutella ornithinolytica (15 Apr 2022 10:07)  Organism: Klebsiella oxytoca /Raoutella ornithinolytica (15 Apr 2022 10:07)      Current Medications  Standing Medications  aspirin  chewable 81 milliGRAM(s) Oral daily  atorvastatin 40 milliGRAM(s) Oral at bedtime  cefTRIAXone   IVPB 1000 milliGRAM(s) IV Intermittent every 24 hours  clopidogrel Tablet 75 milliGRAM(s) Oral daily  dextrose 5%. 1000 milliLiter(s) (50 mL/Hr) IV Continuous <Continuous>  dextrose 5%. 1000 milliLiter(s) (100 mL/Hr) IV Continuous <Continuous>  dextrose 50% Injectable 25 Gram(s) IV Push once  dextrose 50% Injectable 12.5 Gram(s) IV Push once  dextrose 50% Injectable 25 Gram(s) IV Push once  enoxaparin Injectable 40 milliGRAM(s) SubCutaneous every 24 hours  glucagon  Injectable 1 milliGRAM(s) IntraMuscular once  insulin lispro (ADMELOG) corrective regimen sliding scale   SubCutaneous three times a day before meals  lisinopril 5 milliGRAM(s) Oral daily  metoprolol tartrate 25 milliGRAM(s) Oral two times a day  NIFEdipine XL 30 milliGRAM(s) Oral daily  sertraline 25 milliGRAM(s) Oral daily    PRN Medications  dextrose Oral Gel 15 Gram(s) Oral once PRN Blood Glucose LESS THAN 70 milliGRAM(s)/deciliter    Singles Doses Administered  (Floorstock) 1 each &lt;see task&gt; GiveOnce  (Floorstock) 1 each &lt;see task&gt; GiveOnce  lactated ringers Bolus 1000 milliLiter(s) IV Bolus once  magnesium sulfate  IVPB 2 Gram(s) IV Intermittent once        Assessment and Plan  Case of an 81 year old female patient with Depression, HTN, DL, DM, and history of L MCA Aneurysm who was brought to the ED on 04/12 following an unwitnessed fall, found to have cerebral and cerebellar atrophy with ventricular dilation and UTI, admitted for further investigations and monitoring. Currently hemodynamically stable.      Fall Likely Due to Age-Related Changes  Rule Out Peripheral Neuropathy VS NPH VS Stroke   Unlikely Spinal Cord Related Etiology  History of 1.1 L MCA Aneurysm  * CT Head No Cont (04.12.22 @ 13:23) The third and lateral ventricles are mildly enlarged as are the cortical sulci consistent with a mild degree of cortical atrophy, mild degree of cerebellar atrophy. Patchy foci of diminished attenuation in the periventricular white matter  basal ganglia and thalami likely representing ischemic change. Patchy foci of diminished attenuation in the right cerebellar hemisphere  and left occipital lobe consistent with ischemic change.  * CT C spine 04/12 no dislocation or fracture  * MR Head No Cont (04.14.22 @ 21:51) Limited and incomplete examination reveals increased DWI signal within the central aspect of the kiara as well as the right cerebellar hemisphere  likely representing acute ischemic change.  * rEEG 04/13 normal  * XR L Knee osteopenia  * XR pelvis no fracture  * TTE 04/13 EF 68%, mild TR, mild AS, mild PHTN  * Folate 19.5 and B12 700 (04/13)    - Neurology Dr Tejeda on board: will transfer to Stroke Unit for Neurochecks  - Follow up CT angio Head and Neck with IC once performed  - Continue Aspirin 81mg QD  - Added Atorvastatin 40mg QD  - Will keep patient NPO for now per neurology team recommendation. Will get speech and swallow evaluation again 04/15  - Monitor BP closely and avoid hypotension  - PT/OT evaluation  - Check orthostatics  - Fall precautions  - TTE noted above      Klebsiella Oxytoca Urinary Tract Infection  * Symptoms mainly frequency but patient voids via diapers (incontinent)  * Culture - Urine (04.12.22 @ 19:15)  >100,000 CFU/ml Klebsiella oxytoca/Raoutella ornithinolytica. S to Ceftriaxone. S to Ciprofloxacin: S <=0.25     - Monitor T  - Trend WBC  - Start IV Rocephin 1g QD 04/14  - Follow up final urine culture results and S  - Follow up blood cultures received on 04/15      Premature Atrial Contractions  Atrial Fibrillation Ruled Out  * ED HR 85bpm  * ECG revealed afib per report -> reviewed and revealed NSR with PACs and artifacts  * TTE 04/13 EF 68%, mild TR, mild AS, mild PHTN    - EP Dr Cloud on board  - Monitor Tele: NSR  - Monitor HR  - No indication for therapeutic anticoagulation since afib was a misdiagnosis  - Continue PO Lopressor 25mg BID (ordered for HTN)  - TTE noted above      Hypertension  * ED /79 mmHg  - Monitor BP   - Continue Lopressor 25mg BID  - Continue Lisinopril 5mg QD but trend BUN and Cr daily  - Continue Nifedipine 30mg QD      Diabetes Mellitus Type II  * A1C with Estimated Average Glucose (04.13.22 @ 04:30)    A1C with Estimated Average Glucose Result: 6.4  - Monitor POCT premeals and at bedtime  - Continue Sliding Scale A for now      Others  - DVT Prophylaxis: Lovenox 40mg Subcutaneously daily  - GI Prophylaxis: not on Pantoprazole 40mg PO QD  - Diet: DASH/ TLC  - Code Status: Full      Barriers to learning: NO  Discharge Planning: Patient will be discharged once stable   Plan was communicated with patient and medical team       Aurelio Prather MD  PGY - 2 Internal Medicine   [facility] Location: [facility] F9 (3COVF) 010 A ([facility] F9 (3COVF))  Patient Name: REYNA FOWLER  Age: 81y  Gender: Female      Transfer Note      Mr Fowler is an 81 year old female patient:  - Depression  - HTN  - DL  - DM  - History of L MCA Aneurysm     She was brought to the ED on 04/12 following an unwitnessed fall.    In the ED, patient was hypertensive but otherwise stable.  No Stroke Code was called.    ED Labs were unremarkable  Urinalysis was positive and cultures grew Klebsella oxytoca currently on IV Rocephin 1g QD  CT Head No Cont (04.12.22 @ 13:23) The third and lateral ventricles are mildly enlarged as are the cortical sulci consistent with a mild degree of cortical atrophy, mild degree of cerebellar atrophy. Patchy foci of diminished attenuation in the periventricular white matter  basal ganglia and thalami likely representing ischemic change. Patchy foci of diminished attenuation in the right cerebellar hemisphere  and left occipital lobe consistent with ischemic change.  CT C spine 04/12 no dislocation or fracture    Neurology team was on board and recommended MR head without contrast  MR Head No Cont (04.14.22 @ 21:51) Limited and incomplete examination reveals increased DWI signal within the central aspect of the kiara as well as the right cerebellar hemisphere  likely representing acute ischemic change.    After MR report was shared with Neurology team, decision was made to transfer patient to Stroke Unit for neurochecks.  CT angio head and neck will be performed  Atorvastatin was added  Will monitor BP closely  Please refer to below A/P section for more details of hospital course      Vital Signs in the last 24 hours   Vitals Summary T(C): 36.2 (04-15-22 @ 14:46), Max: 36.2 (04-14-22 @ 20:45)  HR: 84 (04-15-22 @ 14:46) (73 - 84)  BP: 154/79 (04-15-22 @ 14:46) (153/72 - 154/82)  RR: 18 (04-15-22 @ 14:46) (18 - 18)  SpO2: 95% (04-15-22 @ 05:10) (95% - 95%)  Vent Data   Intake/ Output   04-14-22 @ 07:01  -  04-15-22 @ 07:00  --------------------------------------------------------  IN: 728 mL / OUT: 900 mL / NET: -172 mL    04-15-22 @ 07:01  -  04-15-22 @ 15:22  --------------------------------------------------------  IN: 230 mL / OUT: 200 mL / NET: 30 mL        Physical Exam  * General Appearance: Alert, cooperative, interactive, well-groomed  * Head: Normocephalic, without obvious abnormality, atraumatic  * Eyes: PERRL, conjunctiva/corneas clear, EOM's intact, fundi benign, both eyes  * Lungs: Respirations unlabored, reduced bilateral air entry due to reduced effort, no audible wheezes, crackles, or rhonchi  * Heart: Regular Rate and Rhythm, normal S1 and S2, no audible murmur, rub, or gallop  * Abdomen: Symmetric, non-distended, no scar, soft, non-tender, bowel sounds active all four quadrants, no masses, no organomegaly (no hepatosplenomegaly)  * Extremities: Extremities normal, atraumatic, no cyanosis, no lower extremity pitting edema bilaterally, adequate dorsalis pedis pulses  * Pulses: 2+ and symmetric all extremities  * Skin: Skin color, texture, turgor normal, no rashes or lesions  * Lymph nodes: Cervical, supraclavicular, and axillary nodes normal      Investigations   Laboratory Workup  - CBC:                        13.2   7.69  )-----------( 219      ( 15 Apr 2022 07:39 )             40.4     - Chemistry:  04-15    137  |  100  |  24<H>  ----------------------------<  140<H>  4.4   |  26  |  1.1    Ca    9.7      15 Apr 2022 07:39  Mg     2.1     04-15    TPro  6.4  /  Alb  3.9  /  TBili  0.3  /  DBili  x   /  AST  15  /  ALT  14  /  AlkPhos  70  04-15    - Cardiac Markers:  CARDIAC MARKERS ( 15 Apr 2022 12:27 )  x     / 0.05 ng/mL / x     / x     / x      CARDIAC MARKERS ( 15 Apr 2022 07:39 )  x     / 0.05 ng/mL / x     / x     / x      CARDIAC MARKERS ( 14 Apr 2022 11:00 )  x     / 0.06 ng/mL / x     / x     / x          Microbiological Workup  Culture - Urine (collected 12 Apr 2022 19:15)  Source: Clean Catch Clean Catch (Midstream)  Final Report (15 Apr 2022 10:07):    >100,000 CFU/ml Klebsiella oxytoca/Raoutella ornithinolytica  Organism: Klebsiella oxytoca /Raoutella ornithinolytica (15 Apr 2022 10:07)  Organism: Klebsiella oxytoca /Raoutella ornithinolytica (15 Apr 2022 10:07)      Current Medications  Standing Medications  aspirin  chewable 81 milliGRAM(s) Oral daily  atorvastatin 40 milliGRAM(s) Oral at bedtime  cefTRIAXone   IVPB 1000 milliGRAM(s) IV Intermittent every 24 hours  clopidogrel Tablet 75 milliGRAM(s) Oral daily  dextrose 5%. 1000 milliLiter(s) (50 mL/Hr) IV Continuous <Continuous>  dextrose 5%. 1000 milliLiter(s) (100 mL/Hr) IV Continuous <Continuous>  dextrose 50% Injectable 25 Gram(s) IV Push once  dextrose 50% Injectable 12.5 Gram(s) IV Push once  dextrose 50% Injectable 25 Gram(s) IV Push once  enoxaparin Injectable 40 milliGRAM(s) SubCutaneous every 24 hours  glucagon  Injectable 1 milliGRAM(s) IntraMuscular once  insulin lispro (ADMELOG) corrective regimen sliding scale   SubCutaneous three times a day before meals  lisinopril 5 milliGRAM(s) Oral daily  metoprolol tartrate 25 milliGRAM(s) Oral two times a day  NIFEdipine XL 30 milliGRAM(s) Oral daily  sertraline 25 milliGRAM(s) Oral daily    PRN Medications  dextrose Oral Gel 15 Gram(s) Oral once PRN Blood Glucose LESS THAN 70 milliGRAM(s)/deciliter    Singles Doses Administered  (Floorstock) 1 each &lt;see task&gt; GiveOnce  (Floorstock) 1 each &lt;see task&gt; GiveOnce  lactated ringers Bolus 1000 milliLiter(s) IV Bolus once  magnesium sulfate  IVPB 2 Gram(s) IV Intermittent once        Assessment and Plan  Case of an 81 year old female patient with Depression, HTN, DL, DM, and history of L MCA Aneurysm who was brought to the ED on 04/12 following an unwitnessed fall, found to have cerebral and cerebellar atrophy with ventricular dilation and UTI, admitted for further investigations and monitoring. Currently hemodynamically stable.      Fall Likely Due to Age-Related Changes  Rule Out Peripheral Neuropathy VS NPH VS Stroke   Unlikely Spinal Cord Related Etiology  History of 1.1 L MCA Aneurysm  * CT Head No Cont (04.12.22 @ 13:23) The third and lateral ventricles are mildly enlarged as are the cortical sulci consistent with a mild degree of cortical atrophy, mild degree of cerebellar atrophy. Patchy foci of diminished attenuation in the periventricular white matter  basal ganglia and thalami likely representing ischemic change. Patchy foci of diminished attenuation in the right cerebellar hemisphere  and left occipital lobe consistent with ischemic change.  * CT C spine 04/12 no dislocation or fracture  * MR Head No Cont (04.14.22 @ 21:51) Limited and incomplete examination reveals increased DWI signal within the central aspect of the kiara as well as the right cerebellar hemisphere  likely representing acute ischemic change.  * rEEG 04/13 normal  * XR L Knee osteopenia  * XR pelvis no fracture  * TTE 04/13 EF 68%, mild TR, mild AS, mild PHTN  * Folate 19.5 and B12 700 (04/13)    - Neurology Dr Tejeda on board: will transfer to Stroke Unit for Neurochecks  - Follow up CT angio Head and Neck with IC once performed  - Continue Aspirin 81mg QD  - Added plavix 75mg QD 04/15  - Added Atorvastatin 40mg QD  - Will keep patient NPO for now per neurology team recommendation. Will get speech and swallow evaluation again 04/15  - Monitor BP closely and avoid hypotension  - PT/OT evaluation  - Check orthostatics  - Fall precautions  - TTE noted above      Klebsiella Oxytoca Urinary Tract Infection  * Symptoms mainly frequency but patient voids via diapers (incontinent)  * Culture - Urine (04.12.22 @ 19:15)  >100,000 CFU/ml Klebsiella oxytoca/Raoutella ornithinolytica. S to Ceftriaxone. S to Ciprofloxacin: S <=0.25     - Monitor T  - Trend WBC  - Start IV Rocephin 1g QD 04/14  - Follow up final urine culture results and S  - Follow up blood cultures received on 04/15      Premature Atrial Contractions  Atrial Fibrillation Ruled Out  * ED HR 85bpm  * ECG revealed afib per report -> reviewed and revealed NSR with PACs and artifacts  * TTE 04/13 EF 68%, mild TR, mild AS, mild PHTN    - EP Dr Cloud on board  - Monitor Tele: NSR  - Monitor HR  - No indication for therapeutic anticoagulation since afib was a misdiagnosis  - Continue PO Lopressor 25mg BID (ordered for HTN)  - TTE noted above      Hypertension  * ED /79 mmHg  - Monitor BP   - Continue Lopressor 25mg BID  - Continue Lisinopril 5mg QD but trend BUN and Cr daily  - Continue Nifedipine 30mg QD      Diabetes Mellitus Type II  * A1C with Estimated Average Glucose (04.13.22 @ 04:30)    A1C with Estimated Average Glucose Result: 6.4  - Monitor POCT premeals and at bedtime  - Continue Sliding Scale A for now      Others  - DVT Prophylaxis: Lovenox 40mg Subcutaneously daily  - GI Prophylaxis: not on Pantoprazole 40mg PO QD  - Diet: DASH/ TLC  - Code Status: Full      Barriers to learning: NO  Discharge Planning: Patient will be discharged once stable   Plan was communicated with patient and medical team       Aurelio Prather MD  PGY - 2 Internal Medicine   [facility] Location: [facility] F9 (3COVF) 010 A ([facility] F9 (3COVF))  Patient Name: REYNA FOWLER  Age: 81y  Gender: Female      Transfer Note      Mr Fowler is an 81 year old female patient:  - Depression  - HTN  - DL  - DM  - History of L MCA Aneurysm     She was brought to the ED on 04/12 following an unwitnessed fall.    In the ED, patient was hypertensive but otherwise stable.  No Stroke Code was called.    ED Labs were unremarkable  Urinalysis was positive and cultures grew Klebsella oxytoca currently on IV Rocephin 1g QD  CT Head No Cont (04.12.22 @ 13:23) The third and lateral ventricles are mildly enlarged as are the cortical sulci consistent with a mild degree of cortical atrophy, mild degree of cerebellar atrophy. Patchy foci of diminished attenuation in the periventricular white matter  basal ganglia and thalami likely representing ischemic change. Patchy foci of diminished attenuation in the right cerebellar hemisphere  and left occipital lobe consistent with ischemic change.  CT C spine 04/12 no dislocation or fracture    Neurology team was on board and recommended MR head without contrast  MR Head No Cont (04.14.22 @ 21:51) Limited and incomplete examination reveals increased DWI signal within the central aspect of the kiara as well as the right cerebellar hemisphere  likely representing acute ischemic change.    After MR report was shared with Neurology team, decision was made to transfer patient to Stroke Unit for neurochecks.  CT angio head and neck will be performed  Atorvastatin was added  Will monitor BP closely  Please refer to below A/P section for more details of hospital course      Vital Signs in the last 24 hours   Vitals Summary T(C): 36.2 (04-15-22 @ 14:46), Max: 36.2 (04-14-22 @ 20:45)  HR: 84 (04-15-22 @ 14:46) (73 - 84)  BP: 154/79 (04-15-22 @ 14:46) (153/72 - 154/82)  RR: 18 (04-15-22 @ 14:46) (18 - 18)  SpO2: 95% (04-15-22 @ 05:10) (95% - 95%)  Vent Data   Intake/ Output   04-14-22 @ 07:01  -  04-15-22 @ 07:00  --------------------------------------------------------  IN: 728 mL / OUT: 900 mL / NET: -172 mL    04-15-22 @ 07:01  -  04-15-22 @ 15:22  --------------------------------------------------------  IN: 230 mL / OUT: 200 mL / NET: 30 mL        Physical Exam  * General Appearance: Alert, cooperative, interactive, well-groomed  * Head: Normocephalic, without obvious abnormality, atraumatic  * Eyes: PERRL, conjunctiva/corneas clear, EOM's intact, fundi benign, both eyes  * Lungs: Respirations unlabored, reduced bilateral air entry due to reduced effort, no audible wheezes, crackles, or rhonchi  * Heart: Regular Rate and Rhythm, normal S1 and S2, no audible murmur, rub, or gallop  * Abdomen: Symmetric, non-distended, no scar, soft, non-tender, bowel sounds active all four quadrants, no masses, no organomegaly (no hepatosplenomegaly)  * Extremities: Extremities normal, atraumatic, no cyanosis, no lower extremity pitting edema bilaterally, adequate dorsalis pedis pulses  * Pulses: 2+ and symmetric all extremities  * Skin: Skin color, texture, turgor normal, no rashes or lesions  * Lymph nodes: Cervical, supraclavicular, and axillary nodes normal      Investigations   Laboratory Workup  - CBC:                        13.2   7.69  )-----------( 219      ( 15 Apr 2022 07:39 )             40.4     - Chemistry:  04-15    137  |  100  |  24<H>  ----------------------------<  140<H>  4.4   |  26  |  1.1    Ca    9.7      15 Apr 2022 07:39  Mg     2.1     04-15    TPro  6.4  /  Alb  3.9  /  TBili  0.3  /  DBili  x   /  AST  15  /  ALT  14  /  AlkPhos  70  04-15    - Cardiac Markers:  CARDIAC MARKERS ( 15 Apr 2022 12:27 )  x     / 0.05 ng/mL / x     / x     / x      CARDIAC MARKERS ( 15 Apr 2022 07:39 )  x     / 0.05 ng/mL / x     / x     / x      CARDIAC MARKERS ( 14 Apr 2022 11:00 )  x     / 0.06 ng/mL / x     / x     / x          Microbiological Workup  Culture - Urine (collected 12 Apr 2022 19:15)  Source: Clean Catch Clean Catch (Midstream)  Final Report (15 Apr 2022 10:07):    >100,000 CFU/ml Klebsiella oxytoca/Raoutella ornithinolytica  Organism: Klebsiella oxytoca /Raoutella ornithinolytica (15 Apr 2022 10:07)  Organism: Klebsiella oxytoca /Raoutella ornithinolytica (15 Apr 2022 10:07)      Current Medications  Standing Medications  aspirin  chewable 81 milliGRAM(s) Oral daily  atorvastatin 40 milliGRAM(s) Oral at bedtime  cefTRIAXone   IVPB 1000 milliGRAM(s) IV Intermittent every 24 hours  clopidogrel Tablet 75 milliGRAM(s) Oral daily  dextrose 5%. 1000 milliLiter(s) (50 mL/Hr) IV Continuous <Continuous>  dextrose 5%. 1000 milliLiter(s) (100 mL/Hr) IV Continuous <Continuous>  dextrose 50% Injectable 25 Gram(s) IV Push once  dextrose 50% Injectable 12.5 Gram(s) IV Push once  dextrose 50% Injectable 25 Gram(s) IV Push once  enoxaparin Injectable 40 milliGRAM(s) SubCutaneous every 24 hours  glucagon  Injectable 1 milliGRAM(s) IntraMuscular once  insulin lispro (ADMELOG) corrective regimen sliding scale   SubCutaneous three times a day before meals  lisinopril 5 milliGRAM(s) Oral daily  metoprolol tartrate 25 milliGRAM(s) Oral two times a day  NIFEdipine XL 30 milliGRAM(s) Oral daily  sertraline 25 milliGRAM(s) Oral daily    PRN Medications  dextrose Oral Gel 15 Gram(s) Oral once PRN Blood Glucose LESS THAN 70 milliGRAM(s)/deciliter    Singles Doses Administered  (Floorstock) 1 each &lt;see task&gt; GiveOnce  (Floorstock) 1 each &lt;see task&gt; GiveOnce  lactated ringers Bolus 1000 milliLiter(s) IV Bolus once  magnesium sulfate  IVPB 2 Gram(s) IV Intermittent once        Assessment and Plan  Case of an 81 year old female patient with Depression, HTN, DL, DM, and history of L MCA Aneurysm who was brought to the ED on 04/12 following an unwitnessed fall, found to have cerebral and cerebellar atrophy with ventricular dilation and UTI, admitted for further investigations and monitoring. Currently hemodynamically stable.      Fall Likely Due to Age-Related Changes  Rule Out Peripheral Neuropathy VS NPH VS Stroke   Unlikely Spinal Cord Related Etiology  History of 1.1 L MCA Aneurysm  * CT Head No Cont (04.12.22 @ 13:23) The third and lateral ventricles are mildly enlarged as are the cortical sulci consistent with a mild degree of cortical atrophy, mild degree of cerebellar atrophy. Patchy foci of diminished attenuation in the periventricular white matter  basal ganglia and thalami likely representing ischemic change. Patchy foci of diminished attenuation in the right cerebellar hemisphere  and left occipital lobe consistent with ischemic change.  * CT C spine 04/12 no dislocation or fracture  * MR Head No Cont (04.14.22 @ 21:51) Limited and incomplete examination reveals increased DWI signal within the central aspect of the kiara as well as the right cerebellar hemisphere  likely representing acute ischemic change.  * rEEG 04/13 normal  * XR L Knee osteopenia  * XR pelvis no fracture  * TTE 04/13 EF 68%, mild TR, mild AS, mild PHTN  * Folate 19.5 and B12 700 (04/13)    - Neurology Dr Tejeda on board: will transfer to Stroke Unit for Neurochecks  - Follow up CT angio Head and Neck with IC once performed  - Continue Aspirin 81mg QD  - Added plavix 75mg QD 04/15  - Added Atorvastatin 40mg QD  - Will keep patient NPO for now per neurology team recommendation. Will get speech and swallow evaluation again 04/15  - Monitor BP closely and avoid hypotension  - PT/OT evaluation  - Check orthostatics  - Fall precautions  - TTE noted above      Klebsiella Oxytoca Urinary Tract Infection  * Symptoms mainly frequency but patient voids via diapers (incontinent)  * Culture - Urine (04.12.22 @ 19:15)  >100,000 CFU/ml Klebsiella oxytoca/Raoutella ornithinolytica. S to Ceftriaxone. S to Ciprofloxacin: S <=0.25     - Monitor T  - Trend WBC  - Start IV Rocephin 1g QD 04/14  - Follow up final urine culture results and S  - Follow up blood cultures received on 04/15      Premature Atrial Contractions  Atrial Fibrillation Ruled Out  * ED HR 85bpm  * ECG revealed afib per report -> reviewed and revealed NSR with PACs and artifacts  * TTE 04/13 EF 68%, mild TR, mild AS, mild PHTN    - EP Dr Cloud on board  - Monitor Tele: NSR  - Monitor HR  - No indication for therapeutic anticoagulation since afib was a misdiagnosis  - Continue PO Lopressor 25mg BID (ordered for HTN)  - TTE noted above      Hypertension  * ED /79 mmHg  - Monitor BP   - Continue Lopressor 25mg BID  - Continue Lisinopril 5mg QD but trend BUN and Cr daily  - Continue Nifedipine 30mg QD      Diabetes Mellitus Type II  * A1C with Estimated Average Glucose (04.13.22 @ 04:30)    A1C with Estimated Average Glucose Result: 6.4  - Monitor POCT premeals and at bedtime  - Continue Sliding Scale A for now      Others  - DVT Prophylaxis: Lovenox 40mg Subcutaneously daily  - GI Prophylaxis: not on Pantoprazole 40mg PO QD  - Diet: DASH/ TLC  - Code Status: Full      Barriers to learning: NO  Discharge Planning: Patient will be discharged once stable   Plan was communicated with patient and medical team       Aurelio Prather MD  PGY - 2 Internal Medicine   [facility]        Stroke attending attestation:  Pt seen and examined at bedside. Assessment/plan discussed with internal medicine attending, Dr. Guerra.  Pt is a 82 yo F with PMHx of HTN, HLD, DM, left MCA aneurysm, depression, who presented after fall. MRI brain shows acute ischemic stroke of left central kiara (penetrating pontine arteries off basilar) and subacute ischemic in right cerebellum (R SCA territory). Subtle right hemiparesis on exam (NIHSS 2, 1 for RUE and 1 for RLE drift)- granted exam somewhat limited due to poor pt cooperation.  Recommend transfer to stroke unit. CTA head/neck, may consider ILR placement if no significant athero on vessel imaging. TTE completed. Currently on ASA, add plavix and statin. -160. PT/OT/ST, tele, DVT ppx, q4 neurochecks.     Time spent: 35 minutes  Reason for time spent: Review of imaging and chart; obtaining history; examination of pt; discussion and coordination of care.

## 2022-04-16 LAB
ALBUMIN SERPL ELPH-MCNC: 4 G/DL — SIGNIFICANT CHANGE UP (ref 3.5–5.2)
ALP SERPL-CCNC: 73 U/L — SIGNIFICANT CHANGE UP (ref 30–115)
ALT FLD-CCNC: 15 U/L — SIGNIFICANT CHANGE UP (ref 0–41)
ANION GAP SERPL CALC-SCNC: 11 MMOL/L — SIGNIFICANT CHANGE UP (ref 7–14)
AST SERPL-CCNC: 17 U/L — SIGNIFICANT CHANGE UP (ref 0–41)
BASOPHILS # BLD AUTO: 0.04 K/UL — SIGNIFICANT CHANGE UP (ref 0–0.2)
BASOPHILS NFR BLD AUTO: 0.5 % — SIGNIFICANT CHANGE UP (ref 0–1)
BILIRUB SERPL-MCNC: 0.2 MG/DL — SIGNIFICANT CHANGE UP (ref 0.2–1.2)
BUN SERPL-MCNC: 25 MG/DL — HIGH (ref 10–20)
CALCIUM SERPL-MCNC: 9.5 MG/DL — SIGNIFICANT CHANGE UP (ref 8.5–10.1)
CHLORIDE SERPL-SCNC: 100 MMOL/L — SIGNIFICANT CHANGE UP (ref 98–110)
CO2 SERPL-SCNC: 27 MMOL/L — SIGNIFICANT CHANGE UP (ref 17–32)
CREAT SERPL-MCNC: 1.1 MG/DL — SIGNIFICANT CHANGE UP (ref 0.7–1.5)
EGFR: 50 ML/MIN/1.73M2 — LOW
EOSINOPHIL # BLD AUTO: 0.47 K/UL — SIGNIFICANT CHANGE UP (ref 0–0.7)
EOSINOPHIL NFR BLD AUTO: 5.6 % — SIGNIFICANT CHANGE UP (ref 0–8)
GLUCOSE BLDC GLUCOMTR-MCNC: 115 MG/DL — HIGH (ref 70–99)
GLUCOSE BLDC GLUCOMTR-MCNC: 119 MG/DL — HIGH (ref 70–99)
GLUCOSE BLDC GLUCOMTR-MCNC: 122 MG/DL — HIGH (ref 70–99)
GLUCOSE BLDC GLUCOMTR-MCNC: 135 MG/DL — HIGH (ref 70–99)
GLUCOSE SERPL-MCNC: 114 MG/DL — HIGH (ref 70–99)
HCT VFR BLD CALC: 42.8 % — SIGNIFICANT CHANGE UP (ref 37–47)
HGB BLD-MCNC: 13.7 G/DL — SIGNIFICANT CHANGE UP (ref 12–16)
IMM GRANULOCYTES NFR BLD AUTO: 0.2 % — SIGNIFICANT CHANGE UP (ref 0.1–0.3)
LYMPHOCYTES # BLD AUTO: 1.38 K/UL — SIGNIFICANT CHANGE UP (ref 1.2–3.4)
LYMPHOCYTES # BLD AUTO: 16.4 % — LOW (ref 20.5–51.1)
MAGNESIUM SERPL-MCNC: 2.1 MG/DL — SIGNIFICANT CHANGE UP (ref 1.8–2.4)
MCHC RBC-ENTMCNC: 28.4 PG — SIGNIFICANT CHANGE UP (ref 27–31)
MCHC RBC-ENTMCNC: 32 G/DL — SIGNIFICANT CHANGE UP (ref 32–37)
MCV RBC AUTO: 88.8 FL — SIGNIFICANT CHANGE UP (ref 81–99)
MONOCYTES # BLD AUTO: 0.62 K/UL — HIGH (ref 0.1–0.6)
MONOCYTES NFR BLD AUTO: 7.4 % — SIGNIFICANT CHANGE UP (ref 1.7–9.3)
NEUTROPHILS # BLD AUTO: 5.86 K/UL — SIGNIFICANT CHANGE UP (ref 1.4–6.5)
NEUTROPHILS NFR BLD AUTO: 69.9 % — SIGNIFICANT CHANGE UP (ref 42.2–75.2)
NRBC # BLD: 0 /100 WBCS — SIGNIFICANT CHANGE UP (ref 0–0)
PLATELET # BLD AUTO: 224 K/UL — SIGNIFICANT CHANGE UP (ref 130–400)
POTASSIUM SERPL-MCNC: 4.8 MMOL/L — SIGNIFICANT CHANGE UP (ref 3.5–5)
POTASSIUM SERPL-SCNC: 4.8 MMOL/L — SIGNIFICANT CHANGE UP (ref 3.5–5)
PROT SERPL-MCNC: 6.5 G/DL — SIGNIFICANT CHANGE UP (ref 6–8)
RBC # BLD: 4.82 M/UL — SIGNIFICANT CHANGE UP (ref 4.2–5.4)
RBC # FLD: 13.6 % — SIGNIFICANT CHANGE UP (ref 11.5–14.5)
SODIUM SERPL-SCNC: 138 MMOL/L — SIGNIFICANT CHANGE UP (ref 135–146)
WBC # BLD: 8.39 K/UL — SIGNIFICANT CHANGE UP (ref 4.8–10.8)
WBC # FLD AUTO: 8.39 K/UL — SIGNIFICANT CHANGE UP (ref 4.8–10.8)

## 2022-04-16 PROCEDURE — 99232 SBSQ HOSP IP/OBS MODERATE 35: CPT

## 2022-04-16 PROCEDURE — 93010 ELECTROCARDIOGRAM REPORT: CPT

## 2022-04-16 PROCEDURE — 99233 SBSQ HOSP IP/OBS HIGH 50: CPT

## 2022-04-16 PROCEDURE — 71045 X-RAY EXAM CHEST 1 VIEW: CPT | Mod: 26

## 2022-04-16 RX ADMIN — Medication 25 MILLIGRAM(S): at 17:12

## 2022-04-16 RX ADMIN — CEFTRIAXONE 100 MILLIGRAM(S): 500 INJECTION, POWDER, FOR SOLUTION INTRAMUSCULAR; INTRAVENOUS at 11:49

## 2022-04-16 RX ADMIN — LISINOPRIL 5 MILLIGRAM(S): 2.5 TABLET ORAL at 05:12

## 2022-04-16 RX ADMIN — ENOXAPARIN SODIUM 40 MILLIGRAM(S): 100 INJECTION SUBCUTANEOUS at 13:50

## 2022-04-16 RX ADMIN — ATORVASTATIN CALCIUM 80 MILLIGRAM(S): 80 TABLET, FILM COATED ORAL at 21:58

## 2022-04-16 RX ADMIN — SERTRALINE 25 MILLIGRAM(S): 25 TABLET, FILM COATED ORAL at 11:49

## 2022-04-16 RX ADMIN — Medication 30 MILLIGRAM(S): at 05:12

## 2022-04-16 RX ADMIN — Medication 81 MILLIGRAM(S): at 11:49

## 2022-04-16 RX ADMIN — CLOPIDOGREL BISULFATE 75 MILLIGRAM(S): 75 TABLET, FILM COATED ORAL at 11:49

## 2022-04-16 RX ADMIN — Medication 25 MILLIGRAM(S): at 05:12

## 2022-04-16 NOTE — CONSULT NOTE ADULT - ASSESSMENT
IMPRESSION: Rehab of acute CVA, central kiara and right cerebellar hemisphere    PRECAUTIONS: [   ] Cardiac  [   ] Respiratory  [   ] Seizures [   ] Contact Isolation  [   ] Droplet Isolation  [   ] Other    Weight Bearing Status:     RECOMMENDATION:    Out of Bed to Chair     DVT/Decubiti Prophylaxis    REHAB PLAN:     [ x   ] Bedside P/T 3-5 times a week   [  x  ]   Bedside O/T  2-3 times a week             [  x  ] Speech Therapy               [    ]  No Rehab Therapy Indicated   Conditioning/ROM                                    ADL  Bed Mobility                                               Conditioning/ROM  Transfers                                                     Bed Mobility  Sitting /Standing Balance                         Transfers                                        Gait Training                                               Sitting/Standing Balance  Stair Training [   ]Applicable                    Home equipment Eval                                                                        Splinting  [   ] Only      GOALS:   ADL   [ x   ]   Independent                    Transfers  [  x  ] Independent                          Ambulation  [ x   ] Independent     [ x    ] With device                            [    ]  CG                                                         [    ]  CG                                                                  [    ] CG                            [    ] Min A                                                   [    ] Min A                                                              [    ] Min  A          DISCHARGE PLAN:   [    ]  Good candidate for Intensive Rehabilitation/Hospital based                                             Will tolerate 3hrs Intensive Rehab Daily                                       [     ]  Short Term Rehab in Skilled Nursing Facility                                       [     ]  Home with Outpatient or  services                                         [  x   ]  Possible Candidate for Intensive Hospital based Rehab

## 2022-04-16 NOTE — PROGRESS NOTE ADULT - TIME BILLING
81F PMHx depression, HTN, DM2, CVA here with unwitnessed fall.    #Fall, unwitnessed  pt denies syncope; found by son immediately after incident  tte unrevealing, mild as  orthostatics neg  ucx with kleb  cont ceftriaxone plan for 5 days total  cth unchanged; trauma w/u otherwise neg  mri head with concern for acute cva R kiara, cerebellum  asa, plavix  lipitor 40  diet as tolerated; f/u s+s  cta head, neck with mod stenosis R ICA, 1cm anuerysm     #Elevated cardiac enzymes  ekg q waves inf  no cp  tte no wall motion abnl  trend    #HTN  lopressor 25 bid  lisinopril 5  nifedipine 30    #DM2  ssi    #Depression  zoloft 25    #DVT ppx  lovenox    Mari  [id number]

## 2022-04-16 NOTE — PROGRESS NOTE ADULT - ATTENDING COMMENTS
I have personally seen and examined this patient.  I have fully participated in the care of this patient.  I have reviewed all pertinent clinical information, including history, physical exam, plan and note. Desaturated overnight. CX ray. Medicine input appreciated. Neuralgically stable. Afib on Tele. Cardiology consult. Recommend EVITA to r/o cardiac thrombosis. Private cardiology consult for AC.  I have reviewed all pertinent clinical information and reviewed all relevant imaging and diagnostic studies personally.  Recommendations as above.  Agree with above assessment except as noted.

## 2022-04-16 NOTE — SWALLOW BEDSIDE ASSESSMENT ADULT - COMMENTS
Pt diana Edward,  utilized #[id number]. Pt aphasic, dysarthric with dysphonic vocal quality. Difficulty utilizing  phone.

## 2022-04-16 NOTE — PROGRESS NOTE ADULT - ASSESSMENT
This is an 80 yo F with PMHx of HTN, HLD, DM, left MCA aneurysm, depression, who presented after fall. MRI brain showed acute ischemic stroke of left central kiara (penetrating pontine arteries off basilar) and subacute ischemic in right cerebellum (R SCA territory). Subtle right hemiparesis on exam (NIHSS 2, 1 for RUE and 1 for RLE drift) - granted exam somewhat limited due to poor pt cooperation. Patient was transferred to stroke unit for further monitoring.    Plan:     TTE completed. Currently on ASA, add plavix and statin. -160. PT/OT/ST, tele, DVT ppx, q4 neurochecks.    Neuro  - CT Head reviewed, no acute intracranial hemorrhage, stable exam since CT in 11/2021. 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 Moderate stenosis involving the right clinoid segment of the ICA due to atherosclerotic calcification. Mild stenosis of the bilateral proximal internal carotid arteries due to mixed calcified and noncalcified atherosclerotic plaque  - MRI Brain reviewed, showed diffusion abnormality in central aspect of the kiara and R cerebellum  - Continue Aspirin 81 mg daily  - Continue Plavix 75 mg daily  - Continue Atorvastatin 80 mg daily  - Passed s/s eval, advanced to pureed diet with mildly thick liquids  - Monitor BP closely and avoid hypotension  - Goal  - 160  - PT/OT/SLP evaluation  - Fall precautions  - q4 hour neuro checks and vitals  - Stroke education    Cardio  - EKG shows Afib, none noted on previous EKG  - CHADVASC 5, but given recurrent falls, may need to defer A/C  - Cardiology consult for Afib on telemetry  - TTE showed normal LVEF  - EVITA pending, f/u  - EPS consult for ILR    #HTN  - Continue with Nifedipine and Lisinopril    Pulm  - Place call to provider if SpO2 < 92%  - Overnight, desatted to 80s, can get CXR, f/u    GI  - Replete K > 4, Mg > 2, prn   - Passed s/s eval, advanced to pureed diet with mildly thick liquids    Endocrine  - Hold oral medications  - Monitor FS, if FS>180 can start basal/bolus  - HbA1c 6.4%  - TSH 0.96, wnl    DVT PPx: Lovenox    Dispo: Stroke unit, pending PT/OT eval   This is an 80 yo F with PMHx of HTN, HLD, DM, left MCA aneurysm, depression, who presented after fall. MRI brain showed acute ischemic stroke of left central kiara (penetrating pontine arteries off basilar) and subacute ischemic in right cerebellum (R SCA territory). Subtle right hemiparesis on exam (NIHSS 2, 1 for RUE and 1 for RLE drift) - granted exam somewhat limited due to poor pt cooperation. Patient was transferred to stroke unit for further monitoring.    Plan:  Neuro  - CT Head reviewed, no acute intracranial hemorrhage, stable exam since CT in 11/2021. 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 Moderate stenosis involving the right clinoid segment of the ICA due to atherosclerotic calcification. Mild stenosis of the bilateral proximal internal carotid arteries due to mixed calcified and noncalcified atherosclerotic plaque  - MRI Brain reviewed, showed diffusion abnormality in central aspect of the kiara and R cerebellum  - Continue Aspirin 81 mg daily  - Continue Plavix 75 mg daily  - Continue Atorvastatin 80 mg daily  - Passed s/s eval, advanced to pureed diet with mildly thick liquids  - Monitor BP closely and avoid hypotension  - Goal  - 160  - PT/OT/SLP evaluation  - Fall precautions  - q4 hour neuro checks and vitals  - Stroke education    Cardio  - EKG shows Afib, none noted on previous EKG  - CHADVASC 5, but given recurrent falls, may need to defer A/C  - Cardiology consult for Afib on telemetry  - TTE showed normal LVEF  - EVITA pending, f/u  - EPS consult for ILR    #HTN  - Continue with Nifedipine and Lisinopril    Pulm  - Place call to provider if SpO2 < 92%  - Overnight, desatted to 80s, can get CXR, f/u    GI  - Replete K > 4, Mg > 2, prn   - Passed s/s eval, advanced to pureed diet with mildly thick liquids    Endocrine  - Hold oral medications  - Monitor FS, if FS>180 can start basal/bolus  - HbA1c 6.4%  - TSH 0.96, wnl    DVT PPx: Lovenox    Dispo: Stroke unit, pending PT/OT eval   This is an 80 yo F with PMHx of HTN, HLD, DM, left MCA aneurysm, depression, who presented after fall. MRI brain showed acute ischemic stroke of left central kiara (penetrating pontine arteries off basilar) and subacute ischemic in right cerebellum (R SCA territory). Subtle right hemiparesis on exam (NIHSS 2, 1 for RUE and 1 for RLE drift) - granted exam somewhat limited due to poor pt cooperation. Patient was transferred to stroke unit for further monitoring.    Plan:  Neuro  - CT Head reviewed, no acute intracranial hemorrhage, stable exam since CT in 11/2021. 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 Moderate stenosis involving the right clinoid segment of the ICA due to atherosclerotic calcification. Mild stenosis of the bilateral proximal internal carotid arteries due to mixed calcified and noncalcified atherosclerotic plaque  - MRI Brain reviewed, showed diffusion abnormality in central aspect of the kiara and R cerebellum  - Continue Aspirin 81 mg daily  - Continue Plavix 75 mg daily  - Continue Atorvastatin 80 mg daily  - Passed s/s eval, advanced to pureed diet with mildly thick liquids  - Monitor BP closely and avoid hypotension  - Goal  - 160  - PT/OT/SLP evaluation  - Fall precautions  - q4 hour neuro checks and vitals  - Stroke education    Cardio  - EKG shows Afib, none noted on previous EKG  - CHADVASC 5, but given recurrent falls, may need to defer A/C  - Cardiology consult for Afib on telemetry  - TTE showed normal LVEF  - EVITA pending, f/u  - Hold off on EPS if AFIB is confirmed.   - Will hold off on Plavix in case of AC.     #HTN  - Continue with Nifedipine and Lisinopril    Pulm  - Place call to provider if SpO2 < 92%  - Overnight, desatted to 80s, can get CXR, f/u    GI  - Replete K > 4, Mg > 2, prn   - Passed s/s eval, advanced to pureed diet with mildly thick liquids    Endocrine  - Hold oral medications  - Monitor FS, if FS>180 can start basal/bolus  - HbA1c 6.4%  - TSH 0.96, wnl    DVT PPx: Lovenox    Dispo: Stroke unit, pending PT/OT eval

## 2022-04-16 NOTE — CONSULT NOTE ADULT - SUBJECTIVE AND OBJECTIVE BOX
HPI:  82 y/o Tajik speaking female with PMHx of HTN, HLD, DM, hx of Left MCA aneurysm presented to the ED after fall at home. As per son, he woke up at 3am to his mother calling out for help and stating she couldn't get up. When he had gone into the room, he found her sitting on the floor, awake, and scared. No loss of urine or stools. Patient is unable to state how she fell. Son reports that for the past couple of days she has been feeling weak and fatigue, this morning she had trouble standing on her right leg. No recent fevers, chills, chest pain, SOB, abdominal pain, nausea, vomiting, diarrhea, constipation, dysuria. Currently not complaining of any pain.     In the ED, VS noted for T 97.4, HR 85, /79, SpO2 96% on RA. Labs noted for WBC 8, Cr 1.2, Trop 0.05. CT head shows mild degree of cortical atrophy, mild degree of cerebellar atrophy, ischemic changes noted in periventricular white matter basal ganglia, thalami, and right cerebellar hemisphere and left occipital lobe.   CT c-spine no acute displaced c-spine fracture.  MR Head  reveals increased signal within the central aspect of the kiara as well as the right cerebellar hemisphere  likely representing acute ischemic change.           PAST MEDICAL & SURGICAL HISTORY:  Type II diabetes mellitus    Hypertension    Dyslipidemia    Recurrent falls    History of hernia surgery        Hospital Course:    TODAY'S SUBJECTIVE & REVIEW OF SYMPTOMS:     Constitutional WNL   Cardio WNL   Resp WNL   GI WNL  Heme WNL  Endo WNL  Skin WNL  MSK Weakness  Neuro WNL  Cognitive WNL  Psych WNL      MEDICATIONS  (STANDING):  aspirin  chewable 81 milliGRAM(s) Oral daily  atorvastatin 80 milliGRAM(s) Oral at bedtime  cefTRIAXone   IVPB 1000 milliGRAM(s) IV Intermittent every 24 hours  clopidogrel Tablet 75 milliGRAM(s) Oral daily  dextrose 5%. 1000 milliLiter(s) (50 mL/Hr) IV Continuous <Continuous>  dextrose 5%. 1000 milliLiter(s) (100 mL/Hr) IV Continuous <Continuous>  dextrose 50% Injectable 25 Gram(s) IV Push once  dextrose 50% Injectable 12.5 Gram(s) IV Push once  dextrose 50% Injectable 25 Gram(s) IV Push once  enoxaparin Injectable 40 milliGRAM(s) SubCutaneous every 24 hours  glucagon  Injectable 1 milliGRAM(s) IntraMuscular once  insulin lispro (ADMELOG) corrective regimen sliding scale   SubCutaneous three times a day before meals  lisinopril 5 milliGRAM(s) Oral daily  metoprolol tartrate 25 milliGRAM(s) Oral two times a day  NIFEdipine XL 30 milliGRAM(s) Oral daily  sertraline 25 milliGRAM(s) Oral daily    MEDICATIONS  (PRN):  dextrose Oral Gel 15 Gram(s) Oral once PRN Blood Glucose LESS THAN 70 milliGRAM(s)/deciliter      FAMILY HISTORY:  FHx: diabetes mellitus        Allergies    No Known Allergies    Intolerances        SOCIAL HISTORY:    [  ] Etoh  [  ] Smoking  [  ] Substance abuse     Home Environment:  [   ] Home Alone  [ x  ] Lives with Family  [   ] Home Health Aid    Dwelling:  [   ] Apartment  [  x ] Private House  [   ] Adult Home  [   ] Skilled Nursing Facility      [   ] Short Term  [   ] Long Term  [ x  ] Stairs       Elevator [   ]    FUNCTIONAL STATUS PTA: (Check all that apply)  Ambulation: [ x   ]Independent    [   ] Dependent     [   ] Non-Ambulatory  Assistive Device: [   ] SA Cane  [   ]  Q Cane  [ x  ] Walker  [   ]  Wheelchair  ADL : [ x  ] Independent  [    ]  Dependent       Vital Signs Last 24 Hrs  T(C): 36.1 (16 Apr 2022 08:05), Max: 36.2 (15 Apr 2022 14:46)  T(F): 96.9 (16 Apr 2022 08:05), Max: 97.2 (15 Apr 2022 14:46)  HR: 67 (16 Apr 2022 08:05) (66 - 84)  BP: 150/78 (16 Apr 2022 08:05) (138/74 - 161/81)  BP(mean): 95 (16 Apr 2022 08:05) (90 - 121)  RR: 18 (16 Apr 2022 08:05) (17 - 19)  SpO2: 96% (16 Apr 2022 08:05) (92% - 98%)      PHYSICAL EXAM: Awake & Alert  GENERAL: NAD  HEAD:  Normocephalic  CHEST/LUNG: Clear   HEART: S1S2+  ABDOMEN: Soft, Nontender  EXTREMITIES:  no calf tenderness    NERVOUS SYSTEM:  Cranial Nerves 2-12 intact [   ] Abnormal  [  x ]dysarthria  ROM: WFL all extremities [   ]  Abnormal [   ]able to move all ext - limited participation  Motor Strength: WFL all extremities  [   ]  Abnormal [   ]  Sensation: intact to light touch [ x  ] Abnormal [   ]    FUNCTIONAL STATUS:  Bed Mobility: Independent [   ]  Supervision [   ]  Needs Assistance [ x  ]  N/A [   ]  Transfers: Independent [   ]  Supervision [   ]  Needs Assistance [   ]  N/A [   ]   Ambulation: Independent [   ]  Supervision [   ]  Needs Assistance [   ]  N/A [   ]  ADL: Independent [   ] Requires Assistance [   ] N/A [   ]      LABS:                        13.7   8.39  )-----------( 224      ( 16 Apr 2022 04:30 )             42.8     04-16    138  |  100  |  25<H>  ----------------------------<  114<H>  4.8   |  27  |  1.1    Ca    9.5      16 Apr 2022 04:30  Mg     2.1     04-16    TPro  6.5  /  Alb  4.0  /  TBili  0.2  /  DBili  x   /  AST  17  /  ALT  15  /  AlkPhos  73  04-16          RADIOLOGY & ADDITIONAL STUDIES:

## 2022-04-17 LAB
ALBUMIN SERPL ELPH-MCNC: 4 G/DL — SIGNIFICANT CHANGE UP (ref 3.5–5.2)
ALP SERPL-CCNC: 70 U/L — SIGNIFICANT CHANGE UP (ref 30–115)
ALT FLD-CCNC: 16 U/L — SIGNIFICANT CHANGE UP (ref 0–41)
ANION GAP SERPL CALC-SCNC: 10 MMOL/L — SIGNIFICANT CHANGE UP (ref 7–14)
AST SERPL-CCNC: 16 U/L — SIGNIFICANT CHANGE UP (ref 0–41)
BASOPHILS # BLD AUTO: 0.05 K/UL — SIGNIFICANT CHANGE UP (ref 0–0.2)
BASOPHILS NFR BLD AUTO: 0.6 % — SIGNIFICANT CHANGE UP (ref 0–1)
BILIRUB SERPL-MCNC: 0.3 MG/DL — SIGNIFICANT CHANGE UP (ref 0.2–1.2)
BUN SERPL-MCNC: 25 MG/DL — HIGH (ref 10–20)
CALCIUM SERPL-MCNC: 9.6 MG/DL — SIGNIFICANT CHANGE UP (ref 8.5–10.1)
CHLORIDE SERPL-SCNC: 102 MMOL/L — SIGNIFICANT CHANGE UP (ref 98–110)
CO2 SERPL-SCNC: 30 MMOL/L — SIGNIFICANT CHANGE UP (ref 17–32)
CREAT SERPL-MCNC: 1.1 MG/DL — SIGNIFICANT CHANGE UP (ref 0.7–1.5)
EGFR: 50 ML/MIN/1.73M2 — LOW
EOSINOPHIL # BLD AUTO: 0.41 K/UL — SIGNIFICANT CHANGE UP (ref 0–0.7)
EOSINOPHIL NFR BLD AUTO: 5.2 % — SIGNIFICANT CHANGE UP (ref 0–8)
GLUCOSE BLDC GLUCOMTR-MCNC: 101 MG/DL — HIGH (ref 70–99)
GLUCOSE BLDC GLUCOMTR-MCNC: 128 MG/DL — HIGH (ref 70–99)
GLUCOSE BLDC GLUCOMTR-MCNC: 149 MG/DL — HIGH (ref 70–99)
GLUCOSE BLDC GLUCOMTR-MCNC: 162 MG/DL — HIGH (ref 70–99)
GLUCOSE SERPL-MCNC: 122 MG/DL — HIGH (ref 70–99)
HCT VFR BLD CALC: 41.2 % — SIGNIFICANT CHANGE UP (ref 37–47)
HGB BLD-MCNC: 13.2 G/DL — SIGNIFICANT CHANGE UP (ref 12–16)
IMM GRANULOCYTES NFR BLD AUTO: 0.3 % — SIGNIFICANT CHANGE UP (ref 0.1–0.3)
LYMPHOCYTES # BLD AUTO: 1.16 K/UL — LOW (ref 1.2–3.4)
LYMPHOCYTES # BLD AUTO: 14.7 % — LOW (ref 20.5–51.1)
MAGNESIUM SERPL-MCNC: 2 MG/DL — SIGNIFICANT CHANGE UP (ref 1.8–2.4)
MCHC RBC-ENTMCNC: 28.6 PG — SIGNIFICANT CHANGE UP (ref 27–31)
MCHC RBC-ENTMCNC: 32 G/DL — SIGNIFICANT CHANGE UP (ref 32–37)
MCV RBC AUTO: 89.2 FL — SIGNIFICANT CHANGE UP (ref 81–99)
MONOCYTES # BLD AUTO: 0.58 K/UL — SIGNIFICANT CHANGE UP (ref 0.1–0.6)
MONOCYTES NFR BLD AUTO: 7.4 % — SIGNIFICANT CHANGE UP (ref 1.7–9.3)
NEUTROPHILS # BLD AUTO: 5.67 K/UL — SIGNIFICANT CHANGE UP (ref 1.4–6.5)
NEUTROPHILS NFR BLD AUTO: 71.8 % — SIGNIFICANT CHANGE UP (ref 42.2–75.2)
NRBC # BLD: 0 /100 WBCS — SIGNIFICANT CHANGE UP (ref 0–0)
PLATELET # BLD AUTO: 221 K/UL — SIGNIFICANT CHANGE UP (ref 130–400)
POTASSIUM SERPL-MCNC: 4.8 MMOL/L — SIGNIFICANT CHANGE UP (ref 3.5–5)
POTASSIUM SERPL-SCNC: 4.8 MMOL/L — SIGNIFICANT CHANGE UP (ref 3.5–5)
PROT SERPL-MCNC: 6.3 G/DL — SIGNIFICANT CHANGE UP (ref 6–8)
RBC # BLD: 4.62 M/UL — SIGNIFICANT CHANGE UP (ref 4.2–5.4)
RBC # FLD: 13.5 % — SIGNIFICANT CHANGE UP (ref 11.5–14.5)
SODIUM SERPL-SCNC: 142 MMOL/L — SIGNIFICANT CHANGE UP (ref 135–146)
WBC # BLD: 7.89 K/UL — SIGNIFICANT CHANGE UP (ref 4.8–10.8)
WBC # FLD AUTO: 7.89 K/UL — SIGNIFICANT CHANGE UP (ref 4.8–10.8)

## 2022-04-17 PROCEDURE — 99252 IP/OBS CONSLTJ NEW/EST SF 35: CPT | Mod: GC

## 2022-04-17 PROCEDURE — 93010 ELECTROCARDIOGRAM REPORT: CPT

## 2022-04-17 PROCEDURE — 99233 SBSQ HOSP IP/OBS HIGH 50: CPT

## 2022-04-17 PROCEDURE — 99232 SBSQ HOSP IP/OBS MODERATE 35: CPT

## 2022-04-17 RX ORDER — APIXABAN 2.5 MG/1
5 TABLET, FILM COATED ORAL EVERY 12 HOURS
Refills: 0 | Status: DISCONTINUED | OUTPATIENT
Start: 2022-04-18 | End: 2022-04-19

## 2022-04-17 RX ADMIN — Medication 1: at 11:36

## 2022-04-17 RX ADMIN — Medication 25 MILLIGRAM(S): at 05:18

## 2022-04-17 RX ADMIN — ATORVASTATIN CALCIUM 80 MILLIGRAM(S): 80 TABLET, FILM COATED ORAL at 21:34

## 2022-04-17 RX ADMIN — LISINOPRIL 5 MILLIGRAM(S): 2.5 TABLET ORAL at 05:16

## 2022-04-17 RX ADMIN — Medication 0: at 16:26

## 2022-04-17 RX ADMIN — Medication 25 MILLIGRAM(S): at 18:02

## 2022-04-17 RX ADMIN — SERTRALINE 25 MILLIGRAM(S): 25 TABLET, FILM COATED ORAL at 11:36

## 2022-04-17 RX ADMIN — Medication 30 MILLIGRAM(S): at 05:16

## 2022-04-17 RX ADMIN — Medication 81 MILLIGRAM(S): at 11:36

## 2022-04-17 RX ADMIN — CLOPIDOGREL BISULFATE 75 MILLIGRAM(S): 75 TABLET, FILM COATED ORAL at 11:36

## 2022-04-17 NOTE — CONSULT NOTE ADULT - ASSESSMENT
Impression:  #Left Central Jb acute ischemic stroke  #HTN  #H/o MCA aneurysm  #HLD  #DM  #Paroxysmal A-fib    Plan:  - noted some EKGs with A-fib and Telemetry showing Sinus bradycardia as well as Sinus with PACs  - CHADsVASC 7 and HAS-BLED 4; overall 11% risk for stroke and 9% chance for bleeding events.   - Would pursue an  Impression:  #Left Central Jb acute ischemic stroke  #HTN  #H/o MCA aneurysm  #HLD  #DM  #Paroxysmal A-fib    Plan:  - noted some EKGs with A-fib and Telemetry showing Sinus bradycardia as well as Sinus with PACs  - CHADsVASC 7 and HAS-BLED 4; overall 11% risk for stroke and 9% chance for bleeding events.   - Would pursue with anticoagulation for A-fib. Can do Eliquis and Plavix. Would stop ASA to avoid triple therapy.   - Noted EVITA scheduled and overall the A-fib is likely the cause of embolic stroke. Can consider proceeding with Anticoagulation and holding off on EVITA. EVITA carries risks given its a minimally invasive procedure and will not .   - Primary team to further discuss with patient and family

## 2022-04-17 NOTE — PROGRESS NOTE ADULT - TIME BILLING
81F PMHx depression, HTN, DM2, CVA here with unwitnessed fall.    #Frequent PACs  ekg/ tele reviewed with ep, presumed NSR with PACs rather than AFib  cont monitor on tele  f/u ep, cards  stiven    #Fall, unwitnessed  pt denies syncope; found by son immediately after incident  tte unrevealing, mild as  orthostatics neg  ucx with kleb  cont ceftriaxone plan for 5 days total  cth unchanged; trauma w/u otherwise neg  mri head with concern for acute cva R kiara, cerebellum  asa, plavix  lipitor 40  diet as tolerated; f/u s+s  cta head, neck with mod stenosis R ICA, 1cm anuerysm   stiven as above    #Elevated cardiac enzymes  ekg q waves inf  no cp  tte no wall motion abnl  trend    #HTN  lopressor 25 bid  lisinopril 5  nifedipine 30    #DM2  ssi    #Depression  zoloft 25    #DVT ppx  lovenox    Mari  [phone]

## 2022-04-17 NOTE — CONSULT NOTE ADULT - ATTENDING COMMENTS
Patient seen and examined. Pertinent labs, imaging and telemetry reviewed. I agree with the above:    Patient sitting in bed comfortably. Spoke with pt's son, Kelvin, and informed him about AF and need for anticoagulation.     AF: CHADSVASc 7. High risk of repeat CVA. Head imaging consistent with embolic event, likely stemming from pAF.   -Would initiate Eliquis 5mg PO BID.   -Stop ASA 81mg PO and continue Plavix 75mg PO daily for secondary stroke prevention.   -Monitor for bleed.   -Would recommend against EVITA, given diminishing returns for minimally invasive procedure.   -AF likely culprit for stroke, EVITA generally reserved for work up of cryptogenic stroke.   -Currently in NSR with PACs, no need for antiarrhythmic.  -Continue with metoprolol tartrate 25mg PO BID and can transition to Toprol 50mg PO daily on discharge.   -BP control per neurology.     Discussed with neurology attending.
I have personally seen and examined this patient on 4/13.  I have fully participated in the care of this patient.  I have reviewed all pertinent clinical information, including history, physical exam, plan and note.  80 yo Zimbabwean speaking F with PMHx of HTN, HLD, DM, hx of Left MCA aneurysm presented after a fall. Per son, she probably fell down while she was standing. Recommend to check orthostatic. MRI brain and C spine given the brisk reflexes.  Routine EEG and lab work up for neuropathy.  I have reviewed all pertinent clinical information and reviewed all relevant imaging and diagnostic studies personally.  Recommendations as above.  Agree with above assessment except as noted.

## 2022-04-17 NOTE — CONSULT NOTE ADULT - SUBJECTIVE AND OBJECTIVE BOX
HPI:  82 y/o Arabic speaking female with PMHx of HTN, HLD, DM, hx of Left MCA aneurysm presented to the ED after fall at home. As per son, he woke up at 3am to his mother calling out for help and stating she couldn't get up. When he had gone into the room, he found her sitting on the floor, awake, and scared. No loss of urine or stools. Patient is unable to state how she fell. Son reports that for the past couple of days she has been feeling weak and fatigue, this morning she had trouble standing on her right leg. No recent fevers, chills, chest pain, SOB, abdominal pain, nausea, vomiting, diarrhea, constipation, dysuria. Currently not complaining of any pain.     In the ED, VS noted for T 97.4, HR 85, /79, SpO2 96% on RA. Labs noted for WBC 8, Cr 1.2, Trop 0.05. CT head shows mild degree of cortical atrophy, mild degree of cerebellar atrophy, ischemic changes noted in periventricular white matter basal ganglia, thalami, and right cerebellar hemisphere and left occipital lobe.   CT c-spine no acute displaced c-spine fracture.       (2022 15:15)    PAST MEDICAL & SUGICAL HISTORY  Type II diabetes mellitus  Hypertension  Dyslipidemia  Recurrent falls  History of hernia surgery    FAMILY HISTORY:  FAMILY HISTORY:  FHx: diabetes mellitus    SOCIAL HISTORY:  []smoker  []Alcohol  []Drug    ALLERGIES:  No Known Allergies      MEDICATIONS:  MEDICATIONS  (STANDING):  aspirin  chewable 81 milliGRAM(s) Oral daily  atorvastatin 80 milliGRAM(s) Oral at bedtime  clopidogrel Tablet 75 milliGRAM(s) Oral daily  dextrose 5%. 1000 milliLiter(s) (50 mL/Hr) IV Continuous <Continuous>  dextrose 5%. 1000 milliLiter(s) (100 mL/Hr) IV Continuous <Continuous>  dextrose 50% Injectable 25 Gram(s) IV Push once  dextrose 50% Injectable 12.5 Gram(s) IV Push once  dextrose 50% Injectable 25 Gram(s) IV Push once  enoxaparin Injectable 40 milliGRAM(s) SubCutaneous every 24 hours  glucagon  Injectable 1 milliGRAM(s) IntraMuscular once  insulin lispro (ADMELOG) corrective regimen sliding scale   SubCutaneous three times a day before meals  lisinopril 5 milliGRAM(s) Oral daily  metoprolol tartrate 25 milliGRAM(s) Oral two times a day  NIFEdipine XL 30 milliGRAM(s) Oral daily  sertraline 25 milliGRAM(s) Oral daily    MEDICATIONS  (PRN):  dextrose Oral Gel 15 Gram(s) Oral once PRN Blood Glucose LESS THAN 70 milliGRAM(s)/deciliter      HOME MEDICATIONS:  Home Medications:  aspirin 81 mg oral tablet, chewable: 1 tab(s) orally once a day (2022 15:55)  metformin 500 mg tablet: 1 each orally once a day  if FS &gt;200, take 1 tab at night (2022 15:55)  sertraline 25 mg oral tablet: 1 tab(s) orally once a day (2022 15:54)      VITALS:   T(F): 96.9 ( @ 08:15), Max: 98.5 ( @ 13:11)  HR: 80 ( @ 08:15) (55 - 84)  BP: 136/66 ( @ 08:15) (122/68 - 179/96)  BP(mean): 89 ( @ 08:15) (89 - 122)  RR: 22 ( @ 08:15) (16 - 22)  SpO2: 96% ( @ 08:15) (92% - 99%)    I&O's Summary    2022 07:01  -  2022 07:00  --------------------------------------------------------  IN: 480 mL / OUT: 500 mL / NET: -20 mL    REVIEW OF SYSTEMS:  CONSTITUTIONAL: No weakness, fevers or chills  EYES: No visual changes  ENT: No vertigo or throat pain   NECK: No pain or stiffness  RESPIRATORY: No cough, wheezing, hemoptysis; No shortness of breath  CARDIOVASCULAR: No chest pain or palpitations  GASTROINTESTINAL: No abdominal or epigastric pain. No nausea, vomiting, or hematemesis; No diarrhea or constipation. No melena or hematochezia.  GENITOURINARY: No dysuria, frequency or hematuria  NEUROLOGICAL: No numbness or weakness  SKIN: No itching, no rashes  MSK: no    PHYSICAL EXAM:  NEURO: patient is awake , alert and oriented  GEN: Not in acute distress  NECK: no thyroid enlargement, no JVD  LUNGS: Clear to auscultation bilaterally   CARDIOVASCULAR: S1/S2 present, irregularly regular, no murmurs or rubs, no carotid bruits,  + PP bilaterally  ABD: Soft, non-tender, non-distended, +BS  EXT: No RENEE    LABS:                        13.2   7.89  )-----------( 221      ( 2022 07:14 )             41.2     -    142  |  102  |  25<H>  ----------------------------<  122<H>  4.8   |  30  |  1.1    Ca    9.6      2022 07:14  Mg     2.0         TPro  6.3  /  Alb  4.0  /  TBili  0.3  /  DBili  x   /  AST  16  /  ALT  16  /  AlkPhos  70          CARDIAC MARKERS ( 15 Apr 2022 12:27 )  x     / 0.05 ng/mL / x     / x     / x        - Chol 257<H> LDL -- HDL 59 Trig 162<H>    RADIOLOGY:  -CXR:Xray Chest 1 View- PORTABLE-Urgent (Xray Chest 1 View- PORTABLE-Urgent .) (22 @ 13:29)  Impression:  Bibasilar opacities.  -TTE:TTE Echo Complete w/o Contrast w/ Doppler (22 @ 08:31)   1. Normal global left ventricular systolic function.   2. LV Ejection Fraction by Harrington's Method with a biplane EF of 68 %.   3. Normal left ventricular internal cavity size.   4. The left ventricular diastolic function could not be assessed in this   study.   5. Normal left atrial size.   6. Normal right atrial size.   7. Mild thickening and calcification of the anterior and posterior   mitral valve leaflets.   8. Moderate mitral annular calcification.   9. No evidence of mitral valve regurgitation.  10. Mild tricuspid regurgitation.  11. Estimated pulmonary artery systolic pressure is 42.3 mmHg assuming a   right atrial pressure of 8 mmHg, which is consistent with mild pulmonary   hypertension.  12. Pulmonary hypertension is present.  13. LA volume Index is 23.8 ml/m² ml/m2.  14. Mitral valve mean gradient is 3.7 mmHg consistent with mild mitral   stenosis.  15. Peak transaortic gradient equals 9.5 mmHg, mean transaortic gradient   equals 4.3 mmHg, the calculated aortic valve area equals 2.00 cm² by the   continuity equation consistent with mild aortic stenosis.  -CCTA: None  -STRESS TEST:  -CATHETERIZATION:    EC Lead ECG (22 @ 14:55)  Ventricular Rate 83 BPM  QRS Duration 90 ms  Q-T Interval 400 ms  QTC Calculation(Bazett) 470 ms  R Axis 21 degrees  T Axis 73 degrees    Diagnosis Line Atrial fibrillation  Abnormal ECG    EKG 2022  - Sinus with PAC    TELEMETRY EVENTS:  Telemetry reviewed: Sinus with PACs and sinus bradycardia. Rate around 50 - 70 HPI:  80 y/o Greek speaking female with PMHx of HTN, HLD, DM, hx of Left MCA aneurysm presented to the ED after fall at home. As per son, he woke up at 3am to his mother calling out for help and stating she couldn't get up. When he had gone into the room, he found her sitting on the floor, awake, and scared. No loss of urine or stools. Patient is unable to state how she fell. Son reports that for the past couple of days she has been feeling weak and fatigue, this morning she had trouble standing on her right leg. No recent fevers, chills, chest pain, SOB, abdominal pain, nausea, vomiting, diarrhea, constipation, dysuria. Currently not complaining of any pain.     In the ED, VS noted for T 97.4, HR 85, /79, SpO2 96% on RA. Labs noted for WBC 8, Cr 1.2, Trop 0.05. CT head shows mild degree of cortical atrophy, mild degree of cerebellar atrophy, ischemic changes noted in periventricular white matter basal ganglia, thalami, and right cerebellar hemisphere and left occipital lobe.   CT c-spine no acute displaced c-spine fracture.   (2022 15:15)  Cardiology consulted for patient's a-fib and decision for anticoagulation. Spoke with son Sean to obtain history and speak with patient given difficulty in communication with patient when attempting to talk through translation service.     PAST MEDICAL & SURGICAL HISTORY  Type II diabetes mellitus  Hypertension  Dyslipidemia  Recurrent falls  History of hernia surgery    FAMILY HISTORY:  FAMILY HISTORY:  FHx: diabetes mellitus    SOCIAL HISTORY:  Former smoker  No Alcohol  No Recreational Drug Use     ALLERGIES:  No Known Allergies      MEDICATIONS:  MEDICATIONS  (STANDING):  aspirin  chewable 81 milliGRAM(s) Oral daily  atorvastatin 80 milliGRAM(s) Oral at bedtime  clopidogrel Tablet 75 milliGRAM(s) Oral daily  dextrose 5%. 1000 milliLiter(s) (50 mL/Hr) IV Continuous <Continuous>  dextrose 5%. 1000 milliLiter(s) (100 mL/Hr) IV Continuous <Continuous>  dextrose 50% Injectable 25 Gram(s) IV Push once  dextrose 50% Injectable 12.5 Gram(s) IV Push once  dextrose 50% Injectable 25 Gram(s) IV Push once  enoxaparin Injectable 40 milliGRAM(s) SubCutaneous every 24 hours  glucagon  Injectable 1 milliGRAM(s) IntraMuscular once  insulin lispro (ADMELOG) corrective regimen sliding scale   SubCutaneous three times a day before meals  lisinopril 5 milliGRAM(s) Oral daily  metoprolol tartrate 25 milliGRAM(s) Oral two times a day  NIFEdipine XL 30 milliGRAM(s) Oral daily  sertraline 25 milliGRAM(s) Oral daily    MEDICATIONS  (PRN):  dextrose Oral Gel 15 Gram(s) Oral once PRN Blood Glucose LESS THAN 70 milliGRAM(s)/deciliter      HOME MEDICATIONS:  Home Medications:  aspirin 81 mg oral tablet, chewable: 1 tab(s) orally once a day (2022 15:55)  metformin 500 mg tablet: 1 each orally once a day  if FS &gt;200, take 1 tab at night (2022 15:55)  sertraline 25 mg oral tablet: 1 tab(s) orally once a day (2022 15:54)      VITALS:   T(F): 96.9 ( @ 08:15), Max: 98.5 ( @ 13:11)  HR: 80 ( @ 08:15) (55 - 84)  BP: 136/66 ( @ 08:15) (122/68 - 179/96)  BP(mean): 89 ( @ 08:15) (89 - 122)  RR: 22 ( @ 08:15) (16 - 22)  SpO2: 96% ( @ 08:15) (92% - 99%)    I&O's Summary    2022 07:01  -  2022 07:00  --------------------------------------------------------  IN: 480 mL / OUT: 500 mL / NET: -20 mL    REVIEW OF SYSTEMS:  CONSTITUTIONAL: No weakness, fevers or chills  EYES: No visual changes  ENT: No vertigo or throat pain   NECK: No pain or stiffness  RESPIRATORY: No cough, wheezing, hemoptysis; No shortness of breath  CARDIOVASCULAR: No chest pain or palpitations  GASTROINTESTINAL: No abdominal or epigastric pain. No nausea, vomiting, or hematemesis; No diarrhea or constipation. No melena or hematochezia.  GENITOURINARY: No dysuria, frequency or hematuria  NEUROLOGICAL: No numbness or weakness  SKIN: No itching, no rashes  MSK: no    PHYSICAL EXAM:  NEURO: patient is awake , alert and oriented  GEN: Not in acute distress  NECK: no thyroid enlargement, no JVD  LUNGS: Clear to auscultation bilaterally   CARDIOVASCULAR: S1/S2 present, irregularly regular, no murmurs or rubs, no carotid bruits,  + PP bilaterally  ABD: Soft, non-tender, non-distended, +BS  EXT: No RENEE    LABS:                        13.2   7.89  )-----------( 221      ( 2022 07:14 )             41.2     04-    142  |  102  |  25<H>  ----------------------------<  122<H>  4.8   |  30  |  1.1    Ca    9.6      2022 07:14  Mg     2.0     -    TPro  6.3  /  Alb  4.0  /  TBili  0.3  /  DBili  x   /  AST  16  /  ALT  16  /  AlkPhos  70  04-17        CARDIAC MARKERS ( 15 Apr 2022 12:27 )  x     / 0.05 ng/mL / x     / x     / x        04-13 Chol 257<H> LDL -- HDL 59 Trig 162<H>    RADIOLOGY:  -CXR:Xray Chest 1 View- PORTABLE-Urgent (Xray Chest 1 View- PORTABLE-Urgent .) (22 @ 13:29)  Impression:  Bibasilar opacities.  -TTE:TTE Echo Complete w/o Contrast w/ Doppler (22 @ 08:31)   1. Normal global left ventricular systolic function.   2. LV Ejection Fraction by Harrington's Method with a biplane EF of 68 %.   3. Normal left ventricular internal cavity size.   4. The left ventricular diastolic function could not be assessed in this   study.   5. Normal left atrial size.   6. Normal right atrial size.   7. Mild thickening and calcification of the anterior and posterior   mitral valve leaflets.   8. Moderate mitral annular calcification.   9. No evidence of mitral valve regurgitation.  10. Mild tricuspid regurgitation.  11. Estimated pulmonary artery systolic pressure is 42.3 mmHg assuming a   right atrial pressure of 8 mmHg, which is consistent with mild pulmonary   hypertension.  12. Pulmonary hypertension is present.  13. LA volume Index is 23.8 ml/m² ml/m2.  14. Mitral valve mean gradient is 3.7 mmHg consistent with mild mitral   stenosis.  15. Peak transaortic gradient equals 9.5 mmHg, mean transaortic gradient   equals 4.3 mmHg, the calculated aortic valve area equals 2.00 cm² by the   continuity equation consistent with mild aortic stenosis.  -CCTA: None  -STRESS TEST:  -CATHETERIZATION:    EC Lead ECG (22 @ 14:55)  Ventricular Rate 83 BPM  QRS Duration 90 ms  Q-T Interval 400 ms  QTC Calculation(Bazett) 470 ms  R Axis 21 degrees  T Axis 73 degrees    Diagnosis Line Atrial fibrillation  Abnormal ECG    EKG 2022  - Sinus with PAC    TELEMETRY EVENTS:  Telemetry reviewed: Sinus with PACs and sinus bradycardia. Rate around 50 - 70

## 2022-04-17 NOTE — PROGRESS NOTE ADULT - ASSESSMENT
80 yo F with PMHx of HTN, HLD, DM, left MCA aneurysm, depression, who presented after fall. MRI brain showed acute ischemic stroke of left central kiara (penetrating pontine arteries off basilar) and subacute ischemic in right cerebellum (R SCA territory). Subtle right hemiparesis on exam (NIHSS 2, 1 for RUE and 1 for RLE drift) - granted exam somewhat limited due to poor pt cooperation. Patient was transferred to stroke unit for further monitoring.    Plan:  Neuro  - CT Head reviewed, no acute intracranial hemorrhage, stable exam since CT in 11/2021. 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 Moderate stenosis involving the right clinoid segment of the ICA due to atherosclerotic calcification. Mild stenosis of the bilateral proximal internal carotid arteries due to mixed calcified and noncalcified atherosclerotic plaque  - MRI Brain reviewed, showed diffusion abnormality in central aspect of the kiara and R cerebellum  - Continue Aspirin 81 mg daily  - Continue Plavix 75 mg daily  - Continue Atorvastatin 80 mg daily  - Passed s/s eval, advanced to pureed diet with mildly thick liquids  - Monitor BP closely and avoid hypotension  - Goal  - 160  - PT/OT/SLP evaluation  - Fall precautions  - q4 hour neuro checks and vitals  - Stroke education    Cardio  - EKG shows Afib, none noted on previous EKG  - CHADVASC 5, but given recurrent falls, may need to defer A/C  - Cardiology consult for Afib on telemetry  - TTE showed normal LVEF  - EVITA pending, f/u  - Hold off on EPS if AFIB is confirmed.   - Will hold off on Plavix in case of AC.     #HTN  - Continue with Nifedipine and Lisinopril    Pulm  - Place call to provider if SpO2 < 92%  - Overnight, desatted to 80s, can get CXR, f/u    GI  - Replete K > 4, Mg > 2, prn   - Passed s/s eval, advanced to pureed diet with mildly thick liquids    Endocrine  - Hold oral medications  - Monitor FS, if FS>180 can start basal/bolus  - HbA1c 6.4%  - TSH 0.96, wnl    DVT PPx: Lovenox    Dispo: Stroke unit, pending PT/OT eval   82 yo F with PMHx of HTN, HLD, DM, left MCA aneurysm, depression, who presented after fall. MRI brain showed acute ischemic stroke of left central kiara (penetrating pontine arteries off basilar) and subacute ischemic in right cerebellum (R SCA territory). Subtle right hemiparesis on exam (NIHSS 2, 1 for RUE and 1 for RLE drift) - granted exam somewhat limited due to poor pt cooperation. Patient was transferred to stroke unit for further monitoring.    Plan:  Neuro  - CT Head reviewed, no acute intracranial hemorrhage, stable exam since CT in 11/2021. 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 Moderate stenosis involving the right clinoid segment of the ICA due to atherosclerotic calcification. Mild stenosis of the bilateral proximal internal carotid arteries due to mixed calcified and noncalcified atherosclerotic plaque  - MRI Brain reviewed, showed diffusion abnormality in central aspect of the kiara and R cerebellum  - Continue Aspirin 81 mg daily  - Stop Plavix 75 mg daily  - Start Eliquis   - Continue Atorvastatin 80 mg daily  - Passed s/s eval, advanced to pureed diet with mildly thick liquids  - Monitor BP closely and avoid hypotension  - Goal  - 160  - PT/OT/SLP evaluation  - Fall precautions  - q4 hour neuro checks and vitals  - Stroke education    Cardio  - EKG shows Afib, none noted on previous EKG  - CHADVASC 5,  - Cardiology consult for Afib on telemetry  - TTE showed normal LVEF  - EVITA. Will defer   - Hold off on EPS   - Start on Eliquis 5 mg BID     #HTN  - Continue with Nifedipine and Lisinopril    Pulm  - Place call to provider if SpO2 < 92%  - Overnight, desatted to 80s, can get CXR, f/u    GI  - Replete K > 4, Mg > 2, prn   - Passed s/s eval, advanced to pureed diet with mildly thick liquids    Endocrine  - Hold oral medications  - Monitor FS, if FS>180 can start basal/bolus  - HbA1c 6.4%  - TSH 0.96, wnl    DVT PPx: Lovenox    Dispo: Stroke unit, pending PT/OT eval

## 2022-04-17 NOTE — PROGRESS NOTE ADULT - ATTENDING COMMENTS
I have personally seen and examined this patient.  I have fully participated in the care of this patient.  I have reviewed all pertinent clinical information, including history, physical exam, plan and note. Spoke with cardiology attending. Afib confirmed. No needs to EVITA and ILR. Start on Eliquis. Continue ASA   I have reviewed all pertinent clinical information and reviewed all relevant imaging and diagnostic studies personally.  Recommendations as above.  Agree with above assessment except as noted.

## 2022-04-18 LAB
ALBUMIN SERPL ELPH-MCNC: 3.8 G/DL — SIGNIFICANT CHANGE UP (ref 3.5–5.2)
ALP SERPL-CCNC: 66 U/L — SIGNIFICANT CHANGE UP (ref 30–115)
ALT FLD-CCNC: 16 U/L — SIGNIFICANT CHANGE UP (ref 0–41)
ANION GAP SERPL CALC-SCNC: 12 MMOL/L — SIGNIFICANT CHANGE UP (ref 7–14)
AST SERPL-CCNC: 14 U/L — SIGNIFICANT CHANGE UP (ref 0–41)
BASOPHILS # BLD AUTO: 0.04 K/UL — SIGNIFICANT CHANGE UP (ref 0–0.2)
BASOPHILS NFR BLD AUTO: 0.5 % — SIGNIFICANT CHANGE UP (ref 0–1)
BILIRUB SERPL-MCNC: 0.3 MG/DL — SIGNIFICANT CHANGE UP (ref 0.2–1.2)
BUN SERPL-MCNC: 33 MG/DL — HIGH (ref 10–20)
CALCIUM SERPL-MCNC: 9.4 MG/DL — SIGNIFICANT CHANGE UP (ref 8.5–10.1)
CHLORIDE SERPL-SCNC: 101 MMOL/L — SIGNIFICANT CHANGE UP (ref 98–110)
CO2 SERPL-SCNC: 26 MMOL/L — SIGNIFICANT CHANGE UP (ref 17–32)
CREAT SERPL-MCNC: 1.2 MG/DL — SIGNIFICANT CHANGE UP (ref 0.7–1.5)
EGFR: 45 ML/MIN/1.73M2 — LOW
EOSINOPHIL # BLD AUTO: 0.5 K/UL — SIGNIFICANT CHANGE UP (ref 0–0.7)
EOSINOPHIL NFR BLD AUTO: 5.9 % — SIGNIFICANT CHANGE UP (ref 0–8)
GLUCOSE BLDC GLUCOMTR-MCNC: 108 MG/DL — HIGH (ref 70–99)
GLUCOSE BLDC GLUCOMTR-MCNC: 115 MG/DL — HIGH (ref 70–99)
GLUCOSE BLDC GLUCOMTR-MCNC: 117 MG/DL — HIGH (ref 70–99)
GLUCOSE BLDC GLUCOMTR-MCNC: 197 MG/DL — HIGH (ref 70–99)
GLUCOSE SERPL-MCNC: 133 MG/DL — HIGH (ref 70–99)
HCT VFR BLD CALC: 39.1 % — SIGNIFICANT CHANGE UP (ref 37–47)
HGB BLD-MCNC: 12.6 G/DL — SIGNIFICANT CHANGE UP (ref 12–16)
IMM GRANULOCYTES NFR BLD AUTO: 0.2 % — SIGNIFICANT CHANGE UP (ref 0.1–0.3)
LYMPHOCYTES # BLD AUTO: 1.17 K/UL — LOW (ref 1.2–3.4)
LYMPHOCYTES # BLD AUTO: 13.7 % — LOW (ref 20.5–51.1)
MAGNESIUM SERPL-MCNC: 2.2 MG/DL — SIGNIFICANT CHANGE UP (ref 1.8–2.4)
MCHC RBC-ENTMCNC: 28.9 PG — SIGNIFICANT CHANGE UP (ref 27–31)
MCHC RBC-ENTMCNC: 32.2 G/DL — SIGNIFICANT CHANGE UP (ref 32–37)
MCV RBC AUTO: 89.7 FL — SIGNIFICANT CHANGE UP (ref 81–99)
MONOCYTES # BLD AUTO: 0.66 K/UL — HIGH (ref 0.1–0.6)
MONOCYTES NFR BLD AUTO: 7.7 % — SIGNIFICANT CHANGE UP (ref 1.7–9.3)
NEUTROPHILS # BLD AUTO: 6.15 K/UL — SIGNIFICANT CHANGE UP (ref 1.4–6.5)
NEUTROPHILS NFR BLD AUTO: 72 % — SIGNIFICANT CHANGE UP (ref 42.2–75.2)
NRBC # BLD: 0 /100 WBCS — SIGNIFICANT CHANGE UP (ref 0–0)
PLATELET # BLD AUTO: 200 K/UL — SIGNIFICANT CHANGE UP (ref 130–400)
POTASSIUM SERPL-MCNC: 4.7 MMOL/L — SIGNIFICANT CHANGE UP (ref 3.5–5)
POTASSIUM SERPL-SCNC: 4.7 MMOL/L — SIGNIFICANT CHANGE UP (ref 3.5–5)
PROT SERPL-MCNC: 6.2 G/DL — SIGNIFICANT CHANGE UP (ref 6–8)
RBC # BLD: 4.36 M/UL — SIGNIFICANT CHANGE UP (ref 4.2–5.4)
RBC # FLD: 13.6 % — SIGNIFICANT CHANGE UP (ref 11.5–14.5)
SODIUM SERPL-SCNC: 139 MMOL/L — SIGNIFICANT CHANGE UP (ref 135–146)
WBC # BLD: 8.54 K/UL — SIGNIFICANT CHANGE UP (ref 4.8–10.8)
WBC # FLD AUTO: 8.54 K/UL — SIGNIFICANT CHANGE UP (ref 4.8–10.8)

## 2022-04-18 PROCEDURE — 99233 SBSQ HOSP IP/OBS HIGH 50: CPT

## 2022-04-18 PROCEDURE — 99232 SBSQ HOSP IP/OBS MODERATE 35: CPT

## 2022-04-18 RX ORDER — SODIUM CHLORIDE 9 MG/ML
500 INJECTION INTRAMUSCULAR; INTRAVENOUS; SUBCUTANEOUS ONCE
Refills: 0 | Status: COMPLETED | OUTPATIENT
Start: 2022-04-18 | End: 2022-04-18

## 2022-04-18 RX ADMIN — SERTRALINE 25 MILLIGRAM(S): 25 TABLET, FILM COATED ORAL at 11:09

## 2022-04-18 RX ADMIN — Medication 30 MILLIGRAM(S): at 05:34

## 2022-04-18 RX ADMIN — ATORVASTATIN CALCIUM 80 MILLIGRAM(S): 80 TABLET, FILM COATED ORAL at 21:45

## 2022-04-18 RX ADMIN — SODIUM CHLORIDE 250 MILLILITER(S): 9 INJECTION INTRAMUSCULAR; INTRAVENOUS; SUBCUTANEOUS at 12:35

## 2022-04-18 RX ADMIN — APIXABAN 5 MILLIGRAM(S): 2.5 TABLET, FILM COATED ORAL at 05:33

## 2022-04-18 RX ADMIN — APIXABAN 5 MILLIGRAM(S): 2.5 TABLET, FILM COATED ORAL at 17:22

## 2022-04-18 RX ADMIN — Medication 25 MILLIGRAM(S): at 05:33

## 2022-04-18 RX ADMIN — Medication 1: at 11:21

## 2022-04-18 RX ADMIN — Medication 25 MILLIGRAM(S): at 17:22

## 2022-04-18 RX ADMIN — LISINOPRIL 5 MILLIGRAM(S): 2.5 TABLET ORAL at 05:34

## 2022-04-18 NOTE — PROGRESS NOTE ADULT - ASSESSMENT
80 yo F with PMHx of HTN, HLD, DM, left MCA aneurysm, depression, who presented after fall. MRI brain showed acute ischemic stroke of left central kiara (penetrating pontine arteries off basilar) and subacute ischemic in right cerebellum (R SCA territory). Ptn is stable and being downgraded to 3E:   Plan:  Neuro  - CT Head reviewed, no acute intracranial hemorrhage, stable exam since CT in 11/2021. 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 Moderate stenosis involving the right clinoid segment of the ICA due to atherosclerotic calcification. Mild stenosis of the bilateral proximal internal carotid arteries due to mixed calcified and noncalcified atherosclerotic plaque  - MRI Brain reviewed, showed diffusion abnormality in central aspect of the kiara and R cerebellum  - ASA Dced.   - Eliquis continued as 5mg BID.   - Continue Atorvastatin 80 mg daily  - Passed s/s eval, advanced to pureed diet with mildly thick liquids  - Monitor BP closely and avoid hypotension  - Goal SBP <150  - PT/OT/SLP evaluation  - Fall precautions  - q4 hour neuro checks and vitals  - Stroke education  - Neuroendovascular evaluated the patient and recommended the aneurysm is stable, follow up as outpatient with Dr. Goldstein (1 cm saccular aneurysm arising from the left MCA bifurcation)    Cardio  - EKG shows Afib, none noted on previous EKG  - CHADVASC 5,  - Cardiology consult for Afib on telemetry  - TTE showed normal LVEF  - EVITA. Will defer   - Hold off on EPS   - On Eliquis 5mg BID.    #HTN  - Continue with Nifedipine and Lisinopril    Pulm  - Place call to provider if SpO2 < 92%  - Overnight, desatted to 80s, can get CXR, f/u    GI  - Replete K > 4, Mg > 2, prn   - Passed s/s eval, advanced to pureed diet with mildly thick liquids    Endocrine  - Hold oral medications  - Monitor FS, if FS>180 can start basal/bolus  - HbA1c 6.4%  - TSH 0.96, wnl    DVT PPx: Lovenox    Dispo: Candidate for inpatient rehab, downgraded to 3E. Discharge <24h.

## 2022-04-18 NOTE — SWALLOW BEDSIDE ASSESSMENT ADULT - ORAL PHASE
Decreased anterior-posterior movement of the bolus/Delayed oral transit time
Delayed oral transit time

## 2022-04-18 NOTE — SWALLOW BEDSIDE ASSESSMENT ADULT - SWALLOW EVAL: DIAGNOSIS
No s/s aspiration/penetration for puree and mildly thick liquids via teaspoon only. Suspected pharyngeal dysphagia for mildly thick liquids via cup sip
+intermittent overt s/s aspiration/penetration w/ trials of puree and mildly thick liquids

## 2022-04-18 NOTE — SWALLOW BEDSIDE ASSESSMENT ADULT - SLP GENERAL OBSERVATIONS
pt received OOB to chair awake alert w/o c/o pain. +room air +son at bedside who requested to provide translation
Pt awake, +confusion, emotionally labile. Pt aphasic, moderate dysarthria, dysphonic vocal quality. pt able to inconsistently follow simple commands

## 2022-04-18 NOTE — SWALLOW BEDSIDE ASSESSMENT ADULT - SLP PERTINENT HISTORY OF CURRENT PROBLEM
Pt admitted s/p fall at home. r/o CVA. MRI: Limited and incomplete examination reveals increased DWI signal within the central aspect of the kiara as well as the right cerebellar hemisphere  likely representing acute ischemic change.
80 y/o F with PMHx of HTN, HLD, DM, left MCA aneurysm, depression, who presented after fall. MRI brain acute ischemic stroke of left central kiara. Stable ischemic changes in right cerebellar hemisphere. Pt known to Dr. Byers's service for left MCA 11mm aneurysm, last diagnostic cerebral angiogram performed 6/2021. CTA reporting no significant change to left MCA M1 bifurcation aneurysm. Decision remains to not proceed with open surgical / any treatment for left MCA aneurysm which appears stable.

## 2022-04-18 NOTE — PROGRESS NOTE ADULT - TIME BILLING
81F PMHx depression, HTN, DM2, CVA here with unwitnessed fall.    #Fall, unwitnessed, in setting of acute CVA  tte unrevealing, mild as  orthostatics neg  cth unchanged; trauma w/u otherwise neg  mri head with concern for acute cva R kiara, cerebellum  cta head, neck with mod stenosis R ICA, 1cm anuerysm   ekg with concern for pAF  eliquis per primary, cards  lipitor 40  diet as tolerated; f/u s+s    #UTI, klebsiella  ucx with kleb  cont ceftriaxone plan for 5 days total      #Elevated cardiac enzymes  ekg q waves inf  no cp  tte no wall motion abnl  trend    #HTN  lopressor 25 bid  lisinopril 5  nifedipine 30    #DM2  ssi    #Depression  zoloft 25    #DVT ppx  lovenox    Mari  [id number]

## 2022-04-18 NOTE — CHART NOTE - NSCHARTNOTEFT_GEN_A_CORE
Patient is a 81 year old female, known to Dr. Byers's service for left MCA 11mm aneurysm, last diagnostic cerebral angiogram performed 6/2021 by Dr. Byers. Patient now admitted with right pontine/ cerebellar ischemic infarct.   CTA reporting no significant change to left MCA M1 bifurcation aneurysm. On our prior discussion with the patient and her son, it was advised that open surgery may be the best option to treat her aneurysm. At the time in June, both patient and her son declined that option due to risk benefit profile.     Patient and her son seen at bedside in the stroke unit today per the request of Dr. Ac. Decision remains to not proceed with open surgical / any treatment for left MCA aneurysm which appears stable to our eyes on noninvasive imaging on this admission.     Patient and her son informed that should they change their minds we will be available for further discussion.     [phone] with any further questions regarding neuroendovascular treatment

## 2022-04-18 NOTE — PROGRESS NOTE ADULT - ATTENDING COMMENTS
Pt is a 82 yo F with PMHx of HTN, HLD, DM, left MCA aneurysm, depression, who presented after fall. mild right hemiparesis on exam.     Impr: acute ischemic stroke of left central kiara (penetrating pontine arteries off basilar) and subacute ischemic in right cerebellum (R SCA territory).   CTA head/neck without evidence of significant flow limiting stenosis. Noted again left MCA bifurcation aneurysm measuring 1cm, stable from 06/2021. [facility] consulted, aneurysm not amenable to coil embo as multiple MCA branches exit off aneurysm. Pt and family refusing NS for possible aneurysm clipping. F/u outpatient as indicated.   Found to have new onset afib on tele. started on eliquis 5mg BID  -160. PT/OT/ST, tele, DVT ppx, q8 neurochecks, ok to downgrade to 3E.   Dispo planning, possible IPR vs SNF Pt is a 82 yo F with PMHx of HTN, HLD, DM, left MCA aneurysm, depression, who presented after fall. mild right hemiparesis on exam.     Impr: acute ischemic stroke of left central kiara (penetrating pontine arteries off basilar) and subacute ischemic in right cerebellum (R SCA territory).   CTA head/neck without evidence of significant flow limiting stenosis. Noted again left MCA bifurcation aneurysm measuring 1cm, stable from 06/2021. [facility] consulted, aneurysm not amenable to coil embo as multiple MCA branches exit off aneurysm. Pt and family refusing NS for possible aneurysm clipping. F/u outpatient as indicated.   Found to have new onset afib on tele. started on eliquis 5mg BID  Pt has cane/walker at home, but son states she does not use them often. Discussed importance of gait stability training with pt's son, especially as pt is now on anticoagulation.   -160. PT/OT/ST, tele, DVT ppx, q8 neurochecks, ok to downgrade to 3E.   Dispo planning, possible IPR vs SNF

## 2022-04-18 NOTE — SWALLOW BEDSIDE ASSESSMENT ADULT - SWALLOW EVAL: RECOMMENDED FEEDING/EATING TECHNIQUES
small sips/bites
crush medication (when feasible)/no straws/oral hygiene/position upright (90 degrees)/small sips/bites

## 2022-04-19 ENCOUNTER — INPATIENT (INPATIENT)
Facility: HOSPITAL | Age: 82
LOS: 19 days | Discharge: HOME HEALTH | End: 2022-05-09
Attending: PHYSICAL MEDICINE & REHABILITATION | Admitting: PHYSICAL MEDICINE & REHABILITATION
Payer: MEDICAID

## 2022-04-19 ENCOUNTER — TRANSCRIPTION ENCOUNTER (OUTPATIENT)
Age: 82
End: 2022-04-19

## 2022-04-19 VITALS
HEART RATE: 73 BPM | RESPIRATION RATE: 18 BRPM | SYSTOLIC BLOOD PRESSURE: 156 MMHG | TEMPERATURE: 97 F | DIASTOLIC BLOOD PRESSURE: 73 MMHG | WEIGHT: 166.01 LBS

## 2022-04-19 VITALS
DIASTOLIC BLOOD PRESSURE: 61 MMHG | TEMPERATURE: 97 F | RESPIRATION RATE: 18 BRPM | HEART RATE: 78 BPM | SYSTOLIC BLOOD PRESSURE: 140 MMHG | OXYGEN SATURATION: 82 %

## 2022-04-19 DIAGNOSIS — Z98.890 OTHER SPECIFIED POSTPROCEDURAL STATES: Chronic | ICD-10-CM

## 2022-04-19 LAB
ALBUMIN SERPL ELPH-MCNC: 3.6 G/DL — SIGNIFICANT CHANGE UP (ref 3.5–5.2)
ALP SERPL-CCNC: 67 U/L — SIGNIFICANT CHANGE UP (ref 30–115)
ALT FLD-CCNC: 17 U/L — SIGNIFICANT CHANGE UP (ref 0–41)
ANION GAP SERPL CALC-SCNC: 10 MMOL/L — SIGNIFICANT CHANGE UP (ref 7–14)
AST SERPL-CCNC: 16 U/L — SIGNIFICANT CHANGE UP (ref 0–41)
BASOPHILS # BLD AUTO: 0.03 K/UL — SIGNIFICANT CHANGE UP (ref 0–0.2)
BASOPHILS NFR BLD AUTO: 0.4 % — SIGNIFICANT CHANGE UP (ref 0–1)
BILIRUB SERPL-MCNC: 0.3 MG/DL — SIGNIFICANT CHANGE UP (ref 0.2–1.2)
BUN SERPL-MCNC: 28 MG/DL — HIGH (ref 10–20)
CALCIUM SERPL-MCNC: 9.1 MG/DL — SIGNIFICANT CHANGE UP (ref 8.5–10.1)
CHLORIDE SERPL-SCNC: 104 MMOL/L — SIGNIFICANT CHANGE UP (ref 98–110)
CO2 SERPL-SCNC: 27 MMOL/L — SIGNIFICANT CHANGE UP (ref 17–32)
CREAT SERPL-MCNC: 1 MG/DL — SIGNIFICANT CHANGE UP (ref 0.7–1.5)
EGFR: 56 ML/MIN/1.73M2 — LOW
EOSINOPHIL # BLD AUTO: 0.5 K/UL — SIGNIFICANT CHANGE UP (ref 0–0.7)
EOSINOPHIL NFR BLD AUTO: 6.6 % — SIGNIFICANT CHANGE UP (ref 0–8)
GLUCOSE BLDC GLUCOMTR-MCNC: 112 MG/DL — HIGH (ref 70–99)
GLUCOSE BLDC GLUCOMTR-MCNC: 124 MG/DL — HIGH (ref 70–99)
GLUCOSE BLDC GLUCOMTR-MCNC: 148 MG/DL — HIGH (ref 70–99)
GLUCOSE BLDC GLUCOMTR-MCNC: 150 MG/DL — HIGH (ref 70–99)
GLUCOSE SERPL-MCNC: 132 MG/DL — HIGH (ref 70–99)
HCT VFR BLD CALC: 39.6 % — SIGNIFICANT CHANGE UP (ref 37–47)
HGB BLD-MCNC: 12.7 G/DL — SIGNIFICANT CHANGE UP (ref 12–16)
IMM GRANULOCYTES NFR BLD AUTO: 0.4 % — HIGH (ref 0.1–0.3)
LYMPHOCYTES # BLD AUTO: 1.14 K/UL — LOW (ref 1.2–3.4)
LYMPHOCYTES # BLD AUTO: 15.1 % — LOW (ref 20.5–51.1)
MAGNESIUM SERPL-MCNC: 2.3 MG/DL — SIGNIFICANT CHANGE UP (ref 1.8–2.4)
MCHC RBC-ENTMCNC: 28.9 PG — SIGNIFICANT CHANGE UP (ref 27–31)
MCHC RBC-ENTMCNC: 32.1 G/DL — SIGNIFICANT CHANGE UP (ref 32–37)
MCV RBC AUTO: 90 FL — SIGNIFICANT CHANGE UP (ref 81–99)
MONOCYTES # BLD AUTO: 0.55 K/UL — SIGNIFICANT CHANGE UP (ref 0.1–0.6)
MONOCYTES NFR BLD AUTO: 7.3 % — SIGNIFICANT CHANGE UP (ref 1.7–9.3)
NEUTROPHILS # BLD AUTO: 5.28 K/UL — SIGNIFICANT CHANGE UP (ref 1.4–6.5)
NEUTROPHILS NFR BLD AUTO: 70.2 % — SIGNIFICANT CHANGE UP (ref 42.2–75.2)
NRBC # BLD: 0 /100 WBCS — SIGNIFICANT CHANGE UP (ref 0–0)
PHOSPHATE SERPL-MCNC: 3.9 MG/DL — SIGNIFICANT CHANGE UP (ref 2.1–4.9)
PLATELET # BLD AUTO: 186 K/UL — SIGNIFICANT CHANGE UP (ref 130–400)
POTASSIUM SERPL-MCNC: 4.7 MMOL/L — SIGNIFICANT CHANGE UP (ref 3.5–5)
POTASSIUM SERPL-SCNC: 4.7 MMOL/L — SIGNIFICANT CHANGE UP (ref 3.5–5)
PROT SERPL-MCNC: 6 G/DL — SIGNIFICANT CHANGE UP (ref 6–8)
RBC # BLD: 4.4 M/UL — SIGNIFICANT CHANGE UP (ref 4.2–5.4)
RBC # FLD: 13.4 % — SIGNIFICANT CHANGE UP (ref 11.5–14.5)
SODIUM SERPL-SCNC: 141 MMOL/L — SIGNIFICANT CHANGE UP (ref 135–146)
WBC # BLD: 7.53 K/UL — SIGNIFICANT CHANGE UP (ref 4.8–10.8)
WBC # FLD AUTO: 7.53 K/UL — SIGNIFICANT CHANGE UP (ref 4.8–10.8)

## 2022-04-19 PROCEDURE — 99233 SBSQ HOSP IP/OBS HIGH 50: CPT

## 2022-04-19 PROCEDURE — 93010 ELECTROCARDIOGRAM REPORT: CPT

## 2022-04-19 PROCEDURE — 74230 X-RAY XM SWLNG FUNCJ C+: CPT | Mod: 26

## 2022-04-19 PROCEDURE — 99238 HOSP IP/OBS DSCHRG MGMT 30/<: CPT

## 2022-04-19 RX ORDER — APIXABAN 2.5 MG/1
1 TABLET, FILM COATED ORAL
Qty: 60 | Refills: 2
Start: 2022-04-19 | End: 2022-07-17

## 2022-04-19 RX ORDER — LANOLIN ALCOHOL/MO/W.PET/CERES
5 CREAM (GRAM) TOPICAL AT BEDTIME
Refills: 0 | Status: DISCONTINUED | OUTPATIENT
Start: 2022-04-19 | End: 2022-05-09

## 2022-04-19 RX ORDER — ASPIRIN/CALCIUM CARB/MAGNESIUM 324 MG
1 TABLET ORAL
Qty: 0 | Refills: 0 | DISCHARGE

## 2022-04-19 RX ORDER — METOPROLOL TARTRATE 50 MG
1 TABLET ORAL
Qty: 60 | Refills: 3
Start: 2022-04-19 | End: 2022-08-16

## 2022-04-19 RX ORDER — APIXABAN 2.5 MG/1
5 TABLET, FILM COATED ORAL EVERY 12 HOURS
Refills: 0 | Status: DISCONTINUED | OUTPATIENT
Start: 2022-04-19 | End: 2022-05-09

## 2022-04-19 RX ORDER — DEXTROSE 50 % IN WATER 50 %
25 SYRINGE (ML) INTRAVENOUS ONCE
Refills: 0 | Status: DISCONTINUED | OUTPATIENT
Start: 2022-04-19 | End: 2022-05-09

## 2022-04-19 RX ORDER — METOPROLOL TARTRATE 50 MG
1 TABLET ORAL
Qty: 60 | Refills: 3
Start: 2022-04-19 | End: 2022-09-05

## 2022-04-19 RX ORDER — SERTRALINE 25 MG/1
25 TABLET, FILM COATED ORAL DAILY
Refills: 0 | Status: DISCONTINUED | OUTPATIENT
Start: 2022-04-19 | End: 2022-05-09

## 2022-04-19 RX ORDER — NIFEDIPINE 30 MG
1 TABLET, EXTENDED RELEASE 24 HR ORAL
Qty: 30 | Refills: 3
Start: 2022-04-19 | End: 2022-08-16

## 2022-04-19 RX ORDER — ATORVASTATIN CALCIUM 80 MG/1
1 TABLET, FILM COATED ORAL
Qty: 30 | Refills: 3
Start: 2022-04-19 | End: 2022-09-05

## 2022-04-19 RX ORDER — NIFEDIPINE 30 MG
30 TABLET, EXTENDED RELEASE 24 HR ORAL DAILY
Refills: 0 | Status: DISCONTINUED | OUTPATIENT
Start: 2022-04-19 | End: 2022-04-23

## 2022-04-19 RX ORDER — ATORVASTATIN CALCIUM 80 MG/1
80 TABLET, FILM COATED ORAL AT BEDTIME
Refills: 0 | Status: DISCONTINUED | OUTPATIENT
Start: 2022-04-19 | End: 2022-05-09

## 2022-04-19 RX ORDER — PANTOPRAZOLE SODIUM 20 MG/1
40 TABLET, DELAYED RELEASE ORAL
Refills: 0 | Status: DISCONTINUED | OUTPATIENT
Start: 2022-04-19 | End: 2022-05-09

## 2022-04-19 RX ORDER — ACETAMINOPHEN 500 MG
650 TABLET ORAL EVERY 6 HOURS
Refills: 0 | Status: DISCONTINUED | OUTPATIENT
Start: 2022-04-19 | End: 2022-05-09

## 2022-04-19 RX ORDER — GLUCAGON INJECTION, SOLUTION 0.5 MG/.1ML
1 INJECTION, SOLUTION SUBCUTANEOUS ONCE
Refills: 0 | Status: DISCONTINUED | OUTPATIENT
Start: 2022-04-19 | End: 2022-05-09

## 2022-04-19 RX ORDER — ASPIRIN/CALCIUM CARB/MAGNESIUM 324 MG
81 TABLET ORAL DAILY
Refills: 0 | Status: DISCONTINUED | OUTPATIENT
Start: 2022-04-19 | End: 2022-05-09

## 2022-04-19 RX ORDER — METFORMIN HYDROCHLORIDE 850 MG/1
500 TABLET ORAL
Refills: 0 | Status: DISCONTINUED | OUTPATIENT
Start: 2022-04-19 | End: 2022-04-21

## 2022-04-19 RX ORDER — ATORVASTATIN CALCIUM 80 MG/1
1 TABLET, FILM COATED ORAL
Qty: 30 | Refills: 3
Start: 2022-04-19 | End: 2022-08-16

## 2022-04-19 RX ORDER — METOPROLOL TARTRATE 50 MG
25 TABLET ORAL
Refills: 0 | Status: DISCONTINUED | OUTPATIENT
Start: 2022-04-19 | End: 2022-05-09

## 2022-04-19 RX ORDER — LISINOPRIL 2.5 MG/1
1 TABLET ORAL
Qty: 30 | Refills: 3
Start: 2022-04-19 | End: 2022-08-16

## 2022-04-19 RX ORDER — LISINOPRIL 2.5 MG/1
5 TABLET ORAL AT BEDTIME
Refills: 0 | Status: DISCONTINUED | OUTPATIENT
Start: 2022-04-20 | End: 2022-05-09

## 2022-04-19 RX ORDER — MAGNESIUM HYDROXIDE 400 MG/1
30 TABLET, CHEWABLE ORAL DAILY
Refills: 0 | Status: DISCONTINUED | OUTPATIENT
Start: 2022-04-19 | End: 2022-05-09

## 2022-04-19 RX ADMIN — Medication 25 MILLIGRAM(S): at 17:26

## 2022-04-19 RX ADMIN — ATORVASTATIN CALCIUM 80 MILLIGRAM(S): 80 TABLET, FILM COATED ORAL at 21:52

## 2022-04-19 RX ADMIN — APIXABAN 5 MILLIGRAM(S): 2.5 TABLET, FILM COATED ORAL at 17:34

## 2022-04-19 RX ADMIN — SERTRALINE 25 MILLIGRAM(S): 25 TABLET, FILM COATED ORAL at 11:40

## 2022-04-19 RX ADMIN — LISINOPRIL 5 MILLIGRAM(S): 2.5 TABLET ORAL at 05:12

## 2022-04-19 RX ADMIN — Medication 25 MILLIGRAM(S): at 05:12

## 2022-04-19 RX ADMIN — APIXABAN 5 MILLIGRAM(S): 2.5 TABLET, FILM COATED ORAL at 05:12

## 2022-04-19 RX ADMIN — Medication 30 MILLIGRAM(S): at 05:12

## 2022-04-19 NOTE — H&P ADULT - NSHPPHYSICALEXAM_GEN_ALL_CORE
PHYSICAL EXAMINATION   VItals: T(C): 36.3 (04-19-22 @ 13:10), Max: 37 (04-18-22 @ 20:46)  HR: 78 (04-19-22 @ 13:10) (70 - 78)  BP: 140/61 (04-19-22 @ 13:10) (131/60 - 168/82)  RR: 18 (04-19-22 @ 13:10) (18 - 19)  SpO2: 82% (04-19-22 @ 13:10) (82% - 98%)    General:[ x  ] NAD, Resting Comfortable,   [   ] other:                                HEENT: [  x ] NC/AT, EOMI, PERRL , Normal Conjunctivae,   [   ] other:  Cardio: [   ] RRR, no murmer,   [ x  ] other:  irregulary irregular rhythm                            Pulm: [ x  ] No Respiratory Distress,  Lungs CTAB,   [   ] other:                       Abdomen: [ x  ]ND/NT, Soft,   [   ] other:    : [  x ] NO ECHOLS CATHETER, [   ] ECHOLS CATHETER- no meatal tear, no discharge, [   ] other:                                            MSK: [   x] No joint swelling, Full ROM,   [   ] other:                                         Ext: [  x ]No C/C/E, No calf tenderness,   [   ]other:    Skin: [ x  ]intact,   [   ] other:                                                                   Neurological Examination:  Cognitive: [    ] AAO x 3,   [   x ]  other: Alert and oriented to person and place. Not oriented to date or situation                                                                      Attention:  [  x  ] intact,   [    ]  other:                            Mood/Affect: [ x   ] wnl,    [    ]  other:                                                                             Communication: [    ]Fluent, no dysarthria, following commands:  [ x   ] other:  mild dysarthria  CN II - XII:  [  x  ] intact,  [    ] other:                                                                                        Motor:   RIGHT UE: [   ] WNL,  [ x  ] other: 4/5  LEFT    UE: [  x ] WNL,  [   ] other:  RIGHT LE: [   ] WNL,  [ x  ] other: 4/5  LEFT    LE: [x   ] WNL,  [   ] other:    Tone: [  x  ] wnl,   [    ]  other:  Coordination:   [   x ] intact,   [    ] other:                                                                           Sensory: [  x  ] Intact to light touch,   [    ] other:

## 2022-04-19 NOTE — DISCHARGE NOTE PROVIDER - NSDCMRMEDTOKEN_GEN_ALL_CORE_FT
aspirin 81 mg oral tablet, chewable: 1 tab(s) orally once a day  lisinopril 5 mg oral tablet: 1 tab(s) orally once a day   metformin 500 mg tablet: 1 each orally once a day  if FS &gt;200, take 1 tab at night  NIFEdipine 30 mg oral tablet, extended release: 1 tab(s) orally once a day  sertraline 25 mg oral tablet: 1 tab(s) orally once a day   apixaban 5 mg oral tablet: 1 tab(s) orally every 12 hours  atorvastatin 80 mg oral tablet: 1 tab(s) orally once a day (at bedtime)  lisinopril 5 mg oral tablet: 1 tab(s) orally once a day   metformin 500 mg tablet: 1 each orally once a day  if FS &gt;200, take 1 tab at night  metoprolol tartrate 25 mg oral tablet: 1 tab(s) orally 2 times a day  NIFEdipine 30 mg oral tablet, extended release: 1 tab(s) orally once a day  sertraline 25 mg oral tablet: 1 tab(s) orally once a day

## 2022-04-19 NOTE — PATIENT PROFILE ADULT - FUNCTIONAL ASSESSMENT - DAILY ACTIVITY 2.
2 = A lot of assistance Benzoyl Peroxide Pregnancy And Lactation Text: This medication is Pregnancy Category C. It is unknown if benzoyl peroxide is excreted in breast milk.

## 2022-04-19 NOTE — DISCHARGE NOTE PROVIDER - NSDCCPCAREPLAN_GEN_ALL_CORE_FT
PRINCIPAL DISCHARGE DIAGNOSIS  Diagnosis: Ischemic stroke  Assessment and Plan of Treatment: During this hospital admission, you had an ischemic stroke. During an ischemic stroke, blood stops flowing to part of your brain because of a blockage in the blood vessel. This can damage areas in the brain that control other parts of the body.  Please take your Eliquis 5mg twice for blood thinning and Atorvastatin for cholesterol medication/blood vessel protection as prescribed to prevent further strokes. Do not skip doses and do not run low on your medication. If you run low on your medication, please contact your doctor.  You will follow up outpatient with the stroke Nurse Practitioner as scheduled below.  Doing your regular tasks may be difficult after you've had a stroke, but you can learn new ways to manage your daily activities. In fact, doing daily activities may help you to regain muscle strength. Be patient, give yourself time to adjust, and appreciate the progress you make. For example, when showering or bathing, test the water temperature with a hand or foot that was not affected by the stroke, use grab bars, a shower seat, a hand-held showerhead, etc. It is normal to feel fatigue after a stroke, while some days may be worse than others, you will continue to improve.  Call 911 right away if you have any of the following symptoms of another stroke:  B: Balance: Sudden: Dizziness, loss of balance, or a sense of falling, difficulty with coordinating movement  E: Eyes: Sudden double vision or trouble seeing in one or both eyes  F: Face: Sudden uneven face  A: Arms (Legs): Sudden weakness, tingling, or loss of feeling on one side of your face or body  S: Speech: Sudden trouble talking or slurred speech, sudden difficulty understanding others  T: Time: Please call 911 right away and go to the emergency room  •Sudden, severe headache  •Blackouts or seizures      SECONDARY DISCHARGE DIAGNOSES  Diagnosis: Elevated troponin  Assessment and Plan of Treatment:     Diagnosis: HTN (hypertension)  Assessment and Plan of Treatment: Please take your blood pressure medication on time and follow up with your primary care patient.

## 2022-04-19 NOTE — PATIENT PROFILE ADULT - CAREGIVER NAME
11y yr old female s/p SOC, C1 laminectomy, expansile duraplasty   -OOB, pain control  -Valium q6 hrs PRN spasm  -Decrease decadron to 3q8 hrs today, 5 day taper total  -MRI Brain and C spine w/o contrast MCKENZIE

## 2022-04-19 NOTE — PROGRESS NOTE ADULT - PROVIDER SPECIALTY LIST ADULT
Internal Medicine
Neurology
Neurology
Internal Medicine
Neurology

## 2022-04-19 NOTE — PROGRESS NOTE ADULT - TIME BILLING
81F PMHx depression, HTN, DM2, CVA here with unwitnessed fall.    #Fall, unwitnessed, in setting of acute CVA  tte unrevealing, mild as  orthostatics neg  cth unchanged; trauma w/u otherwise neg  mri head with concern for acute cva R kiara, cerebellum  cta head, neck with mod stenosis R ICA, 1cm anuerysm   no intervention per NI  ekg with concern for pAF  eliquis  lipitor 40  diet as tolerated; f/u s+s    #UTI, klebsiella  ucx with kleb  cont ceftriaxone plan for 5 days total      #Elevated cardiac enzymes  ekg q waves inf  no cp  tte no wall motion abnl  trend    #HTN  lopressor 25 bid  lisinopril 5  nifedipine 30    #DM2  ssi    #Depression  zoloft 25    #DVT ppx  lovenox    Mari  [id number]

## 2022-04-19 NOTE — DISCHARGE NOTE NURSING/CASE MANAGEMENT/SOCIAL WORK - NSDCVIVACCINE_GEN_ALL_CORE_FT
Tdap; 05-Nov-2021 10:36; Bernardo Markham (OZ); [employer]; [id number] (Exp. Date: 09-Sep-2023); IntraMuscular; Deltoid Right.; 0.5 milliLiter(s); VIS (VIS Published: 09-May-2013, VIS Presented: 05-Nov-2021);

## 2022-04-19 NOTE — H&P ADULT - HISTORY OF PRESENT ILLNESS
Patient is a 82 yo Somali speaking F with PMHx of HTN, HLD, DM, left MCA aneurysm, depression, who presented to the ED after an unwitnessed fall on 4/12/22. Patient denied having any urinary or bowel incontinence and stated feeling weak for a few days and being unable to stand on her right leg. Work up included a CT C-spine which showed no acute fractures. 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 MRI brain showed acute ischemic stroke of left central kiara (penetrating pontine arteries off basilar) and subacute ischemic in right cerebellum (R SCA territory). Subtle right hemiparesis on was noted on exam, and patient was transferred to stroke unit for further monitoring.     Patient found to be in atrial fibrillation Cardiology consulted a-fib and decision for anticoagulation. Patient started on Eliquis 5mg BID and atorvastatin 80 mg Qd. Passed speech/swallow eval and advanced to pureed diet. [facility] was consulted and evaluated the patient recommending outpatient follow up with Dr. Goldstein and stroke clinic. At this time patient declines proceeding with treatment for stable aneurysm.    PM&R consulted for admission to  for acute inpatient rehabilitation. Prior to admission, patient was independent w/ ambulation using straight cane and required assistance from children with ADLs. Resides w/ children; in a house with 3 steps to enter and none inside    CLOF evaluated by OT  BM - Max A  Transfers - NT  UBD - Max A  LBD - Dependent    CLOF evaluated by PT  BM - Min A  Transfers - Mod A  Ambulation - Mod A 10 ft w/ RW    Also seen by SLP - VFSS/MBS evalu on 4/19 revealed severe oropharyngeal dysphagia for thin liquids; mild-moderate oropharyngeal dysphagia for puree, soft, mildly and moderately thick liquids. continue puree w/ mildly thick liquids    Patient is a good candidate for admission to acute inpatient rehabilitation for R cerebellar and L central kiara CVA with right sided hemiplegia (dominant) with comorbidities of DM2, HTN, dyslipidemia, and atrial fibrilation requiring hospital level supervision. She was deemed medically stable for discharge to  on 4/19/22 where she will undergo intensive restorative therapies 3 hours a day for at least 5 days a week with the goal of regaining the patients independence and discharging her home with family care

## 2022-04-19 NOTE — CHART NOTE - NSCHARTNOTEFT_GEN_A_CORE
c/s acknowledged. Pt is being transferred to [facility] at this time per team. RD covering [facility] will assess and follow up.

## 2022-04-19 NOTE — PATIENT PROFILE ADULT - FALL HARM RISK - HARM RISK INTERVENTIONS

## 2022-04-19 NOTE — H&P ADULT - ATTENDING COMMENTS
Patient seen and examined with the resident. We discussed the case. I have directed the care. I edited the note. She requires acute rehab and close physiatry follow up.  80 yo F with an acute ischemic infarct in the right cerebellum and left central kiara with right hemiparesis (dominant), dysphagia, dysarthria and aphasia with comorbidities of DM-2, a fib, HTN and  HLD. She warrants acute rehab with 3 hours of daily therapies including PT, OT and speech and close physiatry is monitoring for her CVA which has impacting her swallow and communication. She has various comorbidities. Close physiatry follow will reduce the risk of her requiring rehospitalization and be more likely to facsilitate a safe discharge home.   Rehab of right cerebellar and Left central kiara CVA with right hemiplegia (dominant), Aphasia, Dysarthria  Requires acute rehab with PT, OT, Speech, neuropsychology.   #Acute ischemic stroke of left central kiara (penetrating pontine arteries off basilar) and subacute ischemic in right cerebellum (R SCA territory).  - CT Head reviewed, no acute intracranial hemorrhage, stable exam since CT in 11/2021. 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 Moderate stenosis involving the right clinoid segment of the ICA due to atherosclerotic calcification. Mild stenosis of the bilateral proximal internal carotid arteries due to mixed calcified and noncalcified atherosclerotic plaque  - MRI Brain reviewed, showed diffusion abnormality in central aspect of the kiara and R cerebellum  - ASA/Plavix Dced.   - Eliquis continued as 5mg BID.   - Continue Atorvastatin 80 mg daily  - Monitor BP closely and avoid hypotension  - Goal SBP <150  - [facility] evaluated the patient and recommended the aneurysm is stable, follow up as outpatient with Dr. 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    #HTN  - Goal SBP <150  - lisinopril 5 mg po daily--change to qhs  She does not require all her meds qam. This will reduce her risk for othostasis.  - metoprolol tartrate 25 mg po bid  - NIFEdipine 30 mg po daily    #NIDDM  - A1c 6.4  - Metformin 500 po daily  - ISS  - BID glucose checks    #HLD  - Continue Atorvastatin 80 mg daily      -Pain control:   Tylenol prn    -GI/Bowel Mgmt:  No active issues at this time    -Bladder management:   No active issues at this time    -Skin:  No active issues at this time    -FEN   Replete prn    - Diet:  VFSS/MBS evalu on 4/19 revealed severe oropharyngeal dysphagia for thin liquids; mild-moderate oropharyngeal dysphagia for puree, soft, mildly and moderately thick liquids. continue puree w/ mildly thick liquids Patient seen and examined with the resident. We discussed the case. I have directed the care. I edited the note. She requires acute rehab and close physiatry follow up.  80 yo F with an acute ischemic infarct in the right cerebellum and left central kiara with right hemiparesis (dominant), dysphagia, dysarthria and aphasia with comorbidities of DM-2, a fib, HTN and  HLD. She warrants acute rehab with 3 hours of daily therapies including PT, OT and speech and close physiatry is monitoring for her CVA which has impacting her swallow and communication. Given her CVA and multiple significant comorbidities close physiatry follow up is needed and will reduce the risk of her requiring rehospitalization.   Rehab of right cerebellar and Left central kiara CVA with right hemiplegia (dominant), Aphasia, Dysarthria  Requires acute rehab with PT, OT, Speech, neuropsychology.   #Acute ischemic stroke of left central kiara (penetrating pontine arteries off basilar) and subacute ischemic in right cerebellum (R SCA territory).  - CT Head reviewed, no acute intracranial hemorrhage, stable exam since CT in 11/2021. 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 Moderate stenosis involving the right clinoid segment of the ICA due to atherosclerotic calcification. Mild stenosis of the bilateral proximal internal carotid arteries due to mixed calcified and noncalcified atherosclerotic plaque  - MRI Brain reviewed, showed diffusion abnormality in central aspect of the kiara and R cerebellum  - ASA/Plavix Dced.   - Eliquis continued as 5mg BID.   - Continue Atorvastatin 80 mg daily  - Monitor BP closely and avoid hypotension  - Goal SBP <150  - [facility] evaluated the patient and recommended the aneurysm is stable, follow up as outpatient with Dr. 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    #HTN  - Goal SBP <150  - lisinopril 5 mg po daily--change to qhs  She does not require all her meds qam. This will reduce her risk for othostasis.  - metoprolol tartrate 25 mg po bid  - NIFEdipine 30 mg po daily    #NIDDM  - A1c 6.4  - Metformin 500 po daily  - ISS  - BID glucose checks    #HLD  - Continue Atorvastatin 80 mg daily      -Pain control:   Tylenol prn    -GI/Bowel Mgmt:  No active issues at this time    -Bladder management:   No active issues at this time    -Skin:  No active issues at this time    -FEN   Replete prn    - Diet:  VFSS/MBS evalu on 4/19 revealed severe oropharyngeal dysphagia for thin liquids; mild-moderate oropharyngeal dysphagia for puree, soft, mildly and moderately thick liquids. continue puree w/ mildly thick liquids

## 2022-04-19 NOTE — H&P ADULT - ASSESSMENT
ASSESSMENT/PLAN    Rehab of right cerebellar and Left central kiara CVA with right hemiplegia (dominant), Aphasia, Dysarthria    #Acute ischemic stroke of left central kiara (penetrating pontine arteries off basilar) and subacute ischemic in right cerebellum (R SCA territory).  - CT Head reviewed, no acute intracranial hemorrhage, stable exam since CT in 11/2021. 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 Moderate stenosis involving the right clinoid segment of the ICA due to atherosclerotic calcification. Mild stenosis of the bilateral proximal internal carotid arteries due to mixed calcified and noncalcified atherosclerotic plaque  - MRI Brain reviewed, showed diffusion abnormality in central aspect of the kiara and R cerebellum  - ASA/Plavix Dced.   - Eliquis continued as 5mg BID.   - Continue Atorvastatin 80 mg daily  - Monitor BP closely and avoid hypotension  - Goal SBP <150  - [facility] evaluated the patient and recommended the aneurysm is stable, follow up as outpatient with Dr. 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    #HTN  - Goal SBP <150  - lisinopril 5 mg pod  - metoprolol tartrate 25 mg po bid  - NIFEdipine 30 mg pod    #NIDDM  - A1c 6.4  - Metformin 500 pod  - ISS  - BID glucose checks    #HLD  - Continue Atorvastatin 80 mg daily      -Pain control:   Tylenol prn    -GI/Bowel Mgmt:  No active issues at this time    -Bladder management:   No active issues at this time    -Skin:  No active issues at this time    -FEN   Replete prn    - Diet:  VFSS/MBS evalu on 4/19 revealed severe oropharyngeal dysphagia for thin liquids; mild-moderate oropharyngeal dysphagia for puree, soft, mildly and moderately thick liquids. continue puree w/ mildly thick liquids         Precautions / PROPHYLAXIS:      - Falls    - Ortho: Weight bearing status: WBAT      - DVT prophylaxis: Eliquis      MEDICAL PROGNOSIS: GOOD            REHAB POTENTIAL: GOOD             ESTIMATED DISPOSITION: HOME WITH HOME CARE       [ x ]  The goals of the IRF admission were discussed with the patient and or family member, who agreed             ELOS:  [   x  ] 7-14    [    ]  14-21    [    ]  Other    THERAPY ORDERS and INITIAL INDIVIDUALIZED PLAN OF CARE:  This initial individualized interdisciplinary plan of care, which was established by me (the attending physiatrist), is based on elements from the post admission evaluation. The interdisciplinary therapy program is to be at least 3 hrs a day, at least 5 days per week from from physical, occupational and/ or speech therapies as ordered by me below.      [ x  ] P.T. 90 mins. /day at least 5 out of 7 days:  [  x ] superficial  modalities prn, [ x  ] A/AAROM, [ x  ] PREs, [ x  ] transfer training,            [ x  ] progressive ambulation, [x   ] stairs                                               [ x  ] O.T. 90 mins. /day at least 5 out of 7 days::  [ x  ] modalities prn,  [ x  ]A/AAROM, [ x  ] PREs, [  x ] functional transfer training, [ x  ] ADL training           [ x  ] cognitive/ perceptual eval and training, [   ] splint eval                                                  [ x  ] S.L.P:  [x   ] speech eval, [ x  ] swallow eval     [ x  ] Neuropsychology eval     [ x  ] Individualized rec. therapy      RATIONALE FOR INPATIENT ADMISSION - Patient demonstrates the following: (check all that apply)  [X] Medically appropriate for acute rehabilitation admission. Requires interdisiplinary therapy consisting of at least PT and OT, at least 3 hrs. a day at least 5 days a week  [X] Has attainable rehab goals with an appropriate initial discharge plan  [X] Has rehabilitation potential (expected to make a significant improvement within a reasonable period of time)  [X] Requires close medical management by a rehab physician, rehab nursing care,  and comprehensive interdisciplinary team (including PT, OT)    [X] Requires evaluation by a physiatrist at least 3 days a week to evaluate and manage and coordinate rehab and medical problems   ASSESSMENT/PLAN  80 yo F with an acute ischemic infarct in the right cerebellum and left central kiara with right hemiparesis (dominant), dysphagia, dysarthria and aphasia with comorbidities of DM-2, a fib, HTN and  HLD. She warrants acute rehab with 3 hours of daily therapies including PT, OT and speech and close physiatry is monitoring for her CVA which has impacting her swallow and communication. She has various comorbidities. Close physiatry follow will reduce the risk of her requiring rehospitalization and be more likely to facsilitate a safe discharge home.   Rehab of right cerebellar and Left central kiara CVA with right hemiplegia (dominant), Aphasia, Dysarthria  Requires acute rehab with PT, OT, Speech, neuropsychology.   #Acute ischemic stroke of left central kiara (penetrating pontine arteries off basilar) and subacute ischemic in right cerebellum (R SCA territory).  - CT Head reviewed, no acute intracranial hemorrhage, stable exam since CT in 11/2021. 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 Moderate stenosis involving the right clinoid segment of the ICA due to atherosclerotic calcification. Mild stenosis of the bilateral proximal internal carotid arteries due to mixed calcified and noncalcified atherosclerotic plaque  - MRI Brain reviewed, showed diffusion abnormality in central aspect of the kiara and R cerebellum  - ASA/Plavix Dced.   - Eliquis continued as 5mg BID.   - Continue Atorvastatin 80 mg daily  - Monitor BP closely and avoid hypotension  - Goal SBP <150  - [facility] evaluated the patient and recommended the aneurysm is stable, follow up as outpatient with Dr. 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    #HTN  - Goal SBP <150  - lisinopril 5 mg po daily--change to qhs  She does not require all her meds qam. This will reduce her risk for othostasis.  - metoprolol tartrate 25 mg po bid  - NIFEdipine 30 mg po daily    #NIDDM  - A1c 6.4  - Metformin 500 po daily  - ISS  - BID glucose checks    #HLD  - Continue Atorvastatin 80 mg daily      -Pain control:   Tylenol prn    -GI/Bowel Mgmt:  No active issues at this time    -Bladder management:   No active issues at this time    -Skin:  No active issues at this time    -FEN   Replete prn    - Diet:  VFSS/MBS evalu on 4/19 revealed severe oropharyngeal dysphagia for thin liquids; mild-moderate oropharyngeal dysphagia for puree, soft, mildly and moderately thick liquids. continue puree w/ mildly thick liquids         Precautions / PROPHYLAXIS:      - Falls    - Ortho: Weight bearing status: WBAT      - DVT prophylaxis: Eliquis      MEDICAL PROGNOSIS: GOOD            REHAB POTENTIAL: GOOD             ESTIMATED DISPOSITION: HOME WITH HOME CARE       [ x ]  The goals of the IRF admission were discussed with the patient and or family member, who agreed             ELOS:  [   x  ] 7-14    [    ]  14-21    [    ]  Other    THERAPY ORDERS and INITIAL INDIVIDUALIZED PLAN OF CARE:  This initial individualized interdisciplinary plan of care, which was established by me (the attending physiatrist), is based on elements from the post admission evaluation. The interdisciplinary therapy program is to be at least 3 hrs a day, at least 5 days per week from from physical, occupational and/ or speech therapies as ordered by me below.      [ x  ] P.T. 90 mins. /day at least 5 out of 7 days:  [  x ] superficial  modalities prn, [ x  ] A/AAROM, [ x  ] PREs, [ x  ] transfer training,            [ x  ] progressive ambulation, [x   ] stairs                                               [ x  ] O.T. 90 mins. /day at least 5 out of 7 days::  [ x  ] modalities prn,  [ x  ]A/AAROM, [ x  ] PREs, [  x ] functional transfer training, [ x  ] ADL training           [ x  ] cognitive/ perceptual eval and training, [   ] splint eval                                                  [ x  ] S.L.P:  [x   ] speech eval, [ x  ] swallow eval     [ x  ] Neuropsychology eval     [ x  ] Individualized rec. therapy      RATIONALE FOR INPATIENT ADMISSION - Patient demonstrates the following: (check all that apply)  [X] Medically appropriate for acute rehabilitation admission. Requires interdisiplinary therapy consisting of at least PT and OT, at least 3 hrs. a day at least 5 days a week  [X] Has attainable rehab goals with an appropriate initial discharge plan  [X] Has rehabilitation potential (expected to make a significant improvement within a reasonable period of time)  [X] Requires close medical management by a rehab physician, rehab nursing care,  and comprehensive interdisciplinary team (including PT, OT)    [X] Requires evaluation by a physiatrist at least 3 days a week to evaluate and manage and coordinate rehab and medical problems   ASSESSMENT/PLAN  80 yo F with an acute ischemic infarct in the right cerebellum and left central kiara with right hemiparesis (dominant), dysphagia, dysarthria and aphasia with comorbidities of DM-2, a fib, HTN and  HLD. She warrants acute rehab with 3 hours of daily therapies including PT, OT and speech and close physiatry is monitoring for her CVA which has impacting her swallow and communication. Given her CVA and multiple significant comorbidities close physiatry follow up is needed and will reduce the risk of her requiring rehospitalization.   Rehab of right cerebellar and Left central kiara CVA with right hemiparesis (dominant), Aphasia, Dysarthria  Requires acute rehab with PT, OT, Speech, neuropsychology.   #Acute ischemic stroke of left central kiara (penetrating pontine arteries off basilar) and subacute ischemic in right cerebellum (R SCA territory).  - CT Head reviewed, no acute intracranial hemorrhage, stable exam since CT in 11/2021. 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 Moderate stenosis involving the right clinoid segment of the ICA due to atherosclerotic calcification. Mild stenosis of the bilateral proximal internal carotid arteries due to mixed calcified and noncalcified atherosclerotic plaque  - MRI Brain reviewed, showed diffusion abnormality in central aspect of the kiara and R cerebellum  - ASA/Plavix Dced.   - Eliquis continued as 5mg BID.   - Continue Atorvastatin 80 mg daily  - Monitor BP closely and avoid hypotension  - Goal SBP <150  - [facility] evaluated the patient and recommended the aneurysm is stable, follow up as outpatient with Dr. 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    #HTN  - Goal SBP <150  - lisinopril 5 mg po daily--change to qhs  She does not require all her meds qam. This will reduce her risk for othostasis.  - metoprolol tartrate 25 mg po bid  - NIFEdipine 30 mg po daily    #NIDDM  - A1c 6.4  - Metformin 500 po daily  - ISS  - BID glucose checks    #HLD  - Continue Atorvastatin 80 mg daily      -Pain control:   Tylenol prn    -GI/Bowel Mgmt:  No active issues at this time    -Bladder management:   No active issues at this time    -Skin:  No active issues at this time    -FEN   Replete prn    - Diet:  VFSS/MBS evalu on 4/19 revealed severe oropharyngeal dysphagia for thin liquids; mild-moderate oropharyngeal dysphagia for puree, soft, mildly and moderately thick liquids. continue puree w/ mildly thick liquids         Precautions / PROPHYLAXIS:      - Falls    - Ortho: Weight bearing status: WBAT      - DVT prophylaxis: Eliquis      MEDICAL PROGNOSIS: GOOD            REHAB POTENTIAL: GOOD             ESTIMATED DISPOSITION: HOME WITH HOME CARE       [ x ]  The goals of the IRF admission were discussed with the patient and or family member, who agreed             ELOS:  [   x  ] 7-14    [    ]  14-21    [    ]  Other    THERAPY ORDERS and INITIAL INDIVIDUALIZED PLAN OF CARE:  This initial individualized interdisciplinary plan of care, which was established by me (the attending physiatrist), is based on elements from the post admission evaluation. The interdisciplinary therapy program is to be at least 3 hrs a day, at least 5 days per week from from physical, occupational and/ or speech therapies as ordered by me below.      [ x  ] P.T. 90 mins. /day at least 5 out of 7 days:  [  x ] superficial  modalities prn, [ x  ] A/AAROM, [ x  ] PREs, [ x  ] transfer training,            [ x  ] progressive ambulation, [x   ] stairs                                               [ x  ] O.T. 90 mins. /day at least 5 out of 7 days::  [ x  ] modalities prn,  [ x  ]A/AAROM, [ x  ] PREs, [  x ] functional transfer training, [ x  ] ADL training           [ x  ] cognitive/ perceptual eval and training, [   ] splint eval                                                  [ x  ] S.L.P:  [x   ] speech eval, [ x  ] swallow eval     [ x  ] Neuropsychology eval     [ x  ] Individualized rec. therapy      RATIONALE FOR INPATIENT ADMISSION - Patient demonstrates the following: (check all that apply)  [X] Medically appropriate for acute rehabilitation admission. Requires interdisiplinary therapy consisting of at least PT and OT, at least 3 hrs. a day at least 5 days a week  [X] Has attainable rehab goals with an appropriate initial discharge plan  [X] Has rehabilitation potential (expected to make a significant improvement within a reasonable period of time)  [X] Requires close medical management by a rehab physician, rehab nursing care,  and comprehensive interdisciplinary team (including PT, OT)    [X] Requires evaluation by a physiatrist at least 3 days a week to evaluate and manage and coordinate rehab and medical problems

## 2022-04-19 NOTE — SWALLOW VFSS/MBS ASSESSMENT ADULT - ORAL PHASE
Residue in oral cavity/Incomplete tongue to palate contact/Uncontrolled bolus / spillover in hypopharynx/Laryngeal penetration before swallow - silent/Aspiration before swallow - silent Residue in oral cavity/Uncontrolled bolus / spillover in hypopharynx/Laryngeal penetration before swallow - silent penetration before the swallow w/ chewable consistency/Uncontrolled bolus / spillover in hypopharynx/Laryngeal penetration before swallow - silent

## 2022-04-19 NOTE — PROGRESS NOTE ADULT - REASON FOR ADMISSION
Frequent falls

## 2022-04-19 NOTE — H&P ADULT - NSHPLABSRESULTS_GEN_ALL_CORE
LABS:                        12.7   7.53  )-----------( 186      ( 19 Apr 2022 06:50 )             39.6     04-19    141  |  104  |  28<H>  ----------------------------<  132<H>  4.7   |  27  |  1.0    Ca    9.1      19 Apr 2022 06:50  Phos  3.9     04-19  Mg     2.3     04-19    TPro  6.0  /  Alb  3.6  /  TBili  0.3  /  DBili  x   /  AST  16  /  ALT  17  /  AlkPhos  67  04-19

## 2022-04-19 NOTE — SWALLOW VFSS/MBS ASSESSMENT ADULT - ROSENBEK'S PENETRATION ASPIRATION SCALE
(8) contrast passes glottis, visible subglottic residue remains, absent patient response (aspiration) (3) contrast remains above the vocal cords, visible residue remains (penetration)

## 2022-04-19 NOTE — DISCHARGE NOTE PROVIDER - HOSPITAL COURSE
Patient is a 80 yo F with PMHx of HTN, HLD, DM, left MCA aneurysm, depression, who presented to the ED after an unwitnessed fall on 4/12/22. Patient denied having any urinary or bowel incontinence and stated feeling weak for a few days and being unable to stand on her right leg. Work up included a CT C-spine which showed no acute fractures. 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 MRI brain showed acute ischemic stroke of left central kiara (penetrating pontine arteries off basilar) and subacute ischemic in right cerebellum (R SCA territory). Subtle right hemiparesis on was noted on exam, and patient was transferred to stroke unit for further monitoring.     Patient started on Eliquis 5mg BID and atorvastatin 80 mg Qd. Passed speech/swallow eval and advanced to pureed diet. [facility] was consulted and evaluated the patient recommending outpatient follow up with Dr. Goldstein. At this time patient declines proceeding with treatment for stable aneurysm. On exam, patient had 4/5 RUE/RLE strength, some facial asymmetry, and dysarthria. Patient is optimized for discharge with outpatient follow up.          Patient is a 80 yo F with PMHx of HTN, HLD, DM, left MCA aneurysm, depression, who presented to the ED after an unwitnessed fall on 4/12/22. Patient denied having any urinary or bowel incontinence and stated feeling weak for a few days and being unable to stand on her right leg. Work up included a CT C-spine which showed no acute fractures. 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 MRI brain showed acute ischemic stroke of left central kiara (penetrating pontine arteries off basilar) and subacute ischemic in right cerebellum (R SCA territory). Subtle right hemiparesis on was noted on exam, and patient was transferred to stroke unit for further monitoring.     Patient started on Eliquis 5mg BID and atorvastatin 80 mg Qd. Passed speech/swallow eval and advanced to pureed diet. [facility] was consulted and evaluated the patient recommending outpatient follow up with Dr. Goldstein and stroke clinic. At this time patient declines proceeding with treatment for stable aneurysm. On exam, patient had 4/5 RUE/RLE strength, some facial asymmetry, and dysarthria. Patient is optimized for discharge with outpatient follow up.          Patient is a 80 yo F with PMHx of HTN, HLD, DM, left MCA aneurysm, depression, who presented to the ED after an unwitnessed fall on 4/12/22. Patient denied having any urinary or bowel incontinence and stated feeling weak for a few days and being unable to stand on her right leg. Work up included a CT C-spine which showed no acute fractures. 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 MRI brain showed acute ischemic stroke of left central kiara (penetrating pontine arteries off basilar) and subacute ischemic in right cerebellum (R SCA territory). Subtle right hemiparesis on was noted on exam, and patient was transferred to stroke unit for further monitoring.     Patient started on Eliquis 5mg BID and atorvastatin 80 mg Qd. Passed speech/swallow eval and advanced to pureed diet. [facility] was consulted and evaluated the patient recommending outpatient follow up with Dr. Goldstein and stroke clinic. At this time patient declines proceeding with treatment for stable aneurysm. On exam, patient had 4/5 RUE/RLE strength, some facial asymmetry, and dysarthria. Patient is optimized for discharge with outpatient follow up.       Stroke Attending Attestation:    Pt is a 80 yo F with PMHx of HTN, HLD, DM, left MCA aneurysm, depression, who presented after fall. mild right hemiparesis on exam.     Impr: acute ischemic stroke of left central kiara (penetrating pontine arteries off basilar) and subacute ischemic in right cerebellum (R SCA territory).   CTA head/neck without evidence of significant flow limiting stenosis. Noted again left MCA bifurcation aneurysm measuring 1cm, stable from 06/2021. [facility] consulted, aneurysm not amenable to coil embo as multiple MCA branches exit off aneurysm. Pt and family refusing NS for possible aneurysm clipping. F/u outpatient as indicated.   Found to have new onset afib on tele. started on eliquis 5mg BID  D/c to [facility].   F/u in stroke clinic in 2-3 weeks

## 2022-04-19 NOTE — PATIENT PROFILE ADULT - FUNCTIONAL SCREEN CURRENT LEVEL: COMMUNICATION, MLM
pt doesn't speak english eith and pt is confused/3 = unable to understand (not related to language barrier)

## 2022-04-19 NOTE — PROGRESS NOTE ADULT - SUBJECTIVE AND OBJECTIVE BOX
INTERVAL HPI/OVERNIGHT EVENTS:    SUBJECTIVE: Patient seen and examined at bedside.     no cp, sob, abd pain, fever  no ha, dizziness, lightheadedness, syncope  OBJECTIVE:    VITAL SIGNS:  Vital Signs Last 24 Hrs  T(C): 36.1 (17 Apr 2022 08:15), Max: 36.9 (17 Apr 2022 00:03)  T(F): 96.9 (17 Apr 2022 08:15), Max: 98.4 (17 Apr 2022 00:03)  HR: 80 (17 Apr 2022 08:15) (55 - 81)  BP: 136/66 (17 Apr 2022 08:15) (126/62 - 179/96)  BP(mean): 89 (17 Apr 2022 08:15) (89 - 122)  RR: 22 (17 Apr 2022 08:15) (18 - 22)  SpO2: 96% (17 Apr 2022 08:15) (95% - 99%)      PHYSICAL EXAM:    General: NAD  HEENT: NC/AT; PERRL, clear conjunctiva  Neck: supple  Respiratory: CTA b/l  Cardiovascular: +S1/S2; RRR  Abdomen: soft, NT/ND; +BS x4  Extremities: WWP, 2+ peripheral pulses b/l; no LE edema  Skin: normal color and turgor; no rash  Neurological:    MEDICATIONS:  MEDICATIONS  (STANDING):  aspirin  chewable 81 milliGRAM(s) Oral daily  atorvastatin 80 milliGRAM(s) Oral at bedtime  clopidogrel Tablet 75 milliGRAM(s) Oral daily  dextrose 5%. 1000 milliLiter(s) (50 mL/Hr) IV Continuous <Continuous>  dextrose 5%. 1000 milliLiter(s) (100 mL/Hr) IV Continuous <Continuous>  dextrose 50% Injectable 25 Gram(s) IV Push once  dextrose 50% Injectable 12.5 Gram(s) IV Push once  dextrose 50% Injectable 25 Gram(s) IV Push once  enoxaparin Injectable 40 milliGRAM(s) SubCutaneous every 24 hours  glucagon  Injectable 1 milliGRAM(s) IntraMuscular once  insulin lispro (ADMELOG) corrective regimen sliding scale   SubCutaneous three times a day before meals  lisinopril 5 milliGRAM(s) Oral daily  metoprolol tartrate 25 milliGRAM(s) Oral two times a day  NIFEdipine XL 30 milliGRAM(s) Oral daily  sertraline 25 milliGRAM(s) Oral daily    MEDICATIONS  (PRN):  dextrose Oral Gel 15 Gram(s) Oral once PRN Blood Glucose LESS THAN 70 milliGRAM(s)/deciliter      ALLERGIES:  Allergies    No Known Allergies    Intolerances        LABS:                        13.2   7.89  )-----------( 221      ( 17 Apr 2022 07:14 )             41.2     Hemoglobin: 13.2 g/dL (04-17 @ 07:14)  Hemoglobin: 13.7 g/dL (04-16 @ 04:30)  Hemoglobin: 13.2 g/dL (04-15 @ 07:39)  Hemoglobin: 12.6 g/dL (04-14 @ 06:40)  Hemoglobin: 12.9 g/dL (04-13 @ 04:30)    CBC Full  -  ( 17 Apr 2022 07:14 )  WBC Count : 7.89 K/uL  RBC Count : 4.62 M/uL  Hemoglobin : 13.2 g/dL  Hematocrit : 41.2 %  Platelet Count - Automated : 221 K/uL  Mean Cell Volume : 89.2 fL  Mean Cell Hemoglobin : 28.6 pg  Mean Cell Hemoglobin Concentration : 32.0 g/dL  Auto Neutrophil # : 5.67 K/uL  Auto Lymphocyte # : 1.16 K/uL  Auto Monocyte # : 0.58 K/uL  Auto Eosinophil # : 0.41 K/uL  Auto Basophil # : 0.05 K/uL  Auto Neutrophil % : 71.8 %  Auto Lymphocyte % : 14.7 %  Auto Monocyte % : 7.4 %  Auto Eosinophil % : 5.2 %  Auto Basophil % : 0.6 %    04-17    142  |  102  |  25<H>  ----------------------------<  122<H>  4.8   |  30  |  1.1    Ca    9.6      17 Apr 2022 07:14  Mg     2.0     04-17    TPro  6.3  /  Alb  4.0  /  TBili  0.3  /  DBili  x   /  AST  16  /  ALT  16  /  AlkPhos  70  04-17    Creatinine Trend: 1.1<--, 1.1<--, 1.1<--, 1.0<--, 1.0<--, 1.2<--  LIVER FUNCTIONS - ( 17 Apr 2022 07:14 )  Alb: 4.0 g/dL / Pro: 6.3 g/dL / ALK PHOS: 70 U/L / ALT: 16 U/L / AST: 16 U/L / GGT: x               hs Troponin:              CSF:                      EKG:   MICROBIOLOGY:    Culture - Blood (collected 15 Apr 2022 07:39)  Source: .Blood Blood  Preliminary Report (16 Apr 2022 19:01):    No growth to date.      IMAGING:      Labs, imaging, EKG personally reviewed    RADIOLOGY & ADDITIONAL TESTS: Reviewed.
Location: [facility] (3COVF) 010 A ([facility] (3COVF))  Patient Name: REYNA FOWLER  Age: 81y  Gender: Female      Progress Note  This morning patient was seen and examined at bedside.    Today is hospital day 2d.  Ms. Fowler is doing fine.   She is resting comfortably in bed.  History obtained with help of son at bedside.  Her appetite is reduced, but she has no nausea or vomiting.   She denies any abdominal pain, diarrhea, or constipation.  She is not ambulating and has had no palpitations or light headedness since admission.  She is voiding freely (incontinent) and has been having urinary frequency.      Vital Signs in the last 24 hours   Vitals Summary T(C): 36.1 (22 @ 04:45), Max: 36.6 (22 @ 15:44)  HR: 78 (22 @ 04:45) (68 - 92)  BP: 182/91 (22 @ 04:45) (134/73 - 182/91)  RR: 18 (22 @ 04:45) (18 - 18)  SpO2: 97% (22 @ 19:58) (96% - 97%)  Vent Data   Intake/ Output   22 @ 07:01  -  22 @ 07:00  --------------------------------------------------------  IN: 0 mL / OUT: 500 mL / NET: -500 mL    22 @ 07:01  -  22 @ 11:23  --------------------------------------------------------  IN: 50 mL / OUT: 0 mL / NET: 50 mL        Physical Exam  * General Appearance: Alert, cooperative, interactive, well-groomed  * Head: Normocephalic, without obvious abnormality, atraumatic  * Eyes: PERRL, conjunctiva/corneas clear, EOM's intact, fundi benign, both eyes  * Lungs: Respirations unlabored, reduced bilateral air entry due to reduced effort, no audible wheezes, crackles, or rhonchi  * Heart: Regular Rate and Rhythm, normal S1 and S2, no audible murmur, rub, or gallop  * Abdomen: Symmetric, non-distended, no scar, soft, non-tender, bowel sounds active all four quadrants, no masses, no organomegaly (no hepatosplenomegaly)  * Extremities: Extremities normal, atraumatic, no cyanosis, no lower extremity pitting edema bilaterally, adequate dorsalis pedis pulses  * Pulses: 2+ and symmetric all extremities  * Skin: Skin color, texture, turgor normal, no rashes or lesions  * Lymph nodes: Cervical, supraclavicular, and axillary nodes normal      Investigations   Laboratory Workup  - CBC:                        12.6   7.47  )-----------( 202      ( 2022 06:40 )             38.8     - Chemistry:      139  |  101  |  19  ----------------------------<  125<H>  4.3   |  29  |  1.0    Ca    9.3      2022 06:40  Mg     1.7     -14    TPro  6.8  /  Alb  4.1  /  TBili  0.4  /  DBili  x   /  AST  15  /  ALT  12  /  AlkPhos  72  04-13    - Cardiac Markers:  CARDIAC MARKERS ( 2022 20:00 )  x     / x     / x     / x     / 3.6 ng/mL  CARDIAC MARKERS ( 2022 16:00 )  x     / 0.04 ng/mL / x     / x     / x      CARDIAC MARKERS ( 2022 11:32 )  x     / 0.05 ng/mL / 115 U/L / x     / x          Microbiological Workup  Urinalysis Basic - ( 2022 19:15 )    Color: Colorless / Appearance: Slightly Turbid / S.015 / pH: x  Gluc: x / Ketone: Negative  / Bili: Negative / Urobili: <2 mg/dL   Blood: x / Protein: Negative / Nitrite: Positive   Leuk Esterase: Small / RBC: x / WBC x   Sq Epi: x / Non Sq Epi: x / Bacteria: x    Culture - Urine (collected 2022 19:15)  Source: Clean Catch Clean Catch (Midstream)  Preliminary Report (2022 00:41):    >100,000 CFU/ml Gram Negative Rods        Current Medications  Standing Medications  aspirin  chewable 81 milliGRAM(s) Oral daily  cefTRIAXone   IVPB 1000 milliGRAM(s) IV Intermittent every 24 hours  dextrose 5%. 1000 milliLiter(s) (50 mL/Hr) IV Continuous <Continuous>  dextrose 5%. 1000 milliLiter(s) (100 mL/Hr) IV Continuous <Continuous>  dextrose 50% Injectable 25 Gram(s) IV Push once  dextrose 50% Injectable 12.5 Gram(s) IV Push once  dextrose 50% Injectable 25 Gram(s) IV Push once  enoxaparin Injectable 40 milliGRAM(s) SubCutaneous every 24 hours  glucagon  Injectable 1 milliGRAM(s) IntraMuscular once  insulin lispro (ADMELOG) corrective regimen sliding scale   SubCutaneous three times a day before meals  lisinopril 5 milliGRAM(s) Oral daily  metoprolol tartrate 25 milliGRAM(s) Oral two times a day  NIFEdipine XL 30 milliGRAM(s) Oral daily  sertraline 25 milliGRAM(s) Oral daily    PRN Medications  dextrose Oral Gel 15 Gram(s) Oral once PRN Blood Glucose LESS THAN 70 milliGRAM(s)/deciliter    Singles Doses Administered  (Floorstock) 1 each &lt;see task&gt; GiveOnce  (Floorstock) 1 each &lt;see task&gt; GiveOnce  lactated ringers Bolus 1000 milliLiter(s) IV Bolus once  magnesium sulfate  IVPB 2 Gram(s) IV Intermittent once      
Neurology Progress Note    Interval History:    Patient was seen and examined, no acute event over night.     Medications:  apixaban 5 milliGRAM(s) Oral every 12 hours  atorvastatin 80 milliGRAM(s) Oral at bedtime  dextrose 5%. 1000 milliLiter(s) IV Continuous <Continuous>  dextrose 5%. 1000 milliLiter(s) IV Continuous <Continuous>  dextrose 50% Injectable 25 Gram(s) IV Push once  dextrose 50% Injectable 12.5 Gram(s) IV Push once  dextrose 50% Injectable 25 Gram(s) IV Push once  dextrose Oral Gel 15 Gram(s) Oral once PRN  glucagon  Injectable 1 milliGRAM(s) IntraMuscular once  insulin lispro (ADMELOG) corrective regimen sliding scale   SubCutaneous three times a day before meals  lisinopril 5 milliGRAM(s) Oral daily  metoprolol tartrate 25 milliGRAM(s) Oral two times a day  NIFEdipine XL 30 milliGRAM(s) Oral daily  sertraline 25 milliGRAM(s) Oral daily      Vital Signs Last 24 Hrs  T(C): 36.4 (18 Apr 2022 12:00), Max: 36.4 (17 Apr 2022 17:00)  T(F): 97.5 (18 Apr 2022 12:00), Max: 97.6 (17 Apr 2022 17:00)  HR: 79 (18 Apr 2022 12:00) (64 - 80)  BP: 124/67 (18 Apr 2022 12:00) (124/67 - 168/89)  BP(mean): 90 (18 Apr 2022 12:00) (90 - 120)  RR: 19 (18 Apr 2022 12:00) (18 - 22)  SpO2: 97% (18 Apr 2022 12:00) (94% - 98%)    Neurological Exam:   Mental status: Awake, alert and oriented x2 to self, location. [place] speaking.  dysarthria, no aphasia.   Cranial nerves: Pupils equally round and reactive to light, no nystagmus, extraocular muscles intact, V1 through V3 intact bilaterally and asymmetric face.  Motor: MRC grading 5/5 LUE, 5/5 L hip flexion/ext, 4/5 RUE/RLE. Unable to assess plantarflexion/dorsiflexion due to limited patient cooperation.  strength 5/5.  Normal tone and bulk.  No abnormal movements.    Sensation: Intact to light touch, proprioception, and pinprick.   Coordination: No dysmetria on finger-to-nose and heel-to-shin.  Reflexes: 3+ in bilateral UE/LE, downgoing toes bilaterally.  Gait: Deferred.      Labs:  CBC Full  -  ( 18 Apr 2022 05:00 )  WBC Count : 8.54 K/uL  RBC Count : 4.36 M/uL  Hemoglobin : 12.6 g/dL  Hematocrit : 39.1 %  Platelet Count - Automated : 200 K/uL  Mean Cell Volume : 89.7 fL  Mean Cell Hemoglobin : 28.9 pg  Mean Cell Hemoglobin Concentration : 32.2 g/dL  Auto Neutrophil # : 6.15 K/uL  Auto Lymphocyte # : 1.17 K/uL  Auto Monocyte # : 0.66 K/uL  Auto Eosinophil # : 0.50 K/uL  Auto Basophil # : 0.04 K/uL  Auto Neutrophil % : 72.0 %  Auto Lymphocyte % : 13.7 %  Auto Monocyte % : 7.7 %  Auto Eosinophil % : 5.9 %  Auto Basophil % : 0.5 %    04-18    139  |  101  |  33<H>  ----------------------------<  133<H>  4.7   |  26  |  1.2    Ca    9.4      18 Apr 2022 05:00  Mg     2.2     04-18    TPro  6.2  /  Alb  3.8  /  TBili  0.3  /  DBili  x   /  AST  14  /  ALT  16  /  AlkPhos  66  04-18    LIVER FUNCTIONS - ( 18 Apr 2022 05:00 )  Alb: 3.8 g/dL / Pro: 6.2 g/dL / ALK PHOS: 66 U/L / ALT: 16 U/L / AST: 14 U/L / GGT: x                 RADIOLOGY & ADDITIONAL TESTS:  < from: MR Head No Cont (04.14.22 @ 21:51) >    FINDINGS/  IMPRESSION:  Limited and incomplete examination reveals increased DWI signal within   the central aspect of the kiara as well as the right cerebellar hemisphere   likely representing acute ischemic change.        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    Moderate stenosis involving the right clinoid segment of the ICA due to   atherosclerotic calcification.    CTA NECK:  Mild stenosis of the bilateral proximal internal carotid arteries due to   mixed calcified and noncalcified atherosclerotic plaque.    --- End of Report ---      < end of copied text >  
INTERVAL HPI/OVERNIGHT EVENTS:    SUBJECTIVE: Patient seen and examined at bedside.     no cp, sob, abd pain, fever  no ha, dizziness, lightheadedness, syncope    OBJECTIVE:    VITAL SIGNS:  Vital Signs Last 24 Hrs  T(C): 36.4 (18 Apr 2022 08:00), Max: 36.4 (17 Apr 2022 17:00)  T(F): 97.5 (18 Apr 2022 08:00), Max: 97.6 (17 Apr 2022 17:00)  HR: 77 (18 Apr 2022 08:00) (64 - 80)  BP: 140/88 (18 Apr 2022 08:00) (133/80 - 168/89)  BP(mean): 108 (18 Apr 2022 08:00) (95 - 120)  RR: 18 (18 Apr 2022 08:00) (18 - 24)  SpO2: 98% (18 Apr 2022 08:00) (94% - 98%)      PHYSICAL EXAM:    General: NAD  HEENT: NC/AT; PERRL, clear conjunctiva  Neck: supple  Respiratory: CTA b/l  Cardiovascular: +S1/S2; RRR  Abdomen: soft, NT/ND; +BS x4  Extremities: WWP, 2+ peripheral pulses b/l; no LE edema  Skin: normal color and turgor; no rash  Neurological:    MEDICATIONS:  MEDICATIONS  (STANDING):  apixaban 5 milliGRAM(s) Oral every 12 hours  atorvastatin 80 milliGRAM(s) Oral at bedtime  dextrose 5%. 1000 milliLiter(s) (50 mL/Hr) IV Continuous <Continuous>  dextrose 5%. 1000 milliLiter(s) (100 mL/Hr) IV Continuous <Continuous>  dextrose 50% Injectable 25 Gram(s) IV Push once  dextrose 50% Injectable 12.5 Gram(s) IV Push once  dextrose 50% Injectable 25 Gram(s) IV Push once  glucagon  Injectable 1 milliGRAM(s) IntraMuscular once  insulin lispro (ADMELOG) corrective regimen sliding scale   SubCutaneous three times a day before meals  lisinopril 5 milliGRAM(s) Oral daily  metoprolol tartrate 25 milliGRAM(s) Oral two times a day  NIFEdipine XL 30 milliGRAM(s) Oral daily  sertraline 25 milliGRAM(s) Oral daily    MEDICATIONS  (PRN):  dextrose Oral Gel 15 Gram(s) Oral once PRN Blood Glucose LESS THAN 70 milliGRAM(s)/deciliter      ALLERGIES:  Allergies    No Known Allergies    Intolerances        LABS:                        12.6   8.54  )-----------( 200      ( 18 Apr 2022 05:00 )             39.1     Hemoglobin: 12.6 g/dL (04-18 @ 05:00)  Hemoglobin: 13.2 g/dL (04-17 @ 07:14)  Hemoglobin: 13.7 g/dL (04-16 @ 04:30)  Hemoglobin: 13.2 g/dL (04-15 @ 07:39)  Hemoglobin: 12.6 g/dL (04-14 @ 06:40)    CBC Full  -  ( 18 Apr 2022 05:00 )  WBC Count : 8.54 K/uL  RBC Count : 4.36 M/uL  Hemoglobin : 12.6 g/dL  Hematocrit : 39.1 %  Platelet Count - Automated : 200 K/uL  Mean Cell Volume : 89.7 fL  Mean Cell Hemoglobin : 28.9 pg  Mean Cell Hemoglobin Concentration : 32.2 g/dL  Auto Neutrophil # : 6.15 K/uL  Auto Lymphocyte # : 1.17 K/uL  Auto Monocyte # : 0.66 K/uL  Auto Eosinophil # : 0.50 K/uL  Auto Basophil # : 0.04 K/uL  Auto Neutrophil % : 72.0 %  Auto Lymphocyte % : 13.7 %  Auto Monocyte % : 7.7 %  Auto Eosinophil % : 5.9 %  Auto Basophil % : 0.5 %    04-18    139  |  101  |  33<H>  ----------------------------<  133<H>  4.7   |  26  |  1.2    Ca    9.4      18 Apr 2022 05:00  Mg     2.2     04-18    TPro  6.2  /  Alb  3.8  /  TBili  0.3  /  DBili  x   /  AST  14  /  ALT  16  /  AlkPhos  66  04-18    Creatinine Trend: 1.2<--, 1.1<--, 1.1<--, 1.1<--, 1.0<--, 1.0<--  LIVER FUNCTIONS - ( 18 Apr 2022 05:00 )  Alb: 3.8 g/dL / Pro: 6.2 g/dL / ALK PHOS: 66 U/L / ALT: 16 U/L / AST: 14 U/L / GGT: x               hs Troponin:              CSF:                      EKG:   MICROBIOLOGY:    IMAGING:      Labs, imaging, EKG personally reviewed    RADIOLOGY & ADDITIONAL TESTS: Reviewed.
  Patient Information:  REYNA FOWLER / 81y / Female / MRN#:[id number]    Hospital Day: 1d    Interval History:      Past Medical History:  Type II diabetes mellitus    Hypertension    Dyslipidemia    Recurrent falls      Past Surgical History:  History of hernia surgery      Allergies:  No Known Allergies    Medications:  PRN:  dextrose Oral Gel 15 Gram(s) Oral once PRN Blood Glucose LESS THAN 70 milliGRAM(s)/deciliter    Standing:  aspirin  chewable 81 milliGRAM(s) Oral daily  dextrose 5%. 1000 milliLiter(s) (50 mL/Hr) IV Continuous <Continuous>  dextrose 5%. 1000 milliLiter(s) (100 mL/Hr) IV Continuous <Continuous>  dextrose 50% Injectable 25 Gram(s) IV Push once  dextrose 50% Injectable 12.5 Gram(s) IV Push once  dextrose 50% Injectable 25 Gram(s) IV Push once  glucagon  Injectable 1 milliGRAM(s) IntraMuscular once  heparin   Injectable 5000 Unit(s) SubCutaneous every 8 hours  insulin lispro (ADMELOG) corrective regimen sliding scale   SubCutaneous three times a day before meals  lisinopril 5 milliGRAM(s) Oral daily  metoprolol tartrate 25 milliGRAM(s) Oral two times a day  NIFEdipine XL 30 milliGRAM(s) Oral daily  sertraline 25 milliGRAM(s) Oral daily    Home:  aspirin 81 mg oral tablet, chewable: 1 tab(s) orally once a day  metformin 500 mg tablet: 1 each orally once a day  if FS &gt;200, take 1 tab at night  sertraline 25 mg oral tablet: 1 tab(s) orally once a day    Vitals:  T(C): 36.2, Max: 36.6 (22 @ 16:22)  T(F): 97.2, Max: 97.8 (22 @ 16:22)  HR: 77 (77 - 92)  BP: 169/92 (149/87 - 169/92)  RR: 18 (18 - 18)  SpO2: 97% (95% - 97%)    Physical Exam:  General: NAD  Heart: irregular rhythm, not tachycardic   Lungs: CTAB no wheezing or crackles  Abdomen: soft nontender  Extremities: no edema or cyanosis  Neuro: AAOx2    Labs:  CBC ( @ 04:30)                        Hgb: 12.9   WBC: 8.08  )-----------------( Plts: 214                              Hct: 39.6     Chem ( @ 04:30)  Na: 141  |     Cl: 103     |  BUN: 23  -----------------------------------------< Gluc: 129    K: 4.6   |    HCO3: 29  |  Cr: 1.0    Ca 9.8 ( @ 04:30)  Mg 2.0 ( @ 11:32)    LFTs ( @ 04:30)  TPro 6.8  /  Alb 4.1  TBili 0.4  /  DBili     AST 15  /  ALT 12  /  AlkPhos 72    Cardiac Markers ( @ 20:00)  Troponin I X  Troponin T X  CK X  CKMB X  CKMB Units 3.6  Myoglobin X  Lactate X  ESR X    Cardiac Markers ( @ 16:00)  Troponin I X  Troponin T 0.04  CK X  CKMB X  CKMB Units X  Myoglobin X  Lactate X  ESR X    Cardiac Markers ( @ 11:32)  Troponin I X  Troponin T 0.05    CKMB X  CKMB Units X  Myoglobin X  Lactate X  ESR X          Urinalysis Basic ( @ 19:15)  Color: Colorless  Appearance: Slightly Turbid  S.015  pH:   Gluc:   Ketone: Negative  Bili: Negative  Urobili: <2 mg/dL   Blood:   Protein: Negative  Nitrite: Positive   Leuk Esterase: Small  RBC:   WBC:    Sq Epi:   Non Sq Epi:   Bacteria:     Microbiology:    Radiology:  
INTERVAL HPI/OVERNIGHT EVENTS:    SUBJECTIVE: Patient seen and examined at bedside.     no cp, sob, abd pain, fever  no ha, dizziness, lightheadedness, syncope    OBJECTIVE:    VITAL SIGNS:  Vital Signs Last 24 Hrs  T(C): 36.1 (16 Apr 2022 08:05), Max: 36.2 (15 Apr 2022 14:46)  T(F): 96.9 (16 Apr 2022 08:05), Max: 97.2 (15 Apr 2022 14:46)  HR: 67 (16 Apr 2022 08:05) (66 - 84)  BP: 150/78 (16 Apr 2022 08:05) (138/74 - 161/81)  BP(mean): 95 (16 Apr 2022 08:05) (90 - 121)  RR: 18 (16 Apr 2022 08:05) (17 - 19)  SpO2: 96% (16 Apr 2022 08:05) (92% - 98%)      PHYSICAL EXAM:    General: NAD  HEENT: NC/AT; PERRL, clear conjunctiva  Neck: supple  Respiratory: CTA b/l  Cardiovascular: +S1/S2; RRR  Abdomen: soft, NT/ND; +BS x4  Extremities: WWP, 2+ peripheral pulses b/l; no LE edema  Skin: normal color and turgor; no rash  Neurological:    MEDICATIONS:  MEDICATIONS  (STANDING):  aspirin  chewable 81 milliGRAM(s) Oral daily  atorvastatin 80 milliGRAM(s) Oral at bedtime  cefTRIAXone   IVPB 1000 milliGRAM(s) IV Intermittent every 24 hours  clopidogrel Tablet 75 milliGRAM(s) Oral daily  dextrose 5%. 1000 milliLiter(s) (50 mL/Hr) IV Continuous <Continuous>  dextrose 5%. 1000 milliLiter(s) (100 mL/Hr) IV Continuous <Continuous>  dextrose 50% Injectable 25 Gram(s) IV Push once  dextrose 50% Injectable 12.5 Gram(s) IV Push once  dextrose 50% Injectable 25 Gram(s) IV Push once  enoxaparin Injectable 40 milliGRAM(s) SubCutaneous every 24 hours  glucagon  Injectable 1 milliGRAM(s) IntraMuscular once  insulin lispro (ADMELOG) corrective regimen sliding scale   SubCutaneous three times a day before meals  lisinopril 5 milliGRAM(s) Oral daily  metoprolol tartrate 25 milliGRAM(s) Oral two times a day  NIFEdipine XL 30 milliGRAM(s) Oral daily  sertraline 25 milliGRAM(s) Oral daily    MEDICATIONS  (PRN):  dextrose Oral Gel 15 Gram(s) Oral once PRN Blood Glucose LESS THAN 70 milliGRAM(s)/deciliter      ALLERGIES:  Allergies    No Known Allergies    Intolerances        LABS:                        13.7   8.39  )-----------( 224      ( 16 Apr 2022 04:30 )             42.8     Hemoglobin: 13.7 g/dL (04-16 @ 04:30)  Hemoglobin: 13.2 g/dL (04-15 @ 07:39)  Hemoglobin: 12.6 g/dL (04-14 @ 06:40)  Hemoglobin: 12.9 g/dL (04-13 @ 04:30)  Hemoglobin: 13.2 g/dL (04-12 @ 11:32)    CBC Full  -  ( 16 Apr 2022 04:30 )  WBC Count : 8.39 K/uL  RBC Count : 4.82 M/uL  Hemoglobin : 13.7 g/dL  Hematocrit : 42.8 %  Platelet Count - Automated : 224 K/uL  Mean Cell Volume : 88.8 fL  Mean Cell Hemoglobin : 28.4 pg  Mean Cell Hemoglobin Concentration : 32.0 g/dL  Auto Neutrophil # : 5.86 K/uL  Auto Lymphocyte # : 1.38 K/uL  Auto Monocyte # : 0.62 K/uL  Auto Eosinophil # : 0.47 K/uL  Auto Basophil # : 0.04 K/uL  Auto Neutrophil % : 69.9 %  Auto Lymphocyte % : 16.4 %  Auto Monocyte % : 7.4 %  Auto Eosinophil % : 5.6 %  Auto Basophil % : 0.5 %    04-16    138  |  100  |  25<H>  ----------------------------<  114<H>  4.8   |  27  |  1.1    Ca    9.5      16 Apr 2022 04:30  Mg     2.1     04-16    TPro  6.5  /  Alb  4.0  /  TBili  0.2  /  DBili  x   /  AST  17  /  ALT  15  /  AlkPhos  73  04-16    Creatinine Trend: 1.1<--, 1.1<--, 1.0<--, 1.0<--, 1.2<--  LIVER FUNCTIONS - ( 16 Apr 2022 04:30 )  Alb: 4.0 g/dL / Pro: 6.5 g/dL / ALK PHOS: 73 U/L / ALT: 15 U/L / AST: 17 U/L / GGT: x               hs Troponin:              CSF:                      EKG:   MICROBIOLOGY:    IMAGING:      Labs, imaging, EKG personally reviewed    RADIOLOGY & ADDITIONAL TESTS: Reviewed.
INTERVAL HPI/OVERNIGHT EVENTS:    SUBJECTIVE: Patient seen and examined at bedside.     no cp, sob, abd pain, fever  no ha, dizziness, lightheadedness, syncope    OBJECTIVE:    VITAL SIGNS:  Vital Signs Last 24 Hrs  T(C): 36.2 (15 Apr 2022 14:46), Max: 36.2 (14 Apr 2022 20:45)  T(F): 97.2 (15 Apr 2022 14:46), Max: 97.2 (15 Apr 2022 14:46)  HR: 84 (15 Apr 2022 14:46) (73 - 84)  BP: 154/79 (15 Apr 2022 14:46) (153/72 - 154/82)  BP(mean): --  RR: 18 (15 Apr 2022 14:46) (18 - 18)  SpO2: 95% (15 Apr 2022 05:10) (95% - 95%)      PHYSICAL EXAM:    General: NAD  HEENT: NC/AT; PERRL, clear conjunctiva  Neck: supple  Respiratory: CTA b/l  Cardiovascular: +S1/S2; RRR  Abdomen: soft, NT/ND; +BS x4  Extremities: WWP, 2+ peripheral pulses b/l; no LE edema  Skin: normal color and turgor; no rash  Neurological:    MEDICATIONS:  MEDICATIONS  (STANDING):  aspirin  chewable 81 milliGRAM(s) Oral daily  atorvastatin 40 milliGRAM(s) Oral at bedtime  cefTRIAXone   IVPB 1000 milliGRAM(s) IV Intermittent every 24 hours  clopidogrel Tablet 75 milliGRAM(s) Oral daily  dextrose 5%. 1000 milliLiter(s) (50 mL/Hr) IV Continuous <Continuous>  dextrose 5%. 1000 milliLiter(s) (100 mL/Hr) IV Continuous <Continuous>  dextrose 50% Injectable 25 Gram(s) IV Push once  dextrose 50% Injectable 12.5 Gram(s) IV Push once  dextrose 50% Injectable 25 Gram(s) IV Push once  enoxaparin Injectable 40 milliGRAM(s) SubCutaneous every 24 hours  glucagon  Injectable 1 milliGRAM(s) IntraMuscular once  insulin lispro (ADMELOG) corrective regimen sliding scale   SubCutaneous three times a day before meals  lisinopril 5 milliGRAM(s) Oral daily  metoprolol tartrate 25 milliGRAM(s) Oral two times a day  NIFEdipine XL 30 milliGRAM(s) Oral daily  sertraline 25 milliGRAM(s) Oral daily    MEDICATIONS  (PRN):  dextrose Oral Gel 15 Gram(s) Oral once PRN Blood Glucose LESS THAN 70 milliGRAM(s)/deciliter      ALLERGIES:  Allergies    No Known Allergies    Intolerances        LABS:                        13.2   7.69  )-----------( 219      ( 15 Apr 2022 07:39 )             40.4     Hemoglobin: 13.2 g/dL (04-15 @ 07:39)  Hemoglobin: 12.6 g/dL (04-14 @ 06:40)  Hemoglobin: 12.9 g/dL (04-13 @ 04:30)  Hemoglobin: 13.2 g/dL (04-12 @ 11:32)    CBC Full  -  ( 15 Apr 2022 07:39 )  WBC Count : 7.69 K/uL  RBC Count : 4.56 M/uL  Hemoglobin : 13.2 g/dL  Hematocrit : 40.4 %  Platelet Count - Automated : 219 K/uL  Mean Cell Volume : 88.6 fL  Mean Cell Hemoglobin : 28.9 pg  Mean Cell Hemoglobin Concentration : 32.7 g/dL  Auto Neutrophil # : 5.75 K/uL  Auto Lymphocyte # : 0.98 K/uL  Auto Monocyte # : 0.56 K/uL  Auto Eosinophil # : 0.34 K/uL  Auto Basophil # : 0.04 K/uL  Auto Neutrophil % : 74.8 %  Auto Lymphocyte % : 12.7 %  Auto Monocyte % : 7.3 %  Auto Eosinophil % : 4.4 %  Auto Basophil % : 0.5 %    04-15    137  |  100  |  24<H>  ----------------------------<  140<H>  4.4   |  26  |  1.1    Ca    9.7      15 Apr 2022 07:39  Mg     2.1     04-15    TPro  6.4  /  Alb  3.9  /  TBili  0.3  /  DBili  x   /  AST  15  /  ALT  14  /  AlkPhos  70  04-15    Creatinine Trend: 1.1<--, 1.0<--, 1.0<--, 1.2<--  LIVER FUNCTIONS - ( 15 Apr 2022 07:39 )  Alb: 3.9 g/dL / Pro: 6.4 g/dL / ALK PHOS: 70 U/L / ALT: 14 U/L / AST: 15 U/L / GGT: x               hs Troponin:              CSF:                      EKG:   MICROBIOLOGY:    Culture - Urine (collected 12 Apr 2022 19:15)  Source: Clean Catch Clean Catch (Midstream)  Final Report (15 Apr 2022 10:07):    >100,000 CFU/ml Klebsiella oxytoca/Raoutella ornithinolytica  Organism: Klebsiella oxytoca /Raoutella ornithinolytica (15 Apr 2022 10:07)  Organism: Klebsiella oxytoca /Raoutella ornithinolytica (15 Apr 2022 10:07)      IMAGING:      Labs, imaging, EKG personally reviewed    RADIOLOGY & ADDITIONAL TESTS: Reviewed.
Neurology Progress Note    HPI:  82 yo Slovenian speaking female with PMHx of HTN, HLD, DM, hx of Left MCA aneurysm presented to the ED after fall at home. As per son, he woke up at 3am to his mother calling out for help and stating she couldn't get up. When he had gone into the room, he found her sitting on the floor, awake, and scared. No loss of urine or stools. Patient is unable to state how she fell. Son reports that for the past couple of days she has been feeling weak and fatigue, this morning she had trouble standing on her right leg. No recent fevers, chills, chest pain, SOB, abdominal pain, nausea, vomiting, diarrhea, constipation, dysuria. Currently not complaining of any pain.     In the ED, VS noted for T 97.4, HR 85, /79, SpO2 96% on RA. Labs noted for WBC 8, Cr 1.2, Trop 0.05. CT head shows mild degree of cortical atrophy, mild degree of cerebellar atrophy, ischemic changes noted in periventricular white matter basal ganglia, thalami, and right cerebellar hemisphere and left occipital lobe.   CT c-spine no acute displaced c-spine fracture.      Interval History:    Patient seen and examined at bedside. There were no acute events overnight. Patient states she feels well, offered no complaints.      PAST MEDICAL & SURGICAL HISTORY:  Type II diabetes mellitus  Hypertension  Dyslipidemia  Recurrent falls  History of hernia surgery      Medications:    MEDICATIONS  (STANDING):  aspirin  chewable 81 milliGRAM(s) Oral daily  atorvastatin 80 milliGRAM(s) Oral at bedtime  clopidogrel Tablet 75 milliGRAM(s) Oral daily  dextrose 5%. 1000 milliLiter(s) (50 mL/Hr) IV Continuous <Continuous>  dextrose 5%. 1000 milliLiter(s) (100 mL/Hr) IV Continuous <Continuous>  dextrose 50% Injectable 25 Gram(s) IV Push once  dextrose 50% Injectable 12.5 Gram(s) IV Push once  dextrose 50% Injectable 25 Gram(s) IV Push once  enoxaparin Injectable 40 milliGRAM(s) SubCutaneous every 24 hours  glucagon  Injectable 1 milliGRAM(s) IntraMuscular once  insulin lispro (ADMELOG) corrective regimen sliding scale   SubCutaneous three times a day before meals  lisinopril 5 milliGRAM(s) Oral daily  metoprolol tartrate 25 milliGRAM(s) Oral two times a day  NIFEdipine XL 30 milliGRAM(s) Oral daily  sertraline 25 milliGRAM(s) Oral daily    MEDICATIONS  (PRN):  dextrose Oral Gel 15 Gram(s) Oral once PRN Blood Glucose LESS THAN 70 milliGRAM(s)/deciliter        Vital Signs Last 24 Hrs    ICU Vital Signs Last 24 Hrs  T(C): 36.1 (17 Apr 2022 08:15), Max: 36.9 (17 Apr 2022 00:03)  T(F): 96.9 (17 Apr 2022 08:15), Max: 98.4 (17 Apr 2022 00:03)  HR: 80 (17 Apr 2022 08:15) (55 - 81)  BP: 136/66 (17 Apr 2022 08:15) (126/62 - 179/96)  BP(mean): 89 (17 Apr 2022 08:15) (89 - 122)  RR: 22 (17 Apr 2022 08:15) (18 - 22)  SpO2: 96% (17 Apr 2022 08:15) (95% - 99%)      Neurological Exam:   Mental status: Awake, alert and oriented x2 to self, location. Slovenian speaking. No dysarthria, no aphasia.   Cranial nerves: Pupils equally round and reactive to light, no nystagmus, extraocular muscles intact, V1 through V3 intact bilaterally and symmetric, face symmetric, tongue was midline.  Motor: MRC grading 5/5 LUE, 5/5 L hip flexion/ext, 4-/5 RUE/RLE. Unable to assess plantarflexion/dorsiflexion due to limited patient cooperation.  strength 5/5.  Normal tone and bulk.  No abnormal movements.    Sensation: Intact to light touch, proprioception, and pinprick.   Coordination: No dysmetria on finger-to-nose and heel-to-shin.  Reflexes: 3+ in bilateral UE/LE, downgoing toes bilaterally.  Gait: Deferred.    NIHSS 2 (1 point for RUE, 1 point for RLE)      Labs:      LABS:  cret                        13.2   7.89  )-----------( 221      ( 17 Apr 2022 07:14 )             41.2     04-17    142  |  102  |  25<H>  ----------------------------<  122<H>  4.8   |  30  |  1.1    Ca    9.6      17 Apr 2022 07:14  Mg     2.0     04-17    TPro  6.3  /  Alb  4.0  /  TBili  0.3  /  DBili  x   /  AST  16  /  ALT  16  /  AlkPhos  70  04-17          CBC Full  -  ( 16 Apr 2022 04:30 )  WBC Count : 8.39 K/uL  RBC Count : 4.82 M/uL  Hemoglobin : 13.7 g/dL  Hematocrit : 42.8 %  Platelet Count - Automated : 224 K/uL  Mean Cell Volume : 88.8 fL  Mean Cell Hemoglobin : 28.4 pg  Mean Cell Hemoglobin Concentration : 32.0 g/dL  Auto Neutrophil # : 5.86 K/uL  Auto Lymphocyte # : 1.38 K/uL  Auto Monocyte # : 0.62 K/uL  Auto Eosinophil # : 0.47 K/uL  Auto Basophil # : 0.04 K/uL  Auto Neutrophil % : 69.9 %  Auto Lymphocyte % : 16.4 %  Auto Monocyte % : 7.4 %  Auto Eosinophil % : 5.6 %  Auto Basophil % : 0.5 %    04-16    138  |  100  |  25<H>  ----------------------------<  114<H>  4.8   |  27  |  1.1    Ca    9.5      16 Apr 2022 04:30  Mg     2.1     04-16    TPro  6.5  /  Alb  4.0  /  TBili  0.2  /  DBili  x   /  AST  17  /  ALT  15  /  AlkPhos  73  04-16    LIVER FUNCTIONS - ( 16 Apr 2022 04:30 )  Alb: 4.0 g/dL / Pro: 6.5 g/dL / ALK PHOS: 73 U/L / ALT: 15 U/L / AST: 17 U/L / GGT: x           
Neurology Progress Note    HPI:  82 yo [place] speaking female with PMHx of HTN, HLD, DM, hx of Left MCA aneurysm presented to the ED after fall at home. As per son, he woke up at 3am to his mother calling out for help and stating she couldn't get up. When he had gone into the room, he found her sitting on the floor, awake, and scared. No loss of urine or stools. Patient is unable to state how she fell. Son reports that for the past couple of days she has been feeling weak and fatigue, this morning she had trouble standing on her right leg. No recent fevers, chills, chest pain, SOB, abdominal pain, nausea, vomiting, diarrhea, constipation, dysuria. Currently not complaining of any pain.     In the ED, VS noted for T 97.4, HR 85, /79, SpO2 96% on RA. Labs noted for WBC 8, Cr 1.2, Trop 0.05. CT head shows mild degree of cortical atrophy, mild degree of cerebellar atrophy, ischemic changes noted in periventricular white matter basal ganglia, thalami, and right cerebellar hemisphere and left occipital lobe.   CT c-spine no acute displaced c-spine fracture.      Interval History:    Patient seen and examined at bedside. There were no acute events overnight. Patient states she feels well, offered no complaints.      PAST MEDICAL & SURGICAL HISTORY:  Type II diabetes mellitus  Hypertension  Dyslipidemia  Recurrent falls  History of hernia surgery      Medications:  aspirin  chewable 81 milliGRAM(s) Oral daily  atorvastatin 80 milliGRAM(s) Oral at bedtime  clopidogrel Tablet 75 milliGRAM(s) Oral daily  dextrose 5%. 1000 milliLiter(s) IV Continuous <Continuous>  dextrose 5%. 1000 milliLiter(s) IV Continuous <Continuous>  dextrose 50% Injectable 25 Gram(s) IV Push once  dextrose 50% Injectable 12.5 Gram(s) IV Push once  dextrose 50% Injectable 25 Gram(s) IV Push once  dextrose Oral Gel 15 Gram(s) Oral once PRN  enoxaparin Injectable 40 milliGRAM(s) SubCutaneous every 24 hours  glucagon  Injectable 1 milliGRAM(s) IntraMuscular once  insulin lispro (ADMELOG) corrective regimen sliding scale   SubCutaneous three times a day before meals  lisinopril 5 milliGRAM(s) Oral daily  metoprolol tartrate 25 milliGRAM(s) Oral two times a day  NIFEdipine XL 30 milliGRAM(s) Oral daily  sertraline 25 milliGRAM(s) Oral daily      Vital Signs Last 24 Hrs  T(C): 36.1 (16 Apr 2022 08:05), Max: 36.2 (15 Apr 2022 14:46)  T(F): 96.9 (16 Apr 2022 08:05), Max: 97.2 (15 Apr 2022 14:46)  HR: 81 (16 Apr 2022 12:00) (66 - 84)  BP: 167/87 (16 Apr 2022 12:00) (138/74 - 167/87)  BP(mean): 122 (16 Apr 2022 12:00) (90 - 122)  RR: 18 (16 Apr 2022 12:00) (17 - 19)  SpO2: 95% (16 Apr 2022 12:00) (92% - 98%)      Neurological Exam:   Mental status: Awake, alert and oriented x2 to self, location. [place] speaking. No dysarthria, no aphasia.   Cranial nerves: Pupils equally round and reactive to light, no nystagmus, extraocular muscles intact, V1 through V3 intact bilaterally and symmetric, face symmetric, tongue was midline.  Motor: MRC grading 5/5 LUE, 5/5 L hip flexion/ext, 4-/5 RUE/RLE. Unable to assess plantarflexion/dorsiflexion due to limited patient cooperation.  strength 5/5.  Normal tone and bulk.  No abnormal movements.    Sensation: Intact to light touch, proprioception, and pinprick.   Coordination: No dysmetria on finger-to-nose and heel-to-shin.  Reflexes: 3+ in bilateral UE/LE, downgoing toes bilaterally.  Gait: Deferred.    NIHSS 2 (1 point for RUE, 1 point for RLE)      Labs:  CBC Full  -  ( 16 Apr 2022 04:30 )  WBC Count : 8.39 K/uL  RBC Count : 4.82 M/uL  Hemoglobin : 13.7 g/dL  Hematocrit : 42.8 %  Platelet Count - Automated : 224 K/uL  Mean Cell Volume : 88.8 fL  Mean Cell Hemoglobin : 28.4 pg  Mean Cell Hemoglobin Concentration : 32.0 g/dL  Auto Neutrophil # : 5.86 K/uL  Auto Lymphocyte # : 1.38 K/uL  Auto Monocyte # : 0.62 K/uL  Auto Eosinophil # : 0.47 K/uL  Auto Basophil # : 0.04 K/uL  Auto Neutrophil % : 69.9 %  Auto Lymphocyte % : 16.4 %  Auto Monocyte % : 7.4 %  Auto Eosinophil % : 5.6 %  Auto Basophil % : 0.5 %    04-16    138  |  100  |  25<H>  ----------------------------<  114<H>  4.8   |  27  |  1.1    Ca    9.5      16 Apr 2022 04:30  Mg     2.1     04-16    TPro  6.5  /  Alb  4.0  /  TBili  0.2  /  DBili  x   /  AST  17  /  ALT  15  /  AlkPhos  73  04-16    LIVER FUNCTIONS - ( 16 Apr 2022 04:30 )  Alb: 4.0 g/dL / Pro: 6.5 g/dL / ALK PHOS: 73 U/L / ALT: 15 U/L / AST: 17 U/L / GGT: x           
INTERVAL HPI/OVERNIGHT EVENTS:    SUBJECTIVE: Patient seen and examined at bedside.     no cp, sob, abd pain, fever  no ha, dizziness, lightheadedness, syncope    OBJECTIVE:    VITAL SIGNS:  Vital Signs Last 24 Hrs  T(C): 36.3 (19 Apr 2022 13:10), Max: 37 (18 Apr 2022 20:46)  T(F): 97.4 (19 Apr 2022 13:10), Max: 98.6 (18 Apr 2022 20:46)  HR: 78 (19 Apr 2022 13:10) (70 - 78)  BP: 140/61 (19 Apr 2022 13:10) (131/60 - 168/82)  BP(mean): 101 (19 Apr 2022 13:10) (100 - 101)  RR: 18 (19 Apr 2022 13:10) (18 - 19)  SpO2: 82% (19 Apr 2022 13:10) (82% - 98%)      PHYSICAL EXAM:    General: NAD  HEENT: NC/AT; PERRL, clear conjunctiva  Neck: supple  Respiratory: CTA b/l  Cardiovascular: +S1/S2; RRR  Abdomen: soft, NT/ND; +BS x4  Extremities: WWP, 2+ peripheral pulses b/l; no LE edema  Skin: normal color and turgor; no rash  Neurological:    MEDICATIONS:  MEDICATIONS  (STANDING):  apixaban 5 milliGRAM(s) Oral every 12 hours  atorvastatin 80 milliGRAM(s) Oral at bedtime  dextrose 5%. 1000 milliLiter(s) (50 mL/Hr) IV Continuous <Continuous>  dextrose 5%. 1000 milliLiter(s) (100 mL/Hr) IV Continuous <Continuous>  dextrose 50% Injectable 25 Gram(s) IV Push once  dextrose 50% Injectable 12.5 Gram(s) IV Push once  dextrose 50% Injectable 25 Gram(s) IV Push once  glucagon  Injectable 1 milliGRAM(s) IntraMuscular once  insulin lispro (ADMELOG) corrective regimen sliding scale   SubCutaneous three times a day before meals  lisinopril 5 milliGRAM(s) Oral daily  metoprolol tartrate 25 milliGRAM(s) Oral two times a day  NIFEdipine XL 30 milliGRAM(s) Oral daily  sertraline 25 milliGRAM(s) Oral daily    MEDICATIONS  (PRN):  dextrose Oral Gel 15 Gram(s) Oral once PRN Blood Glucose LESS THAN 70 milliGRAM(s)/deciliter      ALLERGIES:  Allergies    No Known Allergies    Intolerances        LABS:                        12.7   7.53  )-----------( 186      ( 19 Apr 2022 06:50 )             39.6     Hemoglobin: 12.7 g/dL (04-19 @ 06:50)  Hemoglobin: 12.6 g/dL (04-18 @ 05:00)  Hemoglobin: 13.2 g/dL (04-17 @ 07:14)  Hemoglobin: 13.7 g/dL (04-16 @ 04:30)  Hemoglobin: 13.2 g/dL (04-15 @ 07:39)    CBC Full  -  ( 19 Apr 2022 06:50 )  WBC Count : 7.53 K/uL  RBC Count : 4.40 M/uL  Hemoglobin : 12.7 g/dL  Hematocrit : 39.6 %  Platelet Count - Automated : 186 K/uL  Mean Cell Volume : 90.0 fL  Mean Cell Hemoglobin : 28.9 pg  Mean Cell Hemoglobin Concentration : 32.1 g/dL  Auto Neutrophil # : 5.28 K/uL  Auto Lymphocyte # : 1.14 K/uL  Auto Monocyte # : 0.55 K/uL  Auto Eosinophil # : 0.50 K/uL  Auto Basophil # : 0.03 K/uL  Auto Neutrophil % : 70.2 %  Auto Lymphocyte % : 15.1 %  Auto Monocyte % : 7.3 %  Auto Eosinophil % : 6.6 %  Auto Basophil % : 0.4 %    04-19    141  |  104  |  28<H>  ----------------------------<  132<H>  4.7   |  27  |  1.0    Ca    9.1      19 Apr 2022 06:50  Phos  3.9     04-19  Mg     2.3     04-19    TPro  6.0  /  Alb  3.6  /  TBili  0.3  /  DBili  x   /  AST  16  /  ALT  17  /  AlkPhos  67  04-19    Creatinine Trend: 1.0<--, 1.2<--, 1.1<--, 1.1<--, 1.1<--, 1.0<--  LIVER FUNCTIONS - ( 19 Apr 2022 06:50 )  Alb: 3.6 g/dL / Pro: 6.0 g/dL / ALK PHOS: 67 U/L / ALT: 17 U/L / AST: 16 U/L / GGT: x               hs Troponin:              CSF:                      EKG:   MICROBIOLOGY:    IMAGING:      Labs, imaging, EKG personally reviewed    RADIOLOGY & ADDITIONAL TESTS: Reviewed.

## 2022-04-19 NOTE — SWALLOW VFSS/MBS ASSESSMENT ADULT - SLP PERTINENT HISTORY OF CURRENT PROBLEM
82 y/o F with PMHx of HTN, HLD, DM, left MCA aneurysm, depression, who presented after fall. MRI brain acute ischemic stroke of left central kiara. Stable ischemic changes in right cerebellar hemisphere. Pt known to Dr. Byers's service for left MCA 11mm aneurysm, last diagnostic cerebral angiogram performed 6/2021. CTA reporting no significant change to left MCA M1 bifurcation aneurysm. Decision remains to not proceed with open surgical / any treatment for left MCA aneurysm which appears stable.

## 2022-04-19 NOTE — DISCHARGE NOTE NURSING/CASE MANAGEMENT/SOCIAL WORK - PATIENT PORTAL LINK FT
You can access the FollowMyHealth Patient Portal offered by [facility] by registering at the following website: [email or web address] By joining [email or web address] [email or web address] portal, you will also be able to view your health information using other applications (apps) compatible with our system.

## 2022-04-19 NOTE — H&P ADULT - NSHPREVIEWOFSYSTEMS_GEN_ALL_CORE
Constiutional:    [ x  ] WNL           [   ] poor appetite   [   ] insomnia   [   ] tired   Cardio:                [  x ] WNL           [   ] CP   [   ] SIFUENTES   [   ] palpitations               Resp:                   [  x ] WNL           [   ] SOB   [   ] cough   [   ] wheezing   GI:                        [ x  ] WNL           [   ] constipation   [   ] diarrhea   [   ] abdominal pain   [   ] nausea   [   ] emesis                                :                      [ x  ] WNL           [   ] ECHOLS  [   ] dusuria   [   ] difficulty voiding             Endo:                   [ x  ] WNL          [   ] po;yuria   [   ] temperature intolerance                 Skin:                     [x   ] WNL          [   ] pain   [   ] wound   [   ] rash   MSK:                    [x   ] WNL          [   ] muscle pain   [   ] joint pain/ stiffness   [   ] muscle tenderness   [   ] swelling   Neuro:                 [   ] WNL          [   ] HA   [   ] change in vision   [   ] tremor   [  x ] weakness right sided   [  x ]dysphagia              Cognitive:           [   ] WNL           [ x  ]confusion      Psych:                  [x   ] WNL           [   ] hallucinations   [   ]agitation   [   ] delusion   [   ]depression

## 2022-04-19 NOTE — SWALLOW VFSS/MBS ASSESSMENT ADULT - DIAGNOSTIC IMPRESSIONS
severe oropharyngeal dysphagia for thin liquids; mild-moderate oropharyngeal dysphagia for puree, soft, mildly and moderately thick liquids

## 2022-04-19 NOTE — DISCHARGE NOTE NURSING/CASE MANAGEMENT/SOCIAL WORK - NSDCPEFALRISK_GEN_ALL_CORE
For information on Fall & Injury Prevention, visit: [email or web address] OR  [email or web address] OR  https://www.cdc.gov/steadi/patient.html

## 2022-04-19 NOTE — DISCHARGE NOTE PROVIDER - CARE PROVIDER_API CALL
Maxi Ac)  Neurology  [address] [place] [address]  Phone: [phone]  Fax: [phone]  Follow Up Time: 2 weeks

## 2022-04-20 LAB
ALBUMIN SERPL ELPH-MCNC: 3.8 G/DL — SIGNIFICANT CHANGE UP (ref 3.5–5.2)
ALP SERPL-CCNC: 71 U/L — SIGNIFICANT CHANGE UP (ref 30–115)
ALT FLD-CCNC: 17 U/L — SIGNIFICANT CHANGE UP (ref 0–41)
ANION GAP SERPL CALC-SCNC: 13 MMOL/L — SIGNIFICANT CHANGE UP (ref 7–14)
AST SERPL-CCNC: 16 U/L — SIGNIFICANT CHANGE UP (ref 0–41)
BASOPHILS # BLD AUTO: 0.04 K/UL — SIGNIFICANT CHANGE UP (ref 0–0.2)
BASOPHILS NFR BLD AUTO: 0.5 % — SIGNIFICANT CHANGE UP (ref 0–1)
BILIRUB SERPL-MCNC: 0.4 MG/DL — SIGNIFICANT CHANGE UP (ref 0.2–1.2)
BUN SERPL-MCNC: 25 MG/DL — HIGH (ref 10–20)
CALCIUM SERPL-MCNC: 9.5 MG/DL — SIGNIFICANT CHANGE UP (ref 8.5–10.1)
CHLORIDE SERPL-SCNC: 100 MMOL/L — SIGNIFICANT CHANGE UP (ref 98–110)
CO2 SERPL-SCNC: 25 MMOL/L — SIGNIFICANT CHANGE UP (ref 17–32)
CREAT SERPL-MCNC: 1 MG/DL — SIGNIFICANT CHANGE UP (ref 0.7–1.5)
CULTURE RESULTS: SIGNIFICANT CHANGE UP
EGFR: 56 ML/MIN/1.73M2 — LOW
EOSINOPHIL # BLD AUTO: 0.42 K/UL — SIGNIFICANT CHANGE UP (ref 0–0.7)
EOSINOPHIL NFR BLD AUTO: 4.8 % — SIGNIFICANT CHANGE UP (ref 0–8)
GLUCOSE BLDC GLUCOMTR-MCNC: 135 MG/DL — HIGH (ref 70–99)
GLUCOSE BLDC GLUCOMTR-MCNC: 144 MG/DL — HIGH (ref 70–99)
GLUCOSE BLDC GLUCOMTR-MCNC: 146 MG/DL — HIGH (ref 70–99)
GLUCOSE BLDC GLUCOMTR-MCNC: 167 MG/DL — HIGH (ref 70–99)
GLUCOSE SERPL-MCNC: 133 MG/DL — HIGH (ref 70–99)
HCT VFR BLD CALC: 39.8 % — SIGNIFICANT CHANGE UP (ref 37–47)
HGB BLD-MCNC: 13.1 G/DL — SIGNIFICANT CHANGE UP (ref 12–16)
IMM GRANULOCYTES NFR BLD AUTO: 0.3 % — SIGNIFICANT CHANGE UP (ref 0.1–0.3)
LYMPHOCYTES # BLD AUTO: 0.98 K/UL — LOW (ref 1.2–3.4)
LYMPHOCYTES # BLD AUTO: 11.1 % — LOW (ref 20.5–51.1)
MAGNESIUM SERPL-MCNC: 1.9 MG/DL — SIGNIFICANT CHANGE UP (ref 1.8–2.4)
MCHC RBC-ENTMCNC: 29 PG — SIGNIFICANT CHANGE UP (ref 27–31)
MCHC RBC-ENTMCNC: 32.9 G/DL — SIGNIFICANT CHANGE UP (ref 32–37)
MCV RBC AUTO: 88.1 FL — SIGNIFICANT CHANGE UP (ref 81–99)
MONOCYTES # BLD AUTO: 0.62 K/UL — HIGH (ref 0.1–0.6)
MONOCYTES NFR BLD AUTO: 7.1 % — SIGNIFICANT CHANGE UP (ref 1.7–9.3)
NEUTROPHILS # BLD AUTO: 6.7 K/UL — HIGH (ref 1.4–6.5)
NEUTROPHILS NFR BLD AUTO: 76.2 % — HIGH (ref 42.2–75.2)
NRBC # BLD: 0 /100 WBCS — SIGNIFICANT CHANGE UP (ref 0–0)
PLATELET # BLD AUTO: 203 K/UL — SIGNIFICANT CHANGE UP (ref 130–400)
POTASSIUM SERPL-MCNC: 4.5 MMOL/L — SIGNIFICANT CHANGE UP (ref 3.5–5)
POTASSIUM SERPL-SCNC: 4.5 MMOL/L — SIGNIFICANT CHANGE UP (ref 3.5–5)
PROT SERPL-MCNC: 6.2 G/DL — SIGNIFICANT CHANGE UP (ref 6–8)
RBC # BLD: 4.52 M/UL — SIGNIFICANT CHANGE UP (ref 4.2–5.4)
RBC # FLD: 13.3 % — SIGNIFICANT CHANGE UP (ref 11.5–14.5)
SODIUM SERPL-SCNC: 138 MMOL/L — SIGNIFICANT CHANGE UP (ref 135–146)
SPECIMEN SOURCE: SIGNIFICANT CHANGE UP
WBC # BLD: 8.79 K/UL — SIGNIFICANT CHANGE UP (ref 4.8–10.8)
WBC # FLD AUTO: 8.79 K/UL — SIGNIFICANT CHANGE UP (ref 4.8–10.8)

## 2022-04-20 RX ADMIN — ATORVASTATIN CALCIUM 80 MILLIGRAM(S): 80 TABLET, FILM COATED ORAL at 21:11

## 2022-04-20 RX ADMIN — PANTOPRAZOLE SODIUM 40 MILLIGRAM(S): 20 TABLET, DELAYED RELEASE ORAL at 06:28

## 2022-04-20 RX ADMIN — Medication 25 MILLIGRAM(S): at 17:16

## 2022-04-20 RX ADMIN — METFORMIN HYDROCHLORIDE 500 MILLIGRAM(S): 850 TABLET ORAL at 08:48

## 2022-04-20 RX ADMIN — APIXABAN 5 MILLIGRAM(S): 2.5 TABLET, FILM COATED ORAL at 17:16

## 2022-04-20 RX ADMIN — Medication 81 MILLIGRAM(S): at 12:21

## 2022-04-20 RX ADMIN — SERTRALINE 25 MILLIGRAM(S): 25 TABLET, FILM COATED ORAL at 12:22

## 2022-04-20 RX ADMIN — LISINOPRIL 5 MILLIGRAM(S): 2.5 TABLET ORAL at 21:11

## 2022-04-20 RX ADMIN — Medication 30 MILLIGRAM(S): at 06:28

## 2022-04-20 RX ADMIN — APIXABAN 5 MILLIGRAM(S): 2.5 TABLET, FILM COATED ORAL at 06:28

## 2022-04-20 RX ADMIN — Medication 25 MILLIGRAM(S): at 06:28

## 2022-04-20 NOTE — PROGRESS NOTE ADULT - SUBJECTIVE AND OBJECTIVE BOX
Patient is a 81y old  Female who presents with a chief complaint of Right cerebellar and Left central kiara CVA with right hemiplegia (dominant), Aphasia, Dysarthria (19 Apr 2022 14:28)      HPI:  Patient is a 82 yo Ivorian speaking F with PMHx of HTN, HLD, DM, left MCA aneurysm, depression, who presented to the ED after an unwitnessed fall on 4/12/22. Patient denied having any urinary or bowel incontinence and stated feeling weak for a few days and being unable to stand on her right leg. Work up included a CT C-spine which showed no acute fractures. 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 MRI brain showed acute ischemic stroke of left central kiara (penetrating pontine arteries off basilar) and subacute ischemic in right cerebellum (R SCA territory). Subtle right hemiparesis on was noted on exam, and patient was transferred to stroke unit for further monitoring.     Patient found to be in atrial fibrillation Cardiology consulted a-fib and decision for anticoagulation. Patient started on Eliquis 5mg BID and atorvastatin 80 mg Qd. Passed speech/swallow eval and advanced to pureed diet. [facility] was consulted and evaluated the patient recommending outpatient follow up with Dr. Goldstein and stroke clinic. At this time patient declines proceeding with treatment for stable aneurysm.    PM&R consulted for admission to  for acute inpatient rehabilitation. Prior to admission, patient was independent w/ ambulation using straight cane and required assistance from children with ADLs. Resides w/ children; in a house with 3 steps to enter and none inside    CLOF evaluated by OT  BM - Max A  Transfers - NT  UBD - Max A  LBD - Dependent    CLOF evaluated by PT  BM - Min A  Transfers - Mod A  Ambulation - Mod A 10 ft w/ RW    Also seen by SLP - VFSS/MBS evalu on 4/19 revealed severe oropharyngeal dysphagia for thin liquids; mild-moderate oropharyngeal dysphagia for puree, soft, mildly and moderately thick liquids. continue puree w/ mildly thick liquids    Patient is a good candidate for admission to acute inpatient rehabilitation for R cerebellar and L central kiara CVA with right sided hemiplegia (dominant) with comorbidities of DM2, HTN, dyslipidemia, and atrial fibrilation requiring hospital level supervision. She was deemed medically stable for discharge to  on 4/19/22 where she will undergo intensive restorative therapies 3 hours a day for at least 5 days a week with the goal of regaining the patients independence and discharging her home with family care   (19 Apr 2022 14:28)      I examined the patient and reviewed the chart. There have been no significant changes since my history and physical except where documented below.    TODAY'S SUBJECTIVE & REVIEW OF SYMPTOMS:  No acute overnight events. VSS. Therapies to start today       Review of Systems:   Constiutional:    [ x  ] WNL           [   ] poor appetite   [   ] insomnia   [   ] tired   Cardio:                [  x ] WNL           [   ] CP   [   ] SIFUENTES   [   ] palpitations               Resp:                   [  x ] WNL           [   ] SOB   [   ] cough   [   ] wheezing   GI:                        [ x  ] WNL           [   ] constipation   [   ] diarrhea   [   ] abdominal pain   [   ] nausea   [   ] emesis                                :                      [ x  ] WNL           [   ] ECHOLS  [   ] dusuria   [   ] difficulty voiding             Endo:                   [ x  ] WNL          [   ] po;yuria   [   ] temperature intolerance                 Skin:                     [x   ] WNL          [   ] pain   [   ] wound   [   ] rash   MSK:                    [x   ] WNL          [   ] muscle pain   [   ] joint pain/ stiffness   [   ] muscle tenderness   [   ] swelling   Neuro:                 [   ] WNL          [   ] HA   [   ] change in vision   [   ] tremor   [  x ] weakness right sided   [  x ]dysphagia              Cognitive:           [   ] WNL           [ x  ]confusion      Psych:                  [x   ] WNL           [   ] hallucinations   [   ]agitation   [   ] delusion   [   ]depression    PHYSICAL EXAM    Vital Signs Last 24 Hrs  T(C): 36.7 (20 Apr 2022 06:22), Max: 36.7 (20 Apr 2022 06:22)  T(F): 98 (20 Apr 2022 06:22), Max: 98 (20 Apr 2022 06:22)  HR: 80 (20 Apr 2022 06:22) (73 - 80)  BP: 153/67 (20 Apr 2022 06:22) (153/67 - 156/73)  BP(mean): --  RR: 20 (20 Apr 2022 06:22) (18 - 20)  SpO2: --    General:[ x  ] NAD, Resting Comfortable,   [   ] other:                                HEENT: [  x ] NC/AT, EOMI, PERRL , Normal Conjunctivae,   [   ] other:  Cardio: [   ] RRR, no murmer,   [ x  ] other:  irregulary irregular rhythm                            Pulm: [ x  ] No Respiratory Distress,  Lungs CTAB,   [   ] other:                       Abdomen: [ x  ]ND/NT, Soft,   [   ] other:    : [  x ] NO ECHOLS CATHETER, [   ] ECHOLS CATHETER- no meatal tear, no discharge, [   ] other:                                            MSK: [   x] No joint swelling, Full ROM,   [   ] other:                                         Ext: [  x ]No C/C/E, No calf tenderness,   [   ]other:    Skin: [ x  ]intact,   [   ] other:                                                                   Neurological Examination:  Cognitive: [    ] AAO x 3,   [   x ]  other: Alert and oriented to person and place. Not oriented to date or situation                                                                      Attention:  [  x  ] intact,   [    ]  other:                            Mood/Affect: [ x   ] wnl,    [    ]  other:                                                                             Communication: [    ]Fluent, no dysarthria, following commands:  [ x   ] other:  mild dysarthria  CN II - XII:  [  x  ] intact,  [    ] other:                                                                                        Motor:   RIGHT UE: [   ] WNL,  [ x  ] other: 4/5  LEFT    UE: [  x ] WNL,  [   ] other:  RIGHT LE: [   ] WNL,  [ x  ] other: 4/5  LEFT    LE: [x   ] WNL,  [   ] other:    Tone: [  x  ] wnl,   [    ]  other:  Coordination:   [   x ] intact,   [    ] other:                                                                           Sensory: [  x  ] Intact to light touch,   [    ] other:    acetaminophen     Tablet .. 650 milliGRAM(s) Oral every 6 hours PRN  aluminum hydroxide/magnesium hydroxide/simethicone Suspension 30 milliLiter(s) Oral every 4 hours PRN  apixaban 5 milliGRAM(s) Oral every 12 hours  aspirin  chewable 81 milliGRAM(s) Oral daily  atorvastatin 80 milliGRAM(s) Oral at bedtime  dextrose 50% Injectable 25 Gram(s) IV Push once  glucagon  Injectable 1 milliGRAM(s) IntraMuscular once  lisinopril 5 milliGRAM(s) Oral at bedtime  magnesium hydroxide Suspension 30 milliLiter(s) Oral daily PRN  melatonin 5 milliGRAM(s) Oral at bedtime PRN  metFORMIN 500 milliGRAM(s) Oral <User Schedule>  metoprolol tartrate 25 milliGRAM(s) Oral two times a day  NIFEdipine XL 30 milliGRAM(s) Oral daily  pantoprazole    Tablet 40 milliGRAM(s) Oral before breakfast  sertraline 25 milliGRAM(s) Oral daily      RECENT LABS/IMAGING                        13.1   8.79  )-----------( 203      ( 20 Apr 2022 07:35 )             39.8     04-20    138  |  100  |  25<H>  ----------------------------<  133<H>  4.5   |  25  |  1.0    Ca    9.5      20 Apr 2022 07:35  Phos  3.9     04-19  Mg     1.9     04-20    TPro  6.2  /  Alb  3.8  /  TBili  0.4  /  DBili  x   /  AST  16  /  ALT  17  /  AlkPhos  71  04-20

## 2022-04-20 NOTE — PROGRESS NOTE ADULT - ASSESSMENT
ASSESSMENT/PLAN:  80 yo F with an acute ischemic infarct in the right cerebellum and left central kiara with right hemiparesis (dominant), dysphagia, dysarthria and aphasia with comorbidities of DM-2, a fib, HTN and  HLD. She warrants acute rehab with 3 hours of daily therapies including PT, OT and speech and close physiatry is monitoring for her CVA which has impacting her swallow and communication. Given her CVA and multiple significant comorbidities close physiatry follow up is needed and will reduce the risk of her requiring rehospitalization.   -Rehab of right cerebellar and Left central kiara CVA with right hemiparesis (dominant), Aphasia, Dysarthria  -Requires acute rehab with PT, OT, Speech, neuropsychology.     #Acute ischemic stroke of left central kiara (penetrating pontine arteries off basilar) and subacute ischemic in right cerebellum (R SCA territory).  - CT Head reviewed, no acute intracranial hemorrhage, stable exam since CT in 11/2021. 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 Moderate stenosis involving the right clinoid segment of the ICA due to atherosclerotic calcification. Mild stenosis of the bilateral proximal internal carotid arteries due to mixed calcified and noncalcified atherosclerotic plaque  - MRI Brain reviewed, showed diffusion abnormality in central aspect of the kiara and R cerebellum  - ASA/Plavix Dced.   - Eliquis continued as 5mg BID.   - Continue Atorvastatin 80 mg daily  - Monitor BP closely and avoid hypotension  - Goal SBP <150  - [facility] evaluated the patient and recommended the aneurysm is stable, follow up as outpatient with Dr. 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    #HTN  - Goal SBP <150  - lisinopril 5 mg po daily--change to qhs  She does not require all her meds qam. This will reduce her risk for othostasis.  - metoprolol tartrate 25 mg po bid  - NIFEdipine 30 mg po daily    #NIDDM  - A1c 6.4  - Metformin 500 po daily  - ISS  - BID glucose checks    #HLD  - Continue Atorvastatin 80 mg daily      -Pain control:   Tylenol prn    -GI/Bowel Mgmt:  No active issues at this time    -Bladder management:   No active issues at this time    -Skin:  No active issues at this time    -FEN   Replete prn    - Diet:  VFSS/MBS evalu on 4/19 revealed severe oropharyngeal dysphagia for thin liquids; mild-moderate oropharyngeal dysphagia for puree, soft, mildly and moderately thick liquids. continue puree w/ mildly thick liquids     Precautions / PROPHYLAXIS:      - Falls    - Ortho: Weight bearing status: WBAT      - DVT prophylaxis: Eliquis

## 2022-04-20 NOTE — PROGRESS NOTE ADULT - ATTENDING COMMENTS
Patient seen and examined with the resident. We discussed the case. I have directed the care. I edited the note. She requires acute rehab with 3 hours of daily therapies and close physiatry follow up.  #Acute ischemic stroke of left central kiara (penetrating pontine arteries off basilar) and subacute ischemic in right cerebellum (R SCA territory).  - CT Head reviewed, no acute intracranial hemorrhage, stable exam since CT in 11/2021. 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 Moderate stenosis involving the right clinoid segment of the ICA due to atherosclerotic calcification. Mild stenosis of the bilateral proximal internal carotid arteries due to mixed calcified and noncalcified atherosclerotic plaque  - MRI Brain reviewed, showed diffusion abnormality in central aspect of the kiara and R cerebellum  - ASA/Plavix Dced.   - Eliquis continued as 5mg BID.   - Continue Atorvastatin 80 mg daily  - Monitor BP closely and avoid hypotension  - Goal SBP <150  - [facility] evaluated the patient and recommended the aneurysm is stable, follow up as outpatient with Dr. 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    #HTN  - Goal SBP <150  - lisinopril 5 mg po daily--change to qhs  She does not require all her meds qam. This will reduce her risk for othostasis.  - metoprolol tartrate 25 mg po bid  - NIFEdipine 30 mg po daily    #NIDDM  - A1c 6.4  - Metformin 500 po daily  - ISS  - BID glucose checks    #HLD  - Continue Atorvastatin 80 mg daily      -Pain control:   Tylenol prn    -GI/Bowel Mgmt:  No active issues at this time    -Bladder management:   No active issues at this time    -Skin:  No active issues at this time    -FEN   Replete prn    - Diet:  VFSS/MBS evalu on 4/19 revealed severe oropharyngeal dysphagia for thin liquids; mild-moderate oropharyngeal dysphagia for puree, soft, mildly and moderately thick liquids. continue puree w/ mildly thick liquids     Precautions / PROPHYLAXIS:      - Falls    - Ortho: Weight bearing status: WBAT      - DVT prophylaxis: Eliquis

## 2022-04-21 LAB
GLUCOSE BLDC GLUCOMTR-MCNC: 144 MG/DL — HIGH (ref 70–99)
GLUCOSE BLDC GLUCOMTR-MCNC: 150 MG/DL — HIGH (ref 70–99)
GLUCOSE BLDC GLUCOMTR-MCNC: 182 MG/DL — HIGH (ref 70–99)
GLUCOSE BLDC GLUCOMTR-MCNC: 201 MG/DL — HIGH (ref 70–99)

## 2022-04-21 RX ORDER — METFORMIN HYDROCHLORIDE 850 MG/1
500 TABLET ORAL
Refills: 0 | Status: DISCONTINUED | OUTPATIENT
Start: 2022-04-21 | End: 2022-05-09

## 2022-04-21 RX ADMIN — LISINOPRIL 5 MILLIGRAM(S): 2.5 TABLET ORAL at 22:09

## 2022-04-21 RX ADMIN — APIXABAN 5 MILLIGRAM(S): 2.5 TABLET, FILM COATED ORAL at 06:10

## 2022-04-21 RX ADMIN — PANTOPRAZOLE SODIUM 40 MILLIGRAM(S): 20 TABLET, DELAYED RELEASE ORAL at 06:09

## 2022-04-21 RX ADMIN — Medication 25 MILLIGRAM(S): at 17:34

## 2022-04-21 RX ADMIN — Medication 25 MILLIGRAM(S): at 06:09

## 2022-04-21 RX ADMIN — Medication 81 MILLIGRAM(S): at 12:20

## 2022-04-21 RX ADMIN — SERTRALINE 25 MILLIGRAM(S): 25 TABLET, FILM COATED ORAL at 12:20

## 2022-04-21 RX ADMIN — METFORMIN HYDROCHLORIDE 500 MILLIGRAM(S): 850 TABLET ORAL at 08:06

## 2022-04-21 RX ADMIN — APIXABAN 5 MILLIGRAM(S): 2.5 TABLET, FILM COATED ORAL at 18:19

## 2022-04-21 RX ADMIN — ATORVASTATIN CALCIUM 80 MILLIGRAM(S): 80 TABLET, FILM COATED ORAL at 22:09

## 2022-04-21 RX ADMIN — METFORMIN HYDROCHLORIDE 500 MILLIGRAM(S): 850 TABLET ORAL at 17:34

## 2022-04-21 RX ADMIN — Medication 30 MILLIGRAM(S): at 06:09

## 2022-04-21 NOTE — PROGRESS NOTE ADULT - SUBJECTIVE AND OBJECTIVE BOX
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 Patient denied having any urinary or bowel incontinence and stated feeling weak for a few days and being unable to stand on her right leg. Work up included a CT C-spine which showed no acute fractures. 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 MRI brain showed acute ischemic stroke of left central kiara (penetrating pontine arteries off basilar) and subacute ischemic in right cerebellum (R SCA territory). Subtle right hemiparesis on was noted on exam, and patient was transferred to stroke unit for further monitoring.     Patient found to be in atrial fibrillation Cardiology consulted a-fib and decision for anticoagulation. Patient started on Eliquis 5mg BID and atorvastatin 80 mg Qd. Passed speech/swallow eval and advanced to pureed diet. Neuroendovascular was consulted and evaluated the patient recommending outpatient follow up with Dr. Goldstein and stroke clinic. At this time patient declines proceeding with treatment for stable aneurysm.    PM&R consulted for admission to  for acute inpatient rehabilitation. Prior to admission, patient was independent w/ ambulation using straight cane and required assistance from children with ADLs. Resides w/ children; in a house with 3 steps to enter and none inside    CLOF evaluated by OT  BM - Max A  Transfers - NT  UBD - Max A  LBD - Dependent    CLOF evaluated by PT  BM - Min A  Transfers - Mod A  Ambulation - Mod A 10 ft w/ RW    Also seen by SLP - VFSS/MBS evalu on 4/19 revealed severe oropharyngeal dysphagia for thin liquids; mild-moderate oropharyngeal dysphagia for puree, soft, mildly and moderately thick liquids. continue puree w/ mildly thick liquids    Patient is a good candidate for admission to acute inpatient rehabilitation for R cerebellar and L central kiara CVA with right sided hemiplegia (dominant) with comorbidities of DM2, HTN, dyslipidemia, and atrial fibrilation requiring hospital level supervision. She was deemed medically stable for discharge to  on 4/19/22 where she will undergo intensive restorative therapies 3 hours a day for at least 5 days a week with the goal of regaining the patients independence and discharging her home with family care   (19 Apr 2022 14:28)      I examined the patient and reviewed the chart. There have been no significant changes since my history and physical except where documented below.    TODAY'S SUBJECTIVE & REVIEW OF SYMPTOMS:  No acute overnight events. VSS. Doing well. Continues to participate with therapies     Review of Systems:   Constiutional:    [ x  ] WNL           [   ] poor appetite   [   ] insomnia   [   ] tired   Cardio:                [  x ] WNL           [   ] CP   [   ] SIFUENTES   [   ] palpitations               Resp:                   [  x ] WNL           [   ] SOB   [   ] cough   [   ] wheezing   GI:                        [ x  ] WNL           [   ] constipation   [   ] diarrhea   [   ] abdominal pain   [   ] nausea   [   ] emesis                                :                      [ x  ] WNL           [   ] ECHOLS  [   ] dusuria   [   ] difficulty voiding             Endo:                   [ x  ] WNL          [   ] po;yuria   [   ] temperature intolerance                 Skin:                     [x   ] WNL          [   ] pain   [   ] wound   [   ] rash   MSK:                    [x   ] WNL          [   ] muscle pain   [   ] joint pain/ stiffness   [   ] muscle tenderness   [   ] swelling   Neuro:                 [   ] WNL          [   ] HA   [   ] change in vision   [   ] tremor   [  x ] weakness right sided   [  x ]dysphagia              Cognitive:           [   ] WNL           [ x  ]confusion      Psych:                  [x   ] WNL           [   ] hallucinations   [   ]agitation   [   ] delusion   [   ]depression    PHYSICAL EXAM    Vital Signs Last 24 Hrs  T(C): 36.6 (21 Apr 2022 06:58), Max: 36.6 (21 Apr 2022 06:18)  T(F): 97.8 (21 Apr 2022 06:58), Max: 97.8 (21 Apr 2022 06:18)  HR: 69 (21 Apr 2022 06:58) (63 - 81)  BP: 167/79 (21 Apr 2022 06:18) (90/53 - 174/83)  BP(mean): --  RR: 20 (21 Apr 2022 06:18) (16 - 20)  SpO2: --    General:[ x  ] NAD, Resting Comfortable,   [   ] other:                                HEENT: [  x ] NC/AT, EOMI, PERRL , Normal Conjunctivae,   [   ] other:  Cardio: [   ] RRR, no murmer,   [ x  ] other:  irregulary irregular rhythm                            Pulm: [ x  ] No Respiratory Distress,  Lungs CTAB,   [   ] other:                       Abdomen: [ x  ]ND/NT, Soft,   [   ] other:    : [  x ] NO ECHOLS CATHETER, [   ] ECHOLS CATHETER- no meatal tear, no discharge, [   ] other:                                            MSK: [   x] No joint swelling, Full ROM,   [   ] other:                                         Ext: [  x ]No C/C/E, No calf tenderness,   [   ]other:    Skin: [ x  ]intact,   [   ] other:                                                                   Neurological Examination:  Cognitive: [    ] AAO x 3,   [   x ]  other: Alert and oriented to person and place. Not oriented to date or situation                                                                      Attention:  [  x  ] intact,   [    ]  other:                            Mood/Affect: [ x   ] wnl,    [    ]  other:                                                                             Communication: [    ]Fluent, no dysarthria, following commands:  [ x   ] other:  mild dysarthria  CN II - XII:  [  x  ] intact,  [    ] other:                                                                                        Motor:   RIGHT UE: [   ] WNL,  [ x  ] other: 4/5  LEFT    UE: [  x ] WNL,  [   ] other:  RIGHT LE: [   ] WNL,  [ x  ] other: 4/5  LEFT    LE: [x   ] WNL,  [   ] other:    Tone: [  x  ] wnl,   [    ]  other:  Coordination:   [   x ] intact,   [    ] other:                                                                           Sensory: [  x  ] Intact to light touch,   [    ] other:    MEDICATIONS  (STANDING):  apixaban 5 milliGRAM(s) Oral every 12 hours  aspirin  chewable 81 milliGRAM(s) Oral daily  atorvastatin 80 milliGRAM(s) Oral at bedtime  dextrose 50% Injectable 25 Gram(s) IV Push once  glucagon  Injectable 1 milliGRAM(s) IntraMuscular once  lisinopril 5 milliGRAM(s) Oral at bedtime  metFORMIN 500 milliGRAM(s) Oral <User Schedule>  metoprolol tartrate 25 milliGRAM(s) Oral two times a day  NIFEdipine XL 30 milliGRAM(s) Oral daily  pantoprazole    Tablet 40 milliGRAM(s) Oral before breakfast  sertraline 25 milliGRAM(s) Oral daily    MEDICATIONS  (PRN):  acetaminophen     Tablet .. 650 milliGRAM(s) Oral every 6 hours PRN Temp greater or equal to 38C (100.4F), Mild Pain (1 - 3)  aluminum hydroxide/magnesium hydroxide/simethicone Suspension 30 milliLiter(s) Oral every 4 hours PRN Dyspepsia  magnesium hydroxide Suspension 30 milliLiter(s) Oral daily PRN Constipation  melatonin 5 milliGRAM(s) Oral at bedtime PRN Insomnia      RECENT LABS/IMAGING                        13.1   8.79  )-----------( 203      ( 20 Apr 2022 07:35 )             39.8     04-20    138  |  100  |  25<H>  ----------------------------<  133<H>  4.5   |  25  |  1.0    Ca    9.5      20 Apr 2022 07:35  Phos  3.9     04-19  Mg     1.9     04-20    TPro  6.2  /  Alb  3.8  /  TBili  0.4  /  DBili  x   /  AST  16  /  ALT  17  /  AlkPhos  71  04-20         Patient is a 81y old  Female who presents with a chief complaint of Right cerebellar and Left central kiara CVA with right hemiplegia (dominant), Aphasia, Dysarthria (19 Apr 2022 14:28)      HPI:  Patient is a 80 yo Barb[place] speaking F with PMHx of HTN, HLD, DM, left MCA aneurysm, depression, who presented to the ED after an unwitnessed fall on 4/12/22. Patient denied having any urinary or bowel incontinence and stated feeling weak for a few days and being unable to stand on her right leg. Work up included a CT C-spine which showed no acute fractures. 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 MRI brain showed acute ischemic stroke of left central kiara (penetrating pontine arteries off basilar) and subacute ischemic in right cerebellum (R SCA territory). Subtle right hemiparesis on was noted on exam, and patient was transferred to stroke unit for further monitoring.     Patient found to be in atrial fibrillation Cardiology consulted a-fib and decision for anticoagulation. Patient started on Eliquis 5mg BID and atorvastatin 80 mg Qd. Passed speech/swallow eval and advanced to pureed diet. Neuroendovascular was consulted and evaluated the patient recommending outpatient follow up with Dr. Goldstein and stroke clinic. At this time patient declines proceeding with treatment for stable aneurysm.    PM&R consulted for admission to  for acute inpatient rehabilitation. Prior to admission, patient was independent w/ ambulation using  straight cane and required assistance from children with ADLs. Resides w/ children; in a house with 3 steps to enter and none inside    CLOF evaluated by OT  BM - Max A  Transfers - NT  UBD - Max A  LBD - Dependent    CLOF evaluated by PT  BM - Min A  Transfers - Mod A  Ambulation - Mod A 10 ft w/ RW    Also seen by SLP - VFSS/MBS evalu on 4/19 revealed severe oropharyngeal dysphagia for thin liquids; mild-moderate oropharyngeal dysphagia for puree, soft, mildly and moderately thick liquids. continue puree w/ mildly thick liquids    Patient is a good candidate for admission to acute inpatient rehabilitation for R cerebellar and L central kiara CVA with right sided hemiplegia (dominant) with comorbidities of DM2, HTN, dyslipidemia, and atrial fibrilation requiring hospital level supervision. She was deemed medically stable for discharge to  on 4/19/22 where she will undergo intensive restorative therapies 3 hours a day for at least 5 days a week with the goal of regaining the patients independence and discharging her home with family care   (19 Apr 2022 14:28)      I examined the patient and reviewed the chart. There have been no significant changes since my history and physical except where documented below.    TODAY'S SUBJECTIVE & REVIEW OF SYMPTOMS:  No acute overnight events. VSS. Doing well. Continues to participate with therapies     Review of Systems:   Constiutional:    [ x  ] WNL           [   ] poor appetite   [   ] insomnia   [   ] tired   Cardio:                [  x ] WNL           [   ] CP   [   ] SIFUENTES   [   ] palpitations               Resp:                   [  x ] WNL           [   ] SOB   [   ] cough   [   ] wheezing   GI:                        [ x  ] WNL           [   ] constipation   [   ] diarrhea   [   ] abdominal pain   [   ] nausea   [   ] emesis                                :                      [ x  ] WNL           [   ] ECHOLS  [   ] dusuria   [   ] difficulty voiding             Endo:                   [ x  ] WNL          [   ] po;yuria   [   ] temperature intolerance                 Skin:                     [x   ] WNL          [   ] pain   [   ] wound   [   ] rash   MSK:                    [x   ] WNL          [   ] muscle pain   [   ] joint pain/ stiffness   [   ] muscle tenderness   [   ] swelling   Neuro:                 [   ] WNL          [   ] HA   [   ] change in vision   [   ] tremor   [  x ] weakness right sided   [  x ]dysphagia              Cognitive:           [   ] WNL           [ x  ]confusion      Psych:                  [x   ] WNL           [   ] hallucinations   [   ]agitation   [   ] delusion   [   ]depression    PHYSICAL EXAM    Vital Signs Last 24 Hrs  T(C): 36.6 (21 Apr 2022 06:58), Max: 36.6 (21 Apr 2022 06:18)  T(F): 97.8 (21 Apr 2022 06:58), Max: 97.8 (21 Apr 2022 06:18)  HR: 69 (21 Apr 2022 06:58) (63 - 81)  BP: 167/79 (21 Apr 2022 06:18) (90/53 - 174/83)  BP(mean): --  RR: 20 (21 Apr 2022 06:18) (16 - 20)  SpO2: --    General:[ x  ] NAD, Resting Comfortable,   [   ] other:                                HEENT: [  x ] NC/AT, EOMI, PERRL , Normal Conjunctivae,   [   ] other:  Cardio: [   ] RRR, no murmer,   [ x  ] other:  irregulary irregular rhythm                            Pulm: [ x  ] No Respiratory Distress,  Lungs CTAB,   [   ] other:                       Abdomen: [ x  ]ND/NT, Soft,   [   ] other:    : [  x ] NO ECHOLS CATHETER, [   ] ECHOLS CATHETER- no meatal tear, no discharge, [   ] other:                                            MSK: [   x] No joint swelling, Full ROM,   [   ] other:                                         Ext: [  x ]No C/C/E, No calf tenderness,   [   ]other:    Skin: [ x  ]intact,   [   ] other:                                                                   Neurological Examination:  Cognitive: [    ] AAO x 3,   [   x ]  other: Alert and oriented to person and place. Not oriented to date or situation                                                                      Attention:  [  x  ] intact,   [    ]  other:                            Mood/Affect: [ x   ] wnl,    [    ]  other:                                                                             Communication: [    ]Fluent, no dysarthria, following commands:  [ x   ] other:  mild dysarthria  CN II - XII:  [  x  ] intact,  [    ] other:                                                                                        Motor:   RIGHT UE: [   ] WNL,  [ x  ] other: 4/5  LEFT    UE: [  x ] WNL,  [   ] other:  RIGHT LE: [   ] WNL,  [ x  ] other: 4/5  LEFT    LE: [x   ] WNL,  [   ] other:    Tone: [  x  ] wnl,   [    ]  other:  Coordination:   [   x ] intact,   [    ] other:                                                                           Sensory: [  x  ] Intact to light touch,   [    ] other:    MEDICATIONS  (STANDING):  apixaban 5 milliGRAM(s) Oral every 12 hours  aspirin  chewable 81 milliGRAM(s) Oral daily  atorvastatin 80 milliGRAM(s) Oral at bedtime  dextrose 50% Injectable 25 Gram(s) IV Push once  glucagon  Injectable 1 milliGRAM(s) IntraMuscular once  lisinopril 5 milliGRAM(s) Oral at bedtime  metFORMIN 500 milliGRAM(s) Oral <User Schedule>  metoprolol tartrate 25 milliGRAM(s) Oral two times a day  NIFEdipine XL 30 milliGRAM(s) Oral daily  pantoprazole    Tablet 40 milliGRAM(s) Oral before breakfast  sertraline 25 milliGRAM(s) Oral daily    MEDICATIONS  (PRN):  acetaminophen     Tablet .. 650 milliGRAM(s) Oral every 6 hours PRN Temp greater or equal to 38C (100.4F), Mild Pain (1 - 3)  aluminum hydroxide/magnesium hydroxide/simethicone Suspension 30 milliLiter(s) Oral every 4 hours PRN Dyspepsia  magnesium hydroxide Suspension 30 milliLiter(s) Oral daily PRN Constipation  melatonin 5 milliGRAM(s) Oral at bedtime PRN Insomnia      RECENT LABS/IMAGING                        13.1   8.79  )-----------( 203      ( 20 Apr 2022 07:35 )             39.8     04-20    138  |  100  |  25<H>  ----------------------------<  133<H>  4.5   |  25  |  1.0    Ca    9.5      20 Apr 2022 07:35  Phos  3.9     04-19  Mg     1.9     04-20    TPro  6.2  /  Alb  3.8  /  TBili  0.4  /  DBili  x   /  AST  16  /  ALT  17  /  AlkPhos  71  04-20

## 2022-04-21 NOTE — PROGRESS NOTE ADULT - ASSESSMENT
Primary Contact Name: Salvador Relationship: Grandson    Pertinent Social History:    Patient’s grandson reported that his biggest concerns regarding changes after his grandmother’s stroke are slurred and effortful speech, as well as general physical weakness. He noted that her speech difficulties appear to be worse at night. He hasn’t noticed acute memory changes, and denied the presence of these difficulties prior to her stroke. However, he reported one isolated episode of confusion approximately 2 months ago during which patient awoke in the middle of the night and began packing her things. She reportedly mentioned a family member who had passed away, and thought they were still living.    Grandson reported that patient became “sick” in [facility] 4 years ago, which prompted her move to the [place]. He does not recall what specifically she became sick with. Since her move to the [place], pt. lives with her son, daughter in law, and grandson. Her grandson is her primary caretaker, and cares for her Monday-Saturday from 8am-2:30pm.    Premorbid Functioning:    · ADLs: Supervised for walking and bathing; eats independently.    · IADLs: Family performs all IADLs for patient.    · Psychiatric History/Treatment: Denied    · Cognition: Denied premorbid problems, though history possibly suggests premorbid dementia.    · Substance Use: Former smoker; denied drinking or illicit drug use.    Current Status:    Mood Changes: Denied    Cognition Changes: Effortful speech/expressive language difficulties    Behavior Changes: Denied    Patient’s Primary Language: Amharic    a) Changes in understanding primary language after Neurological event. No    b) Preferred language for health care information? Amharic    Brain Injury / Cognitive Behavioral Protocol Education Provided: Not Applicable    Family Education: Family was educated about the rehabilitation process, current status, and family education sessions.

## 2022-04-22 LAB
GLUCOSE BLDC GLUCOMTR-MCNC: 115 MG/DL — HIGH (ref 70–99)
GLUCOSE BLDC GLUCOMTR-MCNC: 116 MG/DL — HIGH (ref 70–99)
GLUCOSE BLDC GLUCOMTR-MCNC: 131 MG/DL — HIGH (ref 70–99)
GLUCOSE BLDC GLUCOMTR-MCNC: 177 MG/DL — HIGH (ref 70–99)

## 2022-04-22 RX ADMIN — Medication 25 MILLIGRAM(S): at 06:07

## 2022-04-22 RX ADMIN — ATORVASTATIN CALCIUM 80 MILLIGRAM(S): 80 TABLET, FILM COATED ORAL at 21:02

## 2022-04-22 RX ADMIN — PANTOPRAZOLE SODIUM 40 MILLIGRAM(S): 20 TABLET, DELAYED RELEASE ORAL at 06:07

## 2022-04-22 RX ADMIN — APIXABAN 5 MILLIGRAM(S): 2.5 TABLET, FILM COATED ORAL at 06:07

## 2022-04-22 RX ADMIN — Medication 81 MILLIGRAM(S): at 11:29

## 2022-04-22 RX ADMIN — SERTRALINE 25 MILLIGRAM(S): 25 TABLET, FILM COATED ORAL at 11:29

## 2022-04-22 RX ADMIN — METFORMIN HYDROCHLORIDE 500 MILLIGRAM(S): 850 TABLET ORAL at 17:06

## 2022-04-22 RX ADMIN — Medication 30 MILLIGRAM(S): at 06:07

## 2022-04-22 RX ADMIN — METFORMIN HYDROCHLORIDE 500 MILLIGRAM(S): 850 TABLET ORAL at 07:28

## 2022-04-22 RX ADMIN — Medication 25 MILLIGRAM(S): at 17:06

## 2022-04-22 RX ADMIN — APIXABAN 5 MILLIGRAM(S): 2.5 TABLET, FILM COATED ORAL at 17:06

## 2022-04-22 RX ADMIN — LISINOPRIL 5 MILLIGRAM(S): 2.5 TABLET ORAL at 21:02

## 2022-04-22 NOTE — PROGRESS NOTE ADULT - REASON FOR ADMISSION
Right cerebellar and Left central kiara CVA with right hemiplegia (dominant), Aphasia, Dysarthria Right cerebellar and Left central kiara CVA with right hemiplegia (dominant), Aphasia,  Dysarthria

## 2022-04-22 NOTE — PROGRESS NOTE ADULT - SUBJECTIVE AND OBJECTIVE BOX
Patient is a 81y old  Female who presents with a chief complaint of Right cerebellar and Left central kiara CVA with right hemiplegia (dominant), Aphasia, Dysarthria (19 Apr 2022 14:28)      HPI:  Patient is a 80 yo Palestinian speaking F with PMHx of HTN, HLD, DM, left MCA aneurysm, depression, who presented to the ED after an unwitnessed fall on 4/12/22. Patient denied having any urinary or bowel incontinence and stated feeling weak for a few days and being unable to stand on her right leg. Work up included a CT C-spine which showed no acute fractures. 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 MRI brain showed acute ischemic stroke of left central kiara (penetrating pontine arteries off basilar) and subacute ischemic in right cerebellum (R SCA territory). Subtle right hemiparesis on was noted on exam, and patient was transferred to stroke unit for further monitoring.     Patient found to be in atrial fibrillation Cardiology consulted a-fib and decision for anticoagulation. Patient started on Eliquis 5mg BID and atorvastatin 80 mg Qd. Passed speech/swallow eval and advanced to pureed diet. [facility] was consulted and evaluated the patient recommending outpatient follow up with Dr. Goldstein and stroke clinic. At this time patient declines proceeding with treatment for stable aneurysm.    PM&R consulted for admission to  for acute inpatient rehabilitation. Prior to admission, patient was independent w/ ambulation using  straight cane and required assistance from children with ADLs. Resides w/ children; in a house with 3 steps to enter and none inside    CLOF evaluated by OT  BM - Max A  Transfers - NT  UBD - Max A  LBD - Dependent    CLOF evaluated by PT  BM - Min A  Transfers - Mod A  Ambulation - Mod A 10 ft w/ RW    Also seen by SLP - VFSS/MBS evalu on 4/19 revealed severe oropharyngeal dysphagia for thin liquids; mild-moderate oropharyngeal dysphagia for puree, soft, mildly and moderately thick liquids. continue puree w/ mildly thick liquids    Patient is a good candidate for admission to acute inpatient rehabilitation for R cerebellar and L central kiara CVA with right sided hemiplegia (dominant) with comorbidities of DM2, HTN, dyslipidemia, and atrial fibrilation requiring hospital level supervision. She was deemed medically stable for discharge to  on 4/19/22 where she will undergo intensive restorative therapies 3 hours a day for at least 5 days a week with the goal of regaining the patients independence and discharging her home with family care   (19 Apr 2022 14:28)      I examined the patient and reviewed the chart. There have been no significant changes since my history and physical except where documented below.    TODAY'S SUBJECTIVE & REVIEW OF SYMPTOMS:  No acute overnight events. VSS. Doing well. Continues to participate with therapies     Review of Systems:   Constiutional:    [ x  ] WNL           [   ] poor appetite   [   ] insomnia   [   ] tired   Cardio:                [  x ] WNL           [   ] CP   [   ] SIFUENTES   [   ] palpitations               Resp:                   [  x ] WNL           [   ] SOB   [   ] cough   [   ] wheezing   GI:                        [ x  ] WNL           [   ] constipation   [   ] diarrhea   [   ] abdominal pain   [   ] nausea   [   ] emesis                                :                      [ x  ] WNL           [   ] ECHOLS  [   ] dusuria   [   ] difficulty voiding             Endo:                   [ x  ] WNL          [   ] po;yuria   [   ] temperature intolerance                 Skin:                     [x   ] WNL          [   ] pain   [   ] wound   [   ] rash   MSK:                    [x   ] WNL          [   ] muscle pain   [   ] joint pain/ stiffness   [   ] muscle tenderness   [   ] swelling   Neuro:                 [   ] WNL          [   ] HA   [   ] change in vision   [   ] tremor   [  x ] weakness right sided   [  x ]dysphagia              Cognitive:           [   ] WNL           [ x  ]confusion      Psych:                  [x   ] WNL           [   ] hallucinations   [   ]agitation   [   ] delusion   [   ]depression    PHYSICAL EXAM    Vital Signs Last 24 Hrs  T(C): 36.2 (22 Apr 2022 05:43), Max: 36.3 (21 Apr 2022 14:39)  T(F): 97.2 (22 Apr 2022 05:43), Max: 97.4 (21 Apr 2022 14:39)  HR: 86 (22 Apr 2022 05:43) (68 - 88)  BP: 167/77 (22 Apr 2022 05:43) (118/67 - 167/77)  BP(mean): --  RR: 18 (22 Apr 2022 05:43) (18 - 18)  SpO2: --    General:[ x  ] NAD, Resting Comfortable,   [   ] other:                                HEENT: [  x ] NC/AT, EOMI, PERRL , Normal Conjunctivae,   [   ] other:  Cardio: [   ] RRR, no murmer,   [ x  ] other:  irregulary irregular rhythm                            Pulm: [ x  ] No Respiratory Distress,  Lungs CTAB,   [   ] other:                       Abdomen: [ x  ]ND/NT, Soft,   [   ] other:    : [  x ] NO ECHOLS CATHETER, [   ] ECHOLS CATHETER- no meatal tear, no discharge, [   ] other:                                            MSK: [   x] No joint swelling, Full ROM,   [   ] other:                                         Ext: [  x ]No C/C/E, No calf tenderness,   [   ]other:    Skin: [ x  ]intact,   [   ] other:                                                                   Neurological Examination:  Cognitive: [    ] AAO x 3,   [   x ]  other: Alert and oriented to person and place. Not oriented to date or situation                                                                      Attention:  [  x  ] intact,   [    ]  other:                            Mood/Affect: [ x   ] wnl,    [    ]  other:                                                                             Communication: [    ]Fluent, no dysarthria, following commands:  [ x   ] other:  mild dysarthria  CN II - XII:  [  x  ] intact,  [    ] other:                                                                                        Motor:   RIGHT UE: [   ] WNL,  [ x  ] other: 4/5  LEFT    UE: [  x ] WNL,  [   ] other:  RIGHT LE: [   ] WNL,  [ x  ] other: 4/5  LEFT    LE: [x   ] WNL,  [   ] other:    Tone: [  x  ] wnl,   [    ]  other:  Coordination:   [   x ] intact,   [    ] other:                                                                           Sensory: [  x  ] Intact to light touch,   [    ] other:    CLOF  BM Mod-Max A  Transfers - Max A  Ambulation - Mod assist 10 ft w/ RW    MEDICATIONS  (STANDING):  apixaban 5 milliGRAM(s) Oral every 12 hours  aspirin  chewable 81 milliGRAM(s) Oral daily  atorvastatin 80 milliGRAM(s) Oral at bedtime  dextrose 50% Injectable 25 Gram(s) IV Push once  glucagon  Injectable 1 milliGRAM(s) IntraMuscular once  lisinopril 5 milliGRAM(s) Oral at bedtime  metFORMIN 500 milliGRAM(s) Oral two times a day with meals  metoprolol tartrate 25 milliGRAM(s) Oral two times a day  NIFEdipine XL 30 milliGRAM(s) Oral daily  pantoprazole    Tablet 40 milliGRAM(s) Oral before breakfast  sertraline 25 milliGRAM(s) Oral daily    MEDICATIONS  (PRN):  acetaminophen     Tablet .. 650 milliGRAM(s) Oral every 6 hours PRN Temp greater or equal to 38C (100.4F), Mild Pain (1 - 3)  aluminum hydroxide/magnesium hydroxide/simethicone Suspension 30 milliLiter(s) Oral every 4 hours PRN Dyspepsia  magnesium hydroxide Suspension 30 milliLiter(s) Oral daily PRN Constipation  melatonin 5 milliGRAM(s) Oral at bedtime PRN Insomnia      RECENT LABS/IMAGING                        13.1   8.79  )-----------( 203      ( 20 Apr 2022 07:35 )             39.8     04-20    138  |  100  |  25<H>  ----------------------------<  133<H>  4.5   |  25  |  1.0    Ca    9.5      20 Apr 2022 07:35  Phos  3.9     04-19  Mg     1.9     04-20    TPro  6.2  /  Alb  3.8  /  TBili  0.4  /  DBili  x   /  AST  16  /  ALT  17  /  AlkPhos  71  04-20

## 2022-04-22 NOTE — PROGRESS NOTE ADULT - ASSESSMENT
ASSESSMENT/PLAN:  80 yo F with an acute ischemic infarct in the right cerebellum and left central kiara with right hemiparesis (dominant), dysphagia, dysarthria and aphasia with comorbidities of DM-2, a fib, HTN and  HLD. She warrants acute rehab with 3 hours of daily therapies including PT, OT and speech and close physiatry is monitoring for her CVA which has impacting her swallow and communication. Given her CVA and multiple significant comorbidities close physiatry follow up is needed and will reduce the risk of her requiring rehospitalization.   -Rehab of right cerebellar and Left central kiara CVA with right hemiparesis (dominant), Aphasia, Dysarthria  -Requires acute rehab with PT, OT, Speech, neuropsychology.     #Acute ischemic stroke of left central kiara (penetrating pontine arteries off basilar) and subacute ischemic in right cerebellum (R SCA territory).  - CT Head reviewed, no acute intracranial hemorrhage, stable exam since CT in 11/2021. 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 Moderate stenosis involving the right clinoid segment of the ICA due to atherosclerotic calcification. Mild stenosis of the bilateral proximal internal carotid arteries due to mixed calcified and noncalcified atherosclerotic plaque  - MRI Brain reviewed, showed diffusion abnormality in central aspect of the kiara and R cerebellum  - ASA/Plavix Dced.   - Eliquis continued as 5mg BID.   - Continue Atorvastatin 80 mg daily  - Monitor BP closely and avoid hypotension  - Goal SBP <150  - [facility] evaluated the patient and recommended the aneurysm is stable, follow up as outpatient with Dr. 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    #HTN  - Goal SBP <150  - lisinopril 5 mg po daily--change to qhs  She does not require all her meds qam. This will reduce her risk for othostasis.  - metoprolol tartrate 25 mg po bid  - NIFEdipine 30 mg po daily    #NIDDM  - A1c 6.4  - Metformin 500 po daily  - ISS  - BID glucose checks    #HLD  - Continue Atorvastatin 80 mg daily      -Pain control:   Tylenol prn    -GI/Bowel Mgmt:  No active issues at this time    -Bladder management:   No active issues at this time    -Skin:  No active issues at this time    -FEN   Replete prn    - Diet:  VFSS/MBS evalu on 4/19 revealed severe oropharyngeal dysphagia for thin liquids; mild-moderate oropharyngeal dysphagia for puree, soft, mildly and moderately thick liquids. continue puree w/ mildly thick liquids     Precautions / PROPHYLAXIS:      - Falls    - Ortho: Weight bearing status: WBAT      - DVT prophylaxis: Eliquis     ASSESSMENT/PLAN:  80 yo F with an acute ischemic infarct in the right cerebellum and left central kiara with right hemiparesis (dominant), dysphagia, dysarthria and aphasia with comorbidities of DM-2, a fib, HTN and  HLD. She warrants acute rehab with 3 hours of daily therapies including PT, OT and speech and close physiatry is monitoring for her CVA which has impacting her swallow and communication. Given her CVA and multiple significant comorbidities close physiatry follow up is needed and will reduce the risk of her requiring rehospitalization.   -Rehab of right cerebellar and Left central kiara CVA with right hemiparesis (dominant), Aphasia, Dysarthria  -Requires acute rehab with PT, OT, Speech, neuropsychology.     #Acute ischemic stroke of left central kiara (penetrating pontine arteries off basilar) and subacute ischemic in right cerebellum (R SCA territory).  - CT Head reviewed, no acute intracranial hemorrhage, stable exam since CT in 11/2021. 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 Moderate stenosis involving the right clinoid segment of the ICA due to atherosclerotic calcification. Mild stenosis of the bilateral proximal internal carotid arteries due to mixed calcified and noncalcified atherosclerotic plaque  - MRI Brain reviewed, showed diffusion abnormality in central aspect of the kiara and R cerebellum  - ASA/Plavix Dced.   - Eliquis continued as 5mg BID.   - Continue Atorvastatin 80 mg daily  - Monitor BP closely and avoid hypotension  - Goal SBP <150  - [facility] evaluated the patient and recommended the aneurysm is stable, follow up as outpatient with Dr. 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    #HTN  - Goal SBP <150  - lisinopril 5 mg po daily--change to qhs  She does not require all her meds qam. This will reduce her risk for othostasis.  - metoprolol tartrate 25 mg po bid  - NIFEdipine 30 mg po daily    #NIDDM  - A1c 6.4  - Metformin 500 po daily  - ISS  - BID glucose checks    #HLD  - Continue Atorvastatin 80 mg daily      -Pain control:   Tylenol prn    -GI/Bowel Mgmt:  No active issues at this time    -Bladder management:   No active issues at this time    -Skin:  No active issues at this time    -FEN   Replete prn    - Diet:  VFSS/MBS evalu on 4/19 revealed severe oropharyngeal dysphagia for thin liquids; mild-moderate oropharyngeal dysphagia for puree, soft, mildly and moderately thick liquids. continue puree w/ mildly thick liquids     Precautions / PROPHYLAXIS:      - Falls    - Ortho: Weight bearing status: WBAT      - DVT prophylaxis: Eliquis

## 2022-04-23 LAB
ALBUMIN SERPL ELPH-MCNC: 4.3 G/DL — SIGNIFICANT CHANGE UP (ref 3.5–5.2)
ALP SERPL-CCNC: 90 U/L — SIGNIFICANT CHANGE UP (ref 30–115)
ALT FLD-CCNC: 17 U/L — SIGNIFICANT CHANGE UP (ref 0–41)
ANION GAP SERPL CALC-SCNC: 18 MMOL/L — HIGH (ref 7–14)
AST SERPL-CCNC: 16 U/L — SIGNIFICANT CHANGE UP (ref 0–41)
BASOPHILS # BLD AUTO: 0.04 K/UL — SIGNIFICANT CHANGE UP (ref 0–0.2)
BASOPHILS NFR BLD AUTO: 0.4 % — SIGNIFICANT CHANGE UP (ref 0–1)
BILIRUB SERPL-MCNC: 0.3 MG/DL — SIGNIFICANT CHANGE UP (ref 0.2–1.2)
BUN SERPL-MCNC: 31 MG/DL — HIGH (ref 10–20)
CALCIUM SERPL-MCNC: 10.1 MG/DL — SIGNIFICANT CHANGE UP (ref 8.5–10.1)
CHLORIDE SERPL-SCNC: 98 MMOL/L — SIGNIFICANT CHANGE UP (ref 98–110)
CO2 SERPL-SCNC: 20 MMOL/L — SIGNIFICANT CHANGE UP (ref 17–32)
CREAT SERPL-MCNC: 1.2 MG/DL — SIGNIFICANT CHANGE UP (ref 0.7–1.5)
EGFR: 45 ML/MIN/1.73M2 — LOW
EOSINOPHIL # BLD AUTO: 0.35 K/UL — SIGNIFICANT CHANGE UP (ref 0–0.7)
EOSINOPHIL NFR BLD AUTO: 3.5 % — SIGNIFICANT CHANGE UP (ref 0–8)
GLUCOSE BLDC GLUCOMTR-MCNC: 136 MG/DL — HIGH (ref 70–99)
GLUCOSE BLDC GLUCOMTR-MCNC: 139 MG/DL — HIGH (ref 70–99)
GLUCOSE BLDC GLUCOMTR-MCNC: 140 MG/DL — HIGH (ref 70–99)
GLUCOSE BLDC GLUCOMTR-MCNC: 209 MG/DL — HIGH (ref 70–99)
GLUCOSE SERPL-MCNC: 166 MG/DL — HIGH (ref 70–99)
HCT VFR BLD CALC: 44.6 % — SIGNIFICANT CHANGE UP (ref 37–47)
HGB BLD-MCNC: 14.3 G/DL — SIGNIFICANT CHANGE UP (ref 12–16)
IMM GRANULOCYTES NFR BLD AUTO: 0.3 % — SIGNIFICANT CHANGE UP (ref 0.1–0.3)
LYMPHOCYTES # BLD AUTO: 1.15 K/UL — LOW (ref 1.2–3.4)
LYMPHOCYTES # BLD AUTO: 11.6 % — LOW (ref 20.5–51.1)
MAGNESIUM SERPL-MCNC: 2 MG/DL — SIGNIFICANT CHANGE UP (ref 1.8–2.4)
MCHC RBC-ENTMCNC: 28.5 PG — SIGNIFICANT CHANGE UP (ref 27–31)
MCHC RBC-ENTMCNC: 32.1 G/DL — SIGNIFICANT CHANGE UP (ref 32–37)
MCV RBC AUTO: 89 FL — SIGNIFICANT CHANGE UP (ref 81–99)
MONOCYTES # BLD AUTO: 0.62 K/UL — HIGH (ref 0.1–0.6)
MONOCYTES NFR BLD AUTO: 6.3 % — SIGNIFICANT CHANGE UP (ref 1.7–9.3)
NEUTROPHILS # BLD AUTO: 7.73 K/UL — HIGH (ref 1.4–6.5)
NEUTROPHILS NFR BLD AUTO: 77.9 % — HIGH (ref 42.2–75.2)
NRBC # BLD: 0 /100 WBCS — SIGNIFICANT CHANGE UP (ref 0–0)
NT-PROBNP SERPL-SCNC: 598 PG/ML — HIGH (ref 0–300)
PLATELET # BLD AUTO: 238 K/UL — SIGNIFICANT CHANGE UP (ref 130–400)
POTASSIUM SERPL-MCNC: 5.3 MMOL/L — HIGH (ref 3.5–5)
POTASSIUM SERPL-SCNC: 5.3 MMOL/L — HIGH (ref 3.5–5)
PROT SERPL-MCNC: 7.1 G/DL — SIGNIFICANT CHANGE UP (ref 6–8)
RBC # BLD: 5.01 M/UL — SIGNIFICANT CHANGE UP (ref 4.2–5.4)
RBC # FLD: 13.3 % — SIGNIFICANT CHANGE UP (ref 11.5–14.5)
SARS-COV-2 RNA SPEC QL NAA+PROBE: SIGNIFICANT CHANGE UP
SODIUM SERPL-SCNC: 136 MMOL/L — SIGNIFICANT CHANGE UP (ref 135–146)
WBC # BLD: 9.92 K/UL — SIGNIFICANT CHANGE UP (ref 4.8–10.8)
WBC # FLD AUTO: 9.92 K/UL — SIGNIFICANT CHANGE UP (ref 4.8–10.8)

## 2022-04-23 PROCEDURE — 71045 X-RAY EXAM CHEST 1 VIEW: CPT | Mod: 26

## 2022-04-23 RX ORDER — SODIUM CHLORIDE 9 MG/ML
1000 INJECTION INTRAMUSCULAR; INTRAVENOUS; SUBCUTANEOUS
Refills: 0 | Status: DISCONTINUED | OUTPATIENT
Start: 2022-04-23 | End: 2022-05-02

## 2022-04-23 RX ORDER — NIFEDIPINE 30 MG
30 TABLET, EXTENDED RELEASE 24 HR ORAL
Refills: 0 | Status: DISCONTINUED | OUTPATIENT
Start: 2022-04-24 | End: 2022-04-29

## 2022-04-23 RX ADMIN — Medication 81 MILLIGRAM(S): at 12:38

## 2022-04-23 RX ADMIN — SODIUM CHLORIDE 500 MILLILITER(S): 9 INJECTION INTRAMUSCULAR; INTRAVENOUS; SUBCUTANEOUS at 15:36

## 2022-04-23 RX ADMIN — METFORMIN HYDROCHLORIDE 500 MILLIGRAM(S): 850 TABLET ORAL at 08:09

## 2022-04-23 RX ADMIN — PANTOPRAZOLE SODIUM 40 MILLIGRAM(S): 20 TABLET, DELAYED RELEASE ORAL at 06:04

## 2022-04-23 RX ADMIN — Medication 25 MILLIGRAM(S): at 18:06

## 2022-04-23 RX ADMIN — APIXABAN 5 MILLIGRAM(S): 2.5 TABLET, FILM COATED ORAL at 18:01

## 2022-04-23 RX ADMIN — Medication 25 MILLIGRAM(S): at 06:03

## 2022-04-23 RX ADMIN — METFORMIN HYDROCHLORIDE 500 MILLIGRAM(S): 850 TABLET ORAL at 18:00

## 2022-04-23 RX ADMIN — LISINOPRIL 5 MILLIGRAM(S): 2.5 TABLET ORAL at 22:48

## 2022-04-23 RX ADMIN — Medication 30 MILLIGRAM(S): at 06:03

## 2022-04-23 RX ADMIN — APIXABAN 5 MILLIGRAM(S): 2.5 TABLET, FILM COATED ORAL at 06:03

## 2022-04-23 RX ADMIN — ATORVASTATIN CALCIUM 80 MILLIGRAM(S): 80 TABLET, FILM COATED ORAL at 22:47

## 2022-04-23 RX ADMIN — SERTRALINE 25 MILLIGRAM(S): 25 TABLET, FILM COATED ORAL at 12:38

## 2022-04-23 NOTE — CHART NOTE - NSCHARTNOTEFT_GEN_A_CORE
Wagner  Garima ID #[id number]:     CC: cough/congestion today  HPI:  Patient is an 80 yo F with PMHx of HTN, HLD, DM, left MCA aneurysm, depression, who presented to the ED after an unwitnessed fall on 4/12/22. Patient denied having any urinary or bowel incontinence and stated feeling weak for a few days and being unable to stand on her right leg. Work up included a CT C-spine which showed no acute fractures. 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 MRI brain showed acute ischemic stroke of left central kiara (penetrating pontine arteries off basilar) and subacute ischemic in right cerebellum (R SCA territory). Subtle right hemiparesis on was noted on exam, and patient was transferred to stroke unit for further monitoring.     Patient was started on Eliquis 5mg BID and atorvastatin 80 mg Q hs. Passed speech/swallow eval and advanced to pureed diet. [facility] was consulted and evaluated the patient recommending outpatient follow up with Dr. Byers and stroke clinic. At this time patient declines proceeding with treatment for stable aneurysm. On exam, patient had 4/5 RUE/RLE strength, some facial asymmetry, and dysarthria. Patient is optimized for discharge with outpatient follow up. She was evaluated by PM & R Team and was admitted to Rehab medicine service on 4/19/22.  Prior to admission, the patient was independent w/ ambulation using  straight cane and required assistance from children with ADLs. Resides w/ children; in a house with 3 steps to enter and none inside    Today the therapist noticed that the patient sounds congested and is coughing and has mild SIFUENTES. The patient denies any discomfort but is a little confused. She says she quit smoking over 50 years ago.  She doesn't know if she has any allergies, but according to the chart she has NKA. Pulse ox is 98% on RA.    MEDICATIONS  (STANDING):  apixaban 5 milliGRAM(s) Oral every 12 hours  aspirin  chewable 81 milliGRAM(s) Oral daily  atorvastatin 80 milliGRAM(s) Oral at bedtime  dextrose 50% Injectable 25 Gram(s) IV Push once  glucagon  Injectable 1 milliGRAM(s) IntraMuscular once  lisinopril 5 milliGRAM(s) Oral at bedtime  metFORMIN 500 milliGRAM(s) Oral two times a day with meals  metoprolol tartrate 25 milliGRAM(s) Oral two times a day  NIFEdipine XL 30 milliGRAM(s) Oral daily  pantoprazole    Tablet 40 milliGRAM(s) Oral before breakfast  sertraline 25 milliGRAM(s) Oral daily    MEDICATIONS  (PRN):  acetaminophen     Tablet .. 650 milliGRAM(s) Oral every 6 hours PRN Temp greater or equal to 38C (100.4F), Mild Pain (1 - 3)  aluminum hydroxide/magnesium hydroxide/simethicone Suspension 30 milliLiter(s) Oral every 4 hours PRN Dyspepsia  magnesium hydroxide Suspension 30 milliLiter(s) Oral daily PRN Constipation  melatonin 5 milliGRAM(s) Oral at bedtime PRN Insomnia    Vital Signs Last 24 Hrs  T(C): 35.8 (23 Apr 2022 05:53), Max: 36.6 (22 Apr 2022 20:54)  T(F): 96.4 (23 Apr 2022 05:53), Max: 97.8 (22 Apr 2022 20:54)  HR: 70 (23 Apr 2022 05:53) (70 - 91)  BP: 151/80 (23 Apr 2022 05:53) (148/62 - 173/82)  BP(mean): --  RR: 18 (23 Apr 2022 08:42) (18 - 18)  SpO2: 96% (23 Apr 2022 08:42) (96% - 96%)    PE the patient is alert, NAD but is a little confused  Lungs with scattered b/l rhonchi anterior and posterior lung filds  Heart RR  Abdomen soft, bs+, non-tender  Extremities no CCE    Labs were stable on 4/20/22:  WBC was 8.79 no left shift  B/Cr = 25/1.0/ gfr = 56  LFT's were normal    ASSESSMENT/PLAN:  80 yo F with an acute ischemic infarct in the right cerebellum and left central kiara with right hemiparesis (dominant), dysphagia, dysarthria and aphasia with comorbidities of DM-2, a fib, HTN and  HLD. She warrants acute rehab with 3 hours of daily therapies including PT, OT and speech and close physiatry is monitoring for her CVA which has impacting her swallow and communication. Given her CVA and multiple significant comorbidities close physiatry follow up is needed and will reduce the risk of her requiring rehospitalization.   -Rehab of right cerebellar and Left central kiara CVA with right hemiparesis (dominant), Aphasia, Dysarthria  -Requires acute rehab with PT, OT, Speech, neuropsychology.     #Acute ischemic stroke of left central kiara (penetrating pontine arteries off basilar) and subacute ischemic in right cerebellum (R SCA territory).  - CT Head reviewed, no acute intracranial hemorrhage, stable exam since CT in 11/2021. 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 Moderate stenosis involving the right clinoid segment of the ICA due to atherosclerotic calcification. Mild stenosis of the bilateral proximal internal carotid arteries due to mixed calcified and noncalcified atherosclerotic plaque  - MRI Brain reviewed, showed diffusion abnormality in central aspect of the kiara and R cerebellum  - Plavix Dced; continue ASA 81mg as per Neuro   - Eliquis continued as 5mg BID.   - Continue Atorvastatin 80 mg po q hs  - Monitor BP closely and avoid hypotension  - Goal SBP <150  - [facility] evaluated the patient and recommended the aneurysm is stable, follow up as outpatient with Dr. Byers (1 cm saccular aneurysm arising from the left MCA bifurcation)    # cough today r/o aspiration pnemonia/bronchitis:  - patient is afebrile with good pulse ox  - could be mild COPD (former smoker)  - will get CXR and labs today (CBC with diff, CMP, mg++, BNP)  - cough meds, increase po fluids for now  - Duoneb prn  - monitor labs, VS, pulse ox    #Afib  - Eliquis 5mg BID.    #HTN  - Goal SBP <150  - lisinopril 5 mg po daily--change to qhs  She does not require all her meds q am. This will reduce her risk for orthostasis.  - metoprolol tartrate 25 mg po bid  - NIFEdipine 30 mg po daily    #NIDDM  - A1c 6.4  - Metformin 500 po twice a day with meals  - ISS  - BID glucose checks before breakfast and dinner  -Diabetic diet    #HLD  - Continue Atorvastatin 80 mg daily    -Pain control:   Tylenol prn    -GI/Bowel Mgmt:  No active issues at this time    -Bladder management:   No active issues at this time    -Skin:  No active issues at this time    -FEN   Replete prn    - Diet:  VFSS/MBS evalu on 4/19 revealed severe oropharyngeal dysphagia for thin liquids; mild-moderate oropharyngeal dysphagia for puree, soft, mildly and moderately thick liquids. continue puree w/ mildly thick liquids     Precautions / PROPHYLAXIS:      - Falls    - Ortho: Weight bearing status: WBAT      - DVT prophylaxis: Eliquis Wagner  Garima ID #[id number]:     CC: cough/congestion today  HPI:  Patient is an 80 yo F with PMHx of HTN, HLD, DM, left MCA aneurysm, depression, who presented to the ED after an unwitnessed fall on 4/12/22. Patient denied having any urinary or bowel incontinence and stated feeling weak for a few days and being unable to stand on her right leg. Work up included a CT C-spine which showed no acute fractures. 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 MRI brain showed acute ischemic stroke of left central kiara (penetrating pontine arteries off basilar) and subacute ischemic in right cerebellum (R SCA territory). Subtle right hemiparesis on was noted on exam, and patient was transferred to stroke unit for further monitoring.     Patient was started on Eliquis 5mg BID and atorvastatin 80 mg Q hs. Passed speech/swallow eval and advanced to pureed diet. [facility]end[facility] was consulted and evaluated the patient recommending outpatient follow up with Dr. Byers and stroke clinic. At this time patient declines proceeding with treatment for stable aneurysm. On exam, patient had 4/5 RUE/RLE strength, some facial asymmetry, and dysarthria. Patient is optimized for discharge with outpatient follow up. She was evaluated by PM & R Team and was admitted to Rehab medicine service on 4/19/22.  Prior to admission, the patient was independent w/ ambulation using  straight cane and required assistance from children with ADLs. Resides w/ children; in a house with 3 steps to enter and none inside    Today the therapist noticed that the patient sounds congested and is coughing and has mild SIFUENTES. The patient denies any discomfort but is a little confused. She says she quit smoking over 50 years ago.  She doesn't know if she has any allergies, but according to the chart she has NKA. Pulse ox is 98% on RA.    MEDICATIONS  (STANDING):  apixaban 5 milliGRAM(s) Oral every 12 hours  aspirin  chewable 81 milliGRAM(s) Oral daily  atorvastatin 80 milliGRAM(s) Oral at bedtime  dextrose 50% Injectable 25 Gram(s) IV Push once  glucagon  Injectable 1 milliGRAM(s) IntraMuscular once  lisinopril 5 milliGRAM(s) Oral at bedtime  metFORMIN 500 milliGRAM(s) Oral two times a day with meals  metoprolol tartrate 25 milliGRAM(s) Oral two times a day  NIFEdipine XL 30 milliGRAM(s) Oral daily  pantoprazole    Tablet 40 milliGRAM(s) Oral before breakfast  sertraline 25 milliGRAM(s) Oral daily    MEDICATIONS  (PRN):  acetaminophen     Tablet .. 650 milliGRAM(s) Oral every 6 hours PRN Temp greater or equal to 38C (100.4F), Mild Pain (1 - 3)  aluminum hydroxide/magnesium hydroxide/simethicone Suspension 30 milliLiter(s) Oral every 4 hours PRN Dyspepsia  magnesium hydroxide Suspension 30 milliLiter(s) Oral daily PRN Constipation  melatonin 5 milliGRAM(s) Oral at bedtime PRN Insomnia    Vital Signs Last 24 Hrs  T(C): 35.8 (23 Apr 2022 05:53), Max: 36.6 (22 Apr 2022 20:54)  T(F): 96.4 (23 Apr 2022 05:53), Max: 97.8 (22 Apr 2022 20:54)  HR: 70 (23 Apr 2022 05:53) (70 - 91)  BP: 151/80 (23 Apr 2022 05:53) (148/62 - 173/82)  BP(mean): --  RR: 18 (23 Apr 2022 08:42) (18 - 18)  SpO2: 96% (23 Apr 2022 08:42) (96% - 96%)    PE the patient is alert, NAD but is a little confused  Lungs with scattered b/l rhonchi anterior and posterior lung filds  Heart RR  Abdomen soft, bs+, non-tender  Extremities no CCE    Labs were stable on 4/20/22:  WBC was 8.79 no left shift  B/Cr = 25/1.0/ gfr = 56  LFT's were normal  CXR 4/12 and 4/16/22: bibasilar opacities, unchanged  2D Echo 4/13/22:      < from: TTE Echo Complete w/o Contrast w/ Doppler (04.13.22 @ 08:31) >    Summary:   1. Normal global left ventricular systolic function.   2. LV Ejection Fraction by Harrington's Method with a biplane EF of 68 %.   3. Normal left ventricular internal cavity size.   4. The left ventricular diastolic function could not be assessed in this   study.   5. Normal left atrial size.   6. Normal right atrial size.   7. Mild thickening and calcification of the anterior and posterior   mitral valve leaflets.   8. Moderate mitral annular calcification.   9. No evidence of mitral valve regurgitation.  10. Mild tricuspid regurgitation.  11. Estimated pulmonary artery systolic pressure is 42.3 mmHg assuming a   right atrial pressure of 8 mmHg, which is consistent with mild pulmonary   hypertension.  12. Pulmonary hypertension is present.  13. LA volume Index is 23.8 ml/m² ml/m2.  14. Mitral valve mean gradient is 3.7 mmHg consistent with mild mitral   stenosis.  15. Peak transaortic gradient equals 9.5 mmHg, mean transaortic gradient   equals 4.3 mmHg, the calculated aortic valve area equals 2.00 cm² by the   continuity equation consistent with mild aortic stenosis.    < end of copied text >    Covid negative 4/12/22      ASSESSMENT/PLAN:  80 yo F with an acute ischemic infarct in the right cerebellum and left central kiara with right hemiparesis (dominant), dysphagia, dysarthria and aphasia with comorbidities of DM-2, a fib, HTN and  HLD. She warrants acute rehab with 3 hours of daily therapies including PT, OT and speech and close physiatry is monitoring for her CVA which has impacting her swallow and communication. Given her CVA and multiple significant comorbidities close physiatry follow up is needed and will reduce the risk of her requiring rehospitalization.   -Rehab of right cerebellar and Left central kiara CVA with right hemiparesis (dominant), Aphasia, Dysarthria  -Requires acute rehab with PT, OT, Speech, neuropsychology.     #Acute ischemic stroke of left central kiara (penetrating pontine arteries off basilar) and subacute ischemic in right cerebellum (R SCA territory).  - CT Head reviewed, no acute intracranial hemorrhage, stable exam since CT in 11/2021. 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 Moderate stenosis involving the right clinoid segment of the ICA due to atherosclerotic calcification. Mild stenosis of the bilateral proximal internal carotid arteries due to mixed calcified and noncalcified atherosclerotic plaque  - MRI Brain reviewed, showed diffusion abnormality in central aspect of the kiara and R cerebellum  - Plavix Dced; continue ASA 81mg as per Neuro   - Eliquis continued as 5mg BID.   - Continue Atorvastatin 80 mg po q hs  - Monitor BP closely and avoid hypotension  - Goal SBP <150  - [facility] evaluated the patient and recommended the aneurysm is stable, follow up as outpatient with Dr. Byers (1 cm saccular aneurysm arising from the left MCA bifurcation)    # cough today r/o aspiration pnemonia/bronchitis:  - patient is afebrile with good pulse ox  - could be mild COPD (former smoker)  - will get CXR and labs today (CBC with diff, CMP, mg++, BNP)  - cough meds, increase po fluids for now  - Duoneb prn  - monitor labs, VS, pulse ox, swab for covid     #Afib  - Eliquis 5mg BID.    #HTN  - Goal SBP <150  - lisinopril 5 mg po daily--change to qhs  She does not require all her meds q am. This will reduce her risk for orthostasis.  - metoprolol tartrate 25 mg po bid  - NIFEdipine 30 mg po daily    #NIDDM  - A1c 6.4  - Metformin 500 po twice a day with meals  - ISS  - BID glucose checks before breakfast and dinner  -Diabetic diet    #HLD  - Continue Atorvastatin 80 mg daily    -Pain control:   Tylenol prn    -GI/Bowel Mgmt:  No active issues at this time    -Bladder management:   No active issues at this time    -Skin:  No active issues at this time    -FEN   Replete prn    - Diet:  VFSS/MBS evalu on 4/19 revealed severe oropharyngeal dysphagia for thin liquids; mild-moderate oropharyngeal dysphagia for puree, soft, mildly and moderately thick liquids. continue puree w/ mildly thick liquids     Precautions / PROPHYLAXIS:      - Falls    - Ortho: Weight bearing status: WBAT      - DVT prophylaxis: Eliquis

## 2022-04-23 NOTE — PROGRESS NOTE ADULT - SUBJECTIVE AND OBJECTIVE BOX
Patient is a 81y old  Female who presents with a chief complaint of Right cerebellar and Left central kiara CVA with right hemiplegia (dominant), Aphasia, Dysarthria (19 Apr 2022 14:28)      HPI:  Patient is a 80 yo Mexican speaking F with PMHx of HTN, HLD, DM, left MCA aneurysm, depression, who presented to the ED after an unwitnessed fall on 4/12/22. Patient denied having any urinary or bowel incontinence and stated feeling weak for a few days and being unable to stand on her right leg. Work up included a CT C-spine which showed no acute fractures. 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 MRI brain showed acute ischemic stroke of left central kiara (penetrating pontine arteries off basilar) and subacute ischemic in right cerebellum (R SCA territory). Subtle right hemiparesis on was noted on exam, and patient was transferred to stroke unit for further monitoring.     Patient found to be in atrial fibrillation Cardiology consulted a-fib and decision for anticoagulation. Patient started on Eliquis 5mg BID and atorvastatin 80 mg Qd. Passed speech/swallow eval and advanced to pureed diet. [facility] was consulted and evaluated the patient recommending outpatient follow up with Dr. Goldstein and stroke clinic. At this time patient declines proceeding with treatment for stable aneurysm.    PM&R consulted for admission to  for acute inpatient rehabilitation. Prior to admission, patient was independent w/ ambulation using  straight cane and required assistance from children with ADLs. Resides w/ children; in a house with 3 steps to enter and none inside    CLOF evaluated by OT  BM - Max A  Transfers - NT  UBD - Max A  LBD - Dependent    CLOF evaluated by PT  BM - Min A  Transfers - Mod A  Ambulation - Mod A 10 ft w/ RW    Also seen by SLP - VFSS/MBS evalu on 4/19 revealed severe oropharyngeal dysphagia for thin liquids; mild-moderate oropharyngeal dysphagia for puree, soft, mildly and moderately thick liquids. continue puree w/ mildly thick liquids    Patient is a good candidate for admission to acute inpatient rehabilitation for R cerebellar and L central kiara CVA with right sided hemiplegia (dominant) with comorbidities of DM2, HTN, dyslipidemia, and atrial fibrilation requiring hospital level supervision. She was deemed medically stable for discharge to  on 4/19/22 where she will undergo intensive restorative therapies 3 hours a day for at least 5 days a week with the goal of regaining the patients independence and discharging her home with family care   (19 Apr 2022 14:28)      I examined the patient and reviewed the chart. There have been no significant changes since my history and physical except where documented below.    TODAY'S SUBJECTIVE & REVIEW OF SYMPTOMS:  No acute overnight events. VSS. Doing well. Continues to participate with therapies     Review of Systems:   Constiutional:    [ x  ] WNL           [   ] poor appetite   [   ] insomnia   [   ] tired   Cardio:                [  x ] WNL           [   ] CP   [   ] SIFUENTES   [   ] palpitations               Resp:                   [  x ] WNL           [   ] SOB   [   ] cough   [   ] wheezing   GI:                        [ x  ] WNL           [   ] constipation   [   ] diarrhea   [   ] abdominal pain   [   ] nausea   [   ] emesis                                :                      [ x  ] WNL           [   ] ECHOLS  [   ] dusuria   [   ] difficulty voiding             Endo:                   [ x  ] WNL          [   ] po;yuria   [   ] temperature intolerance                 Skin:                     [x   ] WNL          [   ] pain   [   ] wound   [   ] rash   MSK:                    [x   ] WNL          [   ] muscle pain   [   ] joint pain/ stiffness   [   ] muscle tenderness   [   ] swelling   Neuro:                 [   ] WNL          [   ] HA   [   ] change in vision   [   ] tremor   [  x ] weakness right sided   [  x ]dysphagia              Cognitive:           [   ] WNL           [ x  ]confusion      Psych:                  [x   ] WNL           [   ] hallucinations   [   ]agitation   [   ] delusion   [   ]depression    PHYSICAL EXAM    Vital Signs Last 24 Hrs  T(C): 36.5 (04-23-22 @ 14:22), Max: 36.6 (04-22-22 @ 20:54)  HR: 80 (04-23-22 @ 14:22) (70 - 88)  BP: 90/50 (04-23-22 @ 14:22) (90/50 - 173/82)  RR: 18 (04-23-22 @ 14:22) (18 - 18)  SpO2: 96% (04-23-22 @ 08:42) (96% - 96%)      P04-23    136  |  98  |  31<H>  ----------------------------<  166<H>  5.3<H>   |  20  |  1.2    Ca    10.1      23 Apr 2022 11:00  Mg     2.0     04-23    TPro  7.1  /  Alb  4.3  /  TBili  0.3  /  DBili  x   /  AST  16  /  ALT  17  /  AlkPhos  90  04-23                            14.3   9.92  )-----------( 238      ( 23 Apr 2022 11:00 )             44.6         General:[ x  ] NAD, Resting Comfortable,   [   ] other:                                HEENT: [  x ] NC/AT, EOMI, PERRL , Normal Conjunctivae,   [   ] other:  Cardio: [   ] RRR, no murmer,   [ x  ] other:  irregulary irregular rhythm                            Pulm: [ x  ] No Respiratory Distress,  Lungs CTAB,   [   ] other:                       Abdomen: [ x  ]ND/NT, Soft,   [   ] other:    : [  x ] NO ECHOLS CATHETER, [   ] ECHOLS CATHETER- no meatal tear, no discharge, [   ] other:                                            MSK: [   x] No joint swelling, Full ROM,   [   ] other:                                         Ext: [  x ]No C/C/E, No calf tenderness,   [   ]other:    Skin: [ x  ]intact,   [   ] other:                                                                   Neurological Examination:  Cognitive: [    ] AAO x 3,   [   x ]  other: Alert and oriented to person and place. Not oriented to date or situation                                                                      Attention:  [  x  ] intact,   [    ]  other:                            Mood/Affect: [ x   ] wnl,    [    ]  other:                                                                             Communication: [    ]Fluent, no dysarthria, following commands:  [ x   ] other:  mild dysarthria  CN II - XII:  [  x  ] intact,  [    ] other:                                                                                        Motor:   RIGHT UE: [   ] WNL,  [ x  ] other: 4/5  LEFT    UE: [  x ] WNL,  [   ] other:  RIGHT LE: [   ] WNL,  [ x  ] other: 4/5  LEFT    LE: [x   ] WNL,  [   ] other:    Tone: [  x  ] wnl,   [    ]  other:  Coordination:   [   x ] intact,   [    ] other:                                                                           Sensory: [  x  ] Intact to light touch,   [    ] other:    CLOF  BM Mod-Max A  Transfers - Max A  Ambulation - Mod assist 10 ft w/ RW    MEDICATIONS  (STANDING):  apixaban 5 milliGRAM(s) Oral every 12 hours  aspirin  chewable 81 milliGRAM(s) Oral daily  atorvastatin 80 milliGRAM(s) Oral at bedtime  dextrose 50% Injectable 25 Gram(s) IV Push once  glucagon  Injectable 1 milliGRAM(s) IntraMuscular once  lisinopril 5 milliGRAM(s) Oral at bedtime  metFORMIN 500 milliGRAM(s) Oral two times a day with meals  metoprolol tartrate 25 milliGRAM(s) Oral two times a day  NIFEdipine XL 30 milliGRAM(s) Oral daily  pantoprazole    Tablet 40 milliGRAM(s) Oral before breakfast  sertraline 25 milliGRAM(s) Oral daily    MEDICATIONS  (PRN):  acetaminophen     Tablet .. 650 milliGRAM(s) Oral every 6 hours PRN Temp greater or equal to 38C (100.4F), Mild Pain (1 - 3)  aluminum hydroxide/magnesium hydroxide/simethicone Suspension 30 milliLiter(s) Oral every 4 hours PRN Dyspepsia  magnesium hydroxide Suspension 30 milliLiter(s) Oral daily PRN Constipation  melatonin 5 milliGRAM(s) Oral at bedtime PRN Insomnia      RECENT LABS/IMAGING                        13.1   8.79  )-----------( 203      ( 20 Apr 2022 07:35 )             39.8     04-20    138  |  100  |  25<H>  ----------------------------<  133<H>  4.5   |  25  |  1.0    Ca    9.5      20 Apr 2022 07:35  Phos  3.9     04-19  Mg     1.9     04-20    TPro  6.2  /  Alb  3.8  /  TBili  0.4  /  DBili  x   /  AST  16  /  ALT  17  /  AlkPhos  71  04-20

## 2022-04-23 NOTE — PROGRESS NOTE ADULT - ASSESSMENT
ASSESSMENT/PLAN:  82 yo F with an acute ischemic infarct in the right cerebellum and left central kiara with right hemiparesis (dominant), dysphagia, dysarthria and aphasia with comorbidities of DM-2, a fib, HTN and  HLD. She warrants acute rehab with 3 hours of daily therapies including PT, OT and speech and close physiatry is monitoring for her CVA which has impacting her swallow and communication. Given her CVA and multiple significant comorbidities close physiatry follow up is needed and will reduce the risk of her requiring rehospitalization.   -Rehab of right cerebellar and Left central kiara CVA with right hemiparesis (dominant), Aphasia, Dysarthria  -Requires acute rehab with PT, OT, Speech, neuropsychology.     #Acute ischemic stroke of left central kiara (penetrating pontine arteries off basilar) and subacute ischemic in right cerebellum (R SCA territory).  - CT Head reviewed, no acute intracranial hemorrhage, stable exam since CT in 11/2021. 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 Moderate stenosis involving the right clinoid segment of the ICA due to atherosclerotic calcification. Mild stenosis of the bilateral proximal internal carotid arteries due to mixed calcified and noncalcified atherosclerotic plaque  - MRI Brain reviewed, showed diffusion abnormality in central aspect of the kiara and R cerebellum  - ASA/Plavix Dced.   - Eliquis continued as 5mg BID.   - Continue Atorvastatin 80 mg daily  - Monitor BP closely and avoid hypotension  - Goal SBP <150  - [facility] evaluated the patient and recommended the aneurysm is stable, follow up as outpatient with Dr. 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    #HTN  - Goal SBP <150  - lisinopril 5 mg po daily--change to qhs  She does not require all her meds qam. This will reduce her risk for othostasis.  - metoprolol tartrate 25 mg po bid  - NIFEdipine 30 mg po daily    #NIDDM  - A1c 6.4  - Metformin 500 po daily  - ISS  - BID glucose checks    #HLD  - Continue Atorvastatin 80 mg daily      -Pain control:   Tylenol prn    -GI/Bowel Mgmt:  No active issues at this time    -Bladder management:   No active issues at this time    -Skin:  No active issues at this time    -FEN   Replete prn    - Diet:  VFSS/MBS evalu on 4/19 revealed severe oropharyngeal dysphagia for thin liquids; mild-moderate oropharyngeal dysphagia for puree, soft, mildly and moderately thick liquids. continue puree w/ mildly thick liquids     Precautions / PROPHYLAXIS:      - Falls    - Ortho: Weight bearing status: WBAT      - DVT prophylaxis: Eliquis

## 2022-04-24 LAB
ANION GAP SERPL CALC-SCNC: 12 MMOL/L — SIGNIFICANT CHANGE UP (ref 7–14)
BASOPHILS # BLD AUTO: 0.04 K/UL — SIGNIFICANT CHANGE UP (ref 0–0.2)
BASOPHILS NFR BLD AUTO: 0.4 % — SIGNIFICANT CHANGE UP (ref 0–1)
BUN SERPL-MCNC: 35 MG/DL — HIGH (ref 10–20)
CALCIUM SERPL-MCNC: 9.8 MG/DL — SIGNIFICANT CHANGE UP (ref 8.5–10.1)
CHLORIDE SERPL-SCNC: 98 MMOL/L — SIGNIFICANT CHANGE UP (ref 98–110)
CO2 SERPL-SCNC: 25 MMOL/L — SIGNIFICANT CHANGE UP (ref 17–32)
CREAT SERPL-MCNC: 1.2 MG/DL — SIGNIFICANT CHANGE UP (ref 0.7–1.5)
EGFR: 45 ML/MIN/1.73M2 — LOW
EOSINOPHIL # BLD AUTO: 0.41 K/UL — SIGNIFICANT CHANGE UP (ref 0–0.7)
EOSINOPHIL NFR BLD AUTO: 4.1 % — SIGNIFICANT CHANGE UP (ref 0–8)
GLUCOSE BLDC GLUCOMTR-MCNC: 114 MG/DL — HIGH (ref 70–99)
GLUCOSE BLDC GLUCOMTR-MCNC: 125 MG/DL — HIGH (ref 70–99)
GLUCOSE BLDC GLUCOMTR-MCNC: 142 MG/DL — HIGH (ref 70–99)
GLUCOSE BLDC GLUCOMTR-MCNC: 188 MG/DL — HIGH (ref 70–99)
GLUCOSE SERPL-MCNC: 155 MG/DL — HIGH (ref 70–99)
HCT VFR BLD CALC: 37.6 % — SIGNIFICANT CHANGE UP (ref 37–47)
HGB BLD-MCNC: 12.5 G/DL — SIGNIFICANT CHANGE UP (ref 12–16)
IMM GRANULOCYTES NFR BLD AUTO: 0.4 % — HIGH (ref 0.1–0.3)
LYMPHOCYTES # BLD AUTO: 0.95 K/UL — LOW (ref 1.2–3.4)
LYMPHOCYTES # BLD AUTO: 9.5 % — LOW (ref 20.5–51.1)
MCHC RBC-ENTMCNC: 29.6 PG — SIGNIFICANT CHANGE UP (ref 27–31)
MCHC RBC-ENTMCNC: 33.2 G/DL — SIGNIFICANT CHANGE UP (ref 32–37)
MCV RBC AUTO: 88.9 FL — SIGNIFICANT CHANGE UP (ref 81–99)
MONOCYTES # BLD AUTO: 0.59 K/UL — SIGNIFICANT CHANGE UP (ref 0.1–0.6)
MONOCYTES NFR BLD AUTO: 5.9 % — SIGNIFICANT CHANGE UP (ref 1.7–9.3)
NEUTROPHILS # BLD AUTO: 7.96 K/UL — HIGH (ref 1.4–6.5)
NEUTROPHILS NFR BLD AUTO: 79.7 % — HIGH (ref 42.2–75.2)
NRBC # BLD: 0 /100 WBCS — SIGNIFICANT CHANGE UP (ref 0–0)
PLATELET # BLD AUTO: 233 K/UL — SIGNIFICANT CHANGE UP (ref 130–400)
POTASSIUM SERPL-MCNC: 4.6 MMOL/L — SIGNIFICANT CHANGE UP (ref 3.5–5)
POTASSIUM SERPL-SCNC: 4.6 MMOL/L — SIGNIFICANT CHANGE UP (ref 3.5–5)
RBC # BLD: 4.23 M/UL — SIGNIFICANT CHANGE UP (ref 4.2–5.4)
RBC # FLD: 13.4 % — SIGNIFICANT CHANGE UP (ref 11.5–14.5)
SODIUM SERPL-SCNC: 135 MMOL/L — SIGNIFICANT CHANGE UP (ref 135–146)
WBC # BLD: 9.99 K/UL — SIGNIFICANT CHANGE UP (ref 4.8–10.8)
WBC # FLD AUTO: 9.99 K/UL — SIGNIFICANT CHANGE UP (ref 4.8–10.8)

## 2022-04-24 RX ADMIN — APIXABAN 5 MILLIGRAM(S): 2.5 TABLET, FILM COATED ORAL at 06:26

## 2022-04-24 RX ADMIN — LISINOPRIL 5 MILLIGRAM(S): 2.5 TABLET ORAL at 21:40

## 2022-04-24 RX ADMIN — ATORVASTATIN CALCIUM 80 MILLIGRAM(S): 80 TABLET, FILM COATED ORAL at 21:40

## 2022-04-24 RX ADMIN — METFORMIN HYDROCHLORIDE 500 MILLIGRAM(S): 850 TABLET ORAL at 08:20

## 2022-04-24 RX ADMIN — Medication 25 MILLIGRAM(S): at 06:26

## 2022-04-24 RX ADMIN — Medication 25 MILLIGRAM(S): at 18:31

## 2022-04-24 RX ADMIN — Medication 30 MILLIGRAM(S): at 18:31

## 2022-04-24 RX ADMIN — Medication 81 MILLIGRAM(S): at 12:22

## 2022-04-24 RX ADMIN — SERTRALINE 25 MILLIGRAM(S): 25 TABLET, FILM COATED ORAL at 12:22

## 2022-04-24 RX ADMIN — APIXABAN 5 MILLIGRAM(S): 2.5 TABLET, FILM COATED ORAL at 18:31

## 2022-04-24 RX ADMIN — METFORMIN HYDROCHLORIDE 500 MILLIGRAM(S): 850 TABLET ORAL at 18:30

## 2022-04-24 RX ADMIN — PANTOPRAZOLE SODIUM 40 MILLIGRAM(S): 20 TABLET, DELAYED RELEASE ORAL at 06:26

## 2022-04-24 NOTE — PROGRESS NOTE ADULT - SUBJECTIVE AND OBJECTIVE BOX
MEDICATIONS  (STANDING):  apixaban 5 milliGRAM(s) Oral every 12 hours  aspirin  chewable 81 milliGRAM(s) Oral daily  atorvastatin 80 milliGRAM(s) Oral at bedtime  dextrose 50% Injectable 25 Gram(s) IV Push once  glucagon  Injectable 1 milliGRAM(s) IntraMuscular once  lisinopril 5 milliGRAM(s) Oral at bedtime  metFORMIN 500 milliGRAM(s) Oral two times a day with meals  metoprolol tartrate 25 milliGRAM(s) Oral two times a day  NIFEdipine XL 30 milliGRAM(s) Oral <User Schedule>  pantoprazole    Tablet 40 milliGRAM(s) Oral before breakfast  sertraline 25 milliGRAM(s) Oral daily  sodium chloride 0.9%. 1000 milliLiter(s) (500 mL/Hr) IV Continuous <Continuous>  sodium chloride 0.9%. 1000 milliLiter(s) (75 mL/Hr) IV Continuous <Continuous>    MEDICATIONS  (PRN):  acetaminophen     Tablet .. 650 milliGRAM(s) Oral every 6 hours PRN Temp greater or equal to 38C (100.4F), Mild Pain (1 - 3)  aluminum hydroxide/magnesium hydroxide/simethicone Suspension 30 milliLiter(s) Oral every 4 hours PRN Dyspepsia  magnesium hydroxide Suspension 30 milliLiter(s) Oral daily PRN Constipation  melatonin 5 milliGRAM(s) Oral at bedtime PRN Insomnia      Patient was stable overnight. Appears comfortable with expressive aphasia.     T(C): 36.1 (04-24-22 @ 12:24), Max: 36.9 (04-23-22 @ 21:16)  HR: 89 (04-24-22 @ 12:24) (76 - 89)  BP: 124/76 (04-24-22 @ 12:24) (90/50 - 167/79)  RR: 18 (04-24-22 @ 12:24) (18 - 18)  SpO2: --      PE:    Alert   LUNGS- clear  COR- RRR S1S2  ABD- SOFT, NT  EXTR- w/o edema  NEURO- stable    04-23    136  |  98  |  31<H>  ----------------------------<  166<H>  5.3<H>   |  20  |  1.2    Ca    10.1      23 Apr 2022 11:00  Mg     2.0     04-23    TPro  7.1  /  Alb  4.3  /  TBili  0.3  /  DBili  x   /  AST  16  /  ALT  17  /  AlkPhos  90  04-23                            14.3   9.92  )-----------( 238      ( 23 Apr 2022 11:00 )             44.6             Rehab for right cerebellar and left central kiara CVA with right dominant hemiplegia, aphasia, dysarthria    Continue full acute rehab program.    Follow K+, BUN/Cr

## 2022-04-25 LAB
ALBUMIN SERPL ELPH-MCNC: 3.9 G/DL — SIGNIFICANT CHANGE UP (ref 3.5–5.2)
ALP SERPL-CCNC: 79 U/L — SIGNIFICANT CHANGE UP (ref 30–115)
ALT FLD-CCNC: 16 U/L — SIGNIFICANT CHANGE UP (ref 0–41)
ANION GAP SERPL CALC-SCNC: 10 MMOL/L — SIGNIFICANT CHANGE UP (ref 7–14)
AST SERPL-CCNC: 16 U/L — SIGNIFICANT CHANGE UP (ref 0–41)
BASOPHILS # BLD AUTO: 0.04 K/UL — SIGNIFICANT CHANGE UP (ref 0–0.2)
BASOPHILS NFR BLD AUTO: 0.5 % — SIGNIFICANT CHANGE UP (ref 0–1)
BILIRUB SERPL-MCNC: 0.4 MG/DL — SIGNIFICANT CHANGE UP (ref 0.2–1.2)
BUN SERPL-MCNC: 28 MG/DL — HIGH (ref 10–20)
CALCIUM SERPL-MCNC: 9.6 MG/DL — SIGNIFICANT CHANGE UP (ref 8.5–10.1)
CHLORIDE SERPL-SCNC: 102 MMOL/L — SIGNIFICANT CHANGE UP (ref 98–110)
CO2 SERPL-SCNC: 28 MMOL/L — SIGNIFICANT CHANGE UP (ref 17–32)
CREAT SERPL-MCNC: 1 MG/DL — SIGNIFICANT CHANGE UP (ref 0.7–1.5)
EGFR: 56 ML/MIN/1.73M2 — LOW
EOSINOPHIL # BLD AUTO: 0.48 K/UL — SIGNIFICANT CHANGE UP (ref 0–0.7)
EOSINOPHIL NFR BLD AUTO: 5.8 % — SIGNIFICANT CHANGE UP (ref 0–8)
GLUCOSE BLDC GLUCOMTR-MCNC: 123 MG/DL — HIGH (ref 70–99)
GLUCOSE BLDC GLUCOMTR-MCNC: 127 MG/DL — HIGH (ref 70–99)
GLUCOSE SERPL-MCNC: 136 MG/DL — HIGH (ref 70–99)
HCT VFR BLD CALC: 38.6 % — SIGNIFICANT CHANGE UP (ref 37–47)
HGB BLD-MCNC: 12.8 G/DL — SIGNIFICANT CHANGE UP (ref 12–16)
IMM GRANULOCYTES NFR BLD AUTO: 0.2 % — SIGNIFICANT CHANGE UP (ref 0.1–0.3)
LYMPHOCYTES # BLD AUTO: 1.08 K/UL — LOW (ref 1.2–3.4)
LYMPHOCYTES # BLD AUTO: 13 % — LOW (ref 20.5–51.1)
MAGNESIUM SERPL-MCNC: 1.6 MG/DL — LOW (ref 1.8–2.4)
MCHC RBC-ENTMCNC: 29.1 PG — SIGNIFICANT CHANGE UP (ref 27–31)
MCHC RBC-ENTMCNC: 33.2 G/DL — SIGNIFICANT CHANGE UP (ref 32–37)
MCV RBC AUTO: 87.7 FL — SIGNIFICANT CHANGE UP (ref 81–99)
MONOCYTES # BLD AUTO: 0.42 K/UL — SIGNIFICANT CHANGE UP (ref 0.1–0.6)
MONOCYTES NFR BLD AUTO: 5 % — SIGNIFICANT CHANGE UP (ref 1.7–9.3)
NEUTROPHILS # BLD AUTO: 6.28 K/UL — SIGNIFICANT CHANGE UP (ref 1.4–6.5)
NEUTROPHILS NFR BLD AUTO: 75.5 % — HIGH (ref 42.2–75.2)
NRBC # BLD: 0 /100 WBCS — SIGNIFICANT CHANGE UP (ref 0–0)
PLATELET # BLD AUTO: 231 K/UL — SIGNIFICANT CHANGE UP (ref 130–400)
POTASSIUM SERPL-MCNC: 4.7 MMOL/L — SIGNIFICANT CHANGE UP (ref 3.5–5)
POTASSIUM SERPL-SCNC: 4.7 MMOL/L — SIGNIFICANT CHANGE UP (ref 3.5–5)
PROT SERPL-MCNC: 6.3 G/DL — SIGNIFICANT CHANGE UP (ref 6–8)
RBC # BLD: 4.4 M/UL — SIGNIFICANT CHANGE UP (ref 4.2–5.4)
RBC # FLD: 13.3 % — SIGNIFICANT CHANGE UP (ref 11.5–14.5)
SODIUM SERPL-SCNC: 140 MMOL/L — SIGNIFICANT CHANGE UP (ref 135–146)
WBC # BLD: 8.32 K/UL — SIGNIFICANT CHANGE UP (ref 4.8–10.8)
WBC # FLD AUTO: 8.32 K/UL — SIGNIFICANT CHANGE UP (ref 4.8–10.8)

## 2022-04-25 RX ADMIN — ATORVASTATIN CALCIUM 80 MILLIGRAM(S): 80 TABLET, FILM COATED ORAL at 21:39

## 2022-04-25 RX ADMIN — Medication 30 MILLIGRAM(S): at 17:21

## 2022-04-25 RX ADMIN — APIXABAN 5 MILLIGRAM(S): 2.5 TABLET, FILM COATED ORAL at 17:20

## 2022-04-25 RX ADMIN — PANTOPRAZOLE SODIUM 40 MILLIGRAM(S): 20 TABLET, DELAYED RELEASE ORAL at 06:08

## 2022-04-25 RX ADMIN — Medication 25 MILLIGRAM(S): at 06:08

## 2022-04-25 RX ADMIN — Medication 81 MILLIGRAM(S): at 11:50

## 2022-04-25 RX ADMIN — METFORMIN HYDROCHLORIDE 500 MILLIGRAM(S): 850 TABLET ORAL at 17:20

## 2022-04-25 RX ADMIN — APIXABAN 5 MILLIGRAM(S): 2.5 TABLET, FILM COATED ORAL at 06:08

## 2022-04-25 RX ADMIN — METFORMIN HYDROCHLORIDE 500 MILLIGRAM(S): 850 TABLET ORAL at 08:04

## 2022-04-25 RX ADMIN — SERTRALINE 25 MILLIGRAM(S): 25 TABLET, FILM COATED ORAL at 11:49

## 2022-04-25 RX ADMIN — Medication 25 MILLIGRAM(S): at 17:21

## 2022-04-25 NOTE — PROGRESS NOTE ADULT - ASSESSMENT
ASSESSMENT/PLAN:  80 yo F with an acute ischemic infarct in the right cerebellum and left central kiara with right hemiparesis (dominant), dysphagia, dysarthria and aphasia with comorbidities of DM-2, a fib, HTN and  HLD. She warrants acute rehab with 3 hours of daily therapies including PT, OT and speech and close physiatry is monitoring for her CVA which has impacting her swallow and communication. Given her CVA and multiple significant comorbidities close physiatry follow up is needed and will reduce the risk of her requiring rehospitalization.   -Rehab of right cerebellar and Left central kiara CVA with right hemiparesis (dominant), Aphasia, Dysarthria  -Requires acute rehab with PT, OT, Speech, neuropsychology.     #Acute ischemic stroke of left central kiara (penetrating pontine arteries off basilar) and subacute ischemic in right cerebellum (R SCA territory).  - CT Head reviewed, no acute intracranial hemorrhage, stable exam since CT in 11/2021. 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 Moderate stenosis involving the right clinoid segment of the ICA due to atherosclerotic calcification. Mild stenosis of the bilateral proximal internal carotid arteries due to mixed calcified and noncalcified atherosclerotic plaque  - MRI Brain reviewed, showed diffusion abnormality in central aspect of the kiara and R cerebellum  - ASA/Plavix Dced.   - Eliquis continued as 5mg BID.   - Continue Atorvastatin 80 mg daily  - Monitor BP closely and avoid hypotension  - Goal SBP <150  - [facility] evaluated the patient and recommended the aneurysm is stable, follow up as outpatient with Dr. 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    #HTN  - Goal SBP <150  - lisinopril 5 mg qhs  - metoprolol tartrate 25 mg po bid  - NIFEdipine 30 mg po daily    #NIDDM  - A1c 6.4  - Metformin 500 bid  - ISS    #HLD  - Continue Atorvastatin 80 mg qhs      -Pain control:   Tylenol prn    -GI/Bowel Mgmt:  No active issues at this time    -Bladder management:   No active issues at this time    -Skin:  No active issues at this time    -FEN   Replete prn    - Diet:  VFSS/MBS evalu on 4/19 revealed severe oropharyngeal dysphagia for thin liquids; mild-moderate oropharyngeal dysphagia for puree, soft, mildly and moderately thick liquids. continue puree w/ mildly thick liquids     Precautions / PROPHYLAXIS:      - Falls    - Ortho: Weight bearing status: WBAT      - DVT prophylaxis: Eliquis     ASSESSMENT/PLAN:  80 yo F with an acute ischemic infarct in the right cerebellum and left central kiara with right hemiparesis (dominant), dysphagia, dysarthria and aphasia with comorbidities of DM-2, a fib, HTN and  HLD. She warrants acute rehab with 3 hours of daily therapies including PT, OT and speech and close physiatry is monitoring for her CVA which has impacting her swallow and communication. Given her CVA and multiple significant comorbidities close physiatry follow up is needed and will reduce the risk of her requiring rehospitalization.   -Rehab of right cerebellar and Left central kiara CVA with right hemiparesis (dominant), Aphasia, Dysarthria  -Requires acute rehab with PT, OT, Speech, neuropsychology.     #Acute ischemic stroke of left central kiara (penetrating pontine arteries off basilar) and subacute ischemic in right cerebellum (R SCA territory).  - CT Head reviewed, no acute intracranial hemorrhage, stable exam since CT in 11/2021. 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 Moderate stenosis involving the right clinoid segment of the ICA due to atherosclerotic calcification. Mild stenosis of the bilateral proximal internal carotid arteries due to mixed calcified and noncalcified atherosclerotic plaque  - MRI Brain reviewed, showed diffusion abnormality in central aspect of the kiara and R cerebellum  - Fount to have atrial fiib, ASA/Plavix Dced. - Eliquis continued as 5mg BID.   - Continue Atorvastatin 80 mg daily  - Monitor BP closely and avoid hypotension  - Goal SBP <150  - [facility] evaluated the patient and recommended the aneurysm is stable, follow up as outpatient with Dr. 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    #HTN- controlled  - Goal SBP <150  - lisinopril 5 mg qhs  - metoprolol tartrate 25 mg po bid  - NIFEdipine 30 mg po daily    #NIDDM  - A1c 6.4  - Metformin 500 bid  - ISS    #HLD  - Continue Atorvastatin 80 mg qhs    -Pain control:   Tylenol prn    -GI/Bowel Mgmt:  No active issues at this time    -Bladder management:   No active issues at this time    -Skin:  No active issues at this time    -FEN   Replete prn    #Dysphagia  - Diet, Pureed:   Consistent Carbohydrate Evening Snack  DASH/TLC Sodium & Cholesterol Restricted  Mildly Thick Liquids (MILDTHICKLIQS)  Prosource Gelatein 20 Sugar Free     Qty per Day:  2 (04-21-22 @ 14:13) [Active]     Precautions / PROPHYLAXIS:      - Falls    - Ortho: Weight bearing status: WBAT      - DVT prophylaxis: Eliquis

## 2022-04-25 NOTE — PROGRESS NOTE ADULT - SUBJECTIVE AND OBJECTIVE BOX
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 Patient denied having any urinary or bowel incontinence and stated feeling weak for a few days and being unable to stand on her right leg. Work up included a CT C-spine which showed no acute fractures. 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 MRI brain showed acute ischemic stroke of left central kiara (penetrating pontine arteries off basilar) and subacute ischemic in right cerebellum (R SCA territory). Subtle right hemiparesis on was noted on exam, and patient was transferred to stroke unit for further monitoring.     Patient found to be in atrial fibrillation Cardiology consulted a-fib and decision for anticoagulation. Patient started on Eliquis 5mg BID and atorvastatin 80 mg Qd. Passed speech/swallow eval and advanced to pureed diet. [facility] was consulted and evaluated the patient recommending outpatient follow up with Dr. Goldstein and stroke clinic. At this time patient declines proceeding with treatment for stable aneurysm.    PM&R consulted for admission to  for acute inpatient rehabilitation. Prior to admission, patient was independent w/ ambulation using  straight cane and required assistance from children with ADLs. Resides w/ children; in a house with 3 steps to enter and none inside    CLOF evaluated by OT  BM - Max A  Transfers - NT  UBD - Max A  LBD - Dependent    CLOF evaluated by PT  BM - Min A  Transfers - Mod A  Ambulation - Mod A 10 ft w/ RW    Also seen by SLP - VFSS/MBS evalu on 4/19 revealed severe oropharyngeal dysphagia for thin liquids; mild-moderate oropharyngeal dysphagia for puree, soft, mildly and moderately thick liquids. continue puree w/ mildly thick liquids    Patient is a good candidate for admission to acute inpatient rehabilitation for R cerebellar and L central kiara CVA with right sided hemiplegia (dominant) with comorbidities of DM2, HTN, dyslipidemia, and atrial fibrilation requiring hospital level supervision. She was deemed medically stable for discharge to  on 4/19/22 where she will undergo intensive restorative therapies 3 hours a day for at least 5 days a week with the goal of regaining the patients independence and discharging her home with family care   (19 Apr 2022 14:28)      I examined the patient and reviewed the chart. There have been no significant changes since my history and physical except where documented below.    TODAY'S SUBJECTIVE & REVIEW OF SYMPTOMS:  Patient seen this AM by the rehabilitation team. No acute events overnight. Used  ID ([id number]), patient is not complaining of any active problems. Patient speaks a specific dialect of Salvadorean, that unfortunately not every  is able to understand. Therefore communication with the patient is a challenge. As per discussion with therapists, patient may be having complaints of cp, sob but is not complaining about it 2/2 to cultural norms. Team will attempt to contact the daughter for more clarification.      Review of Systems:   Constiutional:    [ x  ] WNL           [   ] poor appetite   [   ] insomnia   [   ] tired   Cardio:                [  x ] WNL           [   ] CP   [   ] SIFUENTES   [   ] palpitations               Resp:                   [  x ] WNL           [   ] SOB   [   ] cough   [   ] wheezing   GI:                        [ x  ] WNL           [   ] constipation   [   ] diarrhea   [   ] abdominal pain   [   ] nausea   [   ] emesis                                :                      [ x  ] WNL           [   ] ECHOLS  [   ] dusuria   [   ] difficulty voiding             Endo:                   [ x  ] WNL          [   ] po;yuria   [   ] temperature intolerance                 Skin:                     [x   ] WNL          [   ] pain   [   ] wound   [   ] rash   MSK:                    [x   ] WNL          [   ] muscle pain   [   ] joint pain/ stiffness   [   ] muscle tenderness   [   ] swelling   Neuro:                 [   ] WNL          [   ] HA   [   ] change in vision   [   ] tremor   [  x ] weakness right sided   [  x ]dysphagia              Cognitive:           [   ] WNL           [ x  ]confusion      Psych:                  [x   ] WNL           [   ] hallucinations   [   ]agitation   [   ] delusion   [   ]depression    PHYSICAL EXAM    ICU Vital Signs Last 24 Hrs  T(C): 35.7 (25 Apr 2022 12:24), Max: 36.4 (25 Apr 2022 05:18)  T(F): 96.3 (25 Apr 2022 12:24), Max: 97.5 (25 Apr 2022 05:18)  HR: 88 (25 Apr 2022 12:24) (64 - 88)  BP: 120/68 (25 Apr 2022 12:24) (120/68 - 152/97)  BP(mean): --  ABP: --  ABP(mean): --  RR: 20 (25 Apr 2022 12:24) (18 - 20)  SpO2: --      General:[ x  ] NAD, Resting Comfortable,   [   ] other:                                HEENT: [  x ] NC/AT, EOMI, PERRL , Normal Conjunctivae,   [   ] other:  Cardio: [   ] RRR, no murmer,   [ x  ] other:  irregulary irregular rhythm                            Pulm: [ x  ] No Respiratory Distress,  Lungs CTAB,   [   ] other:                       Abdomen: [ x  ]ND/NT, Soft,   [   ] other:    : [  x ] NO ECHOLS CATHETER, [   ] ECHOLS CATHETER- no meatal tear, no discharge, [   ] other:                                            MSK: [   x] No joint swelling, Full ROM,   [   ] other:                                         Ext: [  x ]No C/C/E, No calf tenderness,   [   ]other:    Skin: [ x  ]intact,   [   ] other:                                                                   Neurological Examination:  Cognitive: [    ] AAO x 3,   [   x ]  other: Alert and oriented to person and place. Not oriented to date or situation                                                                      Attention:  [  x  ] intact,   [    ]  other:                            Mood/Affect: [ x   ] wnl,    [    ]  other:                                                                             Communication: [    ]Fluent, no dysarthria, following commands:  [ x   ] other:  mild dysarthria  CN II - XII:  [  x  ] intact,  [    ] other:                                                                                        Motor:   RIGHT UE: [   ] WNL,  [ x  ] other: 4/5  LEFT    UE: [  x ] WNL,  [   ] other:  RIGHT LE: [   ] WNL,  [ x  ] other: 4/5  LEFT    LE: [x   ] WNL,  [   ] other:    Tone: [  x  ] wnl,   [    ]  other:  Coordination:   [   x ] intact,   [    ] other:                                                                           Sensory: [  x  ] Intact to light touch,   [    ] other:    CLOF  BM Mod-Max A  Transfers - Max A  Ambulation - Mod assist 10 ft w/ RW    MEDICATIONS  (STANDING):  apixaban 5 milliGRAM(s) Oral every 12 hours  aspirin  chewable 81 milliGRAM(s) Oral daily  atorvastatin 80 milliGRAM(s) Oral at bedtime  dextrose 50% Injectable 25 Gram(s) IV Push once  glucagon  Injectable 1 milliGRAM(s) IntraMuscular once  lisinopril 5 milliGRAM(s) Oral at bedtime  metFORMIN 500 milliGRAM(s) Oral two times a day with meals  metoprolol tartrate 25 milliGRAM(s) Oral two times a day  NIFEdipine XL 30 milliGRAM(s) Oral <User Schedule>  pantoprazole    Tablet 40 milliGRAM(s) Oral before breakfast  sertraline 25 milliGRAM(s) Oral daily  sodium chloride 0.9%. 1000 milliLiter(s) (75 mL/Hr) IV Continuous <Continuous>  sodium chloride 0.9%. 1000 milliLiter(s) (500 mL/Hr) IV Continuous <Continuous>    MEDICATIONS  (PRN):  acetaminophen     Tablet .. 650 milliGRAM(s) Oral every 6 hours PRN Temp greater or equal to 38C (100.4F), Mild Pain (1 - 3)  aluminum hydroxide/magnesium hydroxide/simethicone Suspension 30 milliLiter(s) Oral every 4 hours PRN Dyspepsia  magnesium hydroxide Suspension 30 milliLiter(s) Oral daily PRN Constipation  melatonin 5 milliGRAM(s) Oral at bedtime PRN Insomnia        RECENT LABS/IMAGING                                      12.8   8.32  )-----------( 231      ( 25 Apr 2022 08:04 )             38.6     04-25    140  |  102  |  28<H>  ----------------------------<  136<H>  4.7   |  28  |  1.0    Ca    9.6      25 Apr 2022 08:04  Mg     1.6     04-25    TPro  6.3  /  Alb  3.9  /  TBili  0.4  /  DBili  x   /  AST  16  /  ALT  16  /  AlkPhos  79  04-25        POCT Blood Glucose.: 125 mg/dL (04-24-22 @ 21:21)  POCT Blood Glucose.: 142 mg/dL (04-24-22 @ 16:43)  POCT Blood Glucose.: 188 mg/dL (04-24-22 @ 11:04)  POCT Blood Glucose.: 114 mg/dL (04-24-22 @ 07:03)  POCT Blood Glucose.: 140 mg/dL (04-23-22 @ 21:02)  POCT Blood Glucose.: 139 mg/dL (04-23-22 @ 16:26)  POCT Blood Glucose.: 209 mg/dL (04-23-22 @ 10:57)  POCT Blood Glucose.: 136 mg/dL (04-23-22 @ 07:03)  POCT Blood Glucose.: 116 mg/dL (04-22-22 @ 21:31)  POCT Blood Glucose.: 177 mg/dL (04-22-22 @ 16:12)  POCT Blood Glucose.: 115 mg/dL (04-22-22 @ 11:05)  POCT Blood Glucose.: 131 mg/dL (04-22-22 @ 07:03)       Patient is a 81y old  Female who presents with a chief complaint of Right cerebellar and Left central kiara CVA with right hemiplegia (dominant), Aphasia, Dysarthria (19 Apr 2022 14:28)      HPI:  Patient is a 82 yo [place]ym[place] speaking F with PMHx of HTN, HLD, DM, left MCA aneurysm, depression, who presented to the ED after an unwitnessed fall on 4/12/22. Patient denied having any urinary or bowel incontinence and stated feeling weak for a few days and being unable to stand on her right leg. Work up included a CT C-spine which showed no acute fractures. 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 MRI brain showed acute ischemic stroke of left central kiara (penetrating pontine arteries off basilar) and subacute ischemic in right cerebellum (R SCA territory). Subtle right hemiparesis on was noted on exam, and patient was transferred to stroke unit for further monitoring.     Patient found to be in atrial fibrillation Cardiology consulted a-fib and decision for anticoagulation. Patient started on Eliquis 5mg BID and atorvastatin 80 mg Qd. Passed speech/swallow eval and advanced to pureed diet. [facility] was consulted and evaluated the patient recommending outpatient follow up with Dr. Goldstein and stroke clinic. At this time patient declines proceeding with treatment for stable aneurysm.    PM&R consulted for admission to  for acute inpatient rehabilitation. Prior to admission, patient was independent w/ ambulation using  straight cane and required assistance from children with ADLs. Resides w/ children; in a house with 3 steps to enter and none inside    CLOF evaluated by OT  BM - Max A  Transfers - NT  UBD - Max A  LBD - Dependent    CLOF evaluated by PT  BM - Min A  Transfers - Mod A  Ambulation - Mod A 10 ft w/ RW    Also seen by SLP - VFSS/MBS evalu on 4/19 revealed severe oropharyngeal dysphagia for thin liquids; mild-moderate oropharyngeal dysphagia for puree, soft, mildly and moderately thick liquids. continue puree w/ mildly thick liquids    Patient is a good candidate for admission to acute inpatient rehabilitation for R cerebellar and L central kiara CVA with right sided hemiplegia (dominant) with comorbidities of DM2, HTN, dyslipidemia, and atrial fibrilation requiring hospital level supervision. She was deemed medically stable for discharge to  on 4/19/22 where she will undergo intensive restorative therapies 3 hours a day for at least 5 days a week with the goal of regaining the patients independence and discharging her home with family care   (19 Apr 2022 14:28)    TODAY'S SUBJECTIVE & REVIEW OF SYMPTOMS:  Patient seen this AM by the rehabilitation team. No acute events overnight. Used  ID ([id number]), patient is not complaining of any active problems. Patient speaks a specific dialect of Serbian, that unfortunately not every  is able to understand. Therefore communication with the patient is a challenge. As per discussion with therapists, patient may be having complaints of cp, sob but is not complaining about it 2/2 to cultural norms. Team will attempt to contact the daughter for more clarification.      Review of Systems:   Constiutional:    [ x  ] WNL           [   ] poor appetite   [   ] insomnia   [   ] tired   Cardio:                [  x ] WNL           [   ] CP   [   ] SIFUENTES   [   ] palpitations               Resp:                   [  x ] WNL           [   ] SOB   [   ] cough   [   ] wheezing   GI:                        [ x  ] WNL           [   ] constipation   [   ] diarrhea   [   ] abdominal pain   [   ] nausea   [   ] emesis                                :                      [ x  ] WNL           [   ] ECHOLS  [   ] dusuria   [   ] difficulty voiding             Endo:                   [ x  ] WNL          [   ] po;yuria   [   ] temperature intolerance                 Skin:                     [x   ] WNL          [   ] pain   [   ] wound   [   ] rash   MSK:                    [x   ] WNL          [   ] muscle pain   [   ] joint pain/ stiffness   [   ] muscle tenderness   [   ] swelling   Neuro:                 [   ] WNL          [   ] HA   [   ] change in vision   [   ] tremor   [  x ] weakness right sided   [  x ]dysphagia              Cognitive:           [   ] WNL           [ x  ]confusion      Psych:                  [x   ] WNL           [   ] hallucinations   [   ]agitation   [   ] delusion   [   ]depression    PHYSICAL EXAM    ICU Vital Signs Last 24 Hrs  T(C): 35.7 (25 Apr 2022 12:24), Max: 36.4 (25 Apr 2022 05:18)  T(F): 96.3 (25 Apr 2022 12:24), Max: 97.5 (25 Apr 2022 05:18)  HR: 88 (25 Apr 2022 12:24) (64 - 88)  BP: 120/68 (25 Apr 2022 12:24) (120/68 - 152/97)  BP(mean): --  ABP: --  ABP(mean): --  RR: 20 (25 Apr 2022 12:24) (18 - 20)  SpO2: --      General:[ x  ] NAD, Resting Comfortable,   [   ] other:                                HEENT: [  x ] NC/AT, EOMI, PERRL , Normal Conjunctivae,   [   ] other:  Cardio: [   ] RRR, no murmer,   [ x  ] other:  irregulary irregular rhythm                            Pulm: [ x  ] No Respiratory Distress,  Lungs CTAB,   [   ] other:                       Abdomen: [ x  ]ND/NT, Soft,   [   ] other:    : [  x ] NO ECHOLS CATHETER, [   ] ECHOLS CATHETER- no meatal tear, no discharge, [   ] other:                                            MSK: [   x] No joint swelling, Full ROM,   [   ] other:                                         Ext: [  x ]No C/C/E, No calf tenderness,   [   ]other:    Skin: [ x  ]intact,   [   ] other:                                                                   Neurological Examination:  Cognitive: [    ] AAO x 3,   [   x ]  other: Alert and oriented to person and place. Not oriented to date or situation                                                                      Attention:  [  x  ] intact,   [    ]  other:                            Mood/Affect: [ x   ] wnl,    [    ]  other:                                                                             Communication: [    ]Fluent, no dysarthria, following commands:  [ x   ] other:  mild dysarthria  CN II - XII:  [  x  ] intact,  [    ] other:                                                                                        Motor:   RIGHT UE: [   ] WNL,  [ x  ] other: 4/5  LEFT    UE: [  x ] WNL,  [   ] other:  RIGHT LE: [   ] WNL,  [ x  ] other: 4/5  LEFT    LE: [x   ] WNL,  [   ] other:    Tone: [  x  ] wnl,   [    ]  other:  Coordination:   [   x ] intact,   [    ] other:                                                                           Sensory: [  x  ] Intact to light touch,   [    ] other:    CLOF  BM Mod-Max A  Transfers - Max A  Ambulation - Mod assist 10 ft w/ RW    MEDICATIONS  (STANDING):  apixaban 5 milliGRAM(s) Oral every 12 hours  aspirin  chewable 81 milliGRAM(s) Oral daily  atorvastatin 80 milliGRAM(s) Oral at bedtime  dextrose 50% Injectable 25 Gram(s) IV Push once  glucagon  Injectable 1 milliGRAM(s) IntraMuscular once  lisinopril 5 milliGRAM(s) Oral at bedtime  metFORMIN 500 milliGRAM(s) Oral two times a day with meals  metoprolol tartrate 25 milliGRAM(s) Oral two times a day  NIFEdipine XL 30 milliGRAM(s) Oral <User Schedule>  pantoprazole    Tablet 40 milliGRAM(s) Oral before breakfast  sertraline 25 milliGRAM(s) Oral daily  sodium chloride 0.9%. 1000 milliLiter(s) (75 mL/Hr) IV Continuous <Continuous>  sodium chloride 0.9%. 1000 milliLiter(s) (500 mL/Hr) IV Continuous <Continuous>    MEDICATIONS  (PRN):  acetaminophen     Tablet .. 650 milliGRAM(s) Oral every 6 hours PRN Temp greater or equal to 38C (100.4F), Mild Pain (1 - 3)  aluminum hydroxide/magnesium hydroxide/simethicone Suspension 30 milliLiter(s) Oral every 4 hours PRN Dyspepsia  magnesium hydroxide Suspension 30 milliLiter(s) Oral daily PRN Constipation  melatonin 5 milliGRAM(s) Oral at bedtime PRN Insomnia        RECENT LABS/IMAGING                                      12.8   8.32  )-----------( 231      ( 25 Apr 2022 08:04 )             38.6     04-25    140  |  102  |  28<H>  ----------------------------<  136<H>  4.7   |  28  |  1.0    Ca    9.6      25 Apr 2022 08:04  Mg     1.6     04-25    TPro  6.3  /  Alb  3.9  /  TBili  0.4  /  DBili  x   /  AST  16  /  ALT  16  /  AlkPhos  79  04-25        POCT Blood Glucose.: 125 mg/dL (04-24-22 @ 21:21)  POCT Blood Glucose.: 142 mg/dL (04-24-22 @ 16:43)  POCT Blood Glucose.: 188 mg/dL (04-24-22 @ 11:04)  POCT Blood Glucose.: 114 mg/dL (04-24-22 @ 07:03)  POCT Blood Glucose.: 140 mg/dL (04-23-22 @ 21:02)  POCT Blood Glucose.: 139 mg/dL (04-23-22 @ 16:26)  POCT Blood Glucose.: 209 mg/dL (04-23-22 @ 10:57)  POCT Blood Glucose.: 136 mg/dL (04-23-22 @ 07:03)  POCT Blood Glucose.: 116 mg/dL (04-22-22 @ 21:31)  POCT Blood Glucose.: 177 mg/dL (04-22-22 @ 16:12)  POCT Blood Glucose.: 115 mg/dL (04-22-22 @ 11:05)  POCT Blood Glucose.: 131 mg/dL (04-22-22 @ 07:03)

## 2022-04-25 NOTE — PROGRESS NOTE ADULT - ATTENDING COMMENTS
I reviewed the chart and examined the patient with the resident and we discussed the findings and treatment plan.  The patient is tolerating the rehab program well. I agree with the findings and treatment plan above, which I modified as indicated. The patient requires 3 hrs a day of acute inpatient rehab. Tolerating therapies. No c/o. Appears comfortable, though therapist notes poor endurance. Family reported poor endurance PTA.   Echo with normal LVF and chest x-ray shows only bilateral discoid atelectasis at bases. Continue full rehab program.  I read, edited and agree with the Assessment:  #Acute ischemic stroke of left central kiara (penetrating pontine arteries off basilar) and subacute ischemic in right cerebellum (R SCA territory).  - CT Head reviewed, no acute intracranial hemorrhage, stable exam since CT in 11/2021. 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 Moderate stenosis involving the right clinoid segment of the ICA due to atherosclerotic calcification. Mild stenosis of the bilateral proximal internal carotid arteries due to mixed calcified and noncalcified atherosclerotic plaque  - MRI Brain reviewed, showed diffusion abnormality in central aspect of the kiara and R cerebellum  - Fount to have atrial fiib, ASA/Plavix Dced. - Eliquis continued as 5mg BID.   - Continue Atorvastatin 80 mg daily  - Monitor BP closely and avoid hypotension  - Goal SBP <150  - [facility] evaluated the patient and recommended the aneurysm is stable, follow up as outpatient with Dr. 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    #HTN- controlled  - Goal SBP <150  - lisinopril 5 mg qhs  - metoprolol tartrate 25 mg po bid  - NIFEdipine 30 mg po daily    #NIDDM  - A1c 6.4  - Metformin 500 bid  - ISS    #HLD  - Continue Atorvastatin 80 mg qhs    -Pain control:   Tylenol prn    -GI/Bowel Mgmt:  No active issues at this time    -Bladder management:   No active issues at this time    -Skin:  No active issues at this time    -FEN   Replete prn    #Dysphagia  - Diet, Pureed:   Consistent Carbohydrate Evening Snack  DASH/TLC Sodium & Cholesterol Restricted  Mildly Thick Liquids (MILDTHICKLIQS)  Prosource Gelatein 20 Sugar Free     Qty per Day:  2 (04-21-22 @ 14:13) [Active]

## 2022-04-26 LAB
GLUCOSE BLDC GLUCOMTR-MCNC: 119 MG/DL — HIGH (ref 70–99)
GLUCOSE BLDC GLUCOMTR-MCNC: 126 MG/DL — HIGH (ref 70–99)
GLUCOSE BLDC GLUCOMTR-MCNC: 160 MG/DL — HIGH (ref 70–99)
GLUCOSE BLDC GLUCOMTR-MCNC: 177 MG/DL — HIGH (ref 70–99)
MAGNESIUM SERPL-MCNC: 1.6 MG/DL — LOW (ref 1.8–2.4)

## 2022-04-26 RX ADMIN — SERTRALINE 25 MILLIGRAM(S): 25 TABLET, FILM COATED ORAL at 12:22

## 2022-04-26 RX ADMIN — APIXABAN 5 MILLIGRAM(S): 2.5 TABLET, FILM COATED ORAL at 06:15

## 2022-04-26 RX ADMIN — METFORMIN HYDROCHLORIDE 500 MILLIGRAM(S): 850 TABLET ORAL at 18:07

## 2022-04-26 RX ADMIN — METFORMIN HYDROCHLORIDE 500 MILLIGRAM(S): 850 TABLET ORAL at 08:02

## 2022-04-26 RX ADMIN — Medication 25 MILLIGRAM(S): at 06:15

## 2022-04-26 RX ADMIN — Medication 25 MILLIGRAM(S): at 18:09

## 2022-04-26 RX ADMIN — Medication 30 MILLIGRAM(S): at 18:09

## 2022-04-26 RX ADMIN — ATORVASTATIN CALCIUM 80 MILLIGRAM(S): 80 TABLET, FILM COATED ORAL at 21:43

## 2022-04-26 RX ADMIN — PANTOPRAZOLE SODIUM 40 MILLIGRAM(S): 20 TABLET, DELAYED RELEASE ORAL at 06:15

## 2022-04-26 RX ADMIN — APIXABAN 5 MILLIGRAM(S): 2.5 TABLET, FILM COATED ORAL at 18:07

## 2022-04-26 RX ADMIN — Medication 81 MILLIGRAM(S): at 12:22

## 2022-04-26 NOTE — PROGRESS NOTE ADULT - SUBJECTIVE AND OBJECTIVE BOX
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 Patient denied having any urinary or bowel incontinence and stated feeling weak for a few days and being unable to stand on her right leg. Work up included a CT C-spine which showed no acute fractures. 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 MRI brain showed acute ischemic stroke of left central kiara (penetrating pontine arteries off basilar) and subacute ischemic in right cerebellum (R SCA territory). Subtle right hemiparesis on was noted on exam, and patient was transferred to stroke unit for further monitoring.     Patient found to be in atrial fibrillation Cardiology consulted a-fib and decision for anticoagulation. Patient started on Eliquis 5mg BID and atorvastatin 80 mg Qd. Passed speech/swallow eval and advanced to pureed diet. [facility] was consulted and evaluated the patient recommending outpatient follow up with Dr. Goldstein and stroke clinic. At this time patient declines proceeding with treatment for stable aneurysm.    PM&R consulted for admission to  for acute inpatient rehabilitation. Prior to admission, patient was independent w/ ambulation using  straight cane and required assistance from children with ADLs. Resides w/ children; in a house with 3 steps to enter and none inside    CLOF evaluated by OT  BM - Max A  Transfers - NT  UBD - Max A  LBD - Dependent    CLOF evaluated by PT  BM - Min A  Transfers - Mod A  Ambulation - Mod A 10 ft w/ RW    Also seen by SLP - VFSS/MBS evalu on 4/19 revealed severe oropharyngeal dysphagia for thin liquids; mild-moderate oropharyngeal dysphagia for puree, soft, mildly and moderately thick liquids. continue puree w/ mildly thick liquids    Patient is a good candidate for admission to acute inpatient rehabilitation for R cerebellar and L central kiara CVA with right sided hemiplegia (dominant) with comorbidities of DM2, HTN, dyslipidemia, and atrial fibrilation requiring hospital level supervision. She was deemed medically stable for discharge to  on 4/19/22 where she will undergo intensive restorative therapies 3 hours a day for at least 5 days a week with the goal of regaining the patients independence and discharging her home with family care   (19 Apr 2022 14:28)    TODAY'S SUBJECTIVE & REVIEW OF SYMPTOMS:  Patient seen this AM by the rehabilitation team. No acute events overnight. Unable to obtain  for specific Indonesian dialect. although patient appears well and participates/assists with exam     Review of Systems:   Constiutional:    [ x  ] WNL           [   ] poor appetite   [   ] insomnia   [   ] tired   Cardio:                [  x ] WNL           [   ] CP   [   ] SIFUENTES   [   ] palpitations               Resp:                   [  x ] WNL           [   ] SOB   [   ] cough   [   ] wheezing   GI:                        [ x  ] WNL           [   ] constipation   [   ] diarrhea   [   ] abdominal pain   [   ] nausea   [   ] emesis                                :                      [ x  ] WNL           [   ] ECHOLS  [   ] dusuria   [   ] difficulty voiding             Endo:                   [ x  ] WNL          [   ] po;yuria   [   ] temperature intolerance                 Skin:                     [x   ] WNL          [   ] pain   [   ] wound   [   ] rash   MSK:                    [x   ] WNL          [   ] muscle pain   [   ] joint pain/ stiffness   [   ] muscle tenderness   [   ] swelling   Neuro:                 [   ] WNL          [   ] HA   [   ] change in vision   [   ] tremor   [  x ] weakness right sided   [  x ]dysphagia              Cognitive:           [   ] WNL           [ x  ]confusion      Psych:                  [x   ] WNL           [   ] hallucinations   [   ]agitation   [   ] delusion   [   ]depression    PHYSICAL EXAM    Vital Signs Last 24 Hrs  T(C): 36.9 (26 Apr 2022 13:51), Max: 36.9 (26 Apr 2022 13:51)  T(F): 98.4 (26 Apr 2022 13:51), Max: 98.4 (26 Apr 2022 13:51)  HR: 73 (26 Apr 2022 13:51) (64 - 89)  BP: 160/74 (26 Apr 2022 13:51) (115/62 - 174/84)  BP(mean): --  RR: 18 (26 Apr 2022 13:51) (18 - 18)  SpO2: --      General:[ x  ] NAD, Resting Comfortable,   [   ] other:                                HEENT: [  x ] NC/AT, EOMI, PERRL , Normal Conjunctivae,   [   ] other:  Cardio: [   ] RRR, no murmer,   [ x  ] other:  irregulary irregular rhythm                            Pulm: [ x  ] No Respiratory Distress,  Lungs CTAB,   [   ] other:                       Abdomen: [ x  ]ND/NT, Soft,   [   ] other:    : [  x ] NO ECHOLS CATHETER, [   ] ECHOLS CATHETER- no meatal tear, no discharge, [   ] other:                                            MSK: [   x] No joint swelling, Full ROM,   [   ] other:                                         Ext: [  x ]No C/C/E, No calf tenderness,   [   ]other:    Skin: [ x  ]intact,   [   ] other:                                                                   Neurological Examination:  Cognitive: [    ] AAO x 3,   [   x ]  other: Alert and oriented to person and place. Not oriented to date or situation                                                                      Attention:  [  x  ] intact,   [    ]  other:                            Mood/Affect: [ x   ] wnl,    [    ]  other:                                                                             Communication: [    ]Fluent, no dysarthria, following commands:  [ x   ] other:  mild dysarthria  CN II - XII:  [  x  ] intact,  [    ] other:                                                                                        Motor:   RIGHT UE: [   ] WNL,  [ x  ] other: 4/5  LEFT    UE: [  x ] WNL,  [   ] other:  RIGHT LE: [   ] WNL,  [ x  ] other: 4/5  LEFT    LE: [x   ] WNL,  [   ] other:    Tone: [  x  ] wnl,   [    ]  other:  Coordination:   [   x ] intact,   [    ] other:                                                                           Sensory: [  x  ] Intact to light touch,   [    ] other:    CLOF  BM Mod-Mod A  Transfers - Mod A  Ambulation - Mod assist 10 ft w/ RW    MEDICATIONS  (STANDING):  apixaban 5 milliGRAM(s) Oral every 12 hours  aspirin  chewable 81 milliGRAM(s) Oral daily  atorvastatin 80 milliGRAM(s) Oral at bedtime  dextrose 50% Injectable 25 Gram(s) IV Push once  glucagon  Injectable 1 milliGRAM(s) IntraMuscular once  lisinopril 5 milliGRAM(s) Oral at bedtime  metFORMIN 500 milliGRAM(s) Oral two times a day with meals  metoprolol tartrate 25 milliGRAM(s) Oral two times a day  NIFEdipine XL 30 milliGRAM(s) Oral <User Schedule>  pantoprazole    Tablet 40 milliGRAM(s) Oral before breakfast  sertraline 25 milliGRAM(s) Oral daily  sodium chloride 0.9%. 1000 milliLiter(s) (75 mL/Hr) IV Continuous <Continuous>  sodium chloride 0.9%. 1000 milliLiter(s) (500 mL/Hr) IV Continuous <Continuous>    MEDICATIONS  (PRN):  acetaminophen     Tablet .. 650 milliGRAM(s) Oral every 6 hours PRN Temp greater or equal to 38C (100.4F), Mild Pain (1 - 3)  aluminum hydroxide/magnesium hydroxide/simethicone Suspension 30 milliLiter(s) Oral every 4 hours PRN Dyspepsia  magnesium hydroxide Suspension 30 milliLiter(s) Oral daily PRN Constipation  melatonin 5 milliGRAM(s) Oral at bedtime PRN Insomnia        RECENT LABS/IMAGING                                      12.8   8.32  )-----------( 231      ( 25 Apr 2022 08:04 )             38.6     04-25    140  |  102  |  28<H>  ----------------------------<  136<H>  4.7   |  28  |  1.0    Ca    9.6      25 Apr 2022 08:04  Mg     1.6     04-25    TPro  6.3  /  Alb  3.9  /  TBili  0.4  /  DBili  x   /  AST  16  /  ALT  16  /  AlkPhos  79  04-25        POCT Blood Glucose.: 125 mg/dL (04-24-22 @ 21:21)  POCT Blood Glucose.: 142 mg/dL (04-24-22 @ 16:43)  POCT Blood Glucose.: 188 mg/dL (04-24-22 @ 11:04)  POCT Blood Glucose.: 114 mg/dL (04-24-22 @ 07:03)  POCT Blood Glucose.: 140 mg/dL (04-23-22 @ 21:02)  POCT Blood Glucose.: 139 mg/dL (04-23-22 @ 16:26)  POCT Blood Glucose.: 209 mg/dL (04-23-22 @ 10:57)  POCT Blood Glucose.: 136 mg/dL (04-23-22 @ 07:03)  POCT Blood Glucose.: 116 mg/dL (04-22-22 @ 21:31)  POCT Blood Glucose.: 177 mg/dL (04-22-22 @ 16:12)  POCT Blood Glucose.: 115 mg/dL (04-22-22 @ 11:05)  POCT Blood Glucose.: 131 mg/dL (04-22-22 @ 07:03)       Patient is a 81y old  Female who presents with a chief complaint of Right cerebellar and Left central kiara CVA with right hemiplegia (dominant), Aphasia, Dysarthria (19 Apr 2022 14:28)      HPI:  Patient is a 80 yo Gambian speaking F with PMHx of HTN, HLD, DM, left MCA aneurysm, depression, who presented to the ED after an unwitnessed fall on 4/12/22. Patient denied having any urinary or bowel incontinence and stated feeling weak for a few days and being unable to stand on her right leg. Work up included a CT C-spine which showed no acute fractures. 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 MRI brain showed acute ischemic stroke of left central kiara (penetrating pontine arteries off basilar) and subacute ischemic in right cerebellum (R SCA territory). Subtle right hemiparesis on was noted on exam, and patient was transferred to stroke unit for further monitoring.     Patient found to be in atrial fibrillation Cardiology consulted a-fib and decision for anticoagulation. Patient started on Eliquis 5mg BID and atorvastatin 80 mg Qd. Passed speech/swallow eval and advanced to pureed diet. [facility]end[facility] was consulted and evaluated the patient recommending outpatient follow up with Dr. Goldstein and stroke clinic. At this time patient declines proceeding with treatment for stable aneurysm.    PM&R consulted for admission to  for acute inpatient rehabilitation. Prior to admission, patient was independent w/ ambulation using  straight cane and required assistance from children with ADLs. Resides w/ children; in a house with 3 steps to enter and none inside    CLOF evaluated by OT  BM - Max A  Transfers - NT  UBD - Max A  LBD - Dependent    CLOF evaluated by PT  BM - Min A  Transfers - Mod A  Ambulation - Mod A 10 ft w/ RW    Also seen by SLP - VFSS/MBS evalu on 4/19 revealed severe oropharyngeal dysphagia for thin liquids; mild-moderate oropharyngeal dysphagia for puree, soft, mildly and moderately thick liquids. continue puree w/ mildly thick liquids    Patient is a good candidate for admission to acute inpatient rehabilitation for R cerebellar and L central kiara CVA with right sided hemiplegia (dominant) with comorbidities of DM2, HTN, dyslipidemia, and atrial fibrilation requiring hospital level supervision. She was deemed medically stable for discharge to  on 4/19/22 where she will undergo intensive restorative therapies 3 hours a day for at least 5 days a week with the goal of regaining the patients independence and discharging her home with family care   (19 Apr 2022 14:28)    TODAY'S SUBJECTIVE & REVIEW OF SYMPTOMS:  Patient seen this AM by the rehabilitation team. No acute events overnight. Unable to obtain  for specific [place]ian dialect. although patient appears well and participates/assists with exam     Review of Systems:   Constiutional:    [ x  ] WNL           [   ] poor appetite   [   ] insomnia   [   ] tired   Cardio:                [  x ] WNL           [   ] CP   [   ] SIFUENTES   [   ] palpitations               Resp:                   [  x ] WNL           [   ] SOB   [   ] cough   [   ] wheezing   GI:                        [ x  ] WNL           [   ] constipation   [   ] diarrhea   [   ] abdominal pain   [   ] nausea   [   ] emesis                                :                      [ x  ] WNL           [   ] ECHOLS  [   ] dusuria   [   ] difficulty voiding             Endo:                   [ x  ] WNL          [   ] po;yuria   [   ] temperature intolerance                 Skin:                     [x   ] WNL          [   ] pain   [   ] wound   [   ] rash   MSK:                    [x   ] WNL          [   ] muscle pain   [   ] joint pain/ stiffness   [   ] muscle tenderness   [   ] swelling   Neuro:                 [   ] WNL          [   ] HA   [   ] change in vision   [   ] tremor   [  x ] weakness right sided   [  x ]dysphagia              Cognitive:           [   ] WNL           [ x  ]confusion      Psych:                  [x   ] WNL           [   ] hallucinations   [   ]agitation   [   ] delusion   [   ]depression    PHYSICAL EXAM    Vital Signs Last 24 Hrs  T(C): 36.9 (26 Apr 2022 13:51), Max: 36.9 (26 Apr 2022 13:51)  T(F): 98.4 (26 Apr 2022 13:51), Max: 98.4 (26 Apr 2022 13:51)  HR: 73 (26 Apr 2022 13:51) (64 - 89)  BP: 160/74 (26 Apr 2022 13:51) (115/62 - 174/84)  BP(mean): --  RR: 18 (26 Apr 2022 13:51) (18 - 18)  SpO2: --      General:[ x  ] NAD, Resting Comfortable,   [   ] other:                                HEENT: [  x ] NC/AT, EOMI, PERRL , Normal Conjunctivae,   [   ] other:  Cardio: [   ] RRR, no murmer,   [ x  ] other:  irregulary irregular rhythm                            Pulm: [ x  ] No Respiratory Distress,  Lungs CTAB,   [   ] other:                       Abdomen: [ x  ]ND/NT, Soft,   [   ] other:    : [  x ] NO ECHOLS CATHETER, [   ] ECHOLS CATHETER- no meatal tear, no discharge, [   ] other:                                            MSK: [   x] No joint swelling, Full ROM,   [   ] other:                                         Ext: [  x ]No C/C/E, No calf tenderness,   [   ]other:    Skin: [ x  ]intact,   [   ] other:                                                                   Neurological Examination:  Cognitive: [    ] AAO x 3,   [   x ]  other: Alert and oriented to person and place. Not oriented to date or situation                                                                      Attention:  [  x  ] intact,   [    ]  other:                            Mood/Affect: [ x   ] wnl,    [    ]  other:                                                                             Communication: [    ]Fluent, no dysarthria, following commands:  [ x   ] other:  mild dysarthria  CN II - XII:  [  x  ] intact,  [    ] other:                                                                                        Motor:   RIGHT UE: [   ] WNL,  [ x  ] other: 4/5  LEFT    UE: [  x ] WNL,  [   ] other:  RIGHT LE: [   ] WNL,  [ x  ] other: 4/5  LEFT    LE: [x   ] WNL,  [   ] other:    Tone: [  x  ] wnl,   [    ]  other:  Coordination:   [   x ] intact,   [    ] other:                                                                           Sensory: [  x  ] Intact to light touch,   [    ] other:    CLOF  BM Mod-Mod A  Transfers - Mod A  Ambulation - Mod assist 10 ft w/ RW    MEDICATIONS  (STANDING):  apixaban 5 milliGRAM(s) Oral every 12 hours  aspirin  chewable 81 milliGRAM(s) Oral daily  atorvastatin 80 milliGRAM(s) Oral at bedtime  dextrose 50% Injectable 25 Gram(s) IV Push once  glucagon  Injectable 1 milliGRAM(s) IntraMuscular once  lisinopril 5 milliGRAM(s) Oral at bedtime  metFORMIN 500 milliGRAM(s) Oral two times a day with meals  metoprolol tartrate 25 milliGRAM(s) Oral two times a day  NIFEdipine XL 30 milliGRAM(s) Oral <User Schedule>  pantoprazole    Tablet 40 milliGRAM(s) Oral before breakfast  sertraline 25 milliGRAM(s) Oral daily  sodium chloride 0.9%. 1000 milliLiter(s) (75 mL/Hr) IV Continuous <Continuous>  sodium chloride 0.9%. 1000 milliLiter(s) (500 mL/Hr) IV Continuous <Continuous>    MEDICATIONS  (PRN):  acetaminophen     Tablet .. 650 milliGRAM(s) Oral every 6 hours PRN Temp greater or equal to 38C (100.4F), Mild Pain (1 - 3)  aluminum hydroxide/magnesium hydroxide/simethicone Suspension 30 milliLiter(s) Oral every 4 hours PRN Dyspepsia  magnesium hydroxide Suspension 30 milliLiter(s) Oral daily PRN Constipation  melatonin 5 milliGRAM(s) Oral at bedtime PRN Insomnia        RECENT LABS/IMAGING                                      12.8   8.32  )-----------( 231      ( 25 Apr 2022 08:04 )             38.6     04-25    140  |  102  |  28<H>  ----------------------------<  136<H>  4.7   |  28  |  1.0    Ca    9.6      25 Apr 2022 08:04  Mg     1.6     04-25    TPro  6.3  /  Alb  3.9  /  TBili  0.4  /  DBili  x   /  AST  16  /  ALT  16  /  AlkPhos  79  04-25    CAPILLARY BLOOD GLUCOSE      POCT Blood Glucose.: 126 mg/dL (26 Apr 2022 16:26)  POCT Blood Glucose.: 160 mg/dL (26 Apr 2022 11:11)  POCT Blood Glucose.: 119 mg/dL (26 Apr 2022 07:23)  POCT Blood Glucose.: 127 mg/dL (25 Apr 2022 20:55)

## 2022-04-26 NOTE — PROGRESS NOTE ADULT - ASSESSMENT
ASSESSMENT/PLAN:  80 yo F with an acute ischemic infarct in the right cerebellum and left central kiara with right hemiparesis (dominant), dysphagia, dysarthria and aphasia with comorbidities of DM-2, a fib, HTN and  HLD. She warrants acute rehab with 3 hours of daily therapies including PT, OT and speech and close physiatry is monitoring for her CVA which has impacting her swallow and communication. Given her CVA and multiple significant comorbidities close physiatry follow up is needed and will reduce the risk of her requiring rehospitalization.   -Rehab of right cerebellar and Left central kiara CVA with right hemiparesis (dominant), Aphasia, Dysarthria  -Requires acute rehab with PT, OT, Speech, neuropsychology.     #Acute ischemic stroke of left central kiara (penetrating pontine arteries off basilar) and subacute ischemic in right cerebellum (R SCA territory).  - CT Head reviewed, no acute intracranial hemorrhage, stable exam since CT in 11/2021. 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 Moderate stenosis involving the right clinoid segment of the ICA due to atherosclerotic calcification. Mild stenosis of the bilateral proximal internal carotid arteries due to mixed calcified and noncalcified atherosclerotic plaque  - MRI Brain reviewed, showed diffusion abnormality in central aspect of the kiara and R cerebellum  - Fount to have atrial fiib, ASA/Plavix Dced. - Eliquis continued as 5mg BID.   - Continue Atorvastatin 80 mg daily  - Monitor BP closely and avoid hypotension  - Goal SBP <150  - [facility] evaluated the patient and recommended the aneurysm is stable, follow up as outpatient with Dr. 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    #HTN- controlled  - Goal SBP <150  - lisinopril 5 mg qhs  - metoprolol tartrate 25 mg po bid  - NIFEdipine 30 mg po daily    #NIDDM  - A1c 6.4  - Metformin 500 bid  - ISS    #HLD  - Continue Atorvastatin 80 mg qhs    -Pain control:   Tylenol prn    -GI/Bowel Mgmt:  No active issues at this time    -Bladder management:   No active issues at this time    -Skin:  No active issues at this time    -FEN   Replete prn    #Dysphagia  - Diet, Pureed:   Consistent Carbohydrate Evening Snack  DASH/TLC Sodium & Cholesterol Restricted  Mildly Thick Liquids (MILDTHICKLIQS)  Prosource Gelatein 20 Sugar Free     Qty per Day:  2 (04-21-22 @ 14:13) [Active]     Precautions / PROPHYLAXIS:      - Falls    - Ortho: Weight bearing status: WBAT      - DVT prophylaxis: Eliquis     ASSESSMENT/PLAN:  80 yo F with an acute ischemic infarct in the right cerebellum and left central kiara with right hemiparesis (dominant), dysphagia, dysarthria and aphasia with comorbidities of DM-2, a fib, HTN and  HLD. She warrants acute rehab with 3 hours of daily therapies including PT, OT and speech and close physiatry is monitoring for her CVA which has impacting her swallow and communication. Given her CVA and multiple significant comorbidities close physiatry follow up is needed and will reduce the risk of her requiring rehospitalization.   -Rehab of right cerebellar and Left central kiara CVA with right hemiparesis (dominant), Aphasia, Dysarthria  -Requires acute rehab with PT, OT, Speech, neuropsychology.     #Acute ischemic stroke of left central kiara (penetrating pontine arteries off basilar) and subacute ischemic in right cerebellum (R SCA territory).  - CT Head reviewed, no acute intracranial hemorrhage, stable exam since CT in 11/2021. 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 Moderate stenosis involving the right clinoid segment of the ICA due to atherosclerotic calcification. Mild stenosis of the bilateral proximal internal carotid arteries due to mixed calcified and noncalcified atherosclerotic plaque  - MRI Brain reviewed, showed diffusion abnormality in central aspect of the kiara and R cerebellum  - Fount to have atrial fiib, ASA/Plavix Dced. - Eliquis continued as 5mg BID.   - Continue Atorvastatin 80 mg daily  - Monitor BP closely and avoid hypotension  - Goal SBP <150  - [facility] evaluated the patient and recommended the aneurysm is stable, follow up as outpatient with Dr. 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    #HTN- controlled  - Goal SBP <150  - lisinopril 5 mg qhs  - metoprolol tartrate 25 mg po bid  - NIFEdipine 30 mg po daily    #Poor endurance/ SIFUENTES  - Echo and chest x-ray unremarkable  - long standing debility at home per family  - monitor    #NIDDM  - A1c 6.4  - Metformin 500 bid  - ISS    #HLD  - Continue Atorvastatin 80 mg qhs    -Pain control:   Tylenol prn    #Dysphagia  - Diet, Pureed:   Consistent Carbohydrate Evening Snack  DASH/TLC Sodium & Cholesterol Restricted  Mildly Thick Liquids (MILDTHICKLIQS)  Prosource Gelatein 20 Sugar Free     Qty per Day:  2 (04-21-22 @ 14:13) [Active]     Precautions / PROPHYLAXIS:      - Falls    - Ortho: Weight bearing status: WBAT      - DVT prophylaxis: Eliquis

## 2022-04-26 NOTE — PROGRESS NOTE ADULT - ASSESSMENT
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 With the exception of possible speech difficulties that may have emerged post stroke (as reported by family), patient’s current impairments are likely consistent with baseline functioning. Lack of access to an  that speaks the patient’s dialect has limited the validity of objective cognitive assessment completed thus far, and will limit the validity of future assessment results. As such, further assessment is not recommended at this time. This patient will be discharged from neuropsychology services; reconsult as needed.    Goals: ? No follow-up indicated at this time     Change in Goal: Yes    Plan: Discharge from neuropsychology services; reconsult as needed.

## 2022-04-26 NOTE — PROGRESS NOTE ADULT - ATTENDING COMMENTS
I reviewed the chart and examined the patient with the resident and we discussed the findings and treatment plan.  The patient is tolerating the rehab program well. I agree with the findings and treatment plan above, which I modified as indicated. The patient requires 3 hrs a day of acute inpatient rehab. Tolerating therapies. No c/o. Appears comfortable, though therapist notes poor endurance. Family reported poor endurance PTA.   Echo with normal LVF and chest x-ray shows only bilateral discoid atelectasis at bases. Continue full rehab program.  I read, edited and agree with the Assessment:  #Acute ischemic stroke of left central kiara (penetrating pontine arteries off basilar) and subacute ischemic in right cerebellum (R SCA territory).  - CT Head reviewed, no acute intracranial hemorrhage, stable exam since CT in 11/2021. 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 Moderate stenosis involving the right clinoid segment of the ICA due to atherosclerotic calcification. Mild stenosis of the bilateral proximal internal carotid arteries due to mixed calcified and noncalcified atherosclerotic plaque  - MRI Brain reviewed, showed diffusion abnormality in central aspect of the kiara and R cerebellum  - Fount to have atrial fiib, ASA/Plavix Dced. - Eliquis continued as 5mg BID.   - Continue Atorvastatin 80 mg daily  - Monitor BP closely and avoid hypotension  - Goal SBP <150  - [facility] evaluated the patient and recommended the aneurysm is stable, follow up as outpatient with Dr. 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    #HTN- controlled  - Goal SBP <150  - lisinopril 5 mg qhs  - metoprolol tartrate 25 mg po bid  - NIFEdipine 30 mg po daily    #NIDDM  - A1c 6.4  - Metformin 500 bid  - ISS    #HLD  - Continue Atorvastatin 80 mg qhs    -Pain control:   Tylenol prn    -GI/Bowel Mgmt:  No active issues at this time    -Bladder management:   No active issues at this time    -Skin:  No active issues at this time    -FEN   Replete prn    #Dysphagia  - Diet, Pureed:   Consistent Carbohydrate Evening Snack  DASH/TLC Sodium & Cholesterol Restricted  Mildly Thick Liquids (MILDTHICKLIQS)  Prosource Gelatein 20 Sugar Free     Qty per Day:  2 (04-21-22 @ 14:13) [Active] I reviewed the chart and examined the patient with the resident and we discussed the findings and treatment plan.  The patient is tolerating the rehab program well. I agree with the findings and treatment plan above, which I modified as indicated. The patient requires 3 hrs a day of acute inpatient rehab. No new complaints. Calm and cooperative and participating in therapies. BP elevated. Will monitor and adjust meds. Neuro exam stable. Ambulates with mod assistance. Continue rehab program.    I read, edited and agree with the Assessment:  #Acute ischemic stroke of left central kiara (penetrating pontine arteries off basilar) and subacute ischemic in right cerebellum (R SCA territory).  - CT Head reviewed, no acute intracranial hemorrhage, stable exam since CT in 11/2021. 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 Moderate stenosis involving the right clinoid segment of the ICA due to atherosclerotic calcification. Mild stenosis of the bilateral proximal internal carotid arteries due to mixed calcified and noncalcified atherosclerotic plaque  - MRI Brain reviewed, showed diffusion abnormality in central aspect of the kiara and R cerebellum  - Fount to have atrial fiib, ASA/Plavix Dced. - Eliquis continued as 5mg BID.   - Continue Atorvastatin 80 mg daily  - Monitor BP closely and avoid hypotension  - Goal SBP <150  - [facility] evaluated the patient and recommended the aneurysm is stable, follow up as outpatient with Dr. 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    #HTN- controlled  - Goal SBP <150  - lisinopril 5 mg qhs  - metoprolol tartrate 25 mg po bid  - NIFEdipine 30 mg po daily    #Poor endurance/ SIFUENTES  - Echo and chest x-ray unremarkable  - long standing debility at home per family  - monitor    #NIDDM - controlled  - A1c 6.4  - Metformin 500 bid  - ISS    #HLD  - Continue Atorvastatin 80 mg qhs    #Dysphagia  - Diet, Pureed:   Consistent Carbohydrate Evening Snack  DASH/TLC Sodium & Cholesterol Restricted  Mildly Thick Liquids (MILDTHICKLIQS)  Prosource Gelatein 20 Sugar Free     Qty per Day:  2 (04-21-22 @ 14:13) [Active]     Precautions / PROPHYLAXIS:      - Falls    - Ortho: Weight bearing status: WBAT      - DVT prophylaxis: Eliquis

## 2022-04-27 DIAGNOSIS — I49.1 ATRIAL PREMATURE DEPOLARIZATION: ICD-10-CM

## 2022-04-27 DIAGNOSIS — I67.1 CEREBRAL ANEURYSM, NONRUPTURED: ICD-10-CM

## 2022-04-27 DIAGNOSIS — G31.9 DEGENERATIVE DISEASE OF NERVOUS SYSTEM, UNSPECIFIED: ICD-10-CM

## 2022-04-27 DIAGNOSIS — I49.3 VENTRICULAR PREMATURE DEPOLARIZATION: ICD-10-CM

## 2022-04-27 DIAGNOSIS — I63.89 OTHER CEREBRAL INFARCTION: ICD-10-CM

## 2022-04-27 DIAGNOSIS — F32.A DEPRESSION, UNSPECIFIED: ICD-10-CM

## 2022-04-27 DIAGNOSIS — Z79.84 LONG TERM (CURRENT) USE OF ORAL HYPOGLYCEMIC DRUGS: ICD-10-CM

## 2022-04-27 DIAGNOSIS — Z79.82 LONG TERM (CURRENT) USE OF ASPIRIN: ICD-10-CM

## 2022-04-27 DIAGNOSIS — I48.0 PAROXYSMAL ATRIAL FIBRILLATION: ICD-10-CM

## 2022-04-27 DIAGNOSIS — R47.1 DYSARTHRIA AND ANARTHRIA: ICD-10-CM

## 2022-04-27 DIAGNOSIS — I08.2 RHEUMATIC DISORDERS OF BOTH AORTIC AND TRICUSPID VALVES: ICD-10-CM

## 2022-04-27 DIAGNOSIS — F03.90 UNSPECIFIED DEMENTIA, UNSPECIFIED SEVERITY, WITHOUT BEHAVIORAL DISTURBANCE, PSYCHOTIC DISTURBANCE, MOOD DISTURBANCE, AND ANXIETY: ICD-10-CM

## 2022-04-27 DIAGNOSIS — N39.0 URINARY TRACT INFECTION, SITE NOT SPECIFIED: ICD-10-CM

## 2022-04-27 DIAGNOSIS — R29.6 REPEATED FALLS: ICD-10-CM

## 2022-04-27 DIAGNOSIS — R29.702 NIHSS SCORE 2: ICD-10-CM

## 2022-04-27 DIAGNOSIS — I27.20 PULMONARY HYPERTENSION, UNSPECIFIED: ICD-10-CM

## 2022-04-27 DIAGNOSIS — R29.810 FACIAL WEAKNESS: ICD-10-CM

## 2022-04-27 DIAGNOSIS — E11.9 TYPE 2 DIABETES MELLITUS WITHOUT COMPLICATIONS: ICD-10-CM

## 2022-04-27 DIAGNOSIS — E78.5 HYPERLIPIDEMIA, UNSPECIFIED: ICD-10-CM

## 2022-04-27 DIAGNOSIS — I10 ESSENTIAL (PRIMARY) HYPERTENSION: ICD-10-CM

## 2022-04-27 DIAGNOSIS — G81.91 HEMIPLEGIA, UNSPECIFIED AFFECTING RIGHT DOMINANT SIDE: ICD-10-CM

## 2022-04-27 LAB
GLUCOSE BLDC GLUCOMTR-MCNC: 114 MG/DL — HIGH (ref 70–99)
GLUCOSE BLDC GLUCOMTR-MCNC: 168 MG/DL — HIGH (ref 70–99)
GLUCOSE BLDC GLUCOMTR-MCNC: 169 MG/DL — HIGH (ref 70–99)
GLUCOSE BLDC GLUCOMTR-MCNC: 447 MG/DL — HIGH (ref 70–99)
MAGNESIUM SERPL-MCNC: 1.6 MG/DL — LOW (ref 1.8–2.4)

## 2022-04-27 RX ADMIN — METFORMIN HYDROCHLORIDE 500 MILLIGRAM(S): 850 TABLET ORAL at 08:15

## 2022-04-27 RX ADMIN — Medication 30 MILLIGRAM(S): at 17:50

## 2022-04-27 RX ADMIN — SERTRALINE 25 MILLIGRAM(S): 25 TABLET, FILM COATED ORAL at 11:38

## 2022-04-27 RX ADMIN — METFORMIN HYDROCHLORIDE 500 MILLIGRAM(S): 850 TABLET ORAL at 17:49

## 2022-04-27 RX ADMIN — PANTOPRAZOLE SODIUM 40 MILLIGRAM(S): 20 TABLET, DELAYED RELEASE ORAL at 05:59

## 2022-04-27 RX ADMIN — APIXABAN 5 MILLIGRAM(S): 2.5 TABLET, FILM COATED ORAL at 17:50

## 2022-04-27 RX ADMIN — ATORVASTATIN CALCIUM 80 MILLIGRAM(S): 80 TABLET, FILM COATED ORAL at 22:20

## 2022-04-27 RX ADMIN — Medication 25 MILLIGRAM(S): at 05:59

## 2022-04-27 RX ADMIN — APIXABAN 5 MILLIGRAM(S): 2.5 TABLET, FILM COATED ORAL at 06:01

## 2022-04-27 RX ADMIN — Medication 81 MILLIGRAM(S): at 11:38

## 2022-04-27 RX ADMIN — Medication 25 MILLIGRAM(S): at 17:49

## 2022-04-27 NOTE — PROGRESS NOTE ADULT - SUBJECTIVE AND OBJECTIVE BOX
Patient is a 81y old  Female who presents with a chief complaint of Right cerebellar and Left central kiara CVA with right hemiplegia (dominant), Aphasia, Dysarthria (19 Apr 2022 14:28)      HPI:  Patient is a 82 yo [place]ian speaking F with PMHx of HTN, HLD, DM, left MCA aneurysm, depression, who presented to the ED after an unwitnessed fall on 4/12/22. Patient denied having any urinary or bowel incontinence and stated feeling weak for a few days and being unable to stand on her right leg. Work up included a CT C-spine which showed no acute fractures. 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 MRI brain showed acute ischemic stroke of left central kiara (penetrating pontine arteries off basilar) and subacute ischemic in right cerebellum (R SCA territory). Subtle right hemiparesis on was noted on exam, and patient was transferred to stroke unit for further monitoring.     Patient found to be in atrial fibrillation Cardiology consulted a-fib and decision for anticoagulation. Patient started on Eliquis 5mg BID and atorvastatin 80 mg Qd. Passed speech/swallow eval and advanced to pureed diet. [facility] was consulted and evaluated the patient recommending outpatient follow up with Dr. Goldstein and stroke clinic. At this time patient declines proceeding with treatment for stable aneurysm.    PM&R consulted for admission to  for acute inpatient rehabilitation. Prior to admission, patient was independent w/ ambulation using  straight cane and required assistance from children with ADLs. Resides w/ children; in a house with 3 steps to enter and none inside    CLOF evaluated by OT  BM - Max A  Transfers - NT  UBD - Max A  LBD - Dependent    CLOF evaluated by PT  BM - Min A  Transfers - Mod A  Ambulation - Mod A 10 ft w/ RW    Also seen by SLP - VFSS/MBS evalu on 4/19 revealed severe oropharyngeal dysphagia for thin liquids; mild-moderate oropharyngeal dysphagia for puree, soft, mildly and moderately thick liquids. continue puree w/ mildly thick liquids    Patient is a good candidate for admission to acute inpatient rehabilitation for R cerebellar and L central kiara CVA with right sided hemiplegia (dominant) with comorbidities of DM2, HTN, dyslipidemia, and atrial fibrilation requiring hospital level supervision. She was deemed medically stable for discharge to  on 4/19/22 where she will undergo intensive restorative therapies 3 hours a day for at least 5 days a week with the goal of regaining the patients independence and discharging her home with family care   (19 Apr 2022 14:28)    TODAY'S SUBJECTIVE & REVIEW OF SYMPTOMS:  Patient seen this AM by the rehabilitation team. No acute events overnight.   [place]ian  #[id number] - Patient reports she is afraid to stand up as she feels unsteady. Enforced that therapist will provide support. Agrees to participate with therapies. No other concerns per patient     Review of Systems:   Constiutional:    [ x  ] WNL           [   ] poor appetite   [   ] insomnia   [   ] tired   Cardio:                [  x ] WNL           [   ] CP   [   ] SIFUENTES   [   ] palpitations               Resp:                   [  x ] WNL           [   ] SOB   [   ] cough   [   ] wheezing   GI:                        [ x  ] WNL           [   ] constipation   [   ] diarrhea   [   ] abdominal pain   [   ] nausea   [   ] emesis                                :                      [ x  ] WNL           [   ] ECHOLS  [   ] dusuria   [   ] difficulty voiding             Endo:                   [ x  ] WNL          [   ] po;yuria   [   ] temperature intolerance                 Skin:                     [x   ] WNL          [   ] pain   [   ] wound   [   ] rash   MSK:                    [x   ] WNL          [   ] muscle pain   [   ] joint pain/ stiffness   [   ] muscle tenderness   [   ] swelling   Neuro:                 [   ] WNL          [   ] HA   [   ] change in vision   [   ] tremor   [  x ] weakness right sided   [  x ]dysphagia              Cognitive:           [   ] WNL           [ x  ]confusion      Psych:                  [x   ] WNL           [   ] hallucinations   [   ]agitation   [   ] delusion   [   ]depression    PHYSICAL EXAM    Vital Signs Last 24 Hrs  T(C): 35.9 (27 Apr 2022 06:01), Max: 36.9 (26 Apr 2022 13:51)  T(F): 96.7 (27 Apr 2022 06:01), Max: 98.4 (26 Apr 2022 13:51)  HR: 73 (27 Apr 2022 06:01) (73 - 86)  BP: 151/62 (27 Apr 2022 06:01) (112/64 - 160/74)  BP(mean): --  RR: 20 (27 Apr 2022 06:01) (18 - 20)  SpO2: --    General:[ x  ] NAD, Resting Comfortable,   [   ] other:                                HEENT: [  x ] NC/AT, EOMI, PERRL , Normal Conjunctivae,   [   ] other:  Cardio: [   ] RRR, no murmer,   [ x  ] other:  irregulary irregular rhythm                            Pulm: [ x  ] No Respiratory Distress,  Lungs CTAB,   [   ] other:                       Abdomen: [ x  ]ND/NT, Soft,   [   ] other:    : [  x ] NO ECHOLS CATHETER, [   ] ECHOLS CATHETER- no meatal tear, no discharge, [   ] other:                                            MSK: [   x] No joint swelling, Full ROM,   [   ] other:                                         Ext: [  x ]No C/C/E, No calf tenderness,   [   ]other:    Skin: [ x  ]intact,   [   ] other:                                                                   Neurological Examination:  Cognitive: [    ] AAO x 3,   [   x ]  other: Alert and oriented to person and place. Not oriented to date or situation                                                                      Attention:  [  x  ] intact,   [    ]  other:                            Mood/Affect: [ x   ] wnl,    [    ]  other:                                                                             Communication: [    ]Fluent, no dysarthria, following commands:  [ x   ] other:  mild dysarthria  CN II - XII:  [  x  ] intact,  [    ] other:                                                                                        Motor:   RIGHT UE: [   ] WNL,  [ x  ] other: 4/5  LEFT    UE: [  x ] WNL,  [   ] other:  RIGHT LE: [   ] WNL,  [ x  ] other: 4/5  LEFT    LE: [x   ] WNL,  [   ] other:    Tone: [  x  ] wnl,   [    ]  other:  Coordination:   [   x ] intact,   [    ] other:                                                                           Sensory: [  x  ] Intact to light touch,   [    ] other:    CLOF  BM Mod-Mod A  Transfers - Mod A  Ambulation - Mod assist 10 ft w/ RW    MEDICATIONS  (STANDING):  apixaban 5 milliGRAM(s) Oral every 12 hours  aspirin  chewable 81 milliGRAM(s) Oral daily  atorvastatin 80 milliGRAM(s) Oral at bedtime  dextrose 50% Injectable 25 Gram(s) IV Push once  glucagon  Injectable 1 milliGRAM(s) IntraMuscular once  lisinopril 5 milliGRAM(s) Oral at bedtime  metFORMIN 500 milliGRAM(s) Oral two times a day with meals  metoprolol tartrate 25 milliGRAM(s) Oral two times a day  NIFEdipine XL 30 milliGRAM(s) Oral <User Schedule>  pantoprazole    Tablet 40 milliGRAM(s) Oral before breakfast  sertraline 25 milliGRAM(s) Oral daily  sodium chloride 0.9%. 1000 milliLiter(s) (75 mL/Hr) IV Continuous <Continuous>  sodium chloride 0.9%. 1000 milliLiter(s) (500 mL/Hr) IV Continuous <Continuous>    MEDICATIONS  (PRN):  acetaminophen     Tablet .. 650 milliGRAM(s) Oral every 6 hours PRN Temp greater or equal to 38C (100.4F), Mild Pain (1 - 3)  aluminum hydroxide/magnesium hydroxide/simethicone Suspension 30 milliLiter(s) Oral every 4 hours PRN Dyspepsia  magnesium hydroxide Suspension 30 milliLiter(s) Oral daily PRN Constipation  melatonin 5 milliGRAM(s) Oral at bedtime PRN Insomnia        RECENT LABS/IMAGING                                      12.8   8.32  )-----------( 231      ( 25 Apr 2022 08:04 )             38.6     04-25    140  |  102  |  28<H>  ----------------------------<  136<H>  4.7   |  28  |  1.0    Ca    9.6      25 Apr 2022 08:04  Mg     1.6     04-25    TPro  6.3  /  Alb  3.9  /  TBili  0.4  /  DBili  x   /  AST  16  /  ALT  16  /  AlkPhos  79  04-25    CAPILLARY BLOOD GLUCOSE      POCT Blood Glucose.: 126 mg/dL (26 Apr 2022 16:26)  POCT Blood Glucose.: 160 mg/dL (26 Apr 2022 11:11)  POCT Blood Glucose.: 119 mg/dL (26 Apr 2022 07:23)  POCT Blood Glucose.: 127 mg/dL (25 Apr 2022 20:55)         Patient is a 81y old  Female who presents with a chief complaint of Right cerebellar and Left central kiara CVA with right hemiplegia (dominant), Aphasia, Dysarthria (19 Apr 2022 14:28)      HPI:  Patient is a 80 yo [place] speaking F with PMHx of HTN, HLD, DM, left MCA aneurysm, depression, who presented to the ED after an unwitnessed fall on 4/12/22. Patient denied having any urinary or bowel incontinence and stated feeling weak for a few days and being unable to stand on her right leg. Work up included a CT C-spine which showed no acute fractures. 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 MRI brain showed acute ischemic stroke of left central kiara (penetrating pontine arteries off basilar) and subacute ischemic in right cerebellum (R SCA territory). Subtle right hemiparesis on was noted on exam, and patient was transferred to stroke unit for further monitoring.     Patient found to be in atrial fibrillation Cardiology consulted a-fib and decision for anticoagulation. Patient started on Eliquis 5mg BID and atorvastatin 80 mg Qd. Passed speech/swallow eval and advanced to pureed diet. [facility] was consulted and evaluated the patient recommending outpatient follow up with Dr. Goldstein and stroke clinic. At this time patient declines proceeding with treatment for stable aneurysm.    PM&R consulted for admission to  for acute inpatient rehabilitation. Prior to admission, patient was independent w/ ambulation using  straight cane and required assistance from children with ADLs. Resides w/ children; in a house with 3 steps to enter and none inside    Function upon presentation to IR[facility]:  CLOF evaluated by OT  BM - Max A  Transfers - NT  UBD - Max A  LBD - Dependent    CLOF evaluated by PT  BM - Min A  Transfers - Mod A  Ambulation - Mod A 10 ft w/ RW    Also seen by SLP - VFSS/MBS evalu on 4/19 revealed severe oropharyngeal dysphagia for thin liquids; mild-moderate oropharyngeal dysphagia for puree, soft, mildly and moderately thick liquids. continue puree w/ mildly thick liquids    Patient is a good candidate for admission to acute inpatient rehabilitation for R cerebellar and L central kiara CVA with right sided hemiplegia (dominant) with comorbidities of DM2, HTN, dyslipidemia, and atrial fibrilation requiring hospital level supervision. She was deemed medically stable for discharge to  on 4/19/22 where she will undergo intensive restorative therapies 3 hours a day for at least 5 days a week with the goal of regaining the patients independence and discharging her home with family care   (19 Apr 2022 14:28)    TODAY'S SUBJECTIVE & REVIEW OF SYMPTOMS:  Patient seen this AM by the rehabilitation team. No acute events overnight.   [place]gian  #[id number] - Patient reports she is afraid to stand up as she feels unsteady. Enforced that therapist will provide support. Agrees to participate with therapies. No other concerns per patient     Review of Systems:   Constiutional:    [ x  ] WNL           [   ] poor appetite   [   ] insomnia   [   ] tired   Cardio:                [  x ] WNL           [   ] CP   [   ] SIFUENTES   [   ] palpitations               Resp:                   [  x ] WNL           [   ] SOB   [   ] cough   [   ] wheezing   GI:                        [ x  ] WNL           [   ] constipation   [   ] diarrhea   [   ] abdominal pain   [   ] nausea   [   ] emesis                                :                      [ x  ] WNL           [   ] ECHOLS  [   ] dusuria   [   ] difficulty voiding             Endo:                   [ x  ] WNL          [   ] po;yuria   [   ] temperature intolerance                 Skin:                     [x   ] WNL          [   ] pain   [   ] wound   [   ] rash   MSK:                    [x   ] WNL          [   ] muscle pain   [   ] joint pain/ stiffness   [   ] muscle tenderness   [   ] swelling   Neuro:                 [   ] WNL          [   ] HA   [   ] change in vision   [   ] tremor   [  x ] weakness right sided   [  x ]dysphagia              Cognitive:           [   ] WNL           [ x  ]confusion      Psych:                  [x   ] WNL           [   ] hallucinations   [   ]agitation   [   ] delusion   [   ]depression    PHYSICAL EXAM    Vital Signs Last 24 Hrs  T(C): 35.9 (27 Apr 2022 06:01), Max: 36.9 (26 Apr 2022 13:51)  T(F): 96.7 (27 Apr 2022 06:01), Max: 98.4 (26 Apr 2022 13:51)  HR: 73 (27 Apr 2022 06:01) (73 - 86)  BP: 151/62 (27 Apr 2022 06:01) (112/64 - 160/74)  BP(mean): --  RR: 20 (27 Apr 2022 06:01) (18 - 20)  SpO2: --    General:[ x  ] NAD, Resting Comfortable,   [   ] other:                                HEENT: [  x ] NC/AT, EOMI, PERRL , Normal Conjunctivae,   [   ] other:  Cardio: [   ] RRR, no murmer,   [ x  ] other:  irregulary irregular rhythm                            Pulm: [ x  ] No Respiratory Distress,  Lungs CTAB,   [   ] other:                       Abdomen: [ x  ]ND/NT, Soft,   [   ] other:    : [  x ] NO ECHOLS CATHETER, [   ] ECHOLS CATHETER- no meatal tear, no discharge, [   ] other:                                            MSK: [   x] No joint swelling, Full ROM,   [   ] other:                                         Ext: [  x ]No C/C/E, No calf tenderness,   [   ]other:    Skin: [ x  ]intact,   [   ] other:                                                                   Neurological Examination:  Cognitive: [    ] AAO x 3,   [   x ]  other: Alert and oriented to person and place. Not oriented to date or situation                                                                      Attention:  [  x  ] intact,   [    ]  other:                            Mood/Affect: [ x   ] wnl,    [    ]  other:                                                                             Communication: [    ]Fluent, no dysarthria, following commands:  [ x   ] other:  mild dysarthria  CN II - XII:  [  x  ] intact,  [    ] other:                                                                                        Motor:   RIGHT UE: [   ] WNL,  [ x  ] other: 4/5  LEFT    UE: [  x ] WNL,  [   ] other:  RIGHT LE: [   ] WNL,  [ x  ] other: 4/5  LEFT    LE: [x   ] WNL,  [   ] other:    Tone: [  x  ] wnl,   [    ]  other:  Coordination:   [   x ] intact,   [    ] other:                                                                           Sensory: [  x  ] Intact to light touch,   [    ] other:    CLOF  BM Mod-Mod A  Transfers - Mod A  Ambulation - Mod assist 10 ft w/ RW    MEDICATIONS  (STANDING):  apixaban 5 milliGRAM(s) Oral every 12 hours  aspirin  chewable 81 milliGRAM(s) Oral daily  atorvastatin 80 milliGRAM(s) Oral at bedtime  dextrose 50% Injectable 25 Gram(s) IV Push once  glucagon  Injectable 1 milliGRAM(s) IntraMuscular once  lisinopril 5 milliGRAM(s) Oral at bedtime  metFORMIN 500 milliGRAM(s) Oral two times a day with meals  metoprolol tartrate 25 milliGRAM(s) Oral two times a day  NIFEdipine XL 30 milliGRAM(s) Oral <User Schedule>  pantoprazole    Tablet 40 milliGRAM(s) Oral before breakfast  sertraline 25 milliGRAM(s) Oral daily  sodium chloride 0.9%. 1000 milliLiter(s) (75 mL/Hr) IV Continuous <Continuous>  sodium chloride 0.9%. 1000 milliLiter(s) (500 mL/Hr) IV Continuous <Continuous>    MEDICATIONS  (PRN):  acetaminophen     Tablet .. 650 milliGRAM(s) Oral every 6 hours PRN Temp greater or equal to 38C (100.4F), Mild Pain (1 - 3)  aluminum hydroxide/magnesium hydroxide/simethicone Suspension 30 milliLiter(s) Oral every 4 hours PRN Dyspepsia  magnesium hydroxide Suspension 30 milliLiter(s) Oral daily PRN Constipation  melatonin 5 milliGRAM(s) Oral at bedtime PRN Insomnia        RECENT LABS/IMAGING                                      12.8   8.32  )-----------( 231      ( 25 Apr 2022 08:04 )             38.6     04-25    140  |  102  |  28<H>  ----------------------------<  136<H>  4.7   |  28  |  1.0    Ca    9.6      25 Apr 2022 08:04  Mg     1.6     04-25    TPro  6.3  /  Alb  3.9  /  TBili  0.4  /  DBili  x   /  AST  16  /  ALT  16  /  AlkPhos  79  04-25      CAPILLARY BLOOD GLUCOSE    POCT Blood Glucose.: 169 mg/dL (27 Apr 2022 16:20)  POCT Blood Glucose.: 168 mg/dL (27 Apr 2022 08:57)  POCT Blood Glucose.: 447 mg/dL (27 Apr 2022 07:22)  POCT Blood Glucose.: 177 mg/dL (26 Apr 2022 21:19)

## 2022-04-27 NOTE — CHART NOTE - NSCHARTNOTEFT_GEN_A_CORE
Author spoke with patient's grandson ([phone]) in regards to the patient's current medical status and progress. Due to the grandmother having a specific dialect in [place], staff has been having some difficulty communicating with the patient.  Grandson informed the author that he communicated with his grandmother this morning and she was not having any active complaints. Author was informed earlier in the week that the patient is very cultural, and part of her culture is not complaining. Author wanted the patient to be free in expressing herself and notifying the staff if she has any issues/medical needs. Patient's grandson stated that he would talk with his grandmother and pass that message on.

## 2022-04-27 NOTE — PROGRESS NOTE ADULT - ASSESSMENT
ASSESSMENT/PLAN:  80 yo F with an acute ischemic infarct in the right cerebellum and left central kiara with right hemiparesis (dominant), dysphagia, dysarthria and aphasia with comorbidities of DM-2, a fib, HTN and  HLD. She warrants acute rehab with 3 hours of daily therapies including PT, OT and speech and close physiatry is monitoring for her CVA which has impacting her swallow and communication. Given her CVA and multiple significant comorbidities close physiatry follow up is needed and will reduce the risk of her requiring rehospitalization.   -Rehab of right cerebellar and Left central kiara CVA with right hemiparesis (dominant), Aphasia, Dysarthria  -Requires acute rehab with PT, OT, Speech, neuropsychology.     #Acute ischemic stroke of left central kiara (penetrating pontine arteries off basilar) and subacute ischemic in right cerebellum (R SCA territory).  - CT Head reviewed, no acute intracranial hemorrhage, stable exam since CT in 11/2021. 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 Moderate stenosis involving the right clinoid segment of the ICA due to atherosclerotic calcification. Mild stenosis of the bilateral proximal internal carotid arteries due to mixed calcified and noncalcified atherosclerotic plaque  - MRI Brain reviewed, showed diffusion abnormality in central aspect of the kiara and R cerebellum  - Fount to have atrial fiib, ASA/Plavix Dced. - Eliquis continued as 5mg BID.   - Continue Atorvastatin 80 mg daily  - Monitor BP closely and avoid hypotension  - Goal SBP <150  - [facility] evaluated the patient and recommended the aneurysm is stable, follow up as outpatient with Dr. 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    #HTN- controlled  - Goal SBP <150  - lisinopril 5 mg qhs  - metoprolol tartrate 25 mg po bid  - NIFEdipine 30 mg po daily    #Poor endurance/ SIFUENTES  - Echo and chest x-ray unremarkable  - long standing debility at home per family  - monitor    #NIDDM  - A1c 6.4  - Metformin 500 bid  - ISS    #HLD  - Continue Atorvastatin 80 mg qhs    -Pain control:   Tylenol prn    #Dysphagia  - Diet, Pureed:   Consistent Carbohydrate Evening Snack  DASH/TLC Sodium & Cholesterol Restricted  Mildly Thick Liquids (MILDTHICKLIQS)  Prosource Gelatein 20 Sugar Free     Qty per Day:  2 (04-21-22 @ 14:13) [Active]     Precautions / PROPHYLAXIS:      - Falls    - Ortho: Weight bearing status: WBAT      - DVT prophylaxis: Eliquis

## 2022-04-27 NOTE — PROGRESS NOTE ADULT - ATTENDING COMMENTS
I reviewed the chart and examined the patient with the resident and we discussed the findings and treatment plan.  The patient is tolerating the rehab program well. I agree with the findings and treatment plan above, which I modified as indicated. The patient requires 3 hrs a day of acute inpatient rehab. No new complaints. Calm and cooperative and participating in therapies. BP elevated. Will monitor and adjust meds. Neuro exam stable. Ambulates with mod assistance. Continue rehab program.    I read, edited and agree with the Assessment:  #Acute ischemic stroke of left central kiara (penetrating pontine arteries off basilar) and subacute ischemic in right cerebellum (R SCA territory).  - CT Head reviewed, no acute intracranial hemorrhage, stable exam since CT in 11/2021. 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 Moderate stenosis involving the right clinoid segment of the ICA due to atherosclerotic calcification. Mild stenosis of the bilateral proximal internal carotid arteries due to mixed calcified and noncalcified atherosclerotic plaque  - MRI Brain reviewed, showed diffusion abnormality in central aspect of the kiara and R cerebellum  - Fount to have atrial fiib, ASA/Plavix Dced. - Eliquis continued as 5mg BID.   - Continue Atorvastatin 80 mg daily  - Monitor BP closely and avoid hypotension  - Goal SBP <150  - [facility] evaluated the patient and recommended the aneurysm is stable, follow up as outpatient with Dr. 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    #HTN- controlled  - Goal SBP <150  - lisinopril 5 mg qhs  - metoprolol tartrate 25 mg po bid  - NIFEdipine 30 mg po daily    #Poor endurance/ SIFUENTES  - Echo and chest x-ray unremarkable  - long standing debility at home per family  - monitor    #NIDDM - controlled  - A1c 6.4  - Metformin 500 bid  - ISS    #HLD  - Continue Atorvastatin 80 mg qhs    #Dysphagia  - Diet, Pureed:   Consistent Carbohydrate Evening Snack  DASH/TLC Sodium & Cholesterol Restricted  Mildly Thick Liquids (MILDTHICKLIQS)  Prosource Gelatein 20 Sugar Free     Qty per Day:  2 (04-21-22 @ 14:13) [Active]     Precautions / PROPHYLAXIS:      - Falls    - Ortho: Weight bearing status: WBAT      - DVT prophylaxis: Eliquis I reviewed the chart and examined the patient with the resident and we discussed the findings and treatment plan.  The patient is tolerating the rehab program well. I agree with the findings and treatment plan above, which I modified as indicated. The patient requires 3 hrs a day of acute inpatient rehab. No new complaints. Slow functional progress. Mod assist to stand and difficulty maintaining standing. Denies pain. Medically stable. Continue rehab program.  I read, edited and agree with the Assessment:  #Acute ischemic stroke of left central kiara (penetrating pontine arteries off basilar) and subacute ischemic in right cerebellum (R SCA territory).  - CT Head reviewed, no acute intracranial hemorrhage, stable exam since CT in 11/2021. 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 Moderate stenosis involving the right clinoid segment of the ICA due to atherosclerotic calcification. Mild stenosis of the bilateral proximal internal carotid arteries due to mixed calcified and noncalcified atherosclerotic plaque  - MRI Brain reviewed, showed diffusion abnormality in central aspect of the kiara and R cerebellum  - Fount to have atrial fiib, ASA/Plavix Dced. - Eliquis continued as 5mg BID.   - Continue Atorvastatin 80 mg daily  - Monitor BP closely and avoid hypotension  - Goal SBP <150  - [facility] evaluated the patient and recommended the aneurysm is stable, follow up as outpatient with Dr. 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    #HTN- controlled  - Goal SBP <150  - lisinopril 5 mg qhs  - metoprolol tartrate 25 mg po bid  - NIFEdipine 30 mg po daily    #Poor endurance/ SIFUENTES  - Echo and chest x-ray unremarkable  - long standing debility at home per family  - monitor    #NIDDM  - A1c 6.4  - Metformin 500 bid  - ISS    #HLD  - Continue Atorvastatin 80 mg qhs    -Pain control:   Tylenol prn    #Dysphagia  - Diet, Pureed:   Consistent Carbohydrate Evening Snack  DASH/TLC Sodium & Cholesterol Restricted  Mildly Thick Liquids (MILDTHICKLIQS)  Prosource Gelatein 20 Sugar Free     Qty per Day:  2 (04-21-22 @ 14:13) [Active]     Precautions / PROPHYLAXIS:      - Falls    - Ortho: Weight bearing status: WBAT      - DVT prophylaxis: Eliquis

## 2022-04-28 LAB
GLUCOSE BLDC GLUCOMTR-MCNC: 124 MG/DL — HIGH (ref 70–99)
GLUCOSE BLDC GLUCOMTR-MCNC: 179 MG/DL — HIGH (ref 70–99)
MAGNESIUM SERPL-MCNC: 1.9 MG/DL — SIGNIFICANT CHANGE UP (ref 1.8–2.4)

## 2022-04-28 RX ADMIN — SERTRALINE 25 MILLIGRAM(S): 25 TABLET, FILM COATED ORAL at 12:38

## 2022-04-28 RX ADMIN — Medication 25 MILLIGRAM(S): at 06:24

## 2022-04-28 RX ADMIN — PANTOPRAZOLE SODIUM 40 MILLIGRAM(S): 20 TABLET, DELAYED RELEASE ORAL at 06:24

## 2022-04-28 RX ADMIN — METFORMIN HYDROCHLORIDE 500 MILLIGRAM(S): 850 TABLET ORAL at 07:51

## 2022-04-28 RX ADMIN — APIXABAN 5 MILLIGRAM(S): 2.5 TABLET, FILM COATED ORAL at 18:45

## 2022-04-28 RX ADMIN — ATORVASTATIN CALCIUM 80 MILLIGRAM(S): 80 TABLET, FILM COATED ORAL at 22:11

## 2022-04-28 RX ADMIN — Medication 81 MILLIGRAM(S): at 12:38

## 2022-04-28 RX ADMIN — METFORMIN HYDROCHLORIDE 500 MILLIGRAM(S): 850 TABLET ORAL at 18:45

## 2022-04-28 RX ADMIN — Medication 30 MILLIGRAM(S): at 18:45

## 2022-04-28 RX ADMIN — MAGNESIUM HYDROXIDE 30 MILLILITER(S): 400 TABLET, CHEWABLE ORAL at 08:13

## 2022-04-28 RX ADMIN — Medication 25 MILLIGRAM(S): at 18:45

## 2022-04-28 RX ADMIN — APIXABAN 5 MILLIGRAM(S): 2.5 TABLET, FILM COATED ORAL at 06:24

## 2022-04-28 NOTE — PROGRESS NOTE ADULT - ATTENDING COMMENTS
I reviewed the chart and examined the patient with the resident and we discussed the findings and treatment plan.  The patient is tolerating the rehab program well. I agree with the findings and treatment plan above, which I modified as indicated. The patient requires 3 hrs a day of acute inpatient rehab. Doing well. No new complaints. Appears comfortable. Participating in therapies. Transfers and ambulates with mod assist. Slow gains. Continue rehab. Medically stable.    I read, edited and agree with the Assessment:  #Acute ischemic stroke of left central kiara (penetrating pontine arteries off basilar) and subacute ischemic in right cerebellum (R SCA territory).  - CT Head reviewed, no acute intracranial hemorrhage, stable exam since CT in 11/2021. 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 Moderate stenosis involving the right clinoid segment of the ICA due to atherosclerotic calcification. Mild stenosis of the bilateral proximal internal carotid arteries due to mixed calcified and noncalcified atherosclerotic plaque  - MRI Brain reviewed, showed diffusion abnormality in central aspect of the kiara and R cerebellum  - Fount to have atrial fiib, ASA/Plavix Dced. - Eliquis continued as 5mg BID.   - Continue Atorvastatin 80 mg daily  - Monitor BP closely and avoid hypotension  - Goal SBP <150  - [facility] evaluated the patient and recommended the aneurysm is stable, follow up as outpatient with Dr. 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    #HTN- controlled  - Goal SBP <150  - lisinopril 5 mg qhs  - metoprolol tartrate 25 mg po bid  - NIFEdipine 30 mg po daily    #Poor endurance/ SIFUENTES  - Echo and chest x-ray unremarkable  - long standing debility at home per family  - monitor    #NIDDM  - A1c 6.4  - Metformin 500 bid  - ISS    #HLD  - Continue Atorvastatin 80 mg qhs    -Pain control:   Tylenol prn    #Dysphagia  - Diet, Pureed:   Consistent Carbohydrate Evening Snack  DASH/TLC Sodium & Cholesterol Restricted  Mildly Thick Liquids (MILDTHICKLIQS)  Prosource Gelatein 20 Sugar Free     Qty per Day:  2 (04-21-22 @ 14:13) [Active]     Precautions / PROPHYLAXIS:      - Falls    - Ortho: Weight bearing status: WBAT      - DVT prophylaxis: Eliquis

## 2022-04-28 NOTE — PROGRESS NOTE ADULT - SUBJECTIVE AND OBJECTIVE BOX
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 Patient denied having any urinary or bowel incontinence and stated feeling weak for a few days and being unable to stand on her right leg. Work up included a CT C-spine which showed no acute fractures. 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 MRI brain showed acute ischemic stroke of left central kiara (penetrating pontine arteries off basilar) and subacute ischemic in right cerebellum (R SCA territory). Subtle right hemiparesis on was noted on exam, and patient was transferred to stroke unit for further monitoring.     Patient found to be in atrial fibrillation Cardiology consulted a-fib and decision for anticoagulation. Patient started on Eliquis 5mg BID and atorvastatin 80 mg Qd. Passed speech/swallow eval and advanced to pureed diet. [facility] was consulted and evaluated the patient recommending outpatient follow up with Dr. Goldstein and stroke clinic. At this time patient declines proceeding with treatment for stable aneurysm.    PM&R consulted for admission to  for acute inpatient rehabilitation. Prior to admission, patient was independent w/ ambulation using  straight cane and required assistance from children with ADLs. Resides w/ children; in a house with 3 steps to enter and none inside    Function upon presentation to IRF:  CLOF evaluated by OT  BM - Max A  Transfers - NT  UBD - Max A  LBD - Dependent    CLOF evaluated by PT  BM - Min A  Transfers - Mod A  Ambulation - Mod A 10 ft w/ RW    Also seen by SLP - VFSS/MBS evalu on 4/19 revealed severe oropharyngeal dysphagia for thin liquids; mild-moderate oropharyngeal dysphagia for puree, soft, mildly and moderately thick liquids. continue puree w/ mildly thick liquids    Patient is a good candidate for admission to acute inpatient rehabilitation for R cerebellar and L central kiara CVA with right sided hemiplegia (dominant) with comorbidities of DM2, HTN, dyslipidemia, and atrial fibrilation requiring hospital level supervision. She was deemed medically stable for discharge to  on 4/19/22 where she will undergo intensive restorative therapies 3 hours a day for at least 5 days a week with the goal of regaining the patients independence and discharging her home with family care   (19 Apr 2022 14:28)    TODAY'S SUBJECTIVE & REVIEW OF SYMPTOMS:  Patient seen this AM by the rehabilitation team. No acute events overnight.   Togolese  #[id number] - Patient reports she is afraid to stand up as she feels unsteady. Enforced that therapist will provide support. Agrees to participate with therapies. No other concerns per patient     Review of Systems:   Constiutional:    [ x  ] WNL           [   ] poor appetite   [   ] insomnia   [   ] tired   Cardio:                [  x ] WNL           [   ] CP   [   ] SIFUENTES   [   ] palpitations               Resp:                   [  x ] WNL           [   ] SOB   [   ] cough   [   ] wheezing   GI:                        [ x  ] WNL           [   ] constipation   [   ] diarrhea   [   ] abdominal pain   [   ] nausea   [   ] emesis                                :                      [ x  ] WNL           [   ] ECHOLS  [   ] dusuria   [   ] difficulty voiding             Endo:                   [ x  ] WNL          [   ] po;yuria   [   ] temperature intolerance                 Skin:                     [x   ] WNL          [   ] pain   [   ] wound   [   ] rash   MSK:                    [x   ] WNL          [   ] muscle pain   [   ] joint pain/ stiffness   [   ] muscle tenderness   [   ] swelling   Neuro:                 [   ] WNL          [   ] HA   [   ] change in vision   [   ] tremor   [  x ] weakness right sided   [  x ]dysphagia              Cognitive:           [   ] WNL           [ x  ]confusion      Psych:                  [x   ] WNL           [   ] hallucinations   [   ]agitation   [   ] delusion   [   ]depression    PHYSICAL EXAM    ICU Vital Signs Last 24 Hrs  T(C): 36.3 (28 Apr 2022 06:18), Max: 36.3 (28 Apr 2022 06:18)  T(F): 97.4 (28 Apr 2022 06:18), Max: 97.4 (28 Apr 2022 06:18)  HR: 66 (28 Apr 2022 08:41) (66 - 91)  BP: 156/82 (28 Apr 2022 08:41) (111/64 - 190/85)  BP(mean): --  ABP: --  ABP(mean): --  RR: 18 (28 Apr 2022 06:18) (18 - 18)  SpO2: --      General:[ x  ] NAD, Resting Comfortable,   [   ] other:                                HEENT: [  x ] NC/AT, EOMI, PERRL , Normal Conjunctivae,   [   ] other:  Cardio: [   ] RRR, no murmer,   [ x  ] other:  irregulary irregular rhythm                            Pulm: [ x  ] No Respiratory Distress,  Lungs CTAB,   [   ] other:                       Abdomen: [ x  ]ND/NT, Soft,   [   ] other:    : [  x ] NO ECHOLS CATHETER, [   ] ECHOLS CATHETER- no meatal tear, no discharge, [   ] other:                                            MSK: [   x] No joint swelling, Full ROM,   [   ] other:                                         Ext: [  x ]No C/C/E, No calf tenderness,   [   ]other:    Skin: [ x  ]intact,   [   ] other:                                                                   Neurological Examination:  Cognitive: [    ] AAO x 3,   [   x ]  other: Alert and oriented to person and place. Not oriented to date or situation                                                                      Attention:  [  x  ] intact,   [    ]  other:                            Mood/Affect: [ x   ] wnl,    [    ]  other:                                                                             Communication: [    ]Fluent, no dysarthria, following commands:  [ x   ] other:  mild dysarthria  CN II - XII:  [  x  ] intact,  [    ] other:                                                                                        Motor:   RIGHT UE: [   ] WNL,  [ x  ] other: 4/5  LEFT    UE: [  x ] WNL,  [   ] other:  RIGHT LE: [   ] WNL,  [ x  ] other: 4/5  LEFT    LE: [x   ] WNL,  [   ] other:    Tone: [  x  ] wnl,   [    ]  other:  Coordination:   [   x ] intact,   [    ] other:                                                                           Sensory: [  x  ] Intact to light touch,   [    ] other:    CLOF  BM Mod-Mod A  Transfers - Mod A  Ambulation - Mod assist 10 ft w/ RW    MEDICATIONS  (STANDING):  apixaban 5 milliGRAM(s) Oral every 12 hours  aspirin  chewable 81 milliGRAM(s) Oral daily  atorvastatin 80 milliGRAM(s) Oral at bedtime  dextrose 50% Injectable 25 Gram(s) IV Push once  glucagon  Injectable 1 milliGRAM(s) IntraMuscular once  lisinopril 5 milliGRAM(s) Oral at bedtime  metFORMIN 500 milliGRAM(s) Oral two times a day with meals  metoprolol tartrate 25 milliGRAM(s) Oral two times a day  NIFEdipine XL 30 milliGRAM(s) Oral <User Schedule>  pantoprazole    Tablet 40 milliGRAM(s) Oral before breakfast  sertraline 25 milliGRAM(s) Oral daily  sodium chloride 0.9%. 1000 milliLiter(s) (500 mL/Hr) IV Continuous <Continuous>  sodium chloride 0.9%. 1000 milliLiter(s) (75 mL/Hr) IV Continuous <Continuous>    MEDICATIONS  (PRN):  acetaminophen     Tablet .. 650 milliGRAM(s) Oral every 6 hours PRN Temp greater or equal to 38C (100.4F), Mild Pain (1 - 3)  aluminum hydroxide/magnesium hydroxide/simethicone Suspension 30 milliLiter(s) Oral every 4 hours PRN Dyspepsia  magnesium hydroxide Suspension 30 milliLiter(s) Oral daily PRN Constipation  melatonin 5 milliGRAM(s) Oral at bedtime PRN Insomnia        RECENT LABS/IMAGING                                      12.8   8.32  )-----------( 231      ( 25 Apr 2022 08:04 )             38.6     04-25    140  |  102  |  28<H>  ----------------------------<  136<H>  4.7   |  28  |  1.0    Ca    9.6      25 Apr 2022 08:04  Mg     1.6     04-25    TPro  6.3  /  Alb  3.9  /  TBili  0.4  /  DBili  x   /  AST  16  /  ALT  16  /  AlkPhos  79  04-25      CAPILLARY BLOOD GLUCOSE    POCT Blood Glucose.: 169 mg/dL (27 Apr 2022 16:20)  POCT Blood Glucose.: 168 mg/dL (27 Apr 2022 08:57)  POCT Blood Glucose.: 447 mg/dL (27 Apr 2022 07:22)  POCT Blood Glucose.: 177 mg/dL (26 Apr 2022 21:19)           Patient is a 81y old  Female who presents with a chief complaint of Right cerebellar and Left central kiara CVA with right hemiplegia (dominant), Aphasia, Dysarthria (19 Apr 2022 14:28)      HPI:  Patient is a 82 yo Jordanian speaking F with PMHx of HTN, HLD, DM, left MCA aneurysm, depression, who presented to the ED after an unwitnessed fall on 4/12/22. Patient denied having any urinary or bowel incontinence and stated feeling weak for a few days and being unable to stand on her right leg. Work up included a CT C-spine which showed no acute fractures. 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 MRI brain showed acute ischemic stroke of left central kiara (penetrating pontine arteries off basilar) and subacute ischemic in right cerebellum (R SCA territory). Subtle right hemiparesis on was noted on exam, and patient was transferred to stroke unit for further monitoring.     Patient found to be in atrial fibrillation Cardiology consulted a-fib and decision for anticoagulation. Patient started on Eliquis 5mg BID and atorvastatin 80 mg Qd. Passed speech/swallow eval and advanced to pureed diet. [facility] was consulted and evaluated the patient recommending outpatient follow up with Dr. Goldstein and stroke clinic. At this time patient declines proceeding with treatment for stable aneurysm.    PM&R consulted for admission to  for acute inpatient rehabilitation. Prior to admission, patient was independent w/ ambulation using  straight cane and required assistance from children with ADLs. Resides w/ children; in a house with 3 steps to enter and none inside    Function upon presentation to IRF:  CLOF evaluated by OT  BM - Max A  Transfers - NT  UBD - Max A  LBD - Dependent    CLOF evaluated by PT  BM - Min A  Transfers - Mod A  Ambulation - Mod A 10 ft w/ RW    Also seen by SLP - VFSS/MBS evalu on 4/19 revealed severe oropharyngeal dysphagia for thin liquids; mild-moderate oropharyngeal dysphagia for puree, soft, mildly and moderately thick liquids. continue puree w/ mildly thick liquids    Patient is a good candidate for admission to acute inpatient rehabilitation for R cerebellar and L central kiara CVA with right sided hemiplegia (dominant) with comorbidities of DM2, HTN, dyslipidemia, and atrial fibrilation requiring hospital level supervision. She was deemed medically stable for discharge to  on 4/19/22 where she will undergo intensive restorative therapies 3 hours a day for at least 5 days a week with the goal of regaining the patients independence and discharging her home with family care   (19 Apr 2022 14:28)    TODAY'S SUBJECTIVE & REVIEW OF SYMPTOMS:  Patient seen this AM by the rehabilitation team. No acute events overnight.   [place]ian  #[id number] - Patient reports she is afraid to stand up as she feels unsteady. Enforced that therapist will provide support. Agrees to participate with therapies. No other concerns per patient     Review of Systems:   Constiutional:    [ x  ] WNL           [   ] poor appetite   [   ] insomnia   [   ] tired   Cardio:                [  x ] WNL           [   ] CP   [   ] SIFUENTES   [   ] palpitations               Resp:                   [  x ] WNL           [   ] SOB   [   ] cough   [   ] wheezing   GI:                        [ x  ] WNL           [   ] constipation   [   ] diarrhea   [   ] abdominal pain   [   ] nausea   [   ] emesis                                :                      [ x  ] WNL           [   ] ECHOLS  [   ] dusuria   [   ] difficulty voiding             Endo:                   [ x  ] WNL          [   ] po;yuria   [   ] temperature intolerance                 Skin:                     [x   ] WNL          [   ] pain   [   ] wound   [   ] rash   MSK:                    [x   ] WNL          [   ] muscle pain   [   ] joint pain/ stiffness   [   ] muscle tenderness   [   ] swelling   Neuro:                 [   ] WNL          [   ] HA   [   ] change in vision   [   ] tremor   [  x ] weakness right sided   [  x ]dysphagia              Cognitive:           [   ] WNL           [ x  ]confusion      Psych:                  [x   ] WNL           [   ] hallucinations   [   ]agitation   [   ] delusion   [   ]depression    PHYSICAL EXAM    ICU Vital Signs Last 24 Hrs  T(C): 36.3 (28 Apr 2022 06:18), Max: 36.3 (28 Apr 2022 06:18)  T(F): 97.4 (28 Apr 2022 06:18), Max: 97.4 (28 Apr 2022 06:18)  HR: 66 (28 Apr 2022 08:41) (66 - 91)  BP: 156/82 (28 Apr 2022 08:41) (111/64 - 190/85)  Flow sheets show BP controlled. 190 syst not seen  BP(mean): --  ABP: --  ABP(mean): --  RR: 18 (28 Apr 2022 06:18) (18 - 18)  SpO2: --      General:[ x  ] NAD, Resting Comfortable,   [   ] other:                                HEENT: [  x ] NC/AT, EOMI, PERRL , Normal Conjunctivae,   [   ] other:  Cardio: [   ] RRR, no murmer,   [ x  ] other:  irregulary irregular rhythm                            Pulm: [ x  ] No Respiratory Distress,  Lungs CTAB,   [   ] other:                       Abdomen: [ x  ]ND/NT, Soft,   [   ] other:    : [  x ] NO ECHOLS CATHETER, [   ] ECHOLS CATHETER- no meatal tear, no discharge, [   ] other:                                            MSK: [   x] No joint swelling, Full ROM,   [   ] other:                                         Ext: [  x ]No C/C/E, No calf tenderness,   [   ]other:    Skin: [ x  ]intact,   [   ] other:                                                                   Neurological Examination:  Cognitive: [    ] AAO x 3,   [   x ]  other: Alert and oriented to person and place. Not oriented to date or situation                                                                      Attention:  [  x  ] intact,   [    ]  other:                            Mood/Affect: [ x   ] wnl,    [    ]  other:                                                                             Communication: [    ]Fluent, no dysarthria, following commands:  [ x   ] other:  mild dysarthria  CN II - XII:  [  x  ] intact,  [    ] other:                                                                                        Motor:   RIGHT UE: [   ] WNL,  [ x  ] other: 4/5  LEFT    UE: [  x ] WNL,  [   ] other:  RIGHT LE: [   ] WNL,  [ x  ] other: 4/5  LEFT    LE: [x   ] WNL,  [   ] other:    Tone: [  x  ] wnl,   [    ]  other:  Coordination:   [   x ] intact,   [    ] other:                                                                           Sensory: [  x  ] Intact to light touch,   [    ] other:    CLOF  BM Mod-Mod A  Transfers - Mod A  Ambulation - Mod assist 10 ft w/ RW    MEDICATIONS  (STANDING):  apixaban 5 milliGRAM(s) Oral every 12 hours  aspirin  chewable 81 milliGRAM(s) Oral daily  atorvastatin 80 milliGRAM(s) Oral at bedtime  dextrose 50% Injectable 25 Gram(s) IV Push once  glucagon  Injectable 1 milliGRAM(s) IntraMuscular once  lisinopril 5 milliGRAM(s) Oral at bedtime  metFORMIN 500 milliGRAM(s) Oral two times a day with meals  metoprolol tartrate 25 milliGRAM(s) Oral two times a day  NIFEdipine XL 30 milliGRAM(s) Oral <User Schedule>  pantoprazole    Tablet 40 milliGRAM(s) Oral before breakfast  sertraline 25 milliGRAM(s) Oral daily  sodium chloride 0.9%. 1000 milliLiter(s) (500 mL/Hr) IV Continuous <Continuous>  sodium chloride 0.9%. 1000 milliLiter(s) (75 mL/Hr) IV Continuous <Continuous>    MEDICATIONS  (PRN):  acetaminophen     Tablet .. 650 milliGRAM(s) Oral every 6 hours PRN Temp greater or equal to 38C (100.4F), Mild Pain (1 - 3)  aluminum hydroxide/magnesium hydroxide/simethicone Suspension 30 milliLiter(s) Oral every 4 hours PRN Dyspepsia  magnesium hydroxide Suspension 30 milliLiter(s) Oral daily PRN Constipation  melatonin 5 milliGRAM(s) Oral at bedtime PRN Insomnia        RECENT LABS/IMAGING                                      12.8   8.32  )-----------( 231      ( 25 Apr 2022 08:04 )             38.6     04-25    140  |  102  |  28<H>  ----------------------------<  136<H>  4.7   |  28  |  1.0    Ca    9.6      25 Apr 2022 08:04  Mg     1.6     04-25    TPro  6.3  /  Alb  3.9  /  TBili  0.4  /  DBili  x   /  AST  16  /  ALT  16  /  AlkPhos  79  04-25      CAPILLARY BLOOD GLUCOSE    POCT Blood Glucose.: 169 mg/dL (27 Apr 2022 16:20)  POCT Blood Glucose.: 168 mg/dL (27 Apr 2022 08:57)  POCT Blood Glucose.: 447 mg/dL (27 Apr 2022 07:22)  POCT Blood Glucose.: 177 mg/dL (26 Apr 2022 21:19)

## 2022-04-29 LAB
GLUCOSE BLDC GLUCOMTR-MCNC: 125 MG/DL — HIGH (ref 70–99)
GLUCOSE BLDC GLUCOMTR-MCNC: 158 MG/DL — HIGH (ref 70–99)
GLUCOSE BLDC GLUCOMTR-MCNC: 192 MG/DL — HIGH (ref 70–99)

## 2022-04-29 RX ADMIN — Medication 650 MILLIGRAM(S): at 22:42

## 2022-04-29 RX ADMIN — Medication 25 MILLIGRAM(S): at 05:14

## 2022-04-29 RX ADMIN — SERTRALINE 25 MILLIGRAM(S): 25 TABLET, FILM COATED ORAL at 12:32

## 2022-04-29 RX ADMIN — Medication 5 MILLIGRAM(S): at 22:41

## 2022-04-29 RX ADMIN — ATORVASTATIN CALCIUM 80 MILLIGRAM(S): 80 TABLET, FILM COATED ORAL at 22:39

## 2022-04-29 RX ADMIN — Medication 25 MILLIGRAM(S): at 18:19

## 2022-04-29 RX ADMIN — APIXABAN 5 MILLIGRAM(S): 2.5 TABLET, FILM COATED ORAL at 18:19

## 2022-04-29 RX ADMIN — LISINOPRIL 5 MILLIGRAM(S): 2.5 TABLET ORAL at 22:40

## 2022-04-29 RX ADMIN — Medication 81 MILLIGRAM(S): at 12:32

## 2022-04-29 RX ADMIN — METFORMIN HYDROCHLORIDE 500 MILLIGRAM(S): 850 TABLET ORAL at 18:19

## 2022-04-29 RX ADMIN — APIXABAN 5 MILLIGRAM(S): 2.5 TABLET, FILM COATED ORAL at 05:14

## 2022-04-29 RX ADMIN — METFORMIN HYDROCHLORIDE 500 MILLIGRAM(S): 850 TABLET ORAL at 08:38

## 2022-04-29 RX ADMIN — PANTOPRAZOLE SODIUM 40 MILLIGRAM(S): 20 TABLET, DELAYED RELEASE ORAL at 05:14

## 2022-04-29 NOTE — PROGRESS NOTE ADULT - ASSESSMENT
ASSESSMENT/PLAN:  82 yo F with an acute ischemic infarct in the right cerebellum and left central kiara with right hemiparesis (dominant), dysphagia, dysarthria and aphasia with comorbidities of DM-2, a fib, HTN and  HLD. She warrants acute rehab with 3 hours of daily therapies including PT, OT and speech and close physiatry is monitoring for her CVA which has impacting her swallow and communication. Given her CVA and multiple significant comorbidities close physiatry follow up is needed and will reduce the risk of her requiring rehospitalization.   -Rehab of right cerebellar and Left central kiara CVA with right hemiparesis (dominant), Aphasia, Dysarthria  -Requires acute rehab with PT, OT, Speech, neuropsychology.     #Acute ischemic stroke of left central kiara (penetrating pontine arteries off basilar) and subacute ischemic in right cerebellum (R SCA territory).  - CT Head reviewed, no acute intracranial hemorrhage, stable exam since CT in 11/2021. 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 Moderate stenosis involving the right clinoid segment of the ICA due to atherosclerotic calcification. Mild stenosis of the bilateral proximal internal carotid arteries due to mixed calcified and noncalcified atherosclerotic plaque  - MRI Brain reviewed, showed diffusion abnormality in central aspect of the kiara and R cerebellum  - Fount to have atrial fiib, ASA/Plavix Dced. - Eliquis continued as 5mg BID.   - Continue Atorvastatin 80 mg daily  - Monitor BP closely and avoid hypotension  - Goal SBP <150  - [facility] evaluated the patient and recommended the aneurysm is stable, follow up as outpatient with Dr. 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    #HTN- controlled  - Goal SBP <150  - lisinopril 5 mg qhs  - metoprolol tartrate 25 mg po bid  - d/c NIFEdipine 30 mg po daily    #Poor endurance/ SIFUENTES  - Echo and chest x-ray unremarkable  - long standing debility at home per family  - monitor    #NIDDM  - A1c 6.4  - Metformin 500 bid  - ISS    #HLD  - Continue Atorvastatin 80 mg qhs    -Pain control:   Tylenol prn    #Dysphagia  - Diet, Pureed:   Consistent Carbohydrate Evening Snack  DASH/TLC Sodium & Cholesterol Restricted  Mildly Thick Liquids (MILDTHICKLIQS)  Prosource Gelatein 20 Sugar Free     Qty per Day:  2 (04-21-22 @ 14:13) [Active]     Precautions / PROPHYLAXIS:      - Falls    - Ortho: Weight bearing status: WBAT      - DVT prophylaxis: Eliquis     ASSESSMENT/PLAN:  80 yo F with an acute ischemic infarct in the right cerebellum and left central kiara with right hemiparesis (dominant), dysphagia, dysarthria and aphasia with comorbidities of DM-2, a fib, HTN and  HLD. She warrants acute rehab with 3 hours of daily therapies including PT, OT and speech and close physiatry is monitoring for her CVA which has impacting her swallow and communication. Given her CVA and multiple significant comorbidities close physiatry follow up is needed and will reduce the risk of her requiring rehospitalization.   -Rehab of right cerebellar and Left central kiara CVA with right hemiparesis (dominant), Aphasia, Dysarthria  -Requires acute rehab with PT, OT, Speech, neuropsychology.     #Acute ischemic stroke of left central kiara (penetrating pontine arteries off basilar) and subacute ischemic in right cerebellum (R SCA territory).  - CT Head reviewed, no acute intracranial hemorrhage, stable exam since CT in 11/2021. 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 Moderate stenosis involving the right clinoid segment of the ICA due to atherosclerotic calcification. Mild stenosis of the bilateral proximal internal carotid arteries due to mixed calcified and noncalcified atherosclerotic plaque  - MRI Brain reviewed, showed diffusion abnormality in central aspect of the kiara and R cerebellum  - Fount to have atrial fiib, ASA/Plavix Dced. - Eliquis continued as 5mg BID.   - Continue Atorvastatin 80 mg daily  - Monitor BP closely and avoid hypotension  - Goal SBP <150  - [facility] evaluated the patient and recommended the aneurysm is stable, follow up as outpatient with Dr. 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    #HTN- BP low this morning, systolic BP abo9ut 100, on 4/29 and Lisinopril held.  - Goal SBP <150  - lisinopril 5 mg qhs  - metoprolol tartrate 25 mg po bid  - d/c NIFEdipine 30 mg po daily and monitor    #Poor endurance/ SIFUENTES  - Echo and chest x-ray unremarkable  - long standing debility at home per family  - monitor    #NIDDM  - A1c 6.4  - Metformin 500 bid  - ISS    #HLD  - Continue Atorvastatin 80 mg qhs    -Pain control:   Tylenol prn    #Dysphagia  - Diet, Pureed:   Consistent Carbohydrate Evening Snack  DASH/TLC Sodium & Cholesterol Restricted  Mildly Thick Liquids (MILDTHICKLIQS)  Prosource Gelatein 20 Sugar Free     Qty per Day:  2 (04-21-22 @ 14:13) [Active]     Precautions / PROPHYLAXIS:      - Falls    - Ortho: Weight bearing status: WBAT      - DVT prophylaxis: Eliquis

## 2022-04-29 NOTE — PROGRESS NOTE ADULT - SUBJECTIVE AND OBJECTIVE BOX
Patient is a 81y old  Female who presents with a chief complaint of Right cerebellar and Left central kiara CVA with right hemiplegia (dominant), Aphasia, Dysarthria (19 Apr 2022 14:28)      HPI:  Patient is a 82 yo [place]ym[place] speaking F with PMHx of HTN, HLD, DM, left MCA aneurysm, depression, who presented to the ED after an unwitnessed fall on 4/12/22. Patient denied having any urinary or bowel incontinence and stated feeling weak for a few days and being unable to stand on her right leg. Work up included a CT C-spine which showed no acute fractures. 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 MRI brain showed acute ischemic stroke of left central kiara (penetrating pontine arteries off basilar) and subacute ischemic in right cerebellum (R SCA territory). Subtle right hemiparesis on was noted on exam, and patient was transferred to stroke unit for further monitoring.     Patient found to be in atrial fibrillation Cardiology consulted a-fib and decision for anticoagulation. Patient started on Eliquis 5mg BID and atorvastatin 80 mg Qd. Passed speech/swallow eval and advanced to pureed diet. [facility] was consulted and evaluated the patient recommending outpatient follow up with Dr. Goldstein and stroke clinic. At this time patient declines proceeding with treatment for stable aneurysm.    PM&R consulted for admission to  for acute inpatient rehabilitation. Prior to admission, patient was independent w/ ambulation using  straight cane and required assistance from children with ADLs. Resides w/ children; in a house with 3 steps to enter and none inside    Function upon presentation to IR[facility]:  CLOF evaluated by OT  BM - Max A  Transfers - NT  UBD - Max A  LBD - Dependent    CLOF evaluated by PT  BM - Min A  Transfers - Mod A  Ambulation - Mod A 10 ft w/ RW    Also seen by SLP - VFSS/MBS evalu on 4/19 revealed severe oropharyngeal dysphagia for thin liquids; mild-moderate oropharyngeal dysphagia for puree, soft, mildly and moderately thick liquids. continue puree w/ mildly thick liquids    Patient is a good candidate for admission to acute inpatient rehabilitation for R cerebellar and L central kiara CVA with right sided hemiplegia (dominant) with comorbidities of DM2, HTN, dyslipidemia, and atrial fibrilation requiring hospital level supervision. She was deemed medically stable for discharge to  on 4/19/22 where she will undergo intensive restorative therapies 3 hours a day for at least 5 days a week with the goal of regaining the patients independence and discharging her home with family care   (19 Apr 2022 14:28)    TODAY'S SUBJECTIVE & REVIEW OF SYMPTOMS:  Patient seen this AM by the rehabilitation team. No acute events overnight.   Appears comfortable. Tolerating therapy. Morning lisinopril dose held 2/2 hypotension.      Review of Systems:   Constiutional:    [ x  ] WNL           [   ] poor appetite   [   ] insomnia   [   ] tired   Cardio:                [  x ] WNL           [   ] CP   [   ] SIFUENTES   [   ] palpitations               Resp:                   [  x ] WNL           [   ] SOB   [   ] cough   [   ] wheezing   GI:                        [ x  ] WNL           [   ] constipation   [   ] diarrhea   [   ] abdominal pain   [   ] nausea   [   ] emesis                                :                      [ x  ] WNL           [   ] ECHOLS  [   ] dusuria   [   ] difficulty voiding             Endo:                   [ x  ] WNL          [   ] po;yuria   [   ] temperature intolerance                 Skin:                     [x   ] WNL          [   ] pain   [   ] wound   [   ] rash   MSK:                    [x   ] WNL          [   ] muscle pain   [   ] joint pain/ stiffness   [   ] muscle tenderness   [   ] swelling   Neuro:                 [   ] WNL          [   ] HA   [   ] change in vision   [   ] tremor   [  x ] weakness right sided   [  x ]dysphagia              Cognitive:           [   ] WNL           [ x  ]confusion      Psych:                  [x   ] WNL           [   ] hallucinations   [   ]agitation   [   ] delusion   [   ]depression    PHYSICAL EXAM    Vital Signs Last 24 Hrs  T(C): 36 (29 Apr 2022 12:24), Max: 36.5 (28 Apr 2022 14:02)  T(F): 96.8 (29 Apr 2022 12:24), Max: 97.7 (28 Apr 2022 14:02)  HR: 83 (29 Apr 2022 12:24) (72 - 113)  BP: 151/79 (29 Apr 2022 12:24) (100/54 - 155/78)  BP(mean): --  RR: 18 (29 Apr 2022 12:24) (16 - 18)  SpO2: --      General:[ x  ] NAD, Resting Comfortable,   [   ] other:                                HEENT: [  x ] NC/AT, EOMI, PERRL , Normal Conjunctivae,   [   ] other:  Cardio: [   ] RRR, no murmer,   [ x  ] other:  irregulary irregular rhythm                            Pulm: [ x  ] No Respiratory Distress,  Lungs CTAB,   [   ] other:                       Abdomen: [ x  ]ND/NT, Soft,   [   ] other:    : [  x ] NO ECHOLS CATHETER, [   ] ECHOLS CATHETER- no meatal tear, no discharge, [   ] other:                                            MSK: [   x] No joint swelling, Full ROM,   [   ] other:                                         Ext: [  x ]No C/C/E, No calf tenderness,   [   ]other:    Skin: [ x  ]intact,   [   ] other:                                                                   Neurological Examination:  Cognitive: [    ] AAO x 3,   [   x ]  other: Alert and oriented to person and place. Not oriented to date or situation                                                                      Attention:  [  x  ] intact,   [    ]  other:                            Mood/Affect: [ x   ] wnl,    [    ]  other:                                                                             Communication: [    ]Fluent, no dysarthria, following commands:  [ x   ] other:  mild dysarthria  CN II - XII:  [  x  ] intact,  [    ] other:                                                                                        Motor:   RIGHT UE: [   ] WNL,  [ x  ] other: 4/5  LEFT    UE: [  x ] WNL,  [   ] other:  RIGHT LE: [   ] WNL,  [ x  ] other: 4/5  LEFT    LE: [x   ] WNL,  [   ] other:    Tone: [  x  ] wnl,   [    ]  other:  Coordination:   [   x ] intact,   [    ] other:                                                                           Sensory: [  x  ] Intact to light touch,   [    ] other:    CLOF  BM Mod-Mod A  Transfers - Mod A  Ambulation - Mod assist 10 ft w/ RW    MEDICATIONS  (STANDING):  apixaban 5 milliGRAM(s) Oral every 12 hours  aspirin  chewable 81 milliGRAM(s) Oral daily  atorvastatin 80 milliGRAM(s) Oral at bedtime  dextrose 50% Injectable 25 Gram(s) IV Push once  glucagon  Injectable 1 milliGRAM(s) IntraMuscular once  lisinopril 5 milliGRAM(s) Oral at bedtime  metFORMIN 500 milliGRAM(s) Oral two times a day with meals  metoprolol tartrate 25 milliGRAM(s) Oral two times a day  pantoprazole    Tablet 40 milliGRAM(s) Oral before breakfast  sertraline 25 milliGRAM(s) Oral daily  sodium chloride 0.9%. 1000 milliLiter(s) (500 mL/Hr) IV Continuous <Continuous>  sodium chloride 0.9%. 1000 milliLiter(s) (75 mL/Hr) IV Continuous <Continuous>    MEDICATIONS  (PRN):  acetaminophen     Tablet .. 650 milliGRAM(s) Oral every 6 hours PRN Temp greater or equal to 38C (100.4F), Mild Pain (1 - 3)  aluminum hydroxide/magnesium hydroxide/simethicone Suspension 30 milliLiter(s) Oral every 4 hours PRN Dyspepsia  magnesium hydroxide Suspension 30 milliLiter(s) Oral daily PRN Constipation  melatonin 5 milliGRAM(s) Oral at bedtime PRN Insomnia        RECENT LABS/IMAGING                                      12.8   8.32  )-----------( 231      ( 25 Apr 2022 08:04 )             38.6     04-25    140  |  102  |  28<H>  ----------------------------<  136<H>  4.7   |  28  |  1.0    Ca    9.6      25 Apr 2022 08:04  Mg     1.6     04-25    TPro  6.3  /  Alb  3.9  /  TBili  0.4  /  DBili  x   /  AST  16  /  ALT  16  /  AlkPhos  79  04-25      CAPILLARY BLOOD GLUCOSE    POCT Blood Glucose.: 169 mg/dL (27 Apr 2022 16:20)  POCT Blood Glucose.: 168 mg/dL (27 Apr 2022 08:57)  POCT Blood Glucose.: 447 mg/dL (27 Apr 2022 07:22)  POCT Blood Glucose.: 177 mg/dL (26 Apr 2022 21:19)

## 2022-04-29 NOTE — PROGRESS NOTE ADULT - ATTENDING COMMENTS
I reviewed the chart and examined the patient with the resident and we discussed the findings and treatment plan.  The patient is tolerating the rehab program well. I agree with the findings and treatment plan above, which I modified as indicated. The patient requires 3 hrs a day of acute inpatient rehab. Doing well. No new complaints. Appears comfortable. Participating in therapies. Transfers and ambulates with mod assist. Slow gains. Continue rehab. Medically stable.    I read, edited and agree with the Assessment:  #Acute ischemic stroke of left central kiara (penetrating pontine arteries off basilar) and subacute ischemic in right cerebellum (R SCA territory).  - CT Head reviewed, no acute intracranial hemorrhage, stable exam since CT in 11/2021. 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 Moderate stenosis involving the right clinoid segment of the ICA due to atherosclerotic calcification. Mild stenosis of the bilateral proximal internal carotid arteries due to mixed calcified and noncalcified atherosclerotic plaque  - MRI Brain reviewed, showed diffusion abnormality in central aspect of the kiara and R cerebellum  - Fount to have atrial fiib, ASA/Plavix Dced. - Eliquis continued as 5mg BID.   - Continue Atorvastatin 80 mg daily  - Monitor BP closely and avoid hypotension  - Goal SBP <150  - [facility] evaluated the patient and recommended the aneurysm is stable, follow up as outpatient with Dr. 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    #HTN- controlled  - Goal SBP <150  - lisinopril 5 mg qhs  - metoprolol tartrate 25 mg po bid  - NIFEdipine 30 mg po daily    #Poor endurance/ SIFUENTES  - Echo and chest x-ray unremarkable  - long standing debility at home per family  - monitor    #NIDDM  - A1c 6.4  - Metformin 500 bid  - ISS    #HLD  - Continue Atorvastatin 80 mg qhs    -Pain control:   Tylenol prn    #Dysphagia  - Diet, Pureed:   Consistent Carbohydrate Evening Snack  DASH/TLC Sodium & Cholesterol Restricted  Mildly Thick Liquids (MILDTHICKLIQS)  Prosource Gelatein 20 Sugar Free     Qty per Day:  2 (04-21-22 @ 14:13) [Active]     Precautions / PROPHYLAXIS:      - Falls    - Ortho: Weight bearing status: WBAT      - DVT prophylaxis: Eliquis I reviewed the chart and examined the patient with the resident and we discussed the findings and treatment plan.  The patient is tolerating the rehab program well. I agree with the findings and treatment plan above, which I modified as indicated. The patient requires 3 hrs a day of acute inpatient rehab. Had asymptomatic low BP today, systolic about 100. On 3 BP meds. Nifedipine 30 mg d/c'd. Eating well. Mod assist to stand. Otherwise medically stable.   I read, edited and agree with the Assessment:  #Acute ischemic stroke of left central kiara (penetrating pontine arteries off basilar) and subacute ischemic in right cerebellum (R SCA territory).  - CT Head reviewed, no acute intracranial hemorrhage, stable exam since CT in 11/2021. 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 Moderate stenosis involving the right clinoid segment of the ICA due to atherosclerotic calcification. Mild stenosis of the bilateral proximal internal carotid arteries due to mixed calcified and noncalcified atherosclerotic plaque  - MRI Brain reviewed, showed diffusion abnormality in central aspect of the kiara and R cerebellum  - Fount to have atrial fiib, ASA/Plavix Dced. - Eliquis continued as 5mg BID.   - Continue Atorvastatin 80 mg daily  - Monitor BP closely and avoid hypotension  - Goal SBP <150  - [facility] evaluated the patient and recommended the aneurysm is stable, follow up as outpatient with Dr. 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    #HTN- BP low this morning, systolic BP abo9ut 100, on 4/29 and Lisinopril held.  - Goal SBP <150  - lisinopril 5 mg qhs  - metoprolol tartrate 25 mg po bid  - d/c NIFEdipine 30 mg po daily and monitor    #Poor endurance/ SIFUENTES  - Echo and chest x-ray unremarkable  - long standing debility at home per family  - monitor    #NIDDM  - A1c 6.4  - Metformin 500 bid  - ISS    #HLD  - Continue Atorvastatin 80 mg qhs    -Pain control:   Tylenol prn    #Dysphagia  - Diet, Pureed:   Consistent Carbohydrate Evening Snack  DASH/TLC Sodium & Cholesterol Restricted  Mildly Thick Liquids (MILDTHICKLIQS)  Prosource Gelatein 20 Sugar Free     Qty per Day:  2 (04-21-22 @ 14:13) [Active]     Precautions / PROPHYLAXIS:      - Falls    - Ortho: Weight bearing status: WBAT      - DVT prophylaxis: Eliquis

## 2022-04-30 LAB
GLUCOSE BLDC GLUCOMTR-MCNC: 121 MG/DL — HIGH (ref 70–99)
GLUCOSE BLDC GLUCOMTR-MCNC: 147 MG/DL — HIGH (ref 70–99)

## 2022-04-30 RX ADMIN — PANTOPRAZOLE SODIUM 40 MILLIGRAM(S): 20 TABLET, DELAYED RELEASE ORAL at 05:43

## 2022-04-30 RX ADMIN — Medication 25 MILLIGRAM(S): at 18:34

## 2022-04-30 RX ADMIN — APIXABAN 5 MILLIGRAM(S): 2.5 TABLET, FILM COATED ORAL at 05:44

## 2022-04-30 RX ADMIN — Medication 25 MILLIGRAM(S): at 05:43

## 2022-04-30 RX ADMIN — METFORMIN HYDROCHLORIDE 500 MILLIGRAM(S): 850 TABLET ORAL at 18:33

## 2022-04-30 RX ADMIN — SERTRALINE 25 MILLIGRAM(S): 25 TABLET, FILM COATED ORAL at 12:52

## 2022-04-30 RX ADMIN — Medication 81 MILLIGRAM(S): at 12:52

## 2022-04-30 RX ADMIN — METFORMIN HYDROCHLORIDE 500 MILLIGRAM(S): 850 TABLET ORAL at 08:08

## 2022-04-30 RX ADMIN — LISINOPRIL 5 MILLIGRAM(S): 2.5 TABLET ORAL at 21:55

## 2022-04-30 RX ADMIN — ATORVASTATIN CALCIUM 80 MILLIGRAM(S): 80 TABLET, FILM COATED ORAL at 21:55

## 2022-04-30 RX ADMIN — Medication 650 MILLIGRAM(S): at 00:30

## 2022-04-30 RX ADMIN — APIXABAN 5 MILLIGRAM(S): 2.5 TABLET, FILM COATED ORAL at 18:34

## 2022-04-30 NOTE — PROGRESS NOTE ADULT - SUBJECTIVE AND OBJECTIVE BOX
T(C): 36.6 (04-30-22 @ 05:42), Max: 37.6 (04-29-22 @ 20:48)  HR: 85 (04-30-22 @ 05:42) (81 - 85)  BP: 141/67 (04-30-22 @ 05:42) (141/67 - 151/79)  RR: 18 (04-30-22 @ 05:42) (18 - 18)  SpO2: 93% (04-30-22 @ 05:42) (93% - 93%)      Patient was stable overnight and expresses no new complaints     PE:    Alert   LUNGS- clear  COR- RRR  ABD- SOFT, NT  EXTR- w/o edema  NEURO- stable

## 2022-05-01 LAB
GLUCOSE BLDC GLUCOMTR-MCNC: 124 MG/DL — HIGH (ref 70–99)
GLUCOSE BLDC GLUCOMTR-MCNC: 136 MG/DL — HIGH (ref 70–99)

## 2022-05-01 RX ORDER — AMLODIPINE BESYLATE 2.5 MG/1
2.5 TABLET ORAL AT BEDTIME
Refills: 0 | Status: DISCONTINUED | OUTPATIENT
Start: 2022-05-01 | End: 2022-05-09

## 2022-05-01 RX ADMIN — PANTOPRAZOLE SODIUM 40 MILLIGRAM(S): 20 TABLET, DELAYED RELEASE ORAL at 06:11

## 2022-05-01 RX ADMIN — AMLODIPINE BESYLATE 2.5 MILLIGRAM(S): 2.5 TABLET ORAL at 22:33

## 2022-05-01 RX ADMIN — METFORMIN HYDROCHLORIDE 500 MILLIGRAM(S): 850 TABLET ORAL at 17:20

## 2022-05-01 RX ADMIN — Medication 25 MILLIGRAM(S): at 17:20

## 2022-05-01 RX ADMIN — METFORMIN HYDROCHLORIDE 500 MILLIGRAM(S): 850 TABLET ORAL at 08:57

## 2022-05-01 RX ADMIN — ATORVASTATIN CALCIUM 80 MILLIGRAM(S): 80 TABLET, FILM COATED ORAL at 22:22

## 2022-05-01 RX ADMIN — Medication 81 MILLIGRAM(S): at 13:00

## 2022-05-01 RX ADMIN — SERTRALINE 25 MILLIGRAM(S): 25 TABLET, FILM COATED ORAL at 13:00

## 2022-05-01 RX ADMIN — Medication 25 MILLIGRAM(S): at 06:11

## 2022-05-01 RX ADMIN — APIXABAN 5 MILLIGRAM(S): 2.5 TABLET, FILM COATED ORAL at 17:20

## 2022-05-01 RX ADMIN — LISINOPRIL 5 MILLIGRAM(S): 2.5 TABLET ORAL at 22:22

## 2022-05-01 RX ADMIN — APIXABAN 5 MILLIGRAM(S): 2.5 TABLET, FILM COATED ORAL at 06:11

## 2022-05-01 NOTE — PROGRESS NOTE ADULT - SUBJECTIVE AND OBJECTIVE BOX
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 Patient denied having any urinary or bowel incontinence and stated feeling weak for a few days and being unable to stand on her right leg. Work up included a CT C-spine which showed no acute fractures. 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 MRI brain showed acute ischemic stroke of left central kiara (penetrating pontine arteries off basilar) and subacute ischemic in right cerebellum (R SCA territory). Subtle right hemiparesis on was noted on exam, and patient was transferred to stroke unit for further monitoring.     Patient found to be in atrial fibrillation Cardiology consulted a-fib and decision for anticoagulation. Patient started on Eliquis 5mg BID and atorvastatin 80 mg Qd. Passed speech/swallow eval and advanced to pureed diet. [facility] was consulted and evaluated the patient recommending outpatient follow up with Dr. Goldstein and stroke clinic. At this time patient declines proceeding with treatment for stable aneurysm.    PM&R consulted for admission to  for acute inpatient rehabilitation. Prior to admission, patient was independent w/ ambulation using  straight cane and required assistance from children with ADLs. Resides w/ children; in a house with 3 steps to enter and none inside    Function upon presentation to IRF:  CLOF evaluated by OT  BM - Max A  Transfers - NT  UBD - Max A  LBD - Dependent    CLOF evaluated by PT  BM - Min A  Transfers - Mod A  Ambulation - Mod A 10 ft w/ RW    Also seen by SLP - VFSS/MBS evalu on 4/19 revealed severe oropharyngeal dysphagia for thin liquids; mild-moderate oropharyngeal dysphagia for puree, soft, mildly and moderately thick liquids. continue puree w/ mildly thick liquids    Patient is a good candidate for admission to acute inpatient rehabilitation for R cerebellar and L central kiara CVA with right sided hemiplegia (dominant) with comorbidities of DM2, HTN, dyslipidemia, and atrial fibrilation requiring hospital level supervision. She was deemed medically stable for discharge to  on 4/19/22 where she will undergo intensive restorative therapies 3 hours a day for at least 5 days a week with the goal of regaining the patients independence and discharging her home with family care   (19 Apr 2022 14:28)    TODAY'S SUBJECTIVE & REVIEW OF SYMPTOMS:  Patient seen this AM by the rehabilitation team. No acute events overnight.   Appears comfortable. Tolerating therapy. Morning lisinopril dose held 2/2 hypotension.      Review of Systems:   Constiutional:    [ x  ] WNL           [   ] poor appetite   [   ] insomnia   [   ] tired   Cardio:                [  x ] WNL           [   ] CP   [   ] SIFUENTES   [   ] palpitations               Resp:                   [  x ] WNL           [   ] SOB   [   ] cough   [   ] wheezing   GI:                        [ x  ] WNL           [   ] constipation   [   ] diarrhea   [   ] abdominal pain   [   ] nausea   [   ] emesis                                :                      [ x  ] WNL           [   ] ECHOLS  [   ] dusuria   [   ] difficulty voiding             Endo:                   [ x  ] WNL          [   ] po;yuria   [   ] temperature intolerance                 Skin:                     [x   ] WNL          [   ] pain   [   ] wound   [   ] rash   MSK:                    [x   ] WNL          [   ] muscle pain   [   ] joint pain/ stiffness   [   ] muscle tenderness   [   ] swelling   Neuro:                 [   ] WNL          [   ] HA   [   ] change in vision   [   ] tremor   [  x ] weakness right sided   [  x ]dysphagia              Cognitive:           [   ] WNL           [ x  ]confusion      Psych:                  [x   ] WNL           [   ] hallucinations   [   ]agitation   [   ] delusion   [   ]depression    PHYSICAL EXAM    ICU Vital Signs Last 24 Hrs  T(C): 37.4 (01 May 2022 05:14), Max: 37.4 (01 May 2022 05:14)  T(F): 99.4 (01 May 2022 05:14), Max: 99.4 (01 May 2022 05:14)  HR: 84 (01 May 2022 05:14) (80 - 89)  BP: 186/88 (01 May 2022 05:14) (137/79 - 186/88)  BP(mean): --  ABP: --  ABP(mean): --  RR: 18 (01 May 2022 05:14) (18 - 18)  SpO2: --        General:[ x  ] NAD, Resting Comfortable,   [   ] other:                                HEENT: [  x ] NC/AT, EOMI, PERRL , Normal Conjunctivae,   [   ] other:  Cardio: [   ] RRR, no murmer,   [ x  ] other:  irregulary irregular rhythm                            Pulm: [ x  ] No Respiratory Distress,  Lungs CTAB,   [   ] other:                       Abdomen: [ x  ]ND/NT, Soft,   [   ] other:    : [  x ] NO ECHOLS CATHETER, [   ] ECHOLS CATHETER- no meatal tear, no discharge, [   ] other:                                            MSK: [   x] No joint swelling, Full ROM,   [   ] other:                                         Ext: [  x ]No C/C/E, No calf tenderness,   [   ]other:    Skin: [ x  ]intact,   [   ] other:                                                                   Neurological Examination:  Cognitive: [    ] AAO x 3,   [   x ]  other: Alert and oriented to person and place. Not oriented to date or situation                                                                      Attention:  [  x  ] intact,   [    ]  other:                            Mood/Affect: [ x   ] wnl,    [    ]  other:                                                                             Communication: [    ]Fluent, no dysarthria, following commands:  [ x   ] other:  mild dysarthria  CN II - XII:  [  x  ] intact,  [    ] other:                                                                                        Motor:   RIGHT UE: [   ] WNL,  [ x  ] other: 4/5  LEFT    UE: [  x ] WNL,  [   ] other:  RIGHT LE: [   ] WNL,  [ x  ] other: 4/5  LEFT    LE: [x   ] WNL,  [   ] other:    Tone: [  x  ] wnl,   [    ]  other:  Coordination:   [   x ] intact,   [    ] other:                                                                           Sensory: [  x  ] Intact to light touch,   [    ] other:    CLOF  BM Mod-Mod A  Transfers - Mod A  Ambulation - Mod assist 10 ft w/ RW    MEDICATIONS  (STANDING):  apixaban 5 milliGRAM(s) Oral every 12 hours  aspirin  chewable 81 milliGRAM(s) Oral daily  atorvastatin 80 milliGRAM(s) Oral at bedtime  dextrose 50% Injectable 25 Gram(s) IV Push once  glucagon  Injectable 1 milliGRAM(s) IntraMuscular once  lisinopril 5 milliGRAM(s) Oral at bedtime  metFORMIN 500 milliGRAM(s) Oral two times a day with meals  metoprolol tartrate 25 milliGRAM(s) Oral two times a day  pantoprazole    Tablet 40 milliGRAM(s) Oral before breakfast  sertraline 25 milliGRAM(s) Oral daily  sodium chloride 0.9%. 1000 milliLiter(s) (75 mL/Hr) IV Continuous <Continuous>  sodium chloride 0.9%. 1000 milliLiter(s) (500 mL/Hr) IV Continuous <Continuous>    MEDICATIONS  (PRN):  acetaminophen     Tablet .. 650 milliGRAM(s) Oral every 6 hours PRN Temp greater or equal to 38C (100.4F), Mild Pain (1 - 3)  aluminum hydroxide/magnesium hydroxide/simethicone Suspension 30 milliLiter(s) Oral every 4 hours PRN Dyspepsia  magnesium hydroxide Suspension 30 milliLiter(s) Oral daily PRN Constipation  melatonin 5 milliGRAM(s) Oral at bedtime PRN Insomnia    RECENT LABS/IMAGING                                      12.8   8.32  )-----------( 231      ( 25 Apr 2022 08:04 )             38.6     04-25    140  |  102  |  28<H>  ----------------------------<  136<H>  4.7   |  28  |  1.0    Ca    9.6      25 Apr 2022 08:04  Mg     1.6     04-25    TPro  6.3  /  Alb  3.9  /  TBili  0.4  /  DBili  x   /  AST  16  /  ALT  16  /  AlkPhos  79  04-25      CAPILLARY BLOOD GLUCOSE    POCT Blood Glucose.: 169 mg/dL (27 Apr 2022 16:20)  POCT Blood Glucose.: 168 mg/dL (27 Apr 2022 08:57)  POCT Blood Glucose.: 447 mg/dL (27 Apr 2022 07:22)  POCT Blood Glucose.: 177 mg/dL (26 Apr 2022 21:19)           Patient is a 81y old  Female who presents with a chief complaint of Right cerebellar and Left 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 Patient denied having any urinary or bowel incontinence and stated feeling weak for a few days and being unable to stand on her right leg. Work up included a CT C-spine which showed no acute fractures. 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 MRI brain showed acute ischemic stroke of left central kiara (penetrating pontine arteries off basilar) and subacute ischemic in right cerebellum (R SCA territory). Subtle right hemiparesis on was noted on exam, and patient was transferred to stroke unit for further monitoring.     Patient found to be in atrial fibrillation Cardiology consulted a-fib and decision for anticoagulation. Patient started on Eliquis 5mg BID and atorvastatin 80 mg Qd. Passed speech/swallow eval and advanced to pureed diet. [facility]end[facility] was consulted and evaluated the patient recommending outpatient follow up with Dr. Goldstein and stroke clinic. At this time patient declines proceeding with treatment for stable aneurysm.    PM&R consulted for admission to  for acute inpatient rehabilitation. Prior to admission, patient was independent w/ ambulation using  straight cane and required assistance from children with ADLs. Resides w/ children; in a house with 3 steps to enter and none inside    Function upon presentation to IRF:  CLOF evaluated by OT  BM - Max A  Transfers - NT  UBD - Max A  LBD - Dependent    CLOF evaluated by PT  BM - Min A  Transfers - Mod A  Ambulation - Mod A 10 ft w/ RW    Also seen by SLP - VFSS/MBS evalu on 4/19 revealed severe oropharyngeal dysphagia for thin liquids; mild-moderate oropharyngeal dysphagia for puree, soft, mildly and moderately thick liquids. continue puree w/ mildly thick liquids    Patient is a good candidate for admission to acute inpatient rehabilitation for R cerebellar and L central kiara CVA with right sided hemiplegia (dominant) with comorbidities of DM2, HTN, dyslipidemia, and atrial fibrilation requiring hospital level supervision. She was deemed medically stable for discharge to  on 4/19/22 where she will undergo intensive restorative therapies 3 hours a day for at least 5 days a week with the goal of regaining the patients independence and discharging her home with family care   (19 Apr 2022 14:28)    TODAY'S SUBJECTIVE & REVIEW OF SYMPTOMS:  Patient seen this AM by the rehabilitation team. No acute events overnight. VSS.      Review of Systems:   Constitutional    [ x  ] WNL           [   ] poor appetite   [   ] insomnia   [   ] tired   Cardio:                [  x ] WNL           [   ] CP   [   ] SIFUENTES   [   ] palpitations               Resp:                   [  x ] WNL           [   ] SOB   [   ] cough   [   ] wheezing   GI:                        [ x  ] WNL           [   ] constipation   [   ] diarrhea   [   ] abdominal pain   [   ] nausea   [   ] emesis                                :                      [ x  ] WNL           [   ] ECHOLS  [   ] dysuria   [   ] difficulty voiding             Endo:                   [ x  ] WNL          [   ] polyuria   [   ] temperature intolerance                 Skin:                     [x   ] WNL          [   ] pain   [   ] wound   [   ] rash   MSK:                    [x   ] WNL          [   ] muscle pain   [   ] joint pain/ stiffness   [   ] muscle tenderness   [   ] swelling   Neuro:                 [   ] WNL          [   ] HA   [   ] change in vision   [   ] tremor   [  x ] weakness right sided   [  x ]dysphagia              Cognitive:           [   ] WNL           [ x  ]confusion      Psych:                  [x   ] WNL           [   ] hallucinations   [   ]agitation   [   ] delusion   [   ]depression    PHYSICAL EXAM    ICU Vital Signs Last 24 Hrs  T(C): 36.3 (01 May 2022 12:11), Max: 37.4 (01 May 2022 05:14)  T(F): 97.3 (01 May 2022 12:11), Max: 99.4 (01 May 2022 05:14)  HR: 83 (01 May 2022 12:11) (80 - 89)  BP: 132/62 (01 May 2022 12:11) (132/62 - 186/88)  BP(mean): --  ABP: --  ABP(mean): --  RR: 18 (01 May 2022 12:11) (18 - 18)  SpO2: --        General:[ x  ] NAD, Resting Comfortable,   [   ] other:                                HEENT: [  x ] NC/AT, EOMI, PERRL , Normal Conjunctivae,   [   ] other:  Cardio: [   ] RRR, no murmur   [ x  ] other:  irregularly irregular rhythm                            Pulm: [ x  ] No Respiratory Distress,  Lungs CTAB,   [   ] other:                       Abdomen: [ x  ]ND/NT, Soft,   [   ] other:    : [  x ] NO ECHOLS CATHETER, [   ] ECHOLS CATHETER- no meatal tear, no discharge, [   ] other:                                            MSK: [   x] No joint swelling, Full ROM,   [   ] other:                                         Ext: [  x ]No C/C/E, No calf tenderness,   [   ]other:    Skin: [ x  ]intact,   [   ] other:                                                                   Neurological Examination:  Cognitive: [    ] AAO x 3,   [   x ]  other: Alert and oriented to person and place. Not oriented to date or situation                                                                      Attention:  [  x  ] intact,   [    ]  other:                            Mood/Affect: [ x   ] wnl,    [    ]  other:                                                                             Communication: [    ]Fluent, no dysarthria, following commands:  [ x   ] other:  mild dysarthria  CN II - XII:  [  x  ] intact,  [    ] other:                                                                                        Motor:   RIGHT UE: [   ] WNL,  [ x  ] other: 4/5  LEFT    UE: [  x ] WNL,  [   ] other:  RIGHT LE: [   ] WNL,  [ x  ] other: 4/5  LEFT    LE: [x   ] WNL,  [   ] other:    Tone: [  x  ] wnl,   [    ]  other:  Coordination:   [   x ] intact,   [    ] other:                                                                           Sensory: [  x  ] Intact to light touch,   [    ] other:    CLOF  BM Mod-Mod A  Transfers - Mod A  Ambulation - Mod assist 10 ft w/ RW    MEDICATIONS  (STANDING):  apixaban 5 milliGRAM(s) Oral every 12 hours  aspirin  chewable 81 milliGRAM(s) Oral daily  atorvastatin 80 milliGRAM(s) Oral at bedtime  dextrose 50% Injectable 25 Gram(s) IV Push once  glucagon  Injectable 1 milliGRAM(s) IntraMuscular once  lisinopril 5 milliGRAM(s) Oral at bedtime  metFORMIN 500 milliGRAM(s) Oral two times a day with meals  metoprolol tartrate 25 milliGRAM(s) Oral two times a day  pantoprazole    Tablet 40 milliGRAM(s) Oral before breakfast  sertraline 25 milliGRAM(s) Oral daily  sodium chloride 0.9%. 1000 milliLiter(s) (500 mL/Hr) IV Continuous <Continuous>  sodium chloride 0.9%. 1000 milliLiter(s) (75 mL/Hr) IV Continuous <Continuous>    MEDICATIONS  (PRN):  acetaminophen     Tablet .. 650 milliGRAM(s) Oral every 6 hours PRN Temp greater or equal to 38C (100.4F), Mild Pain (1 - 3)  aluminum hydroxide/magnesium hydroxide/simethicone Suspension 30 milliLiter(s) Oral every 4 hours PRN Dyspepsia  magnesium hydroxide Suspension 30 milliLiter(s) Oral daily PRN Constipation  melatonin 5 milliGRAM(s) Oral at bedtime PRN Insomnia        RECENT LABS/IMAGING                                      12.8   8.32  )-----------( 231      ( 25 Apr 2022 08:04 )             38.6     04-25    140  |  102  |  28<H>  ----------------------------<  136<H>  4.7   |  28  |  1.0    Ca    9.6      25 Apr 2022 08:04  Mg     1.6     04-25    TPro  6.3  /  Alb  3.9  /  TBili  0.4  /  DBili  x   /  AST  16  /  ALT  16  /  AlkPhos  79  04-25      CAPILLARY BLOOD GLUCOSE    POCT Blood Glucose.: 169 mg/dL (27 Apr 2022 16:20)  POCT Blood Glucose.: 168 mg/dL (27 Apr 2022 08:57)  POCT Blood Glucose.: 447 mg/dL (27 Apr 2022 07:22)  POCT Blood Glucose.: 177 mg/dL (26 Apr 2022 21:19)

## 2022-05-01 NOTE — PROGRESS NOTE ADULT - ASSESSMENT
ASSESSMENT/PLAN:  82 yo F with an acute ischemic infarct in the right cerebellum and left central kiara with right hemiparesis (dominant), dysphagia, dysarthria and aphasia with comorbidities of DM-2, a fib, HTN and  HLD. She warrants acute rehab with 3 hours of daily therapies including PT, OT and speech and close physiatry is monitoring for her CVA which has impacting her swallow and communication. Given her CVA and multiple significant comorbidities close physiatry follow up is needed and will reduce the risk of her requiring rehospitalization.   -Rehab of right cerebellar and Left central kiara CVA with right hemiparesis (dominant), Aphasia, Dysarthria  -Requires acute rehab with PT, OT, Speech, neuropsychology.     #Acute ischemic stroke of left central kiara (penetrating pontine arteries off basilar) and subacute ischemic in right cerebellum (R SCA territory).  - CT Head reviewed, no acute intracranial hemorrhage, stable exam since CT in 11/2021. 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 Moderate stenosis involving the right clinoid segment of the ICA due to atherosclerotic calcification. Mild stenosis of the bilateral proximal internal carotid arteries due to mixed calcified and noncalcified atherosclerotic plaque  - MRI Brain reviewed, showed diffusion abnormality in central aspect of the kiara and R cerebellum  - Fount to have atrial fiib, ASA/Plavix Dced. - Eliquis continued as 5mg BID.   - Continue Atorvastatin 80 mg daily  - Monitor BP closely and avoid hypotension  - Goal SBP <150  - [facility] evaluated the patient and recommended the aneurysm is stable, follow up as outpatient with Dr. 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    #HTN- BP low this morning, systolic BP abo9ut 100, on 4/29 and Lisinopril held.  - Goal SBP <150  - lisinopril 5 mg qhs  - metoprolol tartrate 25 mg po bid  - d/c NIFEdipine 30 mg po daily and monitor    #Poor endurance/ SIFUENTES  - Echo and chest x-ray unremarkable  - long standing debility at home per family  - monitor    #NIDDM  - A1c 6.4  - Metformin 500 bid  - ISS    #HLD  - Continue Atorvastatin 80 mg qhs    -Pain control:   Tylenol prn    #Dysphagia  - Diet, Pureed:   Consistent Carbohydrate Evening Snack  DASH/TLC Sodium & Cholesterol Restricted  Mildly Thick Liquids (MILDTHICKLIQS)  Prosource Gelatein 20 Sugar Free     Qty per Day:  2 (04-21-22 @ 14:13) [Active]     Precautions / PROPHYLAXIS:      - Falls    - Ortho: Weight bearing status: WBAT      - DVT prophylaxis: Eliquis   ASSESSMENT/PLAN:  80 yo F with an acute ischemic infarct in the right cerebellum and left central kiara with right hemiparesis (dominant), dysphagia, dysarthria and aphasia with comorbidities of DM-2, a fib, HTN and  HLD. She warrants acute rehab with 3 hours of daily therapies including PT, OT and speech and close physiatry is monitoring for her CVA which has impacting her swallow and communication. Given her CVA and multiple significant comorbidities close physiatry follow up is needed and will reduce the risk of her requiring rehospitalization.   -Rehab of right cerebellar and Left central kiara CVA with right hemiparesis (dominant), Aphasia, Dysarthria  -Requires acute rehab with PT, OT, Speech, neuropsychology.     #Acute ischemic stroke of left central kiara (penetrating pontine arteries off basilar) and subacute ischemic in right cerebellum (R SCA territory).  - CT Head reviewed, no acute intracranial hemorrhage, stable exam since CT in 11/2021. 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 Moderate stenosis involving the right clinoid segment of the ICA due to atherosclerotic calcification. Mild stenosis of the bilateral proximal internal carotid arteries due to mixed calcified and noncalcified atherosclerotic plaque  - MRI Brain reviewed, showed diffusion abnormality in central aspect of the kiara and R cerebellum  - Fount to have atrial fiib, ASA/Plavix Dced. - Eliquis continued as 5mg BID.   - Continue Atorvastatin 80 mg daily  - Monitor BP closely and avoid hypotension  - Goal SBP <150  - [facility] evaluated the patient and recommended the aneurysm is stable, follow up as outpatient with Dr. 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    #HTN- BP low this morning, systolic BP abo9ut 100, on 4/29 and Lisinopril held.  - Goal SBP <150  - lisinopril 5 mg qhs  - metoprolol tartrate 25 mg po bid  - s/p NIFEdipine 30 mg po daily and monitor    #Poor endurance/ SIFUENTES  - Echo and chest x-ray unremarkable  - long standing debility at home per family  - monitor    #NIDDM  - A1c 6.4  - Metformin 500 bid  - ISS    #HLD  - Continue Atorvastatin 80 mg qhs    -Pain control:   Tylenol prn    #Dysphagia  - Diet, Pureed:   Consistent Carbohydrate Evening Snack  DASH/TLC Sodium & Cholesterol Restricted  Mildly Thick Liquids (MILDTHICKLIQS)  Prosource Gelatein 20 Sugar Free     Qty per Day:  2 (04-21-22 @ 14:13) [Active]     Precautions / PROPHYLAXIS:      - Falls    - Ortho: Weight bearing status: WBAT      - DVT prophylaxis: Eliquis   ASSESSMENT/PLAN:  80 yo F with an acute ischemic infarct in the right cerebellum and left central kiara with right hemiparesis (dominant), dysphagia, dysarthria and aphasia with comorbidities of DM-2, a fib, HTN and  HLD. She warrants acute rehab with 3 hours of daily therapies including PT, OT and speech and close physiatry is monitoring for her CVA which has impacting her swallow and communication. Given her CVA and multiple significant comorbidities close physiatry follow up is needed and will reduce the risk of her requiring rehospitalization.   -Rehab of right cerebellar and Left central kiara CVA with right hemiparesis (dominant), Aphasia, Dysarthria  -Requires acute rehab with PT, OT, Speech, neuropsychology.     #Acute ischemic stroke of left central kiara (penetrating pontine arteries off basilar) and subacute ischemic in right cerebellum (R SCA territory).  - CT Head reviewed, no acute intracranial hemorrhage, stable exam since CT in 11/2021. 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 Moderate stenosis involving the right clinoid segment of the ICA due to atherosclerotic calcification. Mild stenosis of the bilateral proximal internal carotid arteries due to mixed calcified and noncalcified atherosclerotic plaque  - MRI Brain reviewed, showed diffusion abnormality in central aspect of the kiara and R cerebellum  - Fount to have atrial fiib, ASA/Plavix Dced. - Eliquis continued as 5mg BID.   - Continue Atorvastatin 80 mg daily  - Monitor BP closely and avoid hypotension  - Goal SBP <150  - [facility] evaluated the patient and recommended the aneurysm is stable, follow up as outpatient with Dr. 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    #HTN- BP low this morning, systolic BP abo9ut 100, on 4/29 and Lisinopril held.  - Goal SBP <150  - lisinopril 5 mg qhs  - metoprolol tartrate 25 mg po bid  - stopped NIFEdipine 30 mg po daily and monitor but BP systolic up to 186 at times. Will start Amlodipine 2.5 mg and monitor    #Poor endurance/ SIFUENTES  - Echo and chest x-ray unremarkable  - long standing debility at home per family  - monitor    #NIDDM  - A1c 6.4  - Metformin 500 bid  - ISS    #HLD  - Continue Atorvastatin 80 mg qhs    -Pain control:   Tylenol prn    #Dysphagia  - Diet, Pureed:   Consistent Carbohydrate Evening Snack  DASH/TLC Sodium & Cholesterol Restricted  Mildly Thick Liquids (MILDTHICKLIQS)  Prosource Gelatein 20 Sugar Free     Qty per Day:  2 (04-21-22 @ 14:13) [Active]     Precautions / PROPHYLAXIS:      - Falls    - Ortho: Weight bearing status: WBAT      - DVT prophylaxis: Eliquis

## 2022-05-01 NOTE — PROGRESS NOTE ADULT - ATTENDING COMMENTS
I reviewed the chart and examined the patient with the resident and we discussed the findings and treatment plan.  The patient is tolerating the rehab program well. I agree with the findings and treatment plan above, which I modified as indicated. The patient requires 3 hrs a day of acute inpatient rehab. No new complaints. Neuro stable. Mod assist to transfer. BP running up to 186 systolic. Will start AMlodipine 2.5 mg q hs    I read, edited and agree with the Assessment:  #Acute ischemic stroke of left central kiara (penetrating pontine arteries off basilar) and subacute ischemic in right cerebellum (R SCA territory).  - CT Head reviewed, no acute intracranial hemorrhage, stable exam since CT in 11/2021. 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 Moderate stenosis involving the right clinoid segment of the ICA due to atherosclerotic calcification. Mild stenosis of the bilateral proximal internal carotid arteries due to mixed calcified and noncalcified atherosclerotic plaque  - MRI Brain reviewed, showed diffusion abnormality in central aspect of the kiara and R cerebellum  - Fount to have atrial fiib, ASA/Plavix Dced. - Eliquis continued as 5mg BID.   - Continue Atorvastatin 80 mg daily  - Monitor BP closely and avoid hypotension  - Goal SBP <150  - [facility] evaluated the patient and recommended the aneurysm is stable, follow up as outpatient with Dr. 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    #HTN- BP low this morning, systolic BP abo9ut 100, on 4/29 and Lisinopril held.  - Goal SBP <150  - lisinopril 5 mg qhs  - metoprolol tartrate 25 mg po bid  - stopped NIFEdipine 30 mg po daily and monitor but BP systolic up to 186 at times. Will start Amlodipine 2.5 mg and monitor    #Poor endurance/ SIFUENTES  - Echo and chest x-ray unremarkable  - long standing debility at home per family  - monitor    #NIDDM  - A1c 6.4  - Metformin 500 bid  - ISS    #HLD  - Continue Atorvastatin 80 mg qhs    -Pain control:   Tylenol prn    #Dysphagia  - Diet, Pureed:   Consistent Carbohydrate Evening Snack  DASH/TLC Sodium & Cholesterol Restricted  Mildly Thick Liquids (MILDTHICKLIQS)  Prosource Gelatein 20 Sugar Free     Qty per Day:  2 (04-21-22 @ 14:13) [Active]     Precautions / PROPHYLAXIS:      - Falls    - Ortho: Weight bearing status: WBAT      - DVT prophylaxis: Eliquis

## 2022-05-02 LAB
ALBUMIN SERPL ELPH-MCNC: 3.7 G/DL — SIGNIFICANT CHANGE UP (ref 3.5–5.2)
ALP SERPL-CCNC: 73 U/L — SIGNIFICANT CHANGE UP (ref 30–115)
ALT FLD-CCNC: 16 U/L — SIGNIFICANT CHANGE UP (ref 0–41)
ANION GAP SERPL CALC-SCNC: 9 MMOL/L — SIGNIFICANT CHANGE UP (ref 7–14)
AST SERPL-CCNC: 16 U/L — SIGNIFICANT CHANGE UP (ref 0–41)
BASOPHILS # BLD AUTO: 0.02 K/UL — SIGNIFICANT CHANGE UP (ref 0–0.2)
BASOPHILS NFR BLD AUTO: 0.2 % — SIGNIFICANT CHANGE UP (ref 0–1)
BILIRUB SERPL-MCNC: 0.3 MG/DL — SIGNIFICANT CHANGE UP (ref 0.2–1.2)
BUN SERPL-MCNC: 22 MG/DL — HIGH (ref 10–20)
CALCIUM SERPL-MCNC: 9.4 MG/DL — SIGNIFICANT CHANGE UP (ref 8.5–10.1)
CHLORIDE SERPL-SCNC: 100 MMOL/L — SIGNIFICANT CHANGE UP (ref 98–110)
CO2 SERPL-SCNC: 30 MMOL/L — SIGNIFICANT CHANGE UP (ref 17–32)
CREAT SERPL-MCNC: 1.1 MG/DL — SIGNIFICANT CHANGE UP (ref 0.7–1.5)
EGFR: 50 ML/MIN/1.73M2 — LOW
EOSINOPHIL # BLD AUTO: 0.24 K/UL — SIGNIFICANT CHANGE UP (ref 0–0.7)
EOSINOPHIL NFR BLD AUTO: 2.6 % — SIGNIFICANT CHANGE UP (ref 0–8)
GLUCOSE BLDC GLUCOMTR-MCNC: 125 MG/DL — HIGH (ref 70–99)
GLUCOSE BLDC GLUCOMTR-MCNC: 137 MG/DL — HIGH (ref 70–99)
GLUCOSE SERPL-MCNC: 135 MG/DL — HIGH (ref 70–99)
HCOV PNL SPEC NAA+PROBE: DETECTED
HCT VFR BLD CALC: 35.7 % — LOW (ref 37–47)
HGB BLD-MCNC: 11.7 G/DL — LOW (ref 12–16)
IMM GRANULOCYTES NFR BLD AUTO: 0.3 % — SIGNIFICANT CHANGE UP (ref 0.1–0.3)
LYMPHOCYTES # BLD AUTO: 0.77 K/UL — LOW (ref 1.2–3.4)
LYMPHOCYTES # BLD AUTO: 8.4 % — LOW (ref 20.5–51.1)
MCHC RBC-ENTMCNC: 28.7 PG — SIGNIFICANT CHANGE UP (ref 27–31)
MCHC RBC-ENTMCNC: 32.8 G/DL — SIGNIFICANT CHANGE UP (ref 32–37)
MCV RBC AUTO: 87.7 FL — SIGNIFICANT CHANGE UP (ref 81–99)
MONOCYTES # BLD AUTO: 0.79 K/UL — HIGH (ref 0.1–0.6)
MONOCYTES NFR BLD AUTO: 8.7 % — SIGNIFICANT CHANGE UP (ref 1.7–9.3)
NEUTROPHILS # BLD AUTO: 7.28 K/UL — HIGH (ref 1.4–6.5)
NEUTROPHILS NFR BLD AUTO: 79.8 % — HIGH (ref 42.2–75.2)
NRBC # BLD: 0 /100 WBCS — SIGNIFICANT CHANGE UP (ref 0–0)
PLATELET # BLD AUTO: 202 K/UL — SIGNIFICANT CHANGE UP (ref 130–400)
POTASSIUM SERPL-MCNC: 4.5 MMOL/L — SIGNIFICANT CHANGE UP (ref 3.5–5)
POTASSIUM SERPL-SCNC: 4.5 MMOL/L — SIGNIFICANT CHANGE UP (ref 3.5–5)
PROT SERPL-MCNC: 6.1 G/DL — SIGNIFICANT CHANGE UP (ref 6–8)
RAPID RVP RESULT: DETECTED
RBC # BLD: 4.07 M/UL — LOW (ref 4.2–5.4)
RBC # FLD: 13.1 % — SIGNIFICANT CHANGE UP (ref 11.5–14.5)
SARS-COV-2 RNA SPEC QL NAA+PROBE: SIGNIFICANT CHANGE UP
SODIUM SERPL-SCNC: 139 MMOL/L — SIGNIFICANT CHANGE UP (ref 135–146)
WBC # BLD: 9.13 K/UL — SIGNIFICANT CHANGE UP (ref 4.8–10.8)
WBC # FLD AUTO: 9.13 K/UL — SIGNIFICANT CHANGE UP (ref 4.8–10.8)

## 2022-05-02 PROCEDURE — 71045 X-RAY EXAM CHEST 1 VIEW: CPT | Mod: 26

## 2022-05-02 RX ORDER — SODIUM CHLORIDE 9 MG/ML
1000 INJECTION INTRAMUSCULAR; INTRAVENOUS; SUBCUTANEOUS
Refills: 0 | Status: DISCONTINUED | OUTPATIENT
Start: 2022-05-02 | End: 2022-05-05

## 2022-05-02 RX ADMIN — ATORVASTATIN CALCIUM 80 MILLIGRAM(S): 80 TABLET, FILM COATED ORAL at 22:51

## 2022-05-02 RX ADMIN — LISINOPRIL 5 MILLIGRAM(S): 2.5 TABLET ORAL at 22:52

## 2022-05-02 RX ADMIN — APIXABAN 5 MILLIGRAM(S): 2.5 TABLET, FILM COATED ORAL at 17:34

## 2022-05-02 RX ADMIN — APIXABAN 5 MILLIGRAM(S): 2.5 TABLET, FILM COATED ORAL at 06:08

## 2022-05-02 RX ADMIN — METFORMIN HYDROCHLORIDE 500 MILLIGRAM(S): 850 TABLET ORAL at 17:34

## 2022-05-02 RX ADMIN — METFORMIN HYDROCHLORIDE 500 MILLIGRAM(S): 850 TABLET ORAL at 08:21

## 2022-05-02 RX ADMIN — AMLODIPINE BESYLATE 2.5 MILLIGRAM(S): 2.5 TABLET ORAL at 22:50

## 2022-05-02 RX ADMIN — Medication 25 MILLIGRAM(S): at 17:38

## 2022-05-02 RX ADMIN — PANTOPRAZOLE SODIUM 40 MILLIGRAM(S): 20 TABLET, DELAYED RELEASE ORAL at 06:08

## 2022-05-02 RX ADMIN — Medication 81 MILLIGRAM(S): at 12:09

## 2022-05-02 RX ADMIN — Medication 25 MILLIGRAM(S): at 06:09

## 2022-05-02 RX ADMIN — SODIUM CHLORIDE 75 MILLILITER(S): 9 INJECTION INTRAMUSCULAR; INTRAVENOUS; SUBCUTANEOUS at 17:32

## 2022-05-02 RX ADMIN — SERTRALINE 25 MILLIGRAM(S): 25 TABLET, FILM COATED ORAL at 12:09

## 2022-05-02 NOTE — PROGRESS NOTE ADULT - ATTENDING COMMENTS
Patient not feeling well today. Coughing since yesterday. Pulse ox 89-90% RA. Resp. Viral Panel pos for Coronavirus, negative for SARS. Chest x-ray with no acute disease. Start 2 liters NC O2 and Speech therapy to reeval for possible recurrent aspiration pneumonitis. DM controlled. Max assist to stand today. Monitor. Continue rehab program.  I read, edited and agree with the Assessment:  #Acute ischemic stroke of left central kiara (penetrating pontine arteries off basilar) and subacute ischemic in right cerebellum (R SCA territory).  - CT Head reviewed, no acute intracranial hemorrhage, stable exam since CT in 11/2021. 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 Moderate stenosis involving the right clinoid segment of the ICA due to atherosclerotic calcification. Mild stenosis of the bilateral proximal internal carotid arteries due to mixed calcified and noncalcified atherosclerotic plaque  - MRI Brain reviewed, showed diffusion abnormality in central aspect of the kiara and R cerebellum  - Fount to have atrial fiib, ASA/Plavix Dced. - Eliquis continued as 5mg BID.   - Continue Atorvastatin 80 mg daily  - Monitor BP closely and avoid hypotension  - Goal SBP <150  - [facility] evaluated the patient and recommended the aneurysm is stable, follow up as outpatient with Dr. 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    #HTN- BP low this morning, systolic BP abo9ut 100, on 4/29 and Lisinopril held.  - Goal SBP <150  - lisinopril 5 mg qhs  - metoprolol tartrate 25 mg po bid  - stopped NIFEdipine 30 mg po daily and monitor but BP systolic up to 186 at times. Will start Amlodipine 2.5 mg and monitor    #Poor endurance/ SIFUENTES  - Echo and chest x-ray unremarkable  - long standing debility at home per family  - Monitor    # SOB/ cough since yesterday  - Spo2 was 89-90% on RA  - 2L NC started on 5/2/22, repeat CXR without acute change, viral panel positive for Coronavirus, negative for SARS  Speech therapy asked to reeval for possible recurrent aspiration pneumonitis    #NIDDM  - A1c 6.4  - Metformin 500 bid  - controlled. Discontinue fingersticks    #HLD  - Continue Atorvastatin 80 mg qhs    -Pain control:   Tylenol prn    #Dysphagia  - Diet, Pureed:   Consistent Carbohydrate Evening Snack  DASH/TLC Sodium & Cholesterol Restricted  Mildly Thick Liquids (MILDTHICKLIQS)  Prosource Gelatein 20 Sugar Free     Qty per Day:  2 (04-21-22 @ 14:13) [Active]     Precautions / PROPHYLAXIS:      - Falls    - Ortho: Weight bearing status: WBAT      - DVT prophylaxis: Eliquis

## 2022-05-02 NOTE — PROGRESS NOTE ADULT - SUBJECTIVE AND OBJECTIVE BOX
Patient is a 81y old  Female who presents with a chief complaint of Right cerebellar and Left central kiara CVA with right hemiplegia (dominant), Aphasia, Dysarthria (19 Apr 2022 14:28)      HPI:  Patient is a 82 yo Japanese speaking F with PMHx of HTN, HLD, DM, left MCA aneurysm, depression, who presented to the ED after an unwitnessed fall on 4/12/22. Patient denied having any urinary or bowel incontinence and stated feeling weak for a few days and being unable to stand on her right leg. Work up included a CT C-spine which showed no acute fractures. 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 MRI brain showed acute ischemic stroke of left central kiara (penetrating pontine arteries off basilar) and subacute ischemic in right cerebellum (R SCA territory). Subtle right hemiparesis on was noted on exam, and patient was transferred to stroke unit for further monitoring.     Patient found to be in atrial fibrillation Cardiology consulted a-fib and decision for anticoagulation. Patient started on Eliquis 5mg BID and atorvastatin 80 mg Qd. Passed speech/swallow eval and advanced to pureed diet. [facility] was consulted and evaluated the patient recommending outpatient follow up with Dr. Goldstein and stroke clinic. At this time patient declines proceeding with treatment for stable aneurysm.    PM&R consulted for admission to  for acute inpatient rehabilitation. Prior to admission, patient was independent w/ ambulation using  straight cane and required assistance from children with ADLs. Resides w/ children; in a house with 3 steps to enter and none inside    Patient is a good candidate for admission to acute inpatient rehabilitation for R cerebellar and L central kiara CVA with right sided hemiplegia (dominant) with comorbidities of DM2, HTN, dyslipidemia, and atrial fibrilation requiring hospital level supervision. She was deemed medically stable for discharge to  on 4/19/22 where she will undergo intensive restorative therapies 3 hours a day for at least 5 days a week with the goal of regaining the patients independence and discharging her home with family care   (19 Apr 2022 14:28)    TODAY'S SUBJECTIVE & REVIEW OF SYMPTOMS:  Patient seen this AM by the rehabilitation team. No acute events overnight. Patient's saturations were 90% on room air. Will send viral panel, order CXR and start patient on 2L NC. Unable to obtain Japanese  who speaks patient's specific dialect.     CLOF: Mod to MaxA with bed mobility and transfers.        Review of Systems:   Constitutional    [ x  ] WNL           [   ] poor appetite   [   ] insomnia   [   ] tired   Cardio:                [  x ] WNL           [   ] CP   [   ] SIFUENTES   [   ] palpitations               Resp:                   [  x ] WNL           [   ] SOB   [   ] cough   [   ] wheezing   GI:                        [ x  ] WNL           [   ] constipation   [   ] diarrhea   [   ] abdominal pain   [   ] nausea   [   ] emesis                                :                      [ x  ] WNL           [   ] ECHOLS  [   ] dysuria   [   ] difficulty voiding             Endo:                   [ x  ] WNL          [   ] polyuria   [   ] temperature intolerance                 Skin:                     [x   ] WNL          [   ] pain   [   ] wound   [   ] rash   MSK:                    [x   ] WNL          [   ] muscle pain   [   ] joint pain/ stiffness   [   ] muscle tenderness   [   ] swelling   Neuro:                 [   ] WNL          [   ] HA   [   ] change in vision   [   ] tremor   [  x ] weakness right sided   [  x ]dysphagia              Cognitive:           [   ] WNL           [ x  ]confusion      Psych:                  [x   ] WNL           [   ] hallucinations   [   ]agitation   [   ] delusion   [   ]depression    PHYSICAL EXAM    Vital Signs Last 24 Hrs  T(C): 36.6 (02 May 2022 12:28), Max: 37.4 (02 May 2022 05:19)  T(F): 97.8 (02 May 2022 12:28), Max: 99.4 (02 May 2022 05:19)  HR: 75 (02 May 2022 12:28) (74 - 90)  BP: 97/55 (02 May 2022 12:28) (97/55 - 164/80)  BP(mean): --  RR: 18 (02 May 2022 12:28) (18 - 18)  SpO2: --    General:[ x  ] NAD, Resting Comfortable,   [   ] other:                                HEENT: [  x ] NC/AT, EOMI, PERRL , Normal Conjunctivae,   [   ] other:  Cardio: [   ] RRR, no murmur   [ x  ] other:  irregularly irregular rhythm                            Pulm: [ x  ] No Respiratory Distress,  Lungs CTAB,   [   ] other:                       Abdomen: [ x  ]ND/NT, Soft,   [   ] other:    : [  x ] NO ECHOLS CATHETER, [   ] ECHOLS CATHETER- no meatal tear, no discharge, [   ] other:                                            MSK: [   x] No joint swelling, Full ROM,   [   ] other:                                         Ext: [  x ]No C/C/E, No calf tenderness,   [   ]other:    Skin: [ x  ]intact,   [   ] other:                                                                   Neurological Examination:  Cognitive: [    ] AAO x 3,   [   x ]  other: Alert and oriented to person and place. Not oriented to date or situation                                                                      Attention:  [  x  ] intact,   [    ]  other:                            Mood/Affect: [ x   ] wnl,    [    ]  other:                                                                             Communication: [    ]Fluent, no dysarthria, following commands:  [ x   ] other:  mild dysarthria  CN II - XII:  [  x  ] intact,  [    ] other:                                                                                        Motor:   RIGHT UE: [   ] WNL,  [ x  ] other: 4/5  LEFT    UE: [  x ] WNL,  [   ] other:  RIGHT LE: [   ] WNL,  [ x  ] other: 4/5  LEFT    LE: [x   ] WNL,  [   ] other:    Tone: [  x  ] wnl,   [    ]  other:  Coordination:   [   x ] intact,   [    ] other:                                                                           Sensory: [  x  ] Intact to light touch,   [    ] other:    MEDICATIONS  (STANDING):  amLODIPine   Tablet 2.5 milliGRAM(s) Oral at bedtime  apixaban 5 milliGRAM(s) Oral every 12 hours  aspirin  chewable 81 milliGRAM(s) Oral daily  atorvastatin 80 milliGRAM(s) Oral at bedtime  dextrose 50% Injectable 25 Gram(s) IV Push once  glucagon  Injectable 1 milliGRAM(s) IntraMuscular once  lisinopril 5 milliGRAM(s) Oral at bedtime  metFORMIN 500 milliGRAM(s) Oral two times a day with meals  metoprolol tartrate 25 milliGRAM(s) Oral two times a day  pantoprazole    Tablet 40 milliGRAM(s) Oral before breakfast  sertraline 25 milliGRAM(s) Oral daily  sodium chloride 0.9%. 1000 milliLiter(s) (500 mL/Hr) IV Continuous <Continuous>  sodium chloride 0.9%. 1000 milliLiter(s) (75 mL/Hr) IV Continuous <Continuous>    MEDICATIONS  (PRN):  acetaminophen     Tablet .. 650 milliGRAM(s) Oral every 6 hours PRN Temp greater or equal to 38C (100.4F), Mild Pain (1 - 3)  aluminum hydroxide/magnesium hydroxide/simethicone Suspension 30 milliLiter(s) Oral every 4 hours PRN Dyspepsia  magnesium hydroxide Suspension 30 milliLiter(s) Oral daily PRN Constipation  melatonin 5 milliGRAM(s) Oral at bedtime PRN Insomnia        RECENT LABS/IMAGING                        11.7   9.13  )-----------( 202      ( 02 May 2022 04:30 )             35.7     05-02    139  |  100  |  22<H>  ----------------------------<  135<H>  4.5   |  30  |  1.1    Ca    9.4      02 May 2022 04:30    TPro  6.1  /  Alb  3.7  /  TBili  0.3  /  DBili  x   /  AST  16  /  ALT  16  /  AlkPhos  73  05-02        POCT Blood Glucose.: 125 mg/dL (05-02-22 @ 07:18)  POCT Blood Glucose.: 136 mg/dL (05-01-22 @ 17:02)  POCT Blood Glucose.: 124 mg/dL (05-01-22 @ 07:17)  POCT Blood Glucose.: 147 mg/dL (04-30-22 @ 16:21)  POCT Blood Glucose.: 121 mg/dL (04-30-22 @ 07:15)  POCT Blood Glucose.: 158 mg/dL (04-29-22 @ 21:18)  POCT Blood Glucose.: 192 mg/dL (04-29-22 @ 16:52)  POCT Blood Glucose.: 125 mg/dL (04-29-22 @ 07:59)  POCT Blood Glucose.: 179 mg/dL (04-28-22 @ 15:56)

## 2022-05-02 NOTE — PROGRESS NOTE ADULT - ASSESSMENT
ASSESSMENT/PLAN:  80 yo F with an acute ischemic infarct in the right cerebellum and left central kiara with right hemiparesis (dominant), dysphagia, dysarthria and aphasia with comorbidities of DM-2, a fib, HTN and  HLD. She warrants acute rehab with 3 hours of daily therapies including PT, OT and speech and close physiatry is monitoring for her CVA which has impacting her swallow and communication. Given her CVA and multiple significant comorbidities close physiatry follow up is needed and will reduce the risk of her requiring rehospitalization.   -Rehab of right cerebellar and Left central kiara CVA with right hemiparesis (dominant), Aphasia, Dysarthria  -Requires acute rehab with PT, OT, Speech, neuropsychology.     #Acute ischemic stroke of left central kiara (penetrating pontine arteries off basilar) and subacute ischemic in right cerebellum (R SCA territory).  - CT Head reviewed, no acute intracranial hemorrhage, stable exam since CT in 11/2021. 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 Moderate stenosis involving the right clinoid segment of the ICA due to atherosclerotic calcification. Mild stenosis of the bilateral proximal internal carotid arteries due to mixed calcified and noncalcified atherosclerotic plaque  - MRI Brain reviewed, showed diffusion abnormality in central aspect of the kiara and R cerebellum  - Fount to have atrial fiib, ASA/Plavix Dced. - Eliquis continued as 5mg BID.   - Continue Atorvastatin 80 mg daily  - Monitor BP closely and avoid hypotension  - Goal SBP <150  - [facility] evaluated the patient and recommended the aneurysm is stable, follow up as outpatient with Dr. 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    #HTN- BP low this morning, systolic BP abo9ut 100, on 4/29 and Lisinopril held.  - Goal SBP <150  - lisinopril 5 mg qhs  - metoprolol tartrate 25 mg po bid  - stopped NIFEdipine 30 mg po daily and monitor but BP systolic up to 186 at times. Will start Amlodipine 2.5 mg and monitor    #Poor endurance/ SIFUENTES  - Echo and chest x-ray unremarkable  - long standing debility at home per family  - Monitor    # SOB   - monitor Spo2   - 2L NC started on 5/2/22, repeat CXR and viral panel pending     #NIDDM  - A1c 6.4  - Metformin 500 bid  - ISS    #HLD  - Continue Atorvastatin 80 mg qhs    -Pain control:   Tylenol prn    #Dysphagia  - Diet, Pureed:   Consistent Carbohydrate Evening Snack  DASH/TLC Sodium & Cholesterol Restricted  Mildly Thick Liquids (MILDTHICKLIQS)  Prosource Gelatein 20 Sugar Free     Qty per Day:  2 (04-21-22 @ 14:13) [Active]     Precautions / PROPHYLAXIS:      - Falls    - Ortho: Weight bearing status: WBAT      - DVT prophylaxis: Eliquis   ASSESSMENT/PLAN:  82 yo F with an acute ischemic infarct in the right cerebellum and left central kiara with right hemiparesis (dominant), dysphagia, dysarthria and aphasia with comorbidities of DM-2, a fib, HTN and  HLD. She warrants acute rehab with 3 hours of daily therapies including PT, OT and speech and close physiatry is monitoring for her CVA which has impacting her swallow and communication. Given her CVA and multiple significant comorbidities close physiatry follow up is needed and will reduce the risk of her requiring rehospitalization.   -Rehab of right cerebellar and Left central kiara CVA with right hemiparesis (dominant), Aphasia, Dysarthria  -Requires acute rehab with PT, OT, Speech, neuropsychology.     #Acute ischemic stroke of left central kiara (penetrating pontine arteries off basilar) and subacute ischemic in right cerebellum (R SCA territory).  - CT Head reviewed, no acute intracranial hemorrhage, stable exam since CT in 11/2021. 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 Moderate stenosis involving the right clinoid segment of the ICA due to atherosclerotic calcification. Mild stenosis of the bilateral proximal internal carotid arteries due to mixed calcified and noncalcified atherosclerotic plaque  - MRI Brain reviewed, showed diffusion abnormality in central aspect of the kiara and R cerebellum  - Fount to have atrial fiib, ASA/Plavix Dced. - Eliquis continued as 5mg BID.   - Continue Atorvastatin 80 mg daily  - Monitor BP closely and avoid hypotension  - Goal SBP <150  - [facility] evaluated the patient and recommended the aneurysm is stable, follow up as outpatient with Dr. 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    #HTN- BP low this morning, systolic BP abo9ut 100, on 4/29 and Lisinopril held.  - Goal SBP <150  - lisinopril 5 mg qhs  - metoprolol tartrate 25 mg po bid  - stopped NIFEdipine 30 mg po daily and monitor but BP systolic up to 186 at times. Will start Amlodipine 2.5 mg and monitor    #Poor endurance/ SIFUENTES  - Echo and chest x-ray unremarkable  - long standing debility at home per family  - Monitor    # SOB/ cough since yesterday  - Spo2 was 89-90% on RA  - 2L NC started on 5/2/22, repeat CXR without acute change, viral panel positive for Coronavirus, negative for SARS  Speech therapy asked to reeval for possible recurrent aspiration pneumonitis    #NIDDM  - A1c 6.4  - Metformin 500 bid  - controlled. Discontinue fingersticks    #HLD  - Continue Atorvastatin 80 mg qhs    -Pain control:   Tylenol prn    #Dysphagia  - Diet, Pureed:   Consistent Carbohydrate Evening Snack  DASH/TLC Sodium & Cholesterol Restricted  Mildly Thick Liquids (MILDTHICKLIQS)  Prosource Gelatein 20 Sugar Free     Qty per Day:  2 (04-21-22 @ 14:13) [Active]     Precautions / PROPHYLAXIS:      - Falls    - Ortho: Weight bearing status: WBAT      - DVT prophylaxis: Eliquis

## 2022-05-03 ENCOUNTER — TRANSCRIPTION ENCOUNTER (OUTPATIENT)
Age: 82
End: 2022-05-03

## 2022-05-03 LAB — GLUCOSE BLDC GLUCOMTR-MCNC: 112 MG/DL — HIGH (ref 70–99)

## 2022-05-03 PROCEDURE — 74230 X-RAY XM SWLNG FUNCJ C+: CPT | Mod: 26

## 2022-05-03 RX ORDER — GUAIFENESIN/DEXTROMETHORPHAN 600MG-30MG
5 TABLET, EXTENDED RELEASE 12 HR ORAL EVERY 8 HOURS
Refills: 0 | Status: COMPLETED | OUTPATIENT
Start: 2022-05-03 | End: 2022-05-08

## 2022-05-03 RX ADMIN — METFORMIN HYDROCHLORIDE 500 MILLIGRAM(S): 850 TABLET ORAL at 18:02

## 2022-05-03 RX ADMIN — SERTRALINE 25 MILLIGRAM(S): 25 TABLET, FILM COATED ORAL at 12:26

## 2022-05-03 RX ADMIN — Medication 5 MILLILITER(S): at 22:04

## 2022-05-03 RX ADMIN — AMLODIPINE BESYLATE 2.5 MILLIGRAM(S): 2.5 TABLET ORAL at 21:58

## 2022-05-03 RX ADMIN — Medication 5 MILLILITER(S): at 14:51

## 2022-05-03 RX ADMIN — APIXABAN 5 MILLIGRAM(S): 2.5 TABLET, FILM COATED ORAL at 18:02

## 2022-05-03 RX ADMIN — APIXABAN 5 MILLIGRAM(S): 2.5 TABLET, FILM COATED ORAL at 05:28

## 2022-05-03 RX ADMIN — ATORVASTATIN CALCIUM 80 MILLIGRAM(S): 80 TABLET, FILM COATED ORAL at 21:58

## 2022-05-03 RX ADMIN — Medication 25 MILLIGRAM(S): at 18:01

## 2022-05-03 RX ADMIN — LISINOPRIL 5 MILLIGRAM(S): 2.5 TABLET ORAL at 21:58

## 2022-05-03 RX ADMIN — Medication 25 MILLIGRAM(S): at 05:28

## 2022-05-03 RX ADMIN — PANTOPRAZOLE SODIUM 40 MILLIGRAM(S): 20 TABLET, DELAYED RELEASE ORAL at 07:07

## 2022-05-03 RX ADMIN — Medication 81 MILLIGRAM(S): at 12:27

## 2022-05-03 RX ADMIN — METFORMIN HYDROCHLORIDE 500 MILLIGRAM(S): 850 TABLET ORAL at 07:58

## 2022-05-03 NOTE — DISCHARGE NOTE NURSING/CASE MANAGEMENT/SOCIAL WORK - PATIENT PORTAL LINK FT
You can access the FollowMyHealth Patient Portal offered by [facility] by registering at the following website: [email or web address]. By joining [facility]’s FollowMyHealth portal, you will also be able to view your health information using other applications (apps) compatible with our system.

## 2022-05-03 NOTE — DISCHARGE NOTE NURSING/CASE MANAGEMENT/SOCIAL WORK - NSDCVIVACCINE_GEN_ALL_CORE_FT
Tdap; 05-Nov-2021 10:36; Bernardo Markham (OZ); [employer]; [id number] (Exp. Date: 09-Sep-2023); IntraMuscular; Deltoid Right.; 0.5 milliLiter(s); VIS (VIS Published: 09-May-2013, VIS Presented: 05-Nov-2021);

## 2022-05-03 NOTE — PROGRESS NOTE ADULT - SUBJECTIVE AND OBJECTIVE BOX
Patient is a 81y old  Female who presents with a chief complaint of Right cerebellar and Left central kiara CVA with right hemiplegia (dominant), Aphasia, Dysarthria (19 Apr 2022 14:28)      HPI:  Patient is a 80 yo Kenyan speaking F with PMHx of HTN, HLD, DM, left MCA aneurysm, depression, who presented to the ED after an unwitnessed fall on 4/12/22. Patient denied having any urinary or bowel incontinence and stated feeling weak for a few days and being unable to stand on her right leg. Work up included a CT C-spine which showed no acute fractures. 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 MRI brain showed acute ischemic stroke of left central kiara (penetrating pontine arteries off basilar) and subacute ischemic in right cerebellum (R SCA territory). Subtle right hemiparesis on was noted on exam, and patient was transferred to stroke unit for further monitoring.     Patient found to be in atrial fibrillation Cardiology consulted a-fib and decision for anticoagulation. Patient started on Eliquis 5mg BID and atorvastatin 80 mg Qd. Passed speech/swallow eval and advanced to pureed diet. [facility] was consulted and evaluated the patient recommending outpatient follow up with Dr. Goldstein and stroke clinic. At this time patient declines proceeding with treatment for stable aneurysm.    PM&R consulted for admission to  for acute inpatient rehabilitation. Prior to admission, patient was independent w/ < from: Xray Chest 1 View- PORTABLE-Routine (Xray Chest 1 View- PORTABLE-Routine .) (05.02.22 @ 11:11) >  Persistent left basilar atelectasis. Increasing   right basilar opacity.    < end of copied text >  ambulation using  straight cane and required assistance from children with ADLs. Resides w/ children; in a house with 3 steps to enter and none inside    Patient is a good candidate for admission to acute inpatient rehabilitation for R cerebellar and L central kiara CVA with right sided hemiplegia (dominant) with comorbidities of DM2, HTN, dyslipidemia, and atrial fibrilation requiring hospital level supervision. She was deemed medically stable for discharge to  on 4/19/22 where she will undergo intensive restorative therapies 3 hours a day for at least 5 days a week with the goal of regaining the patients independence and discharging her home with family care   (19 Apr 2022 14:28)    TODAY'S SUBJECTIVE & REVIEW OF SYMPTOMS:  Patient seen this AM by the rehabilitation team. No acute events overnight. Unable to obtain Kenyan  who speaks patient's specific dialect.   Patient was coughing overnight. Patient is covid negative. 5/2/22 CXR was significant for increasing right basilar opacity. To rule out aspiration, patient will be sent for barium swallow.    CLOF: Mod for bed mobility and transfers. ModA for upper body dressing. ParitalA for lying to sit. Max assist for bed to commode transfer and toileting. Eating with setup.       Review of Systems:   Constitutional    [ x  ] WNL           [   ] poor appetite   [   ] insomnia   [   ] tired   Cardio:                [  x ] WNL           [   ] CP   [   ] SIFUENTES   [   ] palpitations               Resp:                   [  x ] WNL           [   ] SOB   [   ] cough   [   ] wheezing   GI:                        [ x  ] WNL           [   ] constipation   [   ] diarrhea   [   ] abdominal pain   [   ] nausea   [   ] emesis                                :                      [ x  ] WNL           [   ] ECHOLS  [   ] dysuria   [   ] difficulty voiding             Endo:                   [ x  ] WNL          [   ] polyuria   [   ] temperature intolerance                 Skin:                     [x   ] WNL          [   ] pain   [   ] wound   [   ] rash   MSK:                    [x   ] WNL          [   ] muscle pain   [   ] joint pain/ stiffness   [   ] muscle tenderness   [   ] swelling   Neuro:                 [   ] WNL          [   ] HA   [   ] change in vision   [   ] tremor   [  x ] weakness right sided   [  x ]dysphagia              Cognitive:           [   ] WNL           [ x  ]confusion      Psych:                  [x   ] WNL           [   ] hallucinations   [   ]agitation   [   ] delusion   [   ]depression    PHYSICAL EXAM    Vital Signs Last 24 Hrs  T(C): 36.4 (03 May 2022 05:28), Max: 36.6 (02 May 2022 12:28)  T(F): 97.6 (03 May 2022 05:28), Max: 97.8 (02 May 2022 12:28)  HR: 69 (03 May 2022 05:28) (69 - 77)  BP: 141/75 (03 May 2022 05:28) (97/55 - 167/81)  BP(mean): --  RR: 18 (03 May 2022 05:28) (18 - 19)  SpO2: 95% (02 May 2022 17:41) (95% - 95%)    General:[ x  ] NAD, Resting Comfortable,   [   ] other:                                HEENT: [  x ] NC/AT, EOMI, PERRL , Normal Conjunctivae,   [   ] other:  Cardio: [   ] RRR, no murmur   [ x  ] other:  irregularly irregular rhythm                            Pulm: [ x  ] No Respiratory Distress,  Lungs CTAB,   [   ] other:                       Abdomen: [ x  ]ND/NT, Soft,   [   ] other:    : [  x ] NO ECHOLS CATHETER, [   ] ECHOLS CATHETER- no meatal tear, no discharge, [   ] other:                                            MSK: [   x] No joint swelling, Full ROM,   [   ] other:                                         Ext: [  x ]No C/C/E, No calf tenderness,   [   ]other:    Skin: [ x  ]intact,   [   ] other:                                                                   Neurological Examination:  Cognitive: [    ] AAO x 3,   [   x ]  other: Alert and oriented to person and place. Not oriented to date or situation                                                                      Attention:  [  x  ] intact,   [    ]  other:                            Mood/Affect: [ x   ] wnl,    [    ]  other:                                                                             Communication: [    ]Fluent, no dysarthria, following commands:  [ x   ] other:  mild dysarthria  CN II - XII:  [  x  ] intact,  [    ] other:                                                                                        Motor:   RIGHT UE: [   ] WNL,  [ x  ] other: 4/5  LEFT    UE: [  x ] WNL,  [   ] other:  RIGHT LE: [   ] WNL,  [ x  ] other: 4/5  LEFT    LE: [x   ] WNL,  [   ] other:    Tone: [  x  ] wnl,   [    ]  other:  Coordination:   [   x ] intact,   [    ] other:                                                                           Sensory: [  x  ] Intact to light touch,   [    ] other:    MEDICATIONS  (STANDING):  amLODIPine   Tablet 2.5 milliGRAM(s) Oral at bedtime  apixaban 5 milliGRAM(s) Oral every 12 hours  aspirin  chewable 81 milliGRAM(s) Oral daily  atorvastatin 80 milliGRAM(s) Oral at bedtime  dextrose 50% Injectable 25 Gram(s) IV Push once  glucagon  Injectable 1 milliGRAM(s) IntraMuscular once  guaifenesin/dextromethorphan Oral Liquid 5 milliLiter(s) Oral every 8 hours  lisinopril 5 milliGRAM(s) Oral at bedtime  metFORMIN 500 milliGRAM(s) Oral two times a day with meals  metoprolol tartrate 25 milliGRAM(s) Oral two times a day  pantoprazole    Tablet 40 milliGRAM(s) Oral before breakfast  sertraline 25 milliGRAM(s) Oral daily  sodium chloride 0.9%. 1000 milliLiter(s) (75 mL/Hr) IV Continuous <Continuous>    MEDICATIONS  (PRN):  acetaminophen     Tablet .. 650 milliGRAM(s) Oral every 6 hours PRN Temp greater or equal to 38C (100.4F), Mild Pain (1 - 3)  aluminum hydroxide/magnesium hydroxide/simethicone Suspension 30 milliLiter(s) Oral every 4 hours PRN Dyspepsia  magnesium hydroxide Suspension 30 milliLiter(s) Oral daily PRN Constipation  melatonin 5 milliGRAM(s) Oral at bedtime PRN Insomnia      RECENT LABS/IMAGING                        11.7   9.13  )-----------( 202      ( 02 May 2022 04:30 )             35.7     05-02    139  |  100  |  22<H>  ----------------------------<  135<H>  4.5   |  30  |  1.1    Ca    9.4      02 May 2022 04:30    TPro  6.1  /  Alb  3.7  /  TBili  0.3  /  DBili  x   /  AST  16  /  ALT  16  /  AlkPhos  73  05-02        POCT Blood Glucose.: 125 mg/dL (05-02-22 @ 07:18)  POCT Blood Glucose.: 136 mg/dL (05-01-22 @ 17:02)  POCT Blood Glucose.: 124 mg/dL (05-01-22 @ 07:17)  POCT Blood Glucose.: 147 mg/dL (04-30-22 @ 16:21)  POCT Blood Glucose.: 121 mg/dL (04-30-22 @ 07:15)  POCT Blood Glucose.: 158 mg/dL (04-29-22 @ 21:18)  POCT Blood Glucose.: 192 mg/dL (04-29-22 @ 16:52)  POCT Blood Glucose.: 125 mg/dL (04-29-22 @ 07:59)  POCT Blood Glucose.: 179 mg/dL (04-28-22 @ 15:56)

## 2022-05-03 NOTE — CHART NOTE - NSCHARTNOTEFT_GEN_A_CORE
Registered Dietitian Follow-Up     Patient Profile Reviewed                           Yes []   No []     Nutrition History Previously Obtained        Yes []  No []       Pertinent Subjective Information:  Per RN, patient is tolerating current diet well. PO intake is good >75% of meals. She is now upgraded to thin liquids.      Pertinent Medical Interventions:       Diet order:  Diet, Pureed:   Consistent Carbohydrate {Evening Snack}  DASH/TLC {Sodium & Cholesterol Restricted}  Free Water Flush Instructions:  **MAY HAVE THIN LIQUIDS; MUST DO DOUBLE SWALLOWS AFTER EACH BITE/SIP; EAT SLOWLY TO GIVE TIME TO SWALLOW EACH BITE/SIP; MUST HAVE 1:1 SUPERVISION/ASSISTANCE WITH ALL MEALS PLEASE**THANK YOU**  Prosource Gelatein 20 Sugar Free     Qty per Day:  2 (05-03-22 @ 10:07) [Active]    Anthropometrics:  Height: 170.2 cm  Weight: 75.3 kg   BMI: 25.9   IBW: 61.4 kg     MEDICATIONS  (STANDING):  amLODIPine   Tablet 2.5 milliGRAM(s) Oral at bedtime  apixaban 5 milliGRAM(s) Oral every 12 hours  aspirin  chewable 81 milliGRAM(s) Oral daily  atorvastatin 80 milliGRAM(s) Oral at bedtime  dextrose 50% Injectable 25 Gram(s) IV Push once  glucagon  Injectable 1 milliGRAM(s) IntraMuscular once  guaifenesin/dextromethorphan Oral Liquid 5 milliLiter(s) Oral every 8 hours  lisinopril 5 milliGRAM(s) Oral at bedtime  metFORMIN 500 milliGRAM(s) Oral two times a day with meals  metoprolol tartrate 25 milliGRAM(s) Oral two times a day  pantoprazole    Tablet 40 milliGRAM(s) Oral before breakfast  sertraline 25 milliGRAM(s) Oral daily  sodium chloride 0.9%. 1000 milliLiter(s) (75 mL/Hr) IV Continuous <Continuous>    MEDICATIONS  (PRN):  acetaminophen     Tablet .. 650 milliGRAM(s) Oral every 6 hours PRN Temp greater or equal to 38C (100.4F), Mild Pain (1 - 3)  aluminum hydroxide/magnesium hydroxide/simethicone Suspension 30 milliLiter(s) Oral every 4 hours PRN Dyspepsia  magnesium hydroxide Suspension 30 milliLiter(s) Oral daily PRN Constipation  melatonin 5 milliGRAM(s) Oral at bedtime PRN Insomnia    Pertinent Labs:   Finger Sticks:  POCT Blood Glucose.: 112 mg/dL (05-03 @ 07:29)  POCT Blood Glucose.: 137 mg/dL (05-02 @ 16:50)    Physical Findings:  - Appearance:  - GI function:  - Tubes:  - Oral/Mouth cavity:  - Skin:     Nutrition Requirements:  Weight Used:75.3 kg per initial RD assessment      Estimated Energy Needs    Continue [x]  Adjust []  1371 - 1495 kcal/day (MSJ x 1.1 - 1.2) - per initial RD assessment     Estimated Protein Needs    Continue [x]  Adjust []  83 - 90 gm/day (1.1 - 1.2 gm/kg) - per initial RD assessment     Estimated Fluid Needs        Continue [x]  Adjust []  1883 mL/day (25 mL/kg) - per initial RD assessment     Nutrient Intake:     [x] Previous Nutrition Diagnosis:  Difficulty swallowing related to mild -moderate oropharyngeal dysphagia as evidenced by results o f VFSS/MS and patient on pureed, mildly thick liquids  - improving; now allowed thin liquids            [x] Ongoing          [] Resolved     Nutrition Intervention      Goal/Expected Outcome: Patient to maintain po intake >75% of meals within 1-2 days     Indicator/Monitoring: RD to monitor po intake, weight, labs, skin status, NFPF    Recommendation: Registered Dietitian Follow-Up     Patient Profile Reviewed                           Yes [x]   No []     Nutrition History Previously Obtained        Yes [x]  No []       Pertinent Subjective Information:  Per RN, patient is tolerating current diet well. PO intake is good >75% of meals. She is now upgraded to thin liquids.      Pertinent Medical Interventions:  #Acute ischemic stroke of left central kiara (penetrating pontine arteries off basilar) and subacute ischemic in right cerebellum (R SCA territory).  #HTN- BP low this morning, systolic BP abo9ut 100, on 4/29 and Lisinopril held  #NIDDM  - A1c 6.4  - Metformin 500 bid  - controlled. Discontinue fingersticks  #HLD  - Continue Atorvastatin 80 mg qhs     Diet order:  Diet, Pureed:   Consistent Carbohydrate {Evening Snack}  DASH/TLC {Sodium & Cholesterol Restricted}  Free Water Flush Instructions:  **MAY HAVE THIN LIQUIDS; MUST DO DOUBLE SWALLOWS AFTER EACH BITE/SIP; EAT SLOWLY TO GIVE TIME TO SWALLOW EACH BITE/SIP; MUST HAVE 1:1 SUPERVISION/ASSISTANCE WITH ALL MEALS PLEASE**THANK YOU**  Prosource Gelatein 20 Sugar Free     Qty per Day:  2 (05-03-22 @ 10:07) [Active]    Anthropometrics:  Height: 170.2 cm  Weight: 75.3 kg   BMI: 25.9   IBW: 61.4 kg     MEDICATIONS  (STANDING):  amLODIPine   Tablet 2.5 milliGRAM(s) Oral at bedtime  apixaban 5 milliGRAM(s) Oral every 12 hours  aspirin  chewable 81 milliGRAM(s) Oral daily  atorvastatin 80 milliGRAM(s) Oral at bedtime  dextrose 50% Injectable 25 Gram(s) IV Push once  glucagon  Injectable 1 milliGRAM(s) IntraMuscular once  guaifenesin/dextromethorphan Oral Liquid 5 milliLiter(s) Oral every 8 hours  lisinopril 5 milliGRAM(s) Oral at bedtime  metFORMIN 500 milliGRAM(s) Oral two times a day with meals  metoprolol tartrate 25 milliGRAM(s) Oral two times a day  pantoprazole    Tablet 40 milliGRAM(s) Oral before breakfast  sertraline 25 milliGRAM(s) Oral daily  sodium chloride 0.9%. 1000 milliLiter(s) (75 mL/Hr) IV Continuous <Continuous>    MEDICATIONS  (PRN):  acetaminophen     Tablet .. 650 milliGRAM(s) Oral every 6 hours PRN Temp greater or equal to 38C (100.4F), Mild Pain (1 - 3)  aluminum hydroxide/magnesium hydroxide/simethicone Suspension 30 milliLiter(s) Oral every 4 hours PRN Dyspepsia  magnesium hydroxide Suspension 30 milliLiter(s) Oral daily PRN Constipation  melatonin 5 milliGRAM(s) Oral at bedtime PRN Insomnia    Pertinent Labs: 5/2: H/H 11.7/35.7 (L), Na 139, K+ 4.5, Chloride 100, BUN 22 (H), Cr 1.1, Gluc 135 (H), Ca 9.4, Alb 3.7, Alk Phos 73, AST/SGOT 16, ALT/SGPT 16, eGFR 50 (L)    Finger Sticks:  POCT Blood Glucose.: 112 mg/dL (05-03 @ 07:29)  POCT Blood Glucose.: 137 mg/dL (05-02 @ 16:50)    Physical Findings:  - Appearance: disoriented to situation  - GI function: LBM 5/2 per flowsheet  - Tubes: n/a  - Oral/Mouth cavity: Now Pureed texture and thin liquids  - Skin: Ecchymosis; No P/U; nonpitting edema - left and right leg     Nutrition Requirements:  Weight Used:75.3 kg per initial RD assessment      Estimated Energy Needs    Continue [x]  Adjust []  1371 - 1495 kcal/day (MSJ x 1.1 - 1.2) - per initial RD assessment     Estimated Protein Needs    Continue [x]  Adjust []  83 - 90 gm/day (1.1 - 1.2 gm/kg) - per initial RD assessment     Estimated Fluid Needs        Continue [x]  Adjust []  1883 mL/day (25 mL/kg) - per initial RD assessment     Nutrient Intake: Good PO intake >75% of meals.      [x] Previous Nutrition Diagnosis:  Difficulty swallowing related to mild -moderate oropharyngeal dysphagia as evidenced by results of VFSS/MS and patient on pureed, mildly thick liquids  - improving; now allowed thin liquids            [x] Ongoing          [] Resolved     Nutrition Intervention: Continue medical food supplements, coordination of care     Goal/Expected Outcome: Patient to maintain po intake >75% of meals within 1-2 days     Indicator/Monitoring: RD to monitor po intake, weight, labs, skin status, NFPF    Recommendation:  1) Continue current diet order  2) Continue Prosource Gelatein Plus supplement 2x/day    Patient is a low nutrition risk, RD to f/u in 7-10 days or prn

## 2022-05-03 NOTE — PROGRESS NOTE ADULT - ASSESSMENT
ASSESSMENT/PLAN:  80 yo F with an acute ischemic infarct in the right cerebellum and left central kiara with right hemiparesis (dominant), dysphagia, dysarthria and aphasia with comorbidities of DM-2, a fib, HTN and  HLD. She warrants acute rehab with 3 hours of daily therapies including PT, OT and speech and close physiatry is monitoring for her CVA which has impacting her swallow and communication. Given her CVA and multiple significant comorbidities close physiatry follow up is needed and will reduce the risk of her requiring rehospitalization.   -Rehab of right cerebellar and Left central kiara CVA with right hemiparesis (dominant), Aphasia, Dysarthria  -Requires acute rehab with PT, OT, Speech, neuropsychology.     #Acute ischemic stroke of left central kiara (penetrating pontine arteries off basilar) and subacute ischemic in right cerebellum (R SCA territory).  - CT Head reviewed, no acute intracranial hemorrhage, stable exam since CT in 11/2021. 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 Moderate stenosis involving the right clinoid segment of the ICA due to atherosclerotic calcification. Mild stenosis of the bilateral proximal internal carotid arteries due to mixed calcified and noncalcified atherosclerotic plaque  - MRI Brain reviewed, showed diffusion abnormality in central aspect of the kiara and R cerebellum  - Fount to have atrial fiib, ASA/Plavix Dced. - Eliquis continued as 5mg BID.   - Continue Atorvastatin 80 mg daily  - Monitor BP closely and avoid hypotension  - Goal SBP <150  - [facility] evaluated the patient and recommended the aneurysm is stable, follow up as outpatient with Dr. 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    #HTN- BP low this morning, systolic BP abo9ut 100, on 4/29 and Lisinopril held.  - Goal SBP <150  - lisinopril 5 mg qhs  - metoprolol tartrate 25 mg po bid  - stopped NIFEdipine 30 mg po daily and monitor but BP systolic up to 186 at times. Will start Amlodipine 2.5 mg and monitor    #Poor endurance/ SIFUENTES  - Echo and chest x-ray unremarkable  - long standing debility at home per family  - Monitor    # SOB/ cough since yesterday  - Spo2 was 89-90% on RA  - 2L NC started on 5/2/22,   - 5.2.22 CXR: increasing right basilar opacity   - 5/2/22 viral panel positive for Coronavirus, negative for SARS  - Speech therapy asked to reeval for possible recurrent aspiration pneumonitis, pending barium swallow results    #NIDDM  - A1c 6.4  - Metformin 500 bid  - controlled. Discontinue fingersticks    #HLD  - Continue Atorvastatin 80 mg qhs    -Pain control:   Tylenol prn    #Dysphagia  - Diet, Pureed:   Consistent Carbohydrate Evening Snack  DASH/TLC Sodium & Cholesterol Restricted  Mildly Thick Liquids (MILDTHICKLIQS)  Prosource Gelatein 20 Sugar Free     Qty per Day:  2 (04-21-22 @ 14:13) [Active]     Precautions / PROPHYLAXIS:      - Falls    - Ortho: Weight bearing status: WBAT      - DVT prophylaxis: Eliquis   ASSESSMENT/PLAN:  80 yo F with an acute ischemic infarct in the right cerebellum and left central kiara with right hemiparesis (dominant), dysphagia, dysarthria and aphasia with comorbidities of DM-2, a fib, HTN and  HLD. She warrants acute rehab with 3 hours of daily therapies including PT, OT and speech and close physiatry is monitoring for her CVA which has impacting her swallow and communication. Given her CVA and multiple significant comorbidities close physiatry follow up is needed and will reduce the risk of her requiring rehospitalization.   -Rehab of right cerebellar and Left central kiara CVA with right hemiparesis (dominant), Aphasia, Dysarthria  -Requires acute rehab with PT, OT, Speech, neuropsychology.     #Acute ischemic stroke of left central kiara (penetrating pontine arteries off basilar) and subacute ischemic in right cerebellum (R SCA territory).  - CT Head reviewed, no acute intracranial hemorrhage, stable exam since CT in 11/2021. 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 Moderate stenosis involving the right clinoid segment of the ICA due to atherosclerotic calcification. Mild stenosis of the bilateral proximal internal carotid arteries due to mixed calcified and noncalcified atherosclerotic plaque  - MRI Brain reviewed, showed diffusion abnormality in central aspect of the kiara and R cerebellum  - Fount to have atrial fiib, ASA/Plavix Dced. - Eliquis continued as 5mg BID.   - Continue Atorvastatin 80 mg daily  - Monitor BP closely and avoid hypotension  - Goal SBP <150  - [facility] evaluated the patient and recommended the aneurysm is stable, follow up as outpatient with Dr. 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    #HTN- BP low this morning, systolic BP abo9ut 100, on 4/29 and Lisinopril held.  - Goal SBP <150  - lisinopril 5 mg qhs  - metoprolol tartrate 25 mg po bid  - stopped NIFEdipine 30 mg po daily and monitor but BP systolic up to 186 at times. Will start Amlodipine 2.5 mg and monitor    #Poor endurance/ SIFUENTES  - Echo WNL  - long standing debility at home per family  - Monitor    # SOB/ wet cough since yesterday -   - Spo2 was 89-90% on RA  - 2L NC started on 5/2/22,   - 5.2.22 CXR: increasing right basilar opacity   - 5/2/22 viral panel positive for Coronavirus, negative for SARS  - Speech therapy asked to reeval for possible recurrent aspiration pneumonitis, barium swallow 5/3 improved from previous with Pureed and thin liquid. Needs 1 to 1 sit for pacing/ multiple swallows    #NIDDM  - A1c 6.4  - Metformin 500 bid  - controlled. Discontinue fingersticks    #HLD  - Continue Atorvastatin 80 mg qhs    -Pain control:   Tylenol prn    #Dysphagia  - Diet, Pureed:   Consistent Carbohydrate Evening Snack  DASH/TLC Sodium & Cholesterol Restricted  Mildly Thick Liquids (MILDTHICKLIQS)  Prosource Gelatein 20 Sugar Free     Qty per Day:  2 (04-21-22 @ 14:13) [Active]     Precautions / PROPHYLAXIS:      - Falls    - Ortho: Weight bearing status: WBAT      - DVT prophylaxis: Eliquis

## 2022-05-03 NOTE — PROGRESS NOTE ADULT - ATTENDING COMMENTS
I reviewed the chart and examined the patient with the resident and we discussed the findings and treatment plan.  The patient is tolerating the rehab program well. I agree with the findings and treatment plan above, which I modified as indicated. The patient requires 3 hrs a day of acute inpatient rehab. Developed wet cough and some decreased endurance for the past 2 days. Nasal swab positive for no SARS Corona virus. No distress. On NC O2 now for mild hypoxemia. Max assist to transfer. Difficulty maintaining standing. Family would like to take her home when medically stable. Tolerating pureed diet and thin liquids per MBS today. Continue current care and plan d/c home with 24 hr care or SNF when medically stable.     I read, edited and agree with the Assessment:  #Acute ischemic stroke of left central kiara (penetrating pontine arteries off basilar) and subacute ischemic in right cerebellum (R SCA territory).  - CT Head reviewed, no acute intracranial hemorrhage, stable exam since CT in 11/2021. 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 Moderate stenosis involving the right clinoid segment of the ICA due to atherosclerotic calcification. Mild stenosis of the bilateral proximal internal carotid arteries due to mixed calcified and noncalcified atherosclerotic plaque  - MRI Brain reviewed, showed diffusion abnormality in central aspect of the kiara and R cerebellum  - Fount to have atrial fiib, ASA/Plavix Dced. - Eliquis continued as 5mg BID.   - Continue Atorvastatin 80 mg daily  - Monitor BP closely and avoid hypotension  - Goal SBP <150  - [facility] evaluated the patient and recommended the aneurysm is stable, follow up as outpatient with Dr. 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    #HTN- BP low this morning, systolic BP abo9ut 100, on 4/29 and Lisinopril held.  - Goal SBP <150  - lisinopril 5 mg qhs  - metoprolol tartrate 25 mg po bid  - stopped NIFEdipine 30 mg po daily and monitor but BP systolic up to 186 at times. Will start Amlodipine 2.5 mg and monitor    #Poor endurance/ SIFUENTES  - Echo WNL  - long standing debility at home per family  - Monitor    # SOB/ wet cough since yesterday -   - Spo2 was 89-90% on RA  - 2L NC started on 5/2/22,   - 5.2.22 CXR: increasing right basilar opacity   - 5/2/22 viral panel positive for Coronavirus, negative for SARS  - Speech therapy asked to reeval for possible recurrent aspiration pneumonitis, barium swallow 5/3 improved from previous with Pureed and thin liquid. Needs 1 to 1 sit for pacing/ multiple swallows    #NIDDM  - A1c 6.4  - Metformin 500 bid  - controlled. Discontinue fingersticks    #HLD  - Continue Atorvastatin 80 mg qhs    -Pain control:   Tylenol prn    #Dysphagia  - Diet, Pureed:   Consistent Carbohydrate Evening Snack  DASH/TLC Sodium & Cholesterol Restricted  Mildly Thick Liquids (MILDTHICKLIQS)  Prosource Gelatein 20 Sugar Free     Qty per Day:  2 (04-21-22 @ 14:13) [Active]     Precautions / PROPHYLAXIS:      - Falls    - Ortho: Weight bearing status: WBAT      - DVT prophylaxis: Eliquis

## 2022-05-04 LAB
GLUCOSE BLDC GLUCOMTR-MCNC: 136 MG/DL — HIGH (ref 70–99)
GLUCOSE BLDC GLUCOMTR-MCNC: 150 MG/DL — HIGH (ref 70–99)

## 2022-05-04 RX ORDER — SODIUM CHLORIDE 9 MG/ML
1000 INJECTION INTRAMUSCULAR; INTRAVENOUS; SUBCUTANEOUS
Refills: 0 | Status: DISCONTINUED | OUTPATIENT
Start: 2022-05-04 | End: 2022-05-05

## 2022-05-04 RX ADMIN — Medication 5 MILLILITER(S): at 12:31

## 2022-05-04 RX ADMIN — Medication 25 MILLIGRAM(S): at 17:16

## 2022-05-04 RX ADMIN — APIXABAN 5 MILLIGRAM(S): 2.5 TABLET, FILM COATED ORAL at 17:16

## 2022-05-04 RX ADMIN — APIXABAN 5 MILLIGRAM(S): 2.5 TABLET, FILM COATED ORAL at 05:42

## 2022-05-04 RX ADMIN — Medication 5 MILLILITER(S): at 23:09

## 2022-05-04 RX ADMIN — Medication 5 MILLILITER(S): at 05:43

## 2022-05-04 RX ADMIN — LISINOPRIL 5 MILLIGRAM(S): 2.5 TABLET ORAL at 23:09

## 2022-05-04 RX ADMIN — METFORMIN HYDROCHLORIDE 500 MILLIGRAM(S): 850 TABLET ORAL at 08:12

## 2022-05-04 RX ADMIN — Medication 81 MILLIGRAM(S): at 11:45

## 2022-05-04 RX ADMIN — SERTRALINE 25 MILLIGRAM(S): 25 TABLET, FILM COATED ORAL at 11:45

## 2022-05-04 RX ADMIN — METFORMIN HYDROCHLORIDE 500 MILLIGRAM(S): 850 TABLET ORAL at 17:17

## 2022-05-04 RX ADMIN — AMLODIPINE BESYLATE 2.5 MILLIGRAM(S): 2.5 TABLET ORAL at 23:08

## 2022-05-04 RX ADMIN — PANTOPRAZOLE SODIUM 40 MILLIGRAM(S): 20 TABLET, DELAYED RELEASE ORAL at 05:43

## 2022-05-04 RX ADMIN — Medication 25 MILLIGRAM(S): at 05:43

## 2022-05-04 RX ADMIN — ATORVASTATIN CALCIUM 80 MILLIGRAM(S): 80 TABLET, FILM COATED ORAL at 23:09

## 2022-05-04 NOTE — PROGRESS NOTE ADULT - ATTENDING COMMENTS
I reviewed the chart and examined the patient with the resident and we discussed the findings and treatment plan.  The patient is tolerating the rehab program well. I agree with the findings and treatment plan above, which I modified as indicated. The patient requires 3 hrs a day of acute inpatient rehab. Patient doing better today. More alert and animated. Sounds less congested. S/P ivf last night. Will continue current supportive care and try to wean O2. Requires mod to max assistance to stand. Continue rehab. Recheck labs in am.    I read, edited and agree with the Assessment:  #Acute ischemic stroke of left central kiara (penetrating pontine arteries off basilar) and subacute ischemic in right cerebellum (R SCA territory).  - CT Head reviewed, no acute intracranial hemorrhage, stable exam since CT in 11/2021. 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 Moderate stenosis involving the right clinoid segment of the ICA due to atherosclerotic calcification. Mild stenosis of the bilateral proximal internal carotid arteries due to mixed calcified and noncalcified atherosclerotic plaque  - MRI Brain reviewed, showed diffusion abnormality in central aspect of the kiara and R cerebellum  - Fount to have atrial fiib, ASA/Plavix Dced. - Eliquis continued as 5mg BID.   - Continue Atorvastatin 80 mg daily  - Monitor BP closely and avoid hypotension  - Goal SBP <150  - [facility] evaluated the patient and recommended the aneurysm is stable, follow up as outpatient with Dr. 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    #HTN- BP low this morning, systolic BP abo9ut 100, on 4/29 and Lisinopril held.  - Goal SBP <150  - lisinopril 5 mg qhs  - metoprolol tartrate 25 mg po bid  - stopped NIFEdipine 30 mg po daily and monitor but BP systolic up to 186 at times. Started Amlodipine 2.5 mg and monitor    # SOB/ wet cough x 2 days  - Spo2 was 89-90% on RA  - 2L NC started on 5/2/22,   - 5.2.22 CXR: increasing right basilar opacity   - 5/2/22 viral panel positive for Coronavirus, negative for SARS  - Speech therapy asked to reeval for possible recurrent aspiration pneumonitis, barium swallow 5/3 improved from previous with Pureed and thin liquid. Needs 1 to 1 sit for pacing/ multiple swallows  - Looks better today, 5/4. Wean O2 and follow chest xray to resolution. Encourage Incentive Spirometer. Recheck labs.    #NIDDM  - A1c 6.4  - Metformin 500 bid  - controlled. Discontinue fingersticks    #HLD  - Continue Atorvastatin 80 mg qhs    -Pain control:   Tylenol prn    #Dysphagia  - Diet, Pureed:   Consistent Carbohydrate Evening Snack  DASH/TLC Sodium & Cholesterol Restricted  Thin Liquids     Precautions / PROPHYLAXIS:      - Falls    - Ortho: Weight bearing status: WBAT      - DVT prophylaxis: Eliquis

## 2022-05-04 NOTE — PROGRESS NOTE ADULT - SUBJECTIVE AND OBJECTIVE BOX
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 Patient denied having any urinary or bowel incontinence and stated feeling weak for a few days and being unable to stand on her right leg. Work up included a CT C-spine which showed no acute fractures. 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 MRI brain showed acute ischemic stroke of left central kiara (penetrating pontine arteries off basilar) and subacute ischemic in right cerebellum (R SCA territory). Subtle right hemiparesis on was noted on exam, and patient was transferred to stroke unit for further monitoring.     Patient found to be in atrial fibrillation Cardiology consulted a-fib and decision for anticoagulation. Patient started on Eliquis 5mg BID and atorvastatin 80 mg Qd. Passed speech/swallow eval and advanced to pureed diet. Neuroendovascular was consulted and evaluated the patient recommending outpatient follow up with Dr. Goldstein and stroke clinic. At this time patient declines proceeding with treatment for stable aneurysm.    PM&R consulted for admission to  for acute inpatient rehabilitation. Prior to admission, patient was independent w/ < from: Xray Chest 1 View- PORTABLE-Routine (Xray Chest 1 View- PORTABLE-Routine .) (05.02.22 @ 11:11) >  Persistent left basilar atelectasis. Increasing   right basilar opacity.    < end of copied text >  ambulation using  straight cane and required assistance from children with ADLs. Resides w/ children; in a house with 3 steps to enter and none inside    Patient is a good candidate for admission to acute inpatient rehabilitation for R cerebellar and L central kiara CVA with right sided hemiplegia (dominant) with comorbidities of DM2, HTN, dyslipidemia, and atrial fibrilation requiring hospital level supervision. She was deemed medically stable for discharge to  on 4/19/22 where she will undergo intensive restorative therapies 3 hours a day for at least 5 days a week with the goal of regaining the patients independence and discharging her home with family care   (19 Apr 2022 14:28)    TODAY'S SUBJECTIVE & REVIEW OF SYMPTOMS:  Patient seen this AM by the rehabilitation team. No acute events overnight. Unable to obtain [place]  who speaks patient's specific dialect.   Patient tolerated barium swallow yesterday. Patient was found to have mild to moderate ororpharyngeal dysphagia on VFSS. Patient was on pureed diet and thick liquids but Speech Therapy approved patient for thin liquids.     CLOF: Mod for bed mobility and transfers. ModA for upper body dressing. ParitalA for lying to sit. Max assist for bed to commode transfer and toileting. Eating with setup.       Review of Systems:   Constitutional    [ x  ] WNL           [   ] poor appetite   [   ] insomnia   [   ] tired   Cardio:                [  x ] WNL           [   ] CP   [   ] SIFUENTES   [   ] palpitations               Resp:                   [  x ] WNL           [   ] SOB   [   ] cough   [   ] wheezing   GI:                        [ x  ] WNL           [   ] constipation   [   ] diarrhea   [   ] abdominal pain   [   ] nausea   [   ] emesis                                :                      [ x  ] WNL           [   ] ECHOLS  [   ] dysuria   [   ] difficulty voiding             Endo:                   [ x  ] WNL          [   ] polyuria   [   ] temperature intolerance                 Skin:                     [x   ] WNL          [   ] pain   [   ] wound   [   ] rash   MSK:                    [x   ] WNL          [   ] muscle pain   [   ] joint pain/ stiffness   [   ] muscle tenderness   [   ] swelling   Neuro:                 [   ] WNL          [   ] HA   [   ] change in vision   [   ] tremor   [  x ] weakness right sided   [  x ]dysphagia              Cognitive:           [   ] WNL           [ x  ]confusion      Psych:                  [x   ] WNL           [   ] hallucinations   [   ]agitation   [   ] delusion   [   ]depression    PHYSICAL EXAM    Vital Signs Last 24 Hrs  T(C): 36.2 (04 May 2022 05:58), Max: 37.1 (03 May 2022 21:23)  T(F): 97.1 (04 May 2022 05:58), Max: 98.8 (03 May 2022 21:23)  HR: 80 (04 May 2022 05:58) (72 - 80)  BP: 145/75 (04 May 2022 06:10) (115/63 - 172/80)  BP(mean): --  RR: 18 (04 May 2022 05:58) (18 - 20)  SpO2: 95% (04 May 2022 06:10) (95% - 95%)    General:[ x  ] NAD, Resting Comfortable,   [   ] other:                                HEENT: [  x ] NC/AT, EOMI, PERRL , Normal Conjunctivae,   [   ] other:  Cardio: [   ] RRR, no murmur   [ x  ] other:  irregularly irregular rhythm                            Pulm: [ x  ] No Respiratory Distress,  Lungs CTAB,   [   ] other:                       Abdomen: [ x  ]ND/NT, Soft,   [   ] other:    : [  x ] NO ECHOLS CATHETER, [   ] ECHOLS CATHETER- no meatal tear, no discharge, [   ] other:                                            MSK: [   x] No joint swelling, Full ROM,   [   ] other:                                         Ext: [  x ]No C/C/E, No calf tenderness,   [   ]other:    Skin: [ x  ]intact,   [   ] other:                                                                   Neurological Examination:  Cognitive: [    ] AAO x 3,   [   x ]  other: Alert and oriented to person and place. Not oriented to date or situation                                                                      Attention:  [  x  ] intact,   [    ]  other:                            Mood/Affect: [ x   ] wnl,    [    ]  other:                                                                             Communication: [    ]Fluent, no dysarthria, following commands:  [ x   ] other:  mild dysarthria  CN II - XII:  [  x  ] intact,  [    ] other:                                                                                        Motor:   RIGHT UE: [   ] WNL,  [ x  ] other: 4/5  LEFT    UE: [  x ] WNL,  [   ] other:  RIGHT LE: [   ] WNL,  [ x  ] other: 4/5  LEFT    LE: [x   ] WNL,  [   ] other:    Tone: [  x  ] wnl,   [    ]  other:  Coordination:   [   x ] intact,   [    ] other:                                                                           Sensory: [  x  ] Intact to light touch,   [    ] other:    MEDICATIONS  (STANDING):  amLODIPine   Tablet 2.5 milliGRAM(s) Oral at bedtime  apixaban 5 milliGRAM(s) Oral every 12 hours  aspirin  chewable 81 milliGRAM(s) Oral daily  atorvastatin 80 milliGRAM(s) Oral at bedtime  dextrose 50% Injectable 25 Gram(s) IV Push once  glucagon  Injectable 1 milliGRAM(s) IntraMuscular once  guaifenesin/dextromethorphan Oral Liquid 5 milliLiter(s) Oral every 8 hours  lisinopril 5 milliGRAM(s) Oral at bedtime  metFORMIN 500 milliGRAM(s) Oral two times a day with meals  metoprolol tartrate 25 milliGRAM(s) Oral two times a day  pantoprazole    Tablet 40 milliGRAM(s) Oral before breakfast  sertraline 25 milliGRAM(s) Oral daily  sodium chloride 0.9%. 1000 milliLiter(s) (75 mL/Hr) IV Continuous <Continuous>    MEDICATIONS  (PRN):  acetaminophen     Tablet .. 650 milliGRAM(s) Oral every 6 hours PRN Temp greater or equal to 38C (100.4F), Mild Pain (1 - 3)  aluminum hydroxide/magnesium hydroxide/simethicone Suspension 30 milliLiter(s) Oral every 4 hours PRN Dyspepsia  magnesium hydroxide Suspension 30 milliLiter(s) Oral daily PRN Constipation  melatonin 5 milliGRAM(s) Oral at bedtime PRN Insomnia      RECENT LABS/IMAGING                        11.7   9.13  )-----------( 202      ( 02 May 2022 04:30 )             35.7     05-02    139  |  100  |  22<H>  ----------------------------<  135<H>  4.5   |  30  |  1.1    Ca    9.4      02 May 2022 04:30    TPro  6.1  /  Alb  3.7  /  TBili  0.3  /  DBili  x   /  AST  16  /  ALT  16  /  AlkPhos  73  05-02        POCT Blood Glucose.: 125 mg/dL (05-02-22 @ 07:18)  POCT Blood Glucose.: 136 mg/dL (05-01-22 @ 17:02)  POCT Blood Glucose.: 124 mg/dL (05-01-22 @ 07:17)  POCT Blood Glucose.: 147 mg/dL (04-30-22 @ 16:21)  POCT Blood Glucose.: 121 mg/dL (04-30-22 @ 07:15)  POCT Blood Glucose.: 158 mg/dL (04-29-22 @ 21:18)  POCT Blood Glucose.: 192 mg/dL (04-29-22 @ 16:52)  POCT Blood Glucose.: 125 mg/dL (04-29-22 @ 07:59)  POCT Blood Glucose.: 179 mg/dL (04-28-22 @ 15:56)             Patient is a 81y old  Female who presents with a chief complaint of Right cerebellar and Left central kiara CVA with right hemiplegia (dominant), Aphasia, Dysarthria (19 Apr 2022 14:28)      HPI:  Patient is a 80 yo [place] citizen speaking F with PMHx of HTN, HLD, DM, left MCA aneurysm, depression, who presented to the ED after an unwitnessed fall on 4/12/22. Patient denied having any urinary or bowel incontinence and stated feeling weak for a few days and being unable to stand on her right leg. Work up included a CT C-spine which showed no acute fractures. 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 MRI brain showed acute ischemic stroke of left central kiara (penetrating pontine arteries off basilar) and subacute ischemic in right cerebellum (R SCA territory). Subtle right hemiparesis on was noted on exam, and patient was transferred to stroke unit for further monitoring.     Patient found to be in atrial fibrillation Cardiology consulted a-fib and decision for anticoagulation. Patient started on Eliquis 5mg BID and atorvastatin 80 mg Qd. Passed speech/swallow eval and advanced to pureed diet. Neuroendovascular was consulted and evaluated the patient recommending outpatient follow up with Dr. Goldstein and stroke clinic. At this time patient declines proceeding with treatment for stable aneurysm.    PM&R consulted for admission to  for acute inpatient rehabilitation. Prior to admission, patient was independent w/ < from: Xray Chest 1 View- PORTABLE-Routine (Xray Chest 1 View- PORTABLE-Routine .) (05.02.22 @ 11:11) >  Persistent left basilar atelectasis. Increasing   right basilar opacity.    < end of copied text >  ambulation using  straight cane and required assistance from children with ADLs. Resides w/ children; in a house with 3 steps to enter and none inside    Patient is a good candidate for admission to acute inpatient rehabilitation for R cerebellar and L central kiara CVA with right sided hemiplegia (dominant) with comorbidities of DM2, HTN, dyslipidemia, and atrial fibrilation requiring hospital level supervision. She was deemed medically stable for discharge to  on 4/19/22 where she will undergo intensive restorative therapies 3 hours a day for at least 5 days a week with the goal of regaining the patients independence and discharging her home with family care   (19 Apr 2022 14:28)    TODAY'S SUBJECTIVE & REVIEW OF SYMPTOMS:  Patient seen this AM by the rehabilitation team. No acute events overnight. Unable to obtain [place] citizen  who speaks patient's specific dialect.   Patient tolerated barium swallow yesterday. Patient was found to have mild to moderate ororpharyngeal dysphagia on VFSS. Patient was on pureed diet and thick liquids but Speech Therapy approved patient for thin liquids.     CLOF: ModA for bed mobility and ModA/MaxA with transfers. Ambulates 10' and 8' with RW Mod-MaxA.  ModA for sit to stand transfers, WC to bed transfers, sit to lying transfers; eating with set up.       Review of Systems:   Constitutional    [ x  ] WNL           [   ] poor appetite   [   ] insomnia   [   ] tired   Cardio:                [  x ] WNL           [   ] CP   [   ] SIFUENTES   [   ] palpitations               Resp:                   [  x ] WNL           [   ] SOB   [   ] cough   [   ] wheezing   GI:                        [ x  ] WNL           [   ] constipation   [   ] diarrhea   [   ] abdominal pain   [   ] nausea   [   ] emesis                                :                      [ x  ] WNL           [   ] ECHOLS  [   ] dysuria   [   ] difficulty voiding             Endo:                   [ x  ] WNL          [   ] polyuria   [   ] temperature intolerance                 Skin:                     [x   ] WNL          [   ] pain   [   ] wound   [   ] rash   MSK:                    [x   ] WNL          [   ] muscle pain   [   ] joint pain/ stiffness   [   ] muscle tenderness   [   ] swelling   Neuro:                 [   ] WNL          [   ] HA   [   ] change in vision   [   ] tremor   [  x ] weakness right sided   [  x ]dysphagia              Cognitive:           [   ] WNL           [ x  ]confusion      Psych:                  [x   ] WNL           [   ] hallucinations   [   ]agitation   [   ] delusion   [   ]depression    PHYSICAL EXAM    Vital Signs Last 24 Hrs  T(C): 36.2 (04 May 2022 05:58), Max: 37.1 (03 May 2022 21:23)  T(F): 97.1 (04 May 2022 05:58), Max: 98.8 (03 May 2022 21:23)  HR: 80 (04 May 2022 05:58) (72 - 80)  BP: 145/75 (04 May 2022 06:10) (115/63 - 172/80)  BP(mean): --  RR: 18 (04 May 2022 05:58) (18 - 20)  SpO2: 95% (04 May 2022 06:10) (95% - 95%)    General:[ x  ] NAD, Resting Comfortable,   [   ] other:                                HEENT: [  x ] NC/AT, EOMI, PERRL , Normal Conjunctivae,   [   ] other:  Cardio: [   ] RRR, no murmur   [ x  ] other:  irregularly irregular rhythm                            Pulm: [ x  ] No Respiratory Distress,  Lungs CTAB,   [   ] other:                       Abdomen: [ x  ]ND/NT, Soft,   [   ] other:    : [  x ] NO ECHOLS CATHETER, [   ] ECHOLS CATHETER- no meatal tear, no discharge, [   ] other:                                            MSK: [   x] No joint swelling, Full ROM,   [   ] other:                                         Ext: [  x ]No C/C/E, No calf tenderness,   [   ]other:    Skin: [ x  ]intact,   [   ] other:                                                                   Neurological Examination:  Cognitive: [    ] AAO x 3,   [   x ]  other: Alert and oriented to person and place. Not oriented to date or situation                                                                      Attention:  [  x  ] intact,   [    ]  other:                            Mood/Affect: [ x   ] wnl,    [    ]  other:                                                                             Communication: [    ]Fluent, no dysarthria, following commands:  [ x   ] other:  mild dysarthria  CN II - XII:  [  x  ] intact,  [    ] other:                                                                                        Motor:   RIGHT UE: [   ] WNL,  [ x  ] other: 4/5  LEFT    UE: [  x ] WNL,  [   ] other:  RIGHT LE: [   ] WNL,  [ x  ] other: 4/5  LEFT    LE: [x   ] WNL,  [   ] other:    Tone: [  x  ] wnl,   [    ]  other:  Coordination:   [   x ] intact,   [    ] other:                                                                           Sensory: [  x  ] Intact to light touch,   [    ] other:    MEDICATIONS  (STANDING):  amLODIPine   Tablet 2.5 milliGRAM(s) Oral at bedtime  apixaban 5 milliGRAM(s) Oral every 12 hours  aspirin  chewable 81 milliGRAM(s) Oral daily  atorvastatin 80 milliGRAM(s) Oral at bedtime  dextrose 50% Injectable 25 Gram(s) IV Push once  glucagon  Injectable 1 milliGRAM(s) IntraMuscular once  guaifenesin/dextromethorphan Oral Liquid 5 milliLiter(s) Oral every 8 hours  lisinopril 5 milliGRAM(s) Oral at bedtime  metFORMIN 500 milliGRAM(s) Oral two times a day with meals  metoprolol tartrate 25 milliGRAM(s) Oral two times a day  pantoprazole    Tablet 40 milliGRAM(s) Oral before breakfast  sertraline 25 milliGRAM(s) Oral daily  sodium chloride 0.9%. 1000 milliLiter(s) (75 mL/Hr) IV Continuous <Continuous>    MEDICATIONS  (PRN):  acetaminophen     Tablet .. 650 milliGRAM(s) Oral every 6 hours PRN Temp greater or equal to 38C (100.4F), Mild Pain (1 - 3)  aluminum hydroxide/magnesium hydroxide/simethicone Suspension 30 milliLiter(s) Oral every 4 hours PRN Dyspepsia  magnesium hydroxide Suspension 30 milliLiter(s) Oral daily PRN Constipation  melatonin 5 milliGRAM(s) Oral at bedtime PRN Insomnia      RECENT LABS/IMAGING                        11.7   9.13  )-----------( 202      ( 02 May 2022 04:30 )             35.7     05-02    139  |  100  |  22<H>  ----------------------------<  135<H>  4.5   |  30  |  1.1    Ca    9.4      02 May 2022 04:30    TPro  6.1  /  Alb  3.7  /  TBili  0.3  /  DBili  x   /  AST  16  /  ALT  16  /  AlkPhos  73  05-02        POCT Blood Glucose.: 125 mg/dL (05-02-22 @ 07:18)  POCT Blood Glucose.: 136 mg/dL (05-01-22 @ 17:02)  POCT Blood Glucose.: 124 mg/dL (05-01-22 @ 07:17)  POCT Blood Glucose.: 147 mg/dL (04-30-22 @ 16:21)  POCT Blood Glucose.: 121 mg/dL (04-30-22 @ 07:15)  POCT Blood Glucose.: 158 mg/dL (04-29-22 @ 21:18)  POCT Blood Glucose.: 192 mg/dL (04-29-22 @ 16:52)  POCT Blood Glucose.: 125 mg/dL (04-29-22 @ 07:59)  POCT Blood Glucose.: 179 mg/dL (04-28-22 @ 15:56)

## 2022-05-04 NOTE — PROGRESS NOTE ADULT - ASSESSMENT
ASSESSMENT/PLAN:  80 yo F with an acute ischemic infarct in the right cerebellum and left central kiara with right hemiparesis (dominant), dysphagia, dysarthria and aphasia with comorbidities of DM-2, a fib, HTN and  HLD. She warrants acute rehab with 3 hours of daily therapies including PT, OT and speech and close physiatry is monitoring for her CVA which has impacting her swallow and communication. Given her CVA and multiple significant comorbidities close physiatry follow up is needed and will reduce the risk of her requiring rehospitalization.   -Rehab of right cerebellar and Left central kiara CVA with right hemiparesis (dominant), Aphasia, Dysarthria  -Requires acute rehab with PT, OT, Speech, neuropsychology.     #Acute ischemic stroke of left central kiara (penetrating pontine arteries off basilar) and subacute ischemic in right cerebellum (R SCA territory).  - CT Head reviewed, no acute intracranial hemorrhage, stable exam since CT in 11/2021. 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 Moderate stenosis involving the right clinoid segment of the ICA due to atherosclerotic calcification. Mild stenosis of the bilateral proximal internal carotid arteries due to mixed calcified and noncalcified atherosclerotic plaque  - MRI Brain reviewed, showed diffusion abnormality in central aspect of the kiara and R cerebellum  - Fount to have atrial fiib, ASA/Plavix Dced. - Eliquis continued as 5mg BID.   - Continue Atorvastatin 80 mg daily  - Monitor BP closely and avoid hypotension  - Goal SBP <150  - [facility] evaluated the patient and recommended the aneurysm is stable, follow up as outpatient with Dr. 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    #HTN- BP low this morning, systolic BP abo9ut 100, on 4/29 and Lisinopril held.  - Goal SBP <150  - lisinopril 5 mg qhs  - metoprolol tartrate 25 mg po bid  - stopped NIFEdipine 30 mg po daily and monitor but BP systolic up to 186 at times. Will start Amlodipine 2.5 mg and monitor    #Poor endurance/ SIFUENTES  - Echo WNL  - long standing debility at home per family  - Monitor    # SOB/ wet cough since yesterday -   - Spo2 was 89-90% on RA  - 2L NC started on 5/2/22,   - 5.2.22 CXR: increasing right basilar opacity   - 5/2/22 viral panel positive for Coronavirus, negative for SARS  - Speech therapy asked to reeval for possible recurrent aspiration pneumonitis, barium swallow 5/3 improved from previous with Pureed and thin liquid. Needs 1 to 1 sit for pacing/ multiple swallows    #NIDDM  - A1c 6.4  - Metformin 500 bid  - controlled. Discontinue fingersticks    #HLD  - Continue Atorvastatin 80 mg qhs    -Pain control:   Tylenol prn    #Dysphagia  - Diet, Pureed:   Consistent Carbohydrate Evening Snack  DASH/TLC Sodium & Cholesterol Restricted  Thin Liquids     Precautions / PROPHYLAXIS:      - Falls    - Ortho: Weight bearing status: WBAT      - DVT prophylaxis: Eliquis   ASSESSMENT/PLAN:  82 yo F with an acute ischemic infarct in the right cerebellum and left central kiara with right hemiparesis (dominant), dysphagia, dysarthria and aphasia with comorbidities of DM-2, a fib, HTN and  HLD. She warrants acute rehab with 3 hours of daily therapies including PT, OT and speech and close physiatry is monitoring for her CVA which has impacting her swallow and communication. Given her CVA and multiple significant comorbidities close physiatry follow up is needed and will reduce the risk of her requiring rehospitalization.   -Rehab of right cerebellar and Left central kiara CVA with right hemiparesis (dominant), Aphasia, Dysarthria  -Requires acute rehab with PT, OT, Speech, neuropsychology.     #Acute ischemic stroke of left central kiara (penetrating pontine arteries off basilar) and subacute ischemic in right cerebellum (R SCA territory).  - CT Head reviewed, no acute intracranial hemorrhage, stable exam since CT in 11/2021. 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 Moderate stenosis involving the right clinoid segment of the ICA due to atherosclerotic calcification. Mild stenosis of the bilateral proximal internal carotid arteries due to mixed calcified and noncalcified atherosclerotic plaque  - MRI Brain reviewed, showed diffusion abnormality in central aspect of the kiara and R cerebellum  - Fount to have atrial fiib, ASA/Plavix Dced. - Eliquis continued as 5mg BID.   - Continue Atorvastatin 80 mg daily  - Monitor BP closely and avoid hypotension  - Goal SBP <150  - [facility] evaluated the patient and recommended the aneurysm is stable, follow up as outpatient with Dr. 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    #HTN- BP low this morning, systolic BP abo9ut 100, on 4/29 and Lisinopril held.  - Goal SBP <150  - lisinopril 5 mg qhs  - metoprolol tartrate 25 mg po bid  - stopped NIFEdipine 30 mg po daily and monitor but BP systolic up to 186 at times. Started Amlodipine 2.5 mg and monitor    # SOB/ wet cough x 2 days  - Spo2 was 89-90% on RA  - 2L NC started on 5/2/22,   - 5.2.22 CXR: increasing right basilar opacity   - 5/2/22 viral panel positive for Coronavirus, negative for SARS  - Speech therapy asked to reeval for possible recurrent aspiration pneumonitis, barium swallow 5/3 improved from previous with Pureed and thin liquid. Needs 1 to 1 sit for pacing/ multiple swallows  - Looks better today. Wean O2 and follow chest xray to resolution. Encourage Incentive Spirometer    #NIDDM  - A1c 6.4  - Metformin 500 bid  - controlled. Discontinue fingersticks    #HLD  - Continue Atorvastatin 80 mg qhs    -Pain control:   Tylenol prn    #Dysphagia  - Diet, Pureed:   Consistent Carbohydrate Evening Snack  DASH/TLC Sodium & Cholesterol Restricted  Thin Liquids     Precautions / PROPHYLAXIS:      - Falls    - Ortho: Weight bearing status: WBAT      - DVT prophylaxis: Eliquis

## 2022-05-05 LAB
ANION GAP SERPL CALC-SCNC: 11 MMOL/L — SIGNIFICANT CHANGE UP (ref 7–14)
BUN SERPL-MCNC: 21 MG/DL — HIGH (ref 10–20)
CALCIUM SERPL-MCNC: 9.1 MG/DL — SIGNIFICANT CHANGE UP (ref 8.5–10.1)
CHLORIDE SERPL-SCNC: 97 MMOL/L — LOW (ref 98–110)
CO2 SERPL-SCNC: 29 MMOL/L — SIGNIFICANT CHANGE UP (ref 17–32)
CREAT SERPL-MCNC: 0.9 MG/DL — SIGNIFICANT CHANGE UP (ref 0.7–1.5)
EGFR: 64 ML/MIN/1.73M2 — SIGNIFICANT CHANGE UP
GLUCOSE BLDC GLUCOMTR-MCNC: 121 MG/DL — HIGH (ref 70–99)
GLUCOSE SERPL-MCNC: 115 MG/DL — HIGH (ref 70–99)
HCT VFR BLD CALC: 34.1 % — LOW (ref 37–47)
HGB BLD-MCNC: 11.4 G/DL — LOW (ref 12–16)
MCHC RBC-ENTMCNC: 29 PG — SIGNIFICANT CHANGE UP (ref 27–31)
MCHC RBC-ENTMCNC: 33.4 G/DL — SIGNIFICANT CHANGE UP (ref 32–37)
MCV RBC AUTO: 86.8 FL — SIGNIFICANT CHANGE UP (ref 81–99)
NRBC # BLD: 0 /100 WBCS — SIGNIFICANT CHANGE UP (ref 0–0)
PLATELET # BLD AUTO: 194 K/UL — SIGNIFICANT CHANGE UP (ref 130–400)
POTASSIUM SERPL-MCNC: 4.4 MMOL/L — SIGNIFICANT CHANGE UP (ref 3.5–5)
POTASSIUM SERPL-SCNC: 4.4 MMOL/L — SIGNIFICANT CHANGE UP (ref 3.5–5)
RBC # BLD: 3.93 M/UL — LOW (ref 4.2–5.4)
RBC # FLD: 12.7 % — SIGNIFICANT CHANGE UP (ref 11.5–14.5)
SODIUM SERPL-SCNC: 137 MMOL/L — SIGNIFICANT CHANGE UP (ref 135–146)
WBC # BLD: 7.59 K/UL — SIGNIFICANT CHANGE UP (ref 4.8–10.8)
WBC # FLD AUTO: 7.59 K/UL — SIGNIFICANT CHANGE UP (ref 4.8–10.8)

## 2022-05-05 RX ORDER — SODIUM CHLORIDE 9 MG/ML
1000 INJECTION, SOLUTION INTRAVENOUS
Refills: 0 | Status: DISCONTINUED | OUTPATIENT
Start: 2022-05-05 | End: 2022-05-09

## 2022-05-05 RX ADMIN — METFORMIN HYDROCHLORIDE 500 MILLIGRAM(S): 850 TABLET ORAL at 08:16

## 2022-05-05 RX ADMIN — PANTOPRAZOLE SODIUM 40 MILLIGRAM(S): 20 TABLET, DELAYED RELEASE ORAL at 06:41

## 2022-05-05 RX ADMIN — Medication 25 MILLIGRAM(S): at 17:01

## 2022-05-05 RX ADMIN — SODIUM CHLORIDE 75 MILLILITER(S): 9 INJECTION INTRAMUSCULAR; INTRAVENOUS; SUBCUTANEOUS at 06:42

## 2022-05-05 RX ADMIN — LISINOPRIL 5 MILLIGRAM(S): 2.5 TABLET ORAL at 23:39

## 2022-05-05 RX ADMIN — AMLODIPINE BESYLATE 2.5 MILLIGRAM(S): 2.5 TABLET ORAL at 23:39

## 2022-05-05 RX ADMIN — METFORMIN HYDROCHLORIDE 500 MILLIGRAM(S): 850 TABLET ORAL at 17:01

## 2022-05-05 RX ADMIN — ATORVASTATIN CALCIUM 80 MILLIGRAM(S): 80 TABLET, FILM COATED ORAL at 23:39

## 2022-05-05 RX ADMIN — Medication 25 MILLIGRAM(S): at 06:41

## 2022-05-05 RX ADMIN — APIXABAN 5 MILLIGRAM(S): 2.5 TABLET, FILM COATED ORAL at 17:01

## 2022-05-05 RX ADMIN — Medication 81 MILLIGRAM(S): at 12:23

## 2022-05-05 RX ADMIN — APIXABAN 5 MILLIGRAM(S): 2.5 TABLET, FILM COATED ORAL at 06:41

## 2022-05-05 RX ADMIN — Medication 5 MILLILITER(S): at 07:19

## 2022-05-05 RX ADMIN — SODIUM CHLORIDE 75 MILLILITER(S): 9 INJECTION, SOLUTION INTRAVENOUS at 17:02

## 2022-05-05 RX ADMIN — SERTRALINE 25 MILLIGRAM(S): 25 TABLET, FILM COATED ORAL at 12:23

## 2022-05-05 RX ADMIN — Medication 5 MILLILITER(S): at 12:23

## 2022-05-05 RX ADMIN — Medication 5 MILLILITER(S): at 23:39

## 2022-05-05 NOTE — PROGRESS NOTE ADULT - ASSESSMENT
ASSESSMENT/PLAN:  80 yo F with an acute ischemic infarct in the right cerebellum and left central kiara with right hemiparesis (dominant), dysphagia, dysarthria and aphasia with comorbidities of DM-2, a fib, HTN and  HLD. She warrants acute rehab with 3 hours of daily therapies including PT, OT and speech and close physiatry is monitoring for her CVA which has impacting her swallow and communication. Given her CVA and multiple significant comorbidities close physiatry follow up is needed and will reduce the risk of her requiring rehospitalization.   -Rehab of right cerebellar and Left central kiara CVA with right hemiparesis (dominant), Aphasia, Dysarthria  -Requires acute rehab with PT, OT, Speech, neuropsychology.     #Acute ischemic stroke of left central kiara (penetrating pontine arteries off basilar) and subacute ischemic in right cerebellum (R SCA territory).  - CT Head reviewed, no acute intracranial hemorrhage, stable exam since CT in 11/2021. 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 Moderate stenosis involving the right clinoid segment of the ICA due to atherosclerotic calcification. Mild stenosis of the bilateral proximal internal carotid arteries due to mixed calcified and noncalcified atherosclerotic plaque  - MRI Brain reviewed, showed diffusion abnormality in central aspect of the kiara and R cerebellum  - Fount to have atrial fiib, ASA/Plavix Dced. - Eliquis continued as 5mg BID.   - Continue Atorvastatin 80 mg daily  - Monitor BP closely and avoid hypotension  - Goal SBP <150  - [facility] evaluated the patient and recommended the aneurysm is stable, follow up as outpatient with Dr. 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    #HTN- BP low this morning, systolic BP abo9ut 100, on 4/29 and Lisinopril held.  - Goal SBP <150  - lisinopril 5 mg qhs  - metoprolol tartrate 25 mg po bid  - stopped NIFEdipine 30 mg po daily and monitor but BP systolic up to 186 at times. Started Amlodipine 2.5 mg and monitor    # SOB/ wet cough x 2 days  - Spo2 was 89-90% on RA  - 2L NC started on 5/2/22,   - 5.2.22 CXR: increasing right basilar opacity   - 5/2/22 viral panel positive for Coronavirus, negative for SARS  - Speech therapy asked to reeval for possible recurrent aspiration pneumonitis, barium swallow 5/3 improved from previous with Pureed and thin liquid. Needs 1 to 1 sit for pacing/ multiple swallows  - Looks better today. Wean O2 and follow chest xray to resolution. Encourage Incentive Spirometer    #NIDDM  - A1c 6.4  - Metformin 500 bid  - controlled. Discontinue fingersticks    #HLD  - Continue Atorvastatin 80 mg qhs    -Pain control:   Tylenol prn    #Dysphagia  - Diet, Pureed:   Consistent Carbohydrate Evening Snack  DASH/TLC Sodium & Cholesterol Restricted  Thin Liquids     Precautions / PROPHYLAXIS:      - Falls    - Ortho: Weight bearing status: WBAT      - DVT prophylaxis: Eliquis   ASSESSMENT/PLAN:  80 yo F with an acute ischemic infarct in the right cerebellum and left central kiara with right hemiparesis (dominant), dysphagia, dysarthria and aphasia with comorbidities of DM-2, a fib, HTN and  HLD. She warrants acute rehab with 3 hours of daily therapies including PT, OT and speech and close physiatry is monitoring for her CVA which has impacting her swallow and communication. Given her CVA and multiple significant comorbidities close physiatry follow up is needed and will reduce the risk of her requiring rehospitalization.   -Rehab of right cerebellar and Left central kiara CVA with right hemiparesis (dominant), Aphasia, Dysarthria  -Requires acute rehab with PT, OT, Speech, neuropsychology.     #Acute ischemic stroke of left central kiara (penetrating pontine arteries off basilar) and subacute ischemic in right cerebellum (R SCA territory).  - CT Head reviewed, no acute intracranial hemorrhage, stable exam since CT in 11/2021. 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 Moderate stenosis involving the right clinoid segment of the ICA due to atherosclerotic calcification. Mild stenosis of the bilateral proximal internal carotid arteries due to mixed calcified and noncalcified atherosclerotic plaque  - MRI Brain reviewed, showed diffusion abnormality in central aspect of the kiara and R cerebellum  - Fount to have atrial fiib, ASA/Plavix Dced. - Eliquis continued as 5mg BID.   - Continue Atorvastatin 80 mg daily  - Monitor BP closely and avoid hypotension  - Goal SBP <150  - [facility] evaluated the patient and recommended the aneurysm is stable, follow up as outpatient with Dr. 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    #HTN- BP low this morning, systolic BP abo9ut 100, on 4/29 and Lisinopril held.  - Goal SBP <150  - lisinopril 5 mg qhs  - metoprolol tartrate 25 mg po bid  - stopped NIFEdipine 30 mg po daily and monitor but BP systolic up to 186 at times. Started Amlodipine 2.5 mg and monitor    # SOB/ wet cough x 2 days  - Spo2 was 89-90% on RA  - 2L NC started on 5/2/22,   - 5.2.22 CXR: increasing right basilar opacity   - 5/2/22 viral panel positive for Coronavirus, negative for SARS  - Speech therapy asked to reeval for possible recurrent aspiration pneumonitis, barium swallow 5/3 improved from previous with Pureed and thin liquid. Needs 1 to 1 sit for pacing/ multiple swallows  - Looks better today. O2 discontinued on 5/5/22. Encourage Incentive Spirometer    #NIDDM  - A1c 6.4  - Metformin 500 bid  - controlled. Discontinue fingersticks    #HLD  - Continue Atorvastatin 80 mg qhs    -Pain control:   Tylenol prn    #Dysphagia  - Diet, Pureed:   Consistent Carbohydrate Evening Snack  DASH/TLC Sodium & Cholesterol Restricted  Thin Liquids     Precautions / PROPHYLAXIS:      - Falls    - Ortho: Weight bearing status: WBAT      - DVT prophylaxis: Eliquis   ASSESSMENT/PLAN:  82 yo F with an acute ischemic infarct in the right cerebellum and left central kiara with right hemiparesis (dominant), dysphagia, dysarthria and aphasia with comorbidities of DM-2, a fib, HTN and  HLD. She warrants acute rehab with 3 hours of daily therapies including PT, OT and speech and close physiatry is monitoring for her CVA which has impacting her swallow and communication. Given her CVA and multiple significant comorbidities close physiatry follow up is needed and will reduce the risk of her requiring rehospitalization.   -Rehab of right cerebellar and Left central kiara CVA with right hemiparesis (dominant), Aphasia, Dysarthria  -Requires acute rehab with PT, OT, Speech, neuropsychology.     #Acute ischemic stroke of left central kiara (penetrating pontine arteries off basilar) and subacute ischemic in right cerebellum (R SCA territory).  - CT Head reviewed, no acute intracranial hemorrhage, stable exam since CT in 11/2021. 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 Moderate stenosis involving the right clinoid segment of the ICA due to atherosclerotic calcification. Mild stenosis of the bilateral proximal internal carotid arteries due to mixed calcified and noncalcified atherosclerotic plaque  - MRI Brain reviewed, showed diffusion abnormality in central aspect of the kiara and R cerebellum  - Fount to have atrial fiib, ASA/Plavix Dced. - Eliquis continued as 5mg BID.   - Continue Atorvastatin 80 mg daily  - Monitor BP closely and avoid hypotension  - Goal SBP <150  - [facility] evaluated the patient and recommended the aneurysm is stable, follow up as outpatient with Dr. 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    #HTN- BP was  low on Nifedipine  - Goal SBP <150  - lisinopril 5 mg qhs  - metoprolol tartrate 25 mg po bid  - stopped NIFEdipine 30 mg po daily and monitor but BP systolic up to 186 at times. Started Amlodipine 2.5 mg and monitor    # SOB/ wet cough x 3 days. Viral URI, Corona Virus positive, SARS negative  - Spo2 was 89-90% on RA  - 2L NC started on 5/2/22,   - 5.2.22 CXR: increasing right basilar opacity   - 5/2/22 viral panel positive for Coronavirus, negative for SARS  - Speech therapy asked to reeval for possible recurrent aspiration pneumonitis, barium swallow 5/3 improved from previous with Pureed and thin liquid. Needs 1 to 1 sit for pacing/ multiple swallows  - Looks better today. O2 discontinued trial on 5/5/22, but desatted to 89% with ambulation and 2 liters NC O2 resumed. Encourage Incentive Spirometer    #NIDDM  - A1c 6.4  - Metformin 500 bid  - controlled. Discontinue fingersticks    #HLD  - Continue Atorvastatin 80 mg qhs    -Pain control:   Tylenol prn    #Dysphagia  - Diet, Pureed:   Consistent Carbohydrate Evening Snack  DASH/TLC Sodium & Cholesterol Restricted  Thin Liquids     Precautions / PROPHYLAXIS:      - Falls    - Ortho: Weight bearing status: WBAT      - DVT prophylaxis: Eliquis

## 2022-05-05 NOTE — PROGRESS NOTE ADULT - ATTENDING COMMENTS
I reviewed the chart and examined the patient with the resident and we discussed the findings and treatment plan.  The patient is tolerating the rehab program well. I agree with the findings and treatment plan above, which I modified as indicated. The patient requires 3 hrs a day of acute inpatient rehab. Doing better, but still coughing. Pulse ox walking in RA was 89%. Continue NC O2 for now. Transfer and ambulates few steps with mod/ max assistance. Family insists on taking patient home when medically stable and will provide care 24 x 7. URI will hopefully improve by Monday and I hope to wean O2 by then for d/c home. Continue full rehab program.    I read, edited and agree with the Assessment:  #Acute ischemic stroke of left central kiara (penetrating pontine arteries off basilar) and subacute ischemic in right cerebellum (R SCA territory).  - CT Head reviewed, no acute intracranial hemorrhage, stable exam since CT in 11/2021. 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 Moderate stenosis involving the right clinoid segment of the ICA due to atherosclerotic calcification. Mild stenosis of the bilateral proximal internal carotid arteries due to mixed calcified and noncalcified atherosclerotic plaque  - MRI Brain reviewed, showed diffusion abnormality in central aspect of the kiara and R cerebellum  - Fount to have atrial fiib, ASA/Plavix Dced. - Eliquis continued as 5mg BID.   - Continue Atorvastatin 80 mg daily  - Monitor BP closely and avoid hypotension  - Goal SBP <150  - [facility] evaluated the patient and recommended the aneurysm is stable, follow up as outpatient with Dr. 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    #HTN- BP was  low on Nifedipine  - Goal SBP <150  - lisinopril 5 mg qhs  - metoprolol tartrate 25 mg po bid  - stopped NIFEdipine 30 mg po daily and monitor but BP systolic up to 186 at times. Started Amlodipine 2.5 mg and monitor    # SOB/ wet cough x 3 days/ Viral URI, Corona virus positive, SARS negative  - Spo2 was 89-90% on RA  - 2L NC started on 5/2/22,   - 5.2.22 CXR: increasing right basilar opacity   - 5/2/22 viral panel positive for Coronavirus, negative for SARS  - Speech therapy asked to reeval for possible recurrent aspiration pneumonitis, barium swallow 5/3 improved from previous with Pureed and thin liquid. Needs 1 to 1 sit for pacing/ multiple swallows  - Looks better today. O2 discontinued trial on 5/5/22, but desatted to 89% with ambulation and 2 liters NC O2 resumed. Encourage Incentive Spirometer    #NIDDM  - A1c 6.4  - Metformin 500 bid  - controlled. Discontinue fingersticks    #HLD  - Continue Atorvastatin 80 mg qhs    -Pain control:   Tylenol prn    #Dysphagia  - Diet, Pureed:   Consistent Carbohydrate Evening Snack  DASH/TLC Sodium & Cholesterol Restricted  Thin Liquids

## 2022-05-05 NOTE — PROGRESS NOTE ADULT - SUBJECTIVE AND OBJECTIVE BOX
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 Patient denied having any urinary or bowel incontinence and stated feeling weak for a few days and being unable to stand on her right leg. Work up included a CT C-spine which showed no acute fractures. 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 MRI brain showed acute ischemic stroke of left central kiara (penetrating pontine arteries off basilar) and subacute ischemic in right cerebellum (R SCA territory). Subtle right hemiparesis on was noted on exam, and patient was transferred to stroke unit for further monitoring.     Patient found to be in atrial fibrillation Cardiology consulted a-fib and decision for anticoagulation. Patient started on Eliquis 5mg BID and atorvastatin 80 mg Qd. Passed speech/swallow eval and advanced to pureed diet. [facility] was consulted and evaluated the patient recommending outpatient follow up with Dr. Goldstein and stroke clinic. At this time patient declines proceeding with treatment for stable aneurysm.    PM&R consulted for admission to  for acute inpatient rehabilitation. Prior to admission, patient was independent w/ < from: Xray Chest 1 View- PORTABLE-Routine (Xray Chest 1 View- PORTABLE-Routine .) (05.02.22 @ 11:11) >  Persistent left basilar atelectasis. Increasing   right basilar opacity.    < end of copied text >  ambulation using  straight cane and required assistance from children with ADLs. Resides w/ children; in a house with 3 steps to enter and none inside    Patient is a good candidate for admission to acute inpatient rehabilitation for R cerebellar and L central kiara CVA with right sided hemiplegia (dominant) with comorbidities of DM2, HTN, dyslipidemia, and atrial fibrilation requiring hospital level supervision. She was deemed medically stable for discharge to  on 4/19/22 where she will undergo intensive restorative therapies 3 hours a day for at least 5 days a week with the goal of regaining the patients independence and discharging her home with family care   (19 Apr 2022 14:28)    TODAY'S SUBJECTIVE & REVIEW OF SYMPTOMS:  Patient seen this AM by the rehabilitation team. No acute events overnight. Unable to obtain Nigerian  who speaks patient's specific dialect.   Patient appears comfortable this am. Cough has improved.    CLOF: ModA for bed mobility and ModA/MaxA with transfers. Ambulates 10' and 8' with RW Mod-MaxA.  ModA for sit to stand transfers, WC to bed transfers, sit to lying transfers; eating with set up.       Review of Systems:   Constitutional    [ x  ] WNL           [   ] poor appetite   [   ] insomnia   [   ] tired   Cardio:                [  x ] WNL           [   ] CP   [   ] SIFUENTES   [   ] palpitations               Resp:                   [  x ] WNL           [   ] SOB   [   ] cough   [   ] wheezing   GI:                        [ x  ] WNL           [   ] constipation   [   ] diarrhea   [   ] abdominal pain   [   ] nausea   [   ] emesis                                :                      [ x  ] WNL           [   ] ECHOLS  [   ] dysuria   [   ] difficulty voiding             Endo:                   [ x  ] WNL          [   ] polyuria   [   ] temperature intolerance                 Skin:                     [x   ] WNL          [   ] pain   [   ] wound   [   ] rash   MSK:                    [x   ] WNL          [   ] muscle pain   [   ] joint pain/ stiffness   [   ] muscle tenderness   [   ] swelling   Neuro:                 [   ] WNL          [   ] HA   [   ] change in vision   [   ] tremor   [  x ] weakness right sided   [  x ]dysphagia              Cognitive:           [   ] WNL           [ x  ]confusion      Psych:                  [x   ] WNL           [   ] hallucinations   [   ]agitation   [   ] delusion   [   ]depression    PHYSICAL EXAM    Vital Signs Last 24 Hrs  T(C): 36.8 (05 May 2022 07:30), Max: 36.9 (04 May 2022 21:34)  T(F): 98.2 (05 May 2022 07:30), Max: 98.5 (04 May 2022 21:34)  HR: 85 (05 May 2022 07:30) (67 - 85)  BP: 150/75 (05 May 2022 07:30) (140/76 - 166/82)  BP(mean): 115 (04 May 2022 21:34) (115 - 115)  RR: 18 (05 May 2022 07:30) (18 - 20)  SpO2: --    General:[ x  ] NAD, Resting Comfortable,   [   ] other:                                HEENT: [  x ] NC/AT, EOMI, PERRL , Normal Conjunctivae,   [   ] other:  Cardio: [   ] RRR, no murmur   [ x  ] other:  irregularly irregular rhythm                            Pulm: [ x  ] No Respiratory Distress,  Lungs CTAB,   [   ] other:                       Abdomen: [ x  ]ND/NT, Soft,   [   ] other:    : [  x ] NO ECHOLS CATHETER, [   ] ECHOLS CATHETER- no meatal tear, no discharge, [   ] other:                                            MSK: [   x] No joint swelling, Full ROM,   [   ] other:                                         Ext: [  x ]No C/C/E, No calf tenderness,   [   ]other:    Skin: [ x  ]intact,   [   ] other:                                                                   Neurological Examination:  Cognitive: [    ] AAO x 3,   [   x ]  other: Alert and oriented to person and place. Not oriented to date or situation                                                                      Attention:  [  x  ] intact,   [    ]  other:                            Mood/Affect: [ x   ] wnl,    [    ]  other:                                                                             Communication: [    ]Fluent, no dysarthria, following commands:  [ x   ] other:  mild dysarthria  CN II - XII:  [  x  ] intact,  [    ] other:                                                                                        Motor:   RIGHT UE: [   ] WNL,  [ x  ] other: 4/5  LEFT    UE: [  x ] WNL,  [   ] other:  RIGHT LE: [   ] WNL,  [ x  ] other: 4/5  LEFT    LE: [x   ] WNL,  [   ] other:    Tone: [  x  ] wnl,   [    ]  other:  Coordination:   [   x ] intact,   [    ] other:                                                                           Sensory: [  x  ] Intact to light touch,   [    ] other:    MEDICATIONS  (STANDING):  amLODIPine   Tablet 2.5 milliGRAM(s) Oral at bedtime  apixaban 5 milliGRAM(s) Oral every 12 hours  aspirin  chewable 81 milliGRAM(s) Oral daily  atorvastatin 80 milliGRAM(s) Oral at bedtime  dextrose 50% Injectable 25 Gram(s) IV Push once  glucagon  Injectable 1 milliGRAM(s) IntraMuscular once  guaifenesin/dextromethorphan Oral Liquid 5 milliLiter(s) Oral every 8 hours  lisinopril 5 milliGRAM(s) Oral at bedtime  metFORMIN 500 milliGRAM(s) Oral two times a day with meals  metoprolol tartrate 25 milliGRAM(s) Oral two times a day  pantoprazole    Tablet 40 milliGRAM(s) Oral before breakfast  sertraline 25 milliGRAM(s) Oral daily  sodium chloride 0.9%. 1000 milliLiter(s) (75 mL/Hr) IV Continuous <Continuous>  sodium chloride 0.9%. 1000 milliLiter(s) (75 mL/Hr) IV Continuous <Continuous>    MEDICATIONS  (PRN):  acetaminophen     Tablet .. 650 milliGRAM(s) Oral every 6 hours PRN Temp greater or equal to 38C (100.4F), Mild Pain (1 - 3)  aluminum hydroxide/magnesium hydroxide/simethicone Suspension 30 milliLiter(s) Oral every 4 hours PRN Dyspepsia  magnesium hydroxide Suspension 30 milliLiter(s) Oral daily PRN Constipation  melatonin 5 milliGRAM(s) Oral at bedtime PRN Insomnia        RECENT LABS/IMAGING                        11.7   9.13  )-----------( 202      ( 02 May 2022 04:30 )             35.7     05-02    139  |  100  |  22<H>  ----------------------------<  135<H>  4.5   |  30  |  1.1    Ca    9.4      02 May 2022 04:30    TPro  6.1  /  Alb  3.7  /  TBili  0.3  /  DBili  x   /  AST  16  /  ALT  16  /  AlkPhos  73  05-02        POCT Blood Glucose.: 125 mg/dL (05-02-22 @ 07:18)  POCT Blood Glucose.: 136 mg/dL (05-01-22 @ 17:02)  POCT Blood Glucose.: 124 mg/dL (05-01-22 @ 07:17)  POCT Blood Glucose.: 147 mg/dL (04-30-22 @ 16:21)  POCT Blood Glucose.: 121 mg/dL (04-30-22 @ 07:15)  POCT Blood Glucose.: 158 mg/dL (04-29-22 @ 21:18)  POCT Blood Glucose.: 192 mg/dL (04-29-22 @ 16:52)  POCT Blood Glucose.: 125 mg/dL (04-29-22 @ 07:59)  POCT Blood Glucose.: 179 mg/dL (04-28-22 @ 15:56)             Patient is a 81y old  Female who presents with a chief complaint of 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 Patient denied having any urinary or bowel incontinence and stated feeling weak for a few days and being unable to stand on her right leg. Work up included a CT C-spine which showed no acute fractures. 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 MRI brain showed acute ischemic stroke of left central kiara (penetrating pontine arteries off basilar) and subacute ischemic in right cerebellum (R SCA territory). Subtle right hemiparesis on was noted on exam, and patient was transferred to stroke unit for further monitoring.     Patient found to be in atrial fibrillation Cardiology consulted a-fib and decision for anticoagulation. Patient started on Eliquis 5mg BID and atorvastatin 80 mg Qd. Passed speech/swallow eval and advanced to pureed diet. [facility]end[facility] was consulted and evaluated the patient recommending outpatient follow up with Dr. Goldstein and stroke clinic. At this time patient declines proceeding with treatment for stable aneurysm.    PM&R consulted for admission to  for acute inpatient rehabilitation. Prior to admission, patient was independent w/ < from: Xray Chest 1 View- PORTABLE-Routine (Xray Chest 1 View- PORTABLE-Routine .) (05.02.22 @ 11:11) >  Persistent left basilar atelectasis. Increasing   right basilar opacity.    < end of copied text >  ambulation using  straight cane and required assistance from children with ADLs. Resides w/ children; in a house with 3 steps to enter and none inside    Patient is a good candidate for admission to acute inpatient rehabilitation for R cerebellar and L central kiara CVA with right sided hemiplegia (dominant) with comorbidities of DM2, HTN, dyslipidemia, and atrial fibrilation requiring hospital level supervision. She was deemed medically stable for discharge to  on 4/19/22 where she will undergo intensive restorative therapies 3 hours a day for at least 5 days a week with the goal of regaining the patients independence and discharging her home with family care   (19 Apr 2022 14:28)    TODAY'S SUBJECTIVE & REVIEW OF SYMPTOMS:  Patient seen this AM by the rehabilitation team. No acute events overnight. Unable to obtain Stateless  who speaks patient's specific dialect.   Patient appears comfortable this am. Cough has improved. Patient is saturating at 95% on room air; will discontinue nasal cannula.     CLOF: ModA for bed mobility and ModA/MaxA with transfers. Ambulates 10' and 8' with RW Mod-MaxA.  ModA for sit to stand transfers, WC to bed transfers, sit to lying transfers; eating with set up.       Review of Systems:   Constitutional    [ x  ] WNL           [   ] poor appetite   [   ] insomnia   [   ] tired   Cardio:                [  x ] WNL           [   ] CP   [   ] SIFUENTES   [   ] palpitations               Resp:                   [  x ] WNL           [   ] SOB   [   ] cough   [   ] wheezing   GI:                        [ x  ] WNL           [   ] constipation   [   ] diarrhea   [   ] abdominal pain   [   ] nausea   [   ] emesis                                :                      [ x  ] WNL           [   ] ECHOLS  [   ] dysuria   [   ] difficulty voiding             Endo:                   [ x  ] WNL          [   ] polyuria   [   ] temperature intolerance                 Skin:                     [x   ] WNL          [   ] pain   [   ] wound   [   ] rash   MSK:                    [x   ] WNL          [   ] muscle pain   [   ] joint pain/ stiffness   [   ] muscle tenderness   [   ] swelling   Neuro:                 [   ] WNL          [   ] HA   [   ] change in vision   [   ] tremor   [  x ] weakness right sided   [  x ]dysphagia              Cognitive:           [   ] WNL           [ x  ]confusion      Psych:                  [x   ] WNL           [   ] hallucinations   [   ]agitation   [   ] delusion   [   ]depression    PHYSICAL EXAM    Vital Signs Last 24 Hrs  T(C): 36.8 (05 May 2022 07:30), Max: 36.9 (04 May 2022 21:34)  T(F): 98.2 (05 May 2022 07:30), Max: 98.5 (04 May 2022 21:34)  HR: 85 (05 May 2022 07:30) (67 - 85)  BP: 150/75 (05 May 2022 07:30) (140/76 - 166/82)  BP(mean): 115 (04 May 2022 21:34) (115 - 115)  RR: 18 (05 May 2022 07:30) (18 - 20)  SpO2: --    General:[ x  ] NAD, Resting Comfortable,   [   ] other:                                HEENT: [  x ] NC/AT, EOMI, PERRL , Normal Conjunctivae,   [   ] other:  Cardio: [   ] RRR, no murmur   [ x  ] other:  irregularly irregular rhythm                            Pulm: [ x  ] No Respiratory Distress,  Lungs CTAB,   [   ] other:                       Abdomen: [ x  ]ND/NT, Soft,   [   ] other:    : [  x ] NO ECHOLS CATHETER, [   ] ECHOLS CATHETER- no meatal tear, no discharge, [   ] other:                                            MSK: [   x] No joint swelling, Full ROM,   [   ] other:                                         Ext: [  x ]No C/C/E, No calf tenderness,   [   ]other:    Skin: [ x  ]intact,   [   ] other:                                                                   Neurological Examination:  Cognitive: [    ] AAO x 3,   [   x ]  other: Alert and oriented to person and place. Not oriented to date or situation                                                                      Attention:  [  x  ] intact,   [    ]  other:                            Mood/Affect: [ x   ] wnl,    [    ]  other:                                                                             Communication: [    ]Fluent, no dysarthria, following commands:  [ x   ] other:  mild dysarthria  CN II - XII:  [  x  ] intact,  [    ] other:                                                                                        Motor:   RIGHT UE: [   ] WNL,  [ x  ] other: 4/5  LEFT    UE: [  x ] WNL,  [   ] other:  RIGHT LE: [   ] WNL,  [ x  ] other: 4/5  LEFT    LE: [x   ] WNL,  [   ] other:    Tone: [  x  ] wnl,   [    ]  other:  Coordination:   [   x ] intact,   [    ] other:                                                                           Sensory: [  x  ] Intact to light touch,   [    ] other:    MEDICATIONS  (STANDING):  amLODIPine   Tablet 2.5 milliGRAM(s) Oral at bedtime  apixaban 5 milliGRAM(s) Oral every 12 hours  aspirin  chewable 81 milliGRAM(s) Oral daily  atorvastatin 80 milliGRAM(s) Oral at bedtime  dextrose 50% Injectable 25 Gram(s) IV Push once  glucagon  Injectable 1 milliGRAM(s) IntraMuscular once  guaifenesin/dextromethorphan Oral Liquid 5 milliLiter(s) Oral every 8 hours  lisinopril 5 milliGRAM(s) Oral at bedtime  metFORMIN 500 milliGRAM(s) Oral two times a day with meals  metoprolol tartrate 25 milliGRAM(s) Oral two times a day  pantoprazole    Tablet 40 milliGRAM(s) Oral before breakfast  sertraline 25 milliGRAM(s) Oral daily  sodium chloride 0.9%. 1000 milliLiter(s) (75 mL/Hr) IV Continuous <Continuous>  sodium chloride 0.9%. 1000 milliLiter(s) (75 mL/Hr) IV Continuous <Continuous>    MEDICATIONS  (PRN):  acetaminophen     Tablet .. 650 milliGRAM(s) Oral every 6 hours PRN Temp greater or equal to 38C (100.4F), Mild Pain (1 - 3)  aluminum hydroxide/magnesium hydroxide/simethicone Suspension 30 milliLiter(s) Oral every 4 hours PRN Dyspepsia  magnesium hydroxide Suspension 30 milliLiter(s) Oral daily PRN Constipation  melatonin 5 milliGRAM(s) Oral at bedtime PRN Insomnia        RECENT LABS/IMAGING                        11.7   9.13  )-----------( 202      ( 02 May 2022 04:30 )             35.7     05-02    139  |  100  |  22<H>  ----------------------------<  135<H>  4.5   |  30  |  1.1    Ca    9.4      02 May 2022 04:30    TPro  6.1  /  Alb  3.7  /  TBili  0.3  /  DBili  x   /  AST  16  /  ALT  16  /  AlkPhos  73  05-02        POCT Blood Glucose.: 125 mg/dL (05-02-22 @ 07:18)  POCT Blood Glucose.: 136 mg/dL (05-01-22 @ 17:02)  POCT Blood Glucose.: 124 mg/dL (05-01-22 @ 07:17)  POCT Blood Glucose.: 147 mg/dL (04-30-22 @ 16:21)  POCT Blood Glucose.: 121 mg/dL (04-30-22 @ 07:15)  POCT Blood Glucose.: 158 mg/dL (04-29-22 @ 21:18)  POCT Blood Glucose.: 192 mg/dL (04-29-22 @ 16:52)  POCT Blood Glucose.: 125 mg/dL (04-29-22 @ 07:59)  POCT Blood Glucose.: 179 mg/dL (04-28-22 @ 15:56)             Patient is a 81y old  Female who presents with a chief complaint of Right cerebellar and Left central kiara CVA with right hemiplegia (dominant), Aphasia, Dysarthria (19 Apr 2022 14:28)      HPI:  Patient is a 82 yo Sammarinese speaking F with PMHx of HTN, HLD, DM, left MCA aneurysm, depression, who presented to the ED after an unwitnessed fall on 4/12/22. Patient denied having any urinary or bowel incontinence and stated feeling weak for a few days and being unable to stand on her right leg. Work up included a CT C-spine which showed no acute fractures. 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 MRI brain showed acute ischemic stroke of left central kiara (penetrating pontine arteries off basilar) and subacute ischemic in right cerebellum (R SCA territory). Subtle right hemiparesis on was noted on exam, and patient was transferred to stroke unit for further monitoring.     Patient found to be in atrial fibrillation Cardiology consulted a-fib and decision for anticoagulation. Patient started on Eliquis 5mg BID and atorvastatin 80 mg Qd. Passed speech/swallow eval and advanced to pureed diet. Neuroendovascular was consulted and evaluated the patient recommending outpatient follow up with Dr. Goldstein and stroke clinic. At this time patient declines proceeding with treatment for stable aneurysm.    PM&R consulted for admission to  for acute inpatient rehabilitation. Prior to admission, patient was independent w/ < from: Xray Chest 1 View- PORTABLE-Routine (Xray Chest 1 View- PORTABLE-Routine .) (05.02.22 @ 11:11) >  Persistent left basilar atelectasis. Increasing   right basilar opacity.    < end of copied text >  ambulation using  straight cane and required assistance from children with ADLs. Resides w/ children; in a house with 3 steps to enter and none inside    Patient is a good candidate for admission to acute inpatient rehabilitation for R cerebellar and L central kiara CVA with right sided hemiplegia (dominant) with comorbidities of DM2, HTN, dyslipidemia, and atrial fibrilation requiring hospital level supervision. She was deemed medically stable for discharge to  on 4/19/22 where she will undergo intensive restorative therapies 3 hours a day for at least 5 days a week with the goal of regaining the patients independence and discharging her home with family care   (19 Apr 2022 14:28)    TODAY'S SUBJECTIVE & REVIEW OF SYMPTOMS:  Patient seen this AM by the rehabilitation team. No acute events overnight. Unable to obtain Sammarinese  who speaks patient's specific dialect.   Patient appears comfortable this am. Cough has improved. Patient is saturating at 95% on room air; will discontinue nasal cannula.     CLOF: ModA for bed mobility and ModA/MaxA with transfers. Ambulates 10' and 8' with RW Mod-MaxA.  ModA for sit to stand transfers, WC to bed transfers, sit to lying transfers; eating with set up.       Review of Systems:   Constitutional    [ x  ] WNL           [   ] poor appetite   [   ] insomnia   [   ] tired   Cardio:                [  x ] WNL           [   ] CP   [   ] SIFUENTES   [   ] palpitations               Resp:                   [  x ] WNL           [   ] SOB   [   ] cough   [   ] wheezing   GI:                        [ x  ] WNL           [   ] constipation   [   ] diarrhea   [   ] abdominal pain   [   ] nausea   [   ] emesis                                :                      [ x  ] WNL           [   ] ECHOLS  [   ] dysuria   [   ] difficulty voiding             Endo:                   [ x  ] WNL          [   ] polyuria   [   ] temperature intolerance                 Skin:                     [x   ] WNL          [   ] pain   [   ] wound   [   ] rash   MSK:                    [x   ] WNL          [   ] muscle pain   [   ] joint pain/ stiffness   [   ] muscle tenderness   [   ] swelling   Neuro:                 [   ] WNL          [   ] HA   [   ] change in vision   [   ] tremor   [  x ] weakness right sided   [  x ]dysphagia              Cognitive:           [   ] WNL           [ x  ]confusion      Psych:                  [x   ] WNL           [   ] hallucinations   [   ]agitation   [   ] delusion   [   ]depression    PHYSICAL EXAM    Vital Signs Last 24 Hrs  T(C): 36.8 (05 May 2022 07:30), Max: 36.9 (04 May 2022 21:34)  T(F): 98.2 (05 May 2022 07:30), Max: 98.5 (04 May 2022 21:34)  HR: 85 (05 May 2022 07:30) (67 - 85)  BP: 150/75 (05 May 2022 07:30) (140/76 - 166/82)  BP(mean): 115 (04 May 2022 21:34) (115 - 115)  RR: 18 (05 May 2022 07:30) (18 - 20)  SpO2: --    General:[ x  ] NAD, Resting Comfortable,   [   ] other:                                HEENT: [  x ] NC/AT, EOMI, PERRL , Normal Conjunctivae,   [   ] other:  Cardio: [   ] RRR, no murmur   [ x  ] other:  irregularly irregular rhythm                            Pulm: [ x  ] No Respiratory Distress,  Lungs CTAB,   [   ] other:                       Abdomen: [ x  ]ND/NT, Soft,   [   ] other:    : [  x ] NO ECHOLS CATHETER, [   ] ECHOLS CATHETER- no meatal tear, no discharge, [   ] other:                                            MSK: [   x] No joint swelling, Full ROM,   [   ] other:                                         Ext: [  x ]No C/C/E, No calf tenderness,   [   ]other:    Skin: [ x  ]intact,   [   ] other:                                                                   Neurological Examination:  Cognitive: [    ] AAO x 3,   [   x ]  other: Alert and oriented to person and place. Not oriented to date or situation                                                                      Attention:  [  x  ] intact,   [    ]  other:                            Mood/Affect: [ x   ] wnl,    [    ]  other:                                                                             Communication: [    ]Fluent, no dysarthria, following commands:  [ x   ] other:  mild dysarthria  CN II - XII:  [  x  ] intact,  [    ] other:                                                                                        Motor:   RIGHT UE: [   ] WNL,  [ x  ] other: 4/5  LEFT    UE: [  x ] WNL,  [   ] other:  RIGHT LE: [   ] WNL,  [ x  ] other: 4/5  LEFT    LE: [x   ] WNL,  [   ] other:    Tone: [  x  ] wnl,   [    ]  other:  Coordination:   [   x ] intact,   [    ] other:                                                                           Sensory: [  x  ] Intact to light touch,   [    ] other:    MEDICATIONS  (STANDING):  amLODIPine   Tablet 2.5 milliGRAM(s) Oral at bedtime  apixaban 5 milliGRAM(s) Oral every 12 hours  aspirin  chewable 81 milliGRAM(s) Oral daily  atorvastatin 80 milliGRAM(s) Oral at bedtime  dextrose 50% Injectable 25 Gram(s) IV Push once  glucagon  Injectable 1 milliGRAM(s) IntraMuscular once  guaifenesin/dextromethorphan Oral Liquid 5 milliLiter(s) Oral every 8 hours  lisinopril 5 milliGRAM(s) Oral at bedtime  metFORMIN 500 milliGRAM(s) Oral two times a day with meals  metoprolol tartrate 25 milliGRAM(s) Oral two times a day  pantoprazole    Tablet 40 milliGRAM(s) Oral before breakfast  sertraline 25 milliGRAM(s) Oral daily  sodium chloride 0.9%. 1000 milliLiter(s) (75 mL/Hr) IV Continuous <Continuous>  sodium chloride 0.9%. 1000 milliLiter(s) (75 mL/Hr) IV Continuous <Continuous>    MEDICATIONS  (PRN):  acetaminophen     Tablet .. 650 milliGRAM(s) Oral every 6 hours PRN Temp greater or equal to 38C (100.4F), Mild Pain (1 - 3)  aluminum hydroxide/magnesium hydroxide/simethicone Suspension 30 milliLiter(s) Oral every 4 hours PRN Dyspepsia  magnesium hydroxide Suspension 30 milliLiter(s) Oral daily PRN Constipation  melatonin 5 milliGRAM(s) Oral at bedtime PRN Insomnia        RECENT LABS/IMAGING                          11.4   7.59  )-----------( 194      ( 05 May 2022 06:40 )             34.1     05-05    137  |  97<L>  |  21<H>  ----------------------------<  115<H>  4.4   |  29  |  0.9    Ca    9.1      05 May 2022 06:40      POCT Blood Glucose.: 121 mg/dL (05-05-22 @ 07:07)  POCT Blood Glucose.: 150 mg/dL (05-04-22 @ 16:44)  POCT Blood Glucose.: 136 mg/dL (05-04-22 @ 07:09)  POCT Blood Glucose.: 112 mg/dL (05-03-22 @ 07:29)  POCT Blood Glucose.: 137 mg/dL (05-02-22 @ 16:50)  POCT Blood Glucose.: 125 mg/dL (05-02-22 @ 07:18)  POCT Blood Glucose.: 136 mg/dL (05-01-22 @ 17:02)

## 2022-05-06 LAB
GLUCOSE BLDC GLUCOMTR-MCNC: 118 MG/DL — HIGH (ref 70–99)
GLUCOSE BLDC GLUCOMTR-MCNC: 128 MG/DL — HIGH (ref 70–99)

## 2022-05-06 PROCEDURE — 71045 X-RAY EXAM CHEST 1 VIEW: CPT | Mod: 26

## 2022-05-06 RX ADMIN — APIXABAN 5 MILLIGRAM(S): 2.5 TABLET, FILM COATED ORAL at 06:14

## 2022-05-06 RX ADMIN — Medication 5 MILLILITER(S): at 06:15

## 2022-05-06 RX ADMIN — Medication 81 MILLIGRAM(S): at 12:06

## 2022-05-06 RX ADMIN — Medication 25 MILLIGRAM(S): at 18:08

## 2022-05-06 RX ADMIN — METFORMIN HYDROCHLORIDE 500 MILLIGRAM(S): 850 TABLET ORAL at 18:08

## 2022-05-06 RX ADMIN — METFORMIN HYDROCHLORIDE 500 MILLIGRAM(S): 850 TABLET ORAL at 09:03

## 2022-05-06 RX ADMIN — PANTOPRAZOLE SODIUM 40 MILLIGRAM(S): 20 TABLET, DELAYED RELEASE ORAL at 06:14

## 2022-05-06 RX ADMIN — Medication 25 MILLIGRAM(S): at 06:14

## 2022-05-06 RX ADMIN — APIXABAN 5 MILLIGRAM(S): 2.5 TABLET, FILM COATED ORAL at 18:08

## 2022-05-06 RX ADMIN — Medication 5 MILLILITER(S): at 13:48

## 2022-05-06 RX ADMIN — SERTRALINE 25 MILLIGRAM(S): 25 TABLET, FILM COATED ORAL at 12:06

## 2022-05-06 NOTE — PROGRESS NOTE ADULT - ASSESSMENT
ASSESSMENT/PLAN:  80 yo F with an acute ischemic infarct in the right cerebellum and left central kiara with right hemiparesis (dominant), dysphagia, dysarthria and aphasia with comorbidities of DM-2, a fib, HTN and  HLD. She warrants acute rehab with 3 hours of daily therapies including PT, OT and speech and close physiatry is monitoring for her CVA which has impacting her swallow and communication. Given her CVA and multiple significant comorbidities close physiatry follow up is needed and will reduce the risk of her requiring rehospitalization.   -Rehab of right cerebellar and Left central kiara CVA with right hemiparesis (dominant), Aphasia, Dysarthria  -Requires acute rehab with PT, OT, Speech, neuropsychology.     #Acute ischemic stroke of left central kiara (penetrating pontine arteries off basilar) and subacute ischemic in right cerebellum (R SCA territory).  - CT Head reviewed, no acute intracranial hemorrhage, stable exam since CT in 11/2021. 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 Moderate stenosis involving the right clinoid segment of the ICA due to atherosclerotic calcification. Mild stenosis of the bilateral proximal internal carotid arteries due to mixed calcified and noncalcified atherosclerotic plaque  - MRI Brain reviewed, showed diffusion abnormality in central aspect of the kiara and R cerebellum  - Fount to have atrial fiib, ASA/Plavix Dced. - Eliquis continued as 5mg BID.   - Continue Atorvastatin 80 mg daily  - Monitor BP closely and avoid hypotension  - Goal SBP <150  - [facility] evaluated the patient and recommended the aneurysm is stable, follow up as outpatient with Dr. 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    #HTN- BP was  low on Nifedipine  - Goal SBP <150  - lisinopril 5 mg qhs  - metoprolol tartrate 25 mg po bid  - stopped NIFEdipine 30 mg po daily and monitor but BP systolic up to 186 at times. Started Amlodipine 2.5 mg and monitor    # SOB/ wet cough x 3 days. Viral URI, Corona Virus positive, SARS negative  - Spo2 was 89-90% on RA  - 2L NC started on 5/2/22,   - 5.2.22 CXR: increasing right basilar opacity   - 5/2/22 viral panel positive for Coronavirus, negative for SARS  - Speech therapy asked to reeval for possible recurrent aspiration pneumonitis, barium swallow 5/3 improved from previous with Pureed and thin liquid. Needs 1 to 1 sit for pacing/ multiple swallows  - Looks better today. O2 discontinued trial on 5/5/22, but desatted to 89% with ambulation and 2 liters NC O2 resumed. Encourage Incentive Spirometer    #NIDDM  - A1c 6.4  - Metformin 500 bid  - controlled. Discontinue fingersticks    #HLD  - Continue Atorvastatin 80 mg qhs    -Pain control:   Tylenol prn    #Dysphagia  - Diet, Pureed:   Consistent Carbohydrate Evening Snack  DASH/TLC Sodium & Cholesterol Restricted  Thin Liquids     Precautions / PROPHYLAXIS:      - Falls    - Ortho: Weight bearing status: WBAT      - DVT prophylaxis: Eliquis   ASSESSMENT/PLAN:  80 yo F with an acute ischemic infarct in the right cerebellum and left central kiara with right hemiparesis (dominant), dysphagia, dysarthria and aphasia with comorbidities of DM-2, a fib, HTN and  HLD. She warrants acute rehab with 3 hours of daily therapies including PT, OT and speech and close physiatry is monitoring for her CVA which has impacting her swallow and communication. Given her CVA and multiple significant comorbidities close physiatry follow up is needed and will reduce the risk of her requiring rehospitalization.   -Rehab of right cerebellar and Left central kiara CVA with right hemiparesis (dominant), Aphasia, Dysarthria  -Requires acute rehab with PT, OT, Speech, neuropsychology.     #Acute ischemic stroke of left central kiara (penetrating pontine arteries off basilar) and subacute ischemic in right cerebellum (R SCA territory).  - CT Head reviewed, no acute intracranial hemorrhage, stable exam since CT in 11/2021. 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 Moderate stenosis involving the right clinoid segment of the ICA due to atherosclerotic calcification. Mild stenosis of the bilateral proximal internal carotid arteries due to mixed calcified and noncalcified atherosclerotic plaque  - MRI Brain reviewed, showed diffusion abnormality in central aspect of the kiara and R cerebellum  - Fount to have atrial fiib, ASA/Plavix Dced. - Eliquis continued as 5mg BID.   - Continue Atorvastatin 80 mg daily  - Monitor BP closely and avoid hypotension  - Goal SBP <150  - [facility] evaluated the patient and recommended the aneurysm is stable, follow up as outpatient with Dr. 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  - rate controlled    #HTN- BP was  low on Nifedipine  - Goal SBP <150  - lisinopril 5 mg qhs  - metoprolol tartrate 25 mg po bid  - stopped NIFEdipine 30 mg po daily and monitor but BP systolic up to 186 at times. Started Amlodipine 2.5 mg and monitor  - stable, monitor    # SOB/ wet cough x 3 days. Viral URI, Corona Virus positive, SARS negative 5/2  - Spo2 was 89-90% on RA  - 2L NC started on 5/2/22,   - 5.2.22 CXR: increasing right basilar opacity   - Speech therapy asked to reeval for possible recurrent aspiration pneumonitis, barium swallow 5/3 improved from previous with Pureed and thin liquid. Needs 1 to 1 sit for pacing/ multiple swallows  - O2 was 94% on room air today and O2 was discontinued. Cough improving. She is alert and cooperative.    #NIDDM  - A1c 6.4  - Metformin 500 bid  - controlled. Discontinue fingersticks    #HLD  - Continue Atorvastatin 80 mg qhs    -Pain control:   Tylenol prn    #Dysphagia  - Diet, Pureed:   Consistent Carbohydrate Evening Snack  DASH/TLC Sodium & Cholesterol Restricted  Thin Liquids     Precautions / PROPHYLAXIS:      - Falls    - Ortho: Weight bearing status: WBAT      - DVT prophylaxis: Eliquis

## 2022-05-06 NOTE — PROGRESS NOTE ADULT - SUBJECTIVE AND OBJECTIVE BOX
Patient is a 81y old  Female who presents with a chief complaint of Right cerebellar and Left central kiara CVA with right hemiplegia (dominant), Aphasia, Dysarthria (19 Apr 2022 14:28)      HPI:  Patient is a 82 yo Ivorian speaking F with PMHx of HTN, HLD, DM, left MCA aneurysm, depression, who presented to the ED after an unwitnessed fall on 4/12/22. Patient denied having any urinary or bowel incontinence and stated feeling weak for a few days and being unable to stand on her right leg. Work up included a CT C-spine which showed no acute fractures. 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 MRI brain showed acute ischemic stroke of left central kiara (penetrating pontine arteries off basilar) and subacute ischemic in right cerebellum (R SCA territory). Subtle right hemiparesis on was noted on exam, and patient was transferred to stroke unit for further monitoring.     Patient found to be in atrial fibrillation Cardiology consulted a-fib and decision for anticoagulation. Patient started on Eliquis 5mg BID and atorvastatin 80 mg Qd. Passed speech/swallow eval and advanced to pureed diet. [facility] was consulted and evaluated the patient recommending outpatient follow up with Dr. Goldstein and stroke clinic. At this time patient declines proceeding with treatment for stable aneurysm.    PM&R consulted for admission to  for acute inpatient rehabilitation. Prior to admission, patient was independent w/ < from: Xray Chest 1 View- PORTABLE-Routine (Xray Chest 1 View- PORTABLE-Routine .) (05.02.22 @ 11:11) >  Persistent left basilar atelectasis. Increasing   right basilar opacity.    < end of copied text >  ambulation using  straight cane and required assistance from children with ADLs. Resides w/ children; in a house with 3 steps to enter and none inside    Patient is a good candidate for admission to acute inpatient rehabilitation for R cerebellar and L central kiara CVA with right sided hemiplegia (dominant) with comorbidities of DM2, HTN, dyslipidemia, and atrial fibrilation requiring hospital level supervision. She was deemed medically stable for discharge to  on 4/19/22 where she will undergo intensive restorative therapies 3 hours a day for at least 5 days a week with the goal of regaining the patients independence and discharging her home with family care   (19 Apr 2022 14:28)    TODAY'S SUBJECTIVE & REVIEW OF SYMPTOMS:  Patient seen this AM by the rehabilitation team. No acute events overnight. Unable to obtain Ivorian  who speaks patient's specific dialect.  Patient appears comfortable this am. Cough has improved. Patient is saturating at 95% on room air.    CLOF: TA for bed mobility and ModA with transfers. Ambulates 2' with RW ModA.  ModA for sit to stand transfers, MaxA for WC to bed transfers.        Review of Systems:   Constitutional    [ x  ] WNL           [   ] poor appetite   [   ] insomnia   [   ] tired   Cardio:                [  x ] WNL           [   ] CP   [   ] SIFUENTES   [   ] palpitations               Resp:                   [  x ] WNL           [   ] SOB   [   ] cough   [   ] wheezing   GI:                        [ x  ] WNL           [   ] constipation   [   ] diarrhea   [   ] abdominal pain   [   ] nausea   [   ] emesis                                :                      [ x  ] WNL           [   ] ECHOLS  [   ] dysuria   [   ] difficulty voiding             Endo:                   [ x  ] WNL          [   ] polyuria   [   ] temperature intolerance                 Skin:                     [x   ] WNL          [   ] pain   [   ] wound   [   ] rash   MSK:                    [x   ] WNL          [   ] muscle pain   [   ] joint pain/ stiffness   [   ] muscle tenderness   [   ] swelling   Neuro:                 [   ] WNL          [   ] HA   [   ] change in vision   [   ] tremor   [  x ] weakness right sided   [  x ]dysphagia              Cognitive:           [   ] WNL           [ x  ]confusion      Psych:                  [x   ] WNL           [   ] hallucinations   [   ]agitation   [   ] delusion   [   ]depression    PHYSICAL EXAM  Vital Signs Last 24 Hrs  T(C): 36.7 (05-06-22 @ 05:56), Max: 36.9 (05-05-22 @ 21:38)  T(F): 98.1 (05-06-22 @ 05:56), Max: 98.5 (05-05-22 @ 21:38)  HR: 85 (05-06-22 @ 05:56) (69 - 86)  BP: 161/83 (05-06-22 @ 05:56) (159/75 - 167/78)  BP(mean): 115 (05-05-22 @ 21:38) (115 - 115)  RR: 18 (05-06-22 @ 06:19) (18 - 18)  SpO2: 96% (05-06-22 @ 06:19) (92% - 96%)    General:[ x  ] NAD, Resting Comfortable,   [   ] other:                                HEENT: [  x ] NC/AT, EOMI, PERRL , Normal Conjunctivae,   [   ] other:  Cardio: [   ] RRR, no murmur   [ x  ] other:  irregularly irregular rhythm                            Pulm: [ x  ] No Respiratory Distress,  Lungs CTAB,   [   ] other:                       Abdomen: [ x  ]ND/NT, Soft,   [   ] other:    : [  x ] NO ECHOLS CATHETER, [   ] ECHOLS CATHETER- no meatal tear, no discharge, [   ] other:                                            MSK: [   x] No joint swelling, Full ROM,   [   ] other:                                         Ext: [  x ]No C/C/E, No calf tenderness,   [   ]other:    Skin: [ x  ]intact,   [   ] other:                                                                   Neurological Examination:  Cognitive: [    ] AAO x 3,   [   x ]  other: Alert and oriented to person and place. Not oriented to date or situation                                                                      Attention:  [  x  ] intact,   [    ]  other:                            Mood/Affect: [ x   ] wnl,    [    ]  other:                                                                             Communication: [    ]Fluent, no dysarthria, following commands:  [ x   ] other:  mild dysarthria  CN II - XII:  [  x  ] intact,  [    ] other:                                                                                        Motor:   RIGHT UE: [   ] WNL,  [ x  ] other: 4/5  LEFT    UE: [  x ] WNL,  [   ] other:  RIGHT LE: [   ] WNL,  [ x  ] other: 4/5  LEFT    LE: [x   ] WNL,  [   ] other:    Tone: [  x  ] wnl,   [    ]  other:  Coordination:   [   x ] intact,   [    ] other:                                                                           Sensory: [  x  ] Intact to light touch,   [    ] other:    MEDICATIONS  (STANDING):  amLODIPine   Tablet 2.5 milliGRAM(s) Oral at bedtime  apixaban 5 milliGRAM(s) Oral every 12 hours  aspirin  chewable 81 milliGRAM(s) Oral daily  atorvastatin 80 milliGRAM(s) Oral at bedtime  dextrose 50% Injectable 25 Gram(s) IV Push once  glucagon  Injectable 1 milliGRAM(s) IntraMuscular once  guaifenesin/dextromethorphan Oral Liquid 5 milliLiter(s) Oral every 8 hours  lisinopril 5 milliGRAM(s) Oral at bedtime  metFORMIN 500 milliGRAM(s) Oral two times a day with meals  metoprolol tartrate 25 milliGRAM(s) Oral two times a day  pantoprazole    Tablet 40 milliGRAM(s) Oral before breakfast  sertraline 25 milliGRAM(s) Oral daily  sodium chloride 0.45%. 1000 milliLiter(s) (75 mL/Hr) IV Continuous <Continuous>    MEDICATIONS  (PRN):  acetaminophen     Tablet .. 650 milliGRAM(s) Oral every 6 hours PRN Temp greater or equal to 38C (100.4F), Mild Pain (1 - 3)  aluminum hydroxide/magnesium hydroxide/simethicone Suspension 30 milliLiter(s) Oral every 4 hours PRN Dyspepsia  magnesium hydroxide Suspension 30 milliLiter(s) Oral daily PRN Constipation  melatonin 5 milliGRAM(s) Oral at bedtime PRN Insomnia      RECENT LABS/IMAGING                          11.4   7.59  )-----------( 194      ( 05 May 2022 06:40 )             34.1     05-05    137  |  97<L>  |  21<H>  ----------------------------<  115<H>  4.4   |  29  |  0.9    Ca    9.1      05 May 2022 06:40      POCT Blood Glucose.: 121 mg/dL (05-05-22 @ 07:07)  POCT Blood Glucose.: 150 mg/dL (05-04-22 @ 16:44)  POCT Blood Glucose.: 136 mg/dL (05-04-22 @ 07:09)  POCT Blood Glucose.: 112 mg/dL (05-03-22 @ 07:29)  POCT Blood Glucose.: 137 mg/dL (05-02-22 @ 16:50)  POCT Blood Glucose.: 125 mg/dL (05-02-22 @ 07:18)  POCT Blood Glucose.: 136 mg/dL (05-01-22 @ 17:02)   Patient is a 81y old  Female who presents with a chief complaint of Right cerebellar and Left central kiara CVA with right hemiplegia (dominant), Aphasia, Dysarthria (19 Apr 2022 14:28)      HPI:  Patient is a 80 yo Croatian speaking F with PMHx of HTN, HLD, DM, left MCA aneurysm, depression, who presented to the ED after an unwitnessed fall on 4/12/22. Patient denied having any urinary or bowel incontinence and stated feeling weak for a few days and being unable to stand on her right leg. Work up included a CT C-spine which showed no acute fractures. 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 MRI brain showed acute ischemic stroke of left central kiara (penetrating pontine arteries off basilar) and subacute ischemic in right cerebellum (R SCA territory). Subtle right hemiparesis on was noted on exam, and patient was transferred to stroke unit for further monitoring.     Patient found to be in atrial fibrillation Cardiology consulted a-fib and decision for anticoagulation. Patient started on Eliquis 5mg BID and atorvastatin 80 mg Qd. Passed speech/swallow eval and advanced to pureed diet. [facility] was consulted and evaluated the patient recommending outpatient follow up with Dr. Goldstein and stroke clinic. At this time patient declines proceeding with treatment for stable aneurysm.    PM&R consulted for admission to  for acute inpatient rehabilitation. Prior to admission, patient was independent     PTA, she was ambulation using  straight cane and required assistance from children with ADLs. Resides w/ children; in a house with 3 steps to enter and none inside    Patient is a good candidate for admission to acute inpatient rehabilitation for R cerebellar and L central kiara CVA with right sided hemiplegia (dominant) with comorbidities of DM2, HTN, dyslipidemia, and atrial fibrilation requiring hospital level supervision. She was deemed medically stable for discharge to  on 4/19/22 where she will undergo intensive restorative therapies 3 hours a day for at least 5 days a week with the goal of regaining the patients independence and discharging her home with family care   (19 Apr 2022 14:28)    TODAY'S SUBJECTIVE & REVIEW OF SYMPTOMS:  Patient seen this AM by the rehabilitation team. No acute events overnight. Unable to obtain Croatian  who speaks patient's specific dialect.  Patient appears comfortable this am. Cough has improved. Patient is saturating at 95% on room air.    CLOF: TA for bed mobility and ModA with transfers. Ambulates 2' with RW ModA.  ModA for sit to stand transfers, MaxA for WC to bed transfers.        Review of Systems:   Constitutional    [ x  ] WNL           [   ] poor appetite   [   ] insomnia   [   ] tired   Cardio:                [  x ] WNL           [   ] CP   [   ] SIFUENTES   [   ] palpitations               Resp:                   [    ] WNL           [   ] SOB   [ x  ] cough   [   ] wheezing   GI:                        [ x  ] WNL           [   ] constipation   [   ] diarrhea   [   ] abdominal pain   [   ] nausea   [   ] emesis                                :                      [ x  ] WNL           [   ] ECHOLS  [   ] dysuria   [   ] difficulty voiding             Endo:                   [ x  ] WNL          [   ] polyuria   [   ] temperature intolerance                 Skin:                     [x   ] WNL          [   ] pain   [   ] wound   [   ] rash   MSK:                    [x   ] WNL          [   ] muscle pain   [   ] joint pain/ stiffness   [   ] muscle tenderness   [   ] swelling   Neuro:                 [   ] WNL          [   ] HA   [   ] change in vision   [   ] tremor   [  x ] weakness right sided   [  x ]dysphagia              Cognitive:           [   ] WNL           [ x  ]confusion      Psych:                  [x   ] WNL           [   ] hallucinations   [   ]agitation   [   ] delusion   [   ]depression    PHYSICAL EXAM  Vital Signs Last 24 Hrs  T(C): 36.7 (05-06-22 @ 05:56), Max: 36.9 (05-05-22 @ 21:38)  T(F): 98.1 (05-06-22 @ 05:56), Max: 98.5 (05-05-22 @ 21:38)  HR: 85 (05-06-22 @ 05:56) (69 - 86)  BP: 161/83 (05-06-22 @ 05:56) (159/75 - 167/78)  BP(mean): 115 (05-05-22 @ 21:38) (115 - 115)  RR: 18 (05-06-22 @ 06:19) (18 - 18)  SpO2: 96% (05-06-22 @ 06:19) (92% - 96%)    General:[ x  ] NAD, Resting Comfortable,   [   ] other:                                HEENT: [  x ] NC/AT, EOMI, PERRL , Normal Conjunctivae,   [   ] other:  Cardio: [   ] RRR, no murmur   [ x  ] other:  irregularly irregular rhythm                            Pulm: [ x  ] No Respiratory Distress,  Lungs CTAB,   [   ] other:                       Abdomen: [ x  ]ND/NT, Soft,   [   ] other:    : [  x ] NO ECHOLS CATHETER, [   ] ECHOLS CATHETER- no meatal tear, no discharge, [   ] other:                                            MSK: [   x] No joint swelling, Full ROM,   [   ] other:                                         Ext: [  x ]No C/C/E, No calf tenderness,   [   ]other:    Skin: [ x  ]intact,   [   ] other:                                                                   Neurological Examination:  Cognitive: [    ] AAO x 3,   [   x ]  other: Alert and oriented to person and place. Not oriented to date or situation                                                                      Attention:  [  x  ] intact,   [    ]  other:                            Mood/Affect: [ x   ] wnl,    [    ]  other:                                                                             Communication: [    ]Fluent, no dysarthria, following commands:  [ x   ] other:  mild dysarthria  CN II - XII:  [  x  ] intact,  [    ] other:                                                                                        Motor:   RIGHT UE: [   ] WNL,  [ x  ] other: 4/5  LEFT    UE: [  x ] WNL,  [   ] other:  RIGHT LE: [   ] WNL,  [ x  ] other: 4/5  LEFT    LE: [x   ] WNL,  [   ] other:    Tone: [  x  ] wnl,   [    ]  other:  Coordination:   [   x ] intact,   [    ] other:                                                                           Sensory: [  x  ] Intact to light touch,   [    ] other:    MEDICATIONS  (STANDING):  amLODIPine   Tablet 2.5 milliGRAM(s) Oral at bedtime  apixaban 5 milliGRAM(s) Oral every 12 hours  aspirin  chewable 81 milliGRAM(s) Oral daily  atorvastatin 80 milliGRAM(s) Oral at bedtime  dextrose 50% Injectable 25 Gram(s) IV Push once  glucagon  Injectable 1 milliGRAM(s) IntraMuscular once  guaifenesin/dextromethorphan Oral Liquid 5 milliLiter(s) Oral every 8 hours  lisinopril 5 milliGRAM(s) Oral at bedtime  metFORMIN 500 milliGRAM(s) Oral two times a day with meals  metoprolol tartrate 25 milliGRAM(s) Oral two times a day  pantoprazole    Tablet 40 milliGRAM(s) Oral before breakfast  sertraline 25 milliGRAM(s) Oral daily  sodium chloride 0.45%. 1000 milliLiter(s) (75 mL/Hr) IV Continuous <Continuous>    MEDICATIONS  (PRN):  acetaminophen     Tablet .. 650 milliGRAM(s) Oral every 6 hours PRN Temp greater or equal to 38C (100.4F), Mild Pain (1 - 3)  aluminum hydroxide/magnesium hydroxide/simethicone Suspension 30 milliLiter(s) Oral every 4 hours PRN Dyspepsia  magnesium hydroxide Suspension 30 milliLiter(s) Oral daily PRN Constipation  melatonin 5 milliGRAM(s) Oral at bedtime PRN Insomnia      RECENT LABS/IMAGING                          11.4   7.59  )-----------( 194      ( 05 May 2022 06:40 )             34.1     05-05    137  |  97<L>  |  21<H>  ----------------------------<  115<H>  4.4   |  29  |  0.9    Ca    9.1      05 May 2022 06:40      POCT Blood Glucose.: 121 mg/dL (05-05-22 @ 07:07)  POCT Blood Glucose.: 150 mg/dL (05-04-22 @ 16:44)  POCT Blood Glucose.: 136 mg/dL (05-04-22 @ 07:09)  POCT Blood Glucose.: 112 mg/dL (05-03-22 @ 07:29)  POCT Blood Glucose.: 137 mg/dL (05-02-22 @ 16:50)  POCT Blood Glucose.: 125 mg/dL (05-02-22 @ 07:18)  POCT Blood Glucose.: 136 mg/dL (05-01-22 @ 17:02)

## 2022-05-06 NOTE — PROGRESS NOTE ADULT - ATTENDING COMMENTS
I reviewed the chart and examined the patient with the resident and we discussed the findings and treatment plan.  The patient is tolerating the rehab program well. I agree with the findings and treatment plan above, which I modified as indicated. The patient requires 3 hrs a day of acute inpatient rehab. Dong well. Improving cough and no resp distess. O2 on RA was 94% and NC O2 was discontinued. Participating in therapies. Transfers and ambulates with mod assist - with some improvement. Continue reahab.     I read, edited and agree with the Assessment:  #Acute ischemic stroke of left central kiara (penetrating pontine arteries off basilar) and subacute ischemic in right cerebellum (R SCA territory).  - CT Head reviewed, no acute intracranial hemorrhage, stable exam since CT in 11/2021. 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 Moderate stenosis involving the right clinoid segment of the ICA due to atherosclerotic calcification. Mild stenosis of the bilateral proximal internal carotid arteries due to mixed calcified and noncalcified atherosclerotic plaque  - MRI Brain reviewed, showed diffusion abnormality in central aspect of the kiara and R cerebellum  - Fount to have atrial fiib, ASA/Plavix Dced. - Eliquis continued as 5mg BID.   - Continue Atorvastatin 80 mg daily  - Monitor BP closely and avoid hypotension  - Goal SBP <150  - [facility] evaluated the patient and recommended the aneurysm is stable, follow up as outpatient with Dr. 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  - rate controlled    #HTN- BP was  low on Nifedipine  - Goal SBP <150  - lisinopril 5 mg qhs  - metoprolol tartrate 25 mg po bid  - stopped NIFEdipine 30 mg po daily and monitor but BP systolic up to 186 at times. Started Amlodipine 2.5 mg and monitor  - stable, monitor    # SOB/ wet cough x 3 days. Viral URI, Corona Virus positive, SARS negative 5/2  - Spo2 was 89-90% on RA  - 2L NC started on 5/2/22,   - 5.2.22 CXR: increasing right basilar opacity   - Speech therapy asked to reeval for possible recurrent aspiration pneumonitis, barium swallow 5/3 improved from previous with Pureed and thin liquid. Needs 1 to 1 sit for pacing/ multiple swallows  - O2 was 94% on room air today and O2 was discontinued. Cough improving. She is alert and cooperative.    #NIDDM  - A1c 6.4  - Metformin 500 bid  - controlled. Discontinue fingersticks    #HLD  - Continue Atorvastatin 80 mg qhs    -Pain control:   Tylenol prn    #Dysphagia  - Diet, Pureed:   Consistent Carbohydrate Evening Snack  DASH/TLC Sodium & Cholesterol Restricted  Thin Liquids     Precautions / PROPHYLAXIS:      - Falls    - Ortho: Weight bearing status: WBAT      - DVT prophylaxis: Eliquis

## 2022-05-07 LAB — GLUCOSE BLDC GLUCOMTR-MCNC: 163 MG/DL — HIGH (ref 70–99)

## 2022-05-07 RX ADMIN — Medication 5 MILLILITER(S): at 13:22

## 2022-05-07 RX ADMIN — AMLODIPINE BESYLATE 2.5 MILLIGRAM(S): 2.5 TABLET ORAL at 22:40

## 2022-05-07 RX ADMIN — ATORVASTATIN CALCIUM 80 MILLIGRAM(S): 80 TABLET, FILM COATED ORAL at 00:04

## 2022-05-07 RX ADMIN — Medication 5 MILLILITER(S): at 00:08

## 2022-05-07 RX ADMIN — APIXABAN 5 MILLIGRAM(S): 2.5 TABLET, FILM COATED ORAL at 17:40

## 2022-05-07 RX ADMIN — Medication 25 MILLIGRAM(S): at 05:58

## 2022-05-07 RX ADMIN — LISINOPRIL 5 MILLIGRAM(S): 2.5 TABLET ORAL at 00:06

## 2022-05-07 RX ADMIN — APIXABAN 5 MILLIGRAM(S): 2.5 TABLET, FILM COATED ORAL at 05:58

## 2022-05-07 RX ADMIN — ATORVASTATIN CALCIUM 80 MILLIGRAM(S): 80 TABLET, FILM COATED ORAL at 22:41

## 2022-05-07 RX ADMIN — METFORMIN HYDROCHLORIDE 500 MILLIGRAM(S): 850 TABLET ORAL at 17:40

## 2022-05-07 RX ADMIN — Medication 25 MILLIGRAM(S): at 17:40

## 2022-05-07 RX ADMIN — Medication 81 MILLIGRAM(S): at 12:06

## 2022-05-07 RX ADMIN — SERTRALINE 25 MILLIGRAM(S): 25 TABLET, FILM COATED ORAL at 12:06

## 2022-05-07 RX ADMIN — AMLODIPINE BESYLATE 2.5 MILLIGRAM(S): 2.5 TABLET ORAL at 00:05

## 2022-05-07 RX ADMIN — Medication 5 MILLILITER(S): at 06:08

## 2022-05-07 RX ADMIN — PANTOPRAZOLE SODIUM 40 MILLIGRAM(S): 20 TABLET, DELAYED RELEASE ORAL at 05:58

## 2022-05-07 RX ADMIN — Medication 5 MILLILITER(S): at 22:41

## 2022-05-07 RX ADMIN — METFORMIN HYDROCHLORIDE 500 MILLIGRAM(S): 850 TABLET ORAL at 08:32

## 2022-05-07 RX ADMIN — LISINOPRIL 5 MILLIGRAM(S): 2.5 TABLET ORAL at 22:42

## 2022-05-07 NOTE — PROGRESS NOTE ADULT - SUBJECTIVE AND OBJECTIVE BOX
T(C): 36.3 (05-07-22 @ 04:47), Max: 36.8 (05-06-22 @ 23:02)  HR: 86 (05-07-22 @ 04:47) (72 - 86)  BP: 151/75 (05-07-22 @ 04:47) (113/68 - 163/86)  RR: 18 (05-07-22 @ 04:47) (18 - 20)  SpO2: --      Patient was stable overnight and expresses no new complaints     PE:    Alert   LUNGS- clear  COR- RRR  ABD- SOFT, NT  EXTR- w/o edema  NEURO- stable

## 2022-05-08 LAB — GLUCOSE BLDC GLUCOMTR-MCNC: 135 MG/DL — HIGH (ref 70–99)

## 2022-05-08 RX ADMIN — APIXABAN 5 MILLIGRAM(S): 2.5 TABLET, FILM COATED ORAL at 06:34

## 2022-05-08 RX ADMIN — Medication 5 MILLILITER(S): at 06:34

## 2022-05-08 RX ADMIN — ATORVASTATIN CALCIUM 80 MILLIGRAM(S): 80 TABLET, FILM COATED ORAL at 21:22

## 2022-05-08 RX ADMIN — Medication 81 MILLIGRAM(S): at 11:36

## 2022-05-08 RX ADMIN — AMLODIPINE BESYLATE 2.5 MILLIGRAM(S): 2.5 TABLET ORAL at 21:22

## 2022-05-08 RX ADMIN — LISINOPRIL 5 MILLIGRAM(S): 2.5 TABLET ORAL at 21:23

## 2022-05-08 RX ADMIN — Medication 25 MILLIGRAM(S): at 17:38

## 2022-05-08 RX ADMIN — Medication 25 MILLIGRAM(S): at 06:34

## 2022-05-08 RX ADMIN — APIXABAN 5 MILLIGRAM(S): 2.5 TABLET, FILM COATED ORAL at 17:37

## 2022-05-08 RX ADMIN — SERTRALINE 25 MILLIGRAM(S): 25 TABLET, FILM COATED ORAL at 11:37

## 2022-05-08 RX ADMIN — METFORMIN HYDROCHLORIDE 500 MILLIGRAM(S): 850 TABLET ORAL at 17:38

## 2022-05-08 RX ADMIN — METFORMIN HYDROCHLORIDE 500 MILLIGRAM(S): 850 TABLET ORAL at 08:10

## 2022-05-08 RX ADMIN — PANTOPRAZOLE SODIUM 40 MILLIGRAM(S): 20 TABLET, DELAYED RELEASE ORAL at 06:34

## 2022-05-08 NOTE — PROGRESS NOTE ADULT - SUBJECTIVE AND OBJECTIVE BOX
T(C): 36.9 (05-08-22 @ 05:22), Max: 37 (05-07-22 @ 21:20)  HR: 69 (05-08-22 @ 05:22) (69 - 89)  BP: 140/78 (05-08-22 @ 06:26) (123/62 - 178/81)  RR: 18 (05-08-22 @ 05:22) (18 - 18)  SpO2: --      Patient was stable overnight and expresses no new complaints     PE:    Alert   LUNGS- clear  COR- RRR  ABD- SOFT, NT  EXTR- w/o edema  NEURO- stable

## 2022-05-09 ENCOUNTER — TRANSCRIPTION ENCOUNTER (OUTPATIENT)
Age: 82
End: 2022-05-09

## 2022-05-09 VITALS
DIASTOLIC BLOOD PRESSURE: 74 MMHG | RESPIRATION RATE: 18 BRPM | HEART RATE: 69 BPM | SYSTOLIC BLOOD PRESSURE: 160 MMHG | TEMPERATURE: 99 F

## 2022-05-09 LAB
ALBUMIN SERPL ELPH-MCNC: 3.7 G/DL — SIGNIFICANT CHANGE UP (ref 3.5–5.2)
ALP SERPL-CCNC: 76 U/L — SIGNIFICANT CHANGE UP (ref 30–115)
ALT FLD-CCNC: 15 U/L — SIGNIFICANT CHANGE UP (ref 0–41)
ANION GAP SERPL CALC-SCNC: 8 MMOL/L — SIGNIFICANT CHANGE UP (ref 7–14)
AST SERPL-CCNC: 13 U/L — SIGNIFICANT CHANGE UP (ref 0–41)
BASOPHILS # BLD AUTO: 0.05 K/UL — SIGNIFICANT CHANGE UP (ref 0–0.2)
BASOPHILS NFR BLD AUTO: 0.6 % — SIGNIFICANT CHANGE UP (ref 0–1)
BILIRUB SERPL-MCNC: 0.3 MG/DL — SIGNIFICANT CHANGE UP (ref 0.2–1.2)
BUN SERPL-MCNC: 26 MG/DL — HIGH (ref 10–20)
CALCIUM SERPL-MCNC: 9.7 MG/DL — SIGNIFICANT CHANGE UP (ref 8.5–10.1)
CHLORIDE SERPL-SCNC: 99 MMOL/L — SIGNIFICANT CHANGE UP (ref 98–110)
CO2 SERPL-SCNC: 33 MMOL/L — HIGH (ref 17–32)
CREAT SERPL-MCNC: 1.1 MG/DL — SIGNIFICANT CHANGE UP (ref 0.7–1.5)
EGFR: 50 ML/MIN/1.73M2 — LOW
EOSINOPHIL # BLD AUTO: 0.43 K/UL — SIGNIFICANT CHANGE UP (ref 0–0.7)
EOSINOPHIL NFR BLD AUTO: 5.5 % — SIGNIFICANT CHANGE UP (ref 0–8)
GLUCOSE BLDC GLUCOMTR-MCNC: 124 MG/DL — HIGH (ref 70–99)
GLUCOSE SERPL-MCNC: 127 MG/DL — HIGH (ref 70–99)
HCT VFR BLD CALC: 36.4 % — LOW (ref 37–47)
HGB BLD-MCNC: 11.9 G/DL — LOW (ref 12–16)
IMM GRANULOCYTES NFR BLD AUTO: 0.5 % — HIGH (ref 0.1–0.3)
LYMPHOCYTES # BLD AUTO: 1.3 K/UL — SIGNIFICANT CHANGE UP (ref 1.2–3.4)
LYMPHOCYTES # BLD AUTO: 16.7 % — LOW (ref 20.5–51.1)
MCHC RBC-ENTMCNC: 28.5 PG — SIGNIFICANT CHANGE UP (ref 27–31)
MCHC RBC-ENTMCNC: 32.7 G/DL — SIGNIFICANT CHANGE UP (ref 32–37)
MCV RBC AUTO: 87.1 FL — SIGNIFICANT CHANGE UP (ref 81–99)
MONOCYTES # BLD AUTO: 0.61 K/UL — HIGH (ref 0.1–0.6)
MONOCYTES NFR BLD AUTO: 7.8 % — SIGNIFICANT CHANGE UP (ref 1.7–9.3)
NEUTROPHILS # BLD AUTO: 5.35 K/UL — SIGNIFICANT CHANGE UP (ref 1.4–6.5)
NEUTROPHILS NFR BLD AUTO: 68.9 % — SIGNIFICANT CHANGE UP (ref 42.2–75.2)
NRBC # BLD: 0 /100 WBCS — SIGNIFICANT CHANGE UP (ref 0–0)
PLATELET # BLD AUTO: 234 K/UL — SIGNIFICANT CHANGE UP (ref 130–400)
POTASSIUM SERPL-MCNC: 4.7 MMOL/L — SIGNIFICANT CHANGE UP (ref 3.5–5)
POTASSIUM SERPL-SCNC: 4.7 MMOL/L — SIGNIFICANT CHANGE UP (ref 3.5–5)
PROT SERPL-MCNC: 6.1 G/DL — SIGNIFICANT CHANGE UP (ref 6–8)
RBC # BLD: 4.18 M/UL — LOW (ref 4.2–5.4)
RBC # FLD: 12.6 % — SIGNIFICANT CHANGE UP (ref 11.5–14.5)
SODIUM SERPL-SCNC: 140 MMOL/L — SIGNIFICANT CHANGE UP (ref 135–146)
WBC # BLD: 7.78 K/UL — SIGNIFICANT CHANGE UP (ref 4.8–10.8)
WBC # FLD AUTO: 7.78 K/UL — SIGNIFICANT CHANGE UP (ref 4.8–10.8)

## 2022-05-09 RX ORDER — SERTRALINE 25 MG/1
1 TABLET, FILM COATED ORAL
Qty: 0 | Refills: 0 | DISCHARGE

## 2022-05-09 RX ORDER — ATORVASTATIN CALCIUM 80 MG/1
1 TABLET, FILM COATED ORAL
Qty: 60 | Refills: 1
Start: 2022-05-09 | End: 2022-09-05

## 2022-05-09 RX ORDER — APIXABAN 2.5 MG/1
1 TABLET, FILM COATED ORAL
Qty: 120 | Refills: 0
Start: 2022-05-09 | End: 2022-07-07

## 2022-05-09 RX ORDER — PANTOPRAZOLE SODIUM 20 MG/1
1 TABLET, DELAYED RELEASE ORAL
Qty: 30 | Refills: 0
Start: 2022-05-09 | End: 2022-06-07

## 2022-05-09 RX ORDER — AMLODIPINE BESYLATE 2.5 MG/1
1 TABLET ORAL
Qty: 60 | Refills: 1
Start: 2022-05-09 | End: 2022-09-05

## 2022-05-09 RX ORDER — METFORMIN HYDROCHLORIDE 850 MG/1
1 TABLET ORAL
Qty: 120 | Refills: 1
Start: 2022-05-09 | End: 2022-09-05

## 2022-05-09 RX ORDER — SERTRALINE 25 MG/1
1 TABLET, FILM COATED ORAL
Qty: 30 | Refills: 1
Start: 2022-05-09 | End: 2022-07-07

## 2022-05-09 RX ORDER — METOPROLOL TARTRATE 50 MG
1 TABLET ORAL
Qty: 120 | Refills: 1
Start: 2022-05-09 | End: 2022-09-05

## 2022-05-09 RX ORDER — ASPIRIN/CALCIUM CARB/MAGNESIUM 324 MG
1 TABLET ORAL
Qty: 60 | Refills: 0
Start: 2022-05-09 | End: 2022-07-07

## 2022-05-09 RX ORDER — ASPIRIN/CALCIUM CARB/MAGNESIUM 324 MG
1 TABLET ORAL
Qty: 0 | Refills: 0 | DISCHARGE
Start: 2022-05-09

## 2022-05-09 RX ORDER — LANOLIN ALCOHOL/MO/W.PET/CERES
1 CREAM (GRAM) TOPICAL
Qty: 0 | Refills: 0 | DISCHARGE
Start: 2022-05-09

## 2022-05-09 RX ORDER — METFORMIN HYDROCHLORIDE 850 MG/1
1 TABLET ORAL
Qty: 0 | Refills: 3 | DISCHARGE

## 2022-05-09 RX ORDER — METFORMIN HYDROCHLORIDE 850 MG/1
1 TABLET ORAL
Qty: 60 | Refills: 1
Start: 2022-05-09 | End: 2022-07-07

## 2022-05-09 RX ORDER — AMLODIPINE BESYLATE 2.5 MG/1
1 TABLET ORAL
Qty: 30 | Refills: 0
Start: 2022-05-09 | End: 2022-06-07

## 2022-05-09 RX ORDER — METFORMIN HYDROCHLORIDE 850 MG/1
1 TABLET ORAL
Qty: 0 | Refills: 0 | DISCHARGE
Start: 2022-05-09

## 2022-05-09 RX ORDER — LISINOPRIL 2.5 MG/1
1 TABLET ORAL
Qty: 60 | Refills: 1
Start: 2022-05-09 | End: 2022-09-05

## 2022-05-09 RX ORDER — SERTRALINE 25 MG/1
1 TABLET, FILM COATED ORAL
Qty: 60 | Refills: 1
Start: 2022-05-09 | End: 2022-09-05

## 2022-05-09 RX ORDER — AMLODIPINE BESYLATE 2.5 MG/1
1 TABLET ORAL
Qty: 0 | Refills: 0 | DISCHARGE
Start: 2022-05-09

## 2022-05-09 RX ORDER — ACETAMINOPHEN 500 MG
2 TABLET ORAL
Qty: 0 | Refills: 0 | DISCHARGE
Start: 2022-05-09

## 2022-05-09 RX ADMIN — Medication 81 MILLIGRAM(S): at 12:56

## 2022-05-09 RX ADMIN — APIXABAN 5 MILLIGRAM(S): 2.5 TABLET, FILM COATED ORAL at 05:59

## 2022-05-09 RX ADMIN — SERTRALINE 25 MILLIGRAM(S): 25 TABLET, FILM COATED ORAL at 12:55

## 2022-05-09 RX ADMIN — METFORMIN HYDROCHLORIDE 500 MILLIGRAM(S): 850 TABLET ORAL at 08:31

## 2022-05-09 RX ADMIN — Medication 25 MILLIGRAM(S): at 05:59

## 2022-05-09 RX ADMIN — PANTOPRAZOLE SODIUM 40 MILLIGRAM(S): 20 TABLET, DELAYED RELEASE ORAL at 05:59

## 2022-05-09 NOTE — PROGRESS NOTE ADULT - ATTENDING COMMENTS
Patient seen with resident. No new complaints. No resp. distress. Not coughing. Pulse ox. 94% on RA. Neuro status stable. Mod assist to stand and min/ mod assist to take a few steps. Family ed. done and family to take patient home today with 24 hr care and  home care services.   D/C on pureed diet with thin liquids. Activities as tolerated with 24 hr assistance.  Neuro f/u in 3 weeks and f/u with PMD ASAP. D/C meds and instructions reviewed. Med sent to pharmacy.     I read, edited and agree with the Assessment:  #Acute ischemic stroke of left central kiara (penetrating pontine arteries off basilar) and subacute ischemic in right cerebellum (R SCA territory).  - CT Head reviewed, no acute intracranial hemorrhage, stable exam since CT in 11/2021. 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 Moderate stenosis involving the right clinoid segment of the ICA due to atherosclerotic calcification. Mild stenosis of the bilateral proximal internal carotid arteries due to mixed calcified and noncalcified atherosclerotic plaque  - MRI Brain reviewed, showed diffusion abnormality in central aspect of the kiara and R cerebellum  - Fount to have atrial fiib, ASA/Plavix Dced. - Eliquis continued as 5mg BID.   - Continue Atorvastatin 80 mg daily and Eliquis  - Monitor BP closely and avoid hypotension  - Goal SBP <150  - [facility] evaluated the patient and recommended the aneurysm is stable, follow up as outpatient with Dr. 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  - rate controlled  - f/u PMD    #HTN- BP was  low on Nifedipine  - Goal SBP <150  - lisinopril 5 mg qhs  - metoprolol tartrate 25 mg po bid  - stopped NIFEdipine 30 mg po daily and monitor but BP systolic up to 186 at times. Started Amlodipine 2.5 mg  - stable, f/u PMD    # SOB/ wet cough x 3 days. Viral URI, Corona Virus positive, SARS negative 5/2  - Spo2 was 89-90% on RA  - 2L NC started on 5/2/22,   - 5.2.22 CXR: increasing right basilar opacity   - Speech therapy asked to reeval for possible recurrent aspiration pneumonitis, barium swallow 5/3 improved from previous with Pureed and thin liquid. Needs 1 to 1 sit for pacing/ multiple swallows  - O2 was 94% on room air today and O2 was discontinued. Cough improving. She is alert and cooperative.    #NIDDM  - A1c 6.4  - Metformin 500 bid  - controlled. Discontinue fingersticks    #HLD  - Continue Atorvastatin 80 mg qhs    #Dysphagia  - Diet, Pureed with thin liquids  Consistent Carbohydrate Evening Snack  DASH/TLC Sodium & Cholesterol Restricted

## 2022-05-09 NOTE — PROGRESS NOTE ADULT - SUBJECTIVE AND OBJECTIVE BOX
Patient is a 81y old  Female who presents with a chief complaint of Right cerebellar and Left central kiara CVA with right hemiplegia (dominant), Aphasia, Dysarthria (19 Apr 2022 14:28)      HPI:  Patient is a 82 yo [place] speaking F with PMHx of HTN, HLD, DM, left MCA aneurysm, depression, who presented to the ED after an unwitnessed fall on 4/12/22. Patient denied having any urinary or bowel incontinence and stated feeling weak for a few days and being unable to stand on her right leg. Work up included a CT C-spine which showed no acute fractures. 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 MRI brain showed acute ischemic stroke of left central kiara (penetrating pontine arteries off basilar) and subacute ischemic in right cerebellum (R SCA territory). Subtle right hemiparesis on was noted on exam, and patient was transferred to stroke unit for further monitoring.     Patient found to be in atrial fibrillation Cardiology consulted a-fib and decision for anticoagulation. Patient started on Eliquis 5mg BID and atorvastatin 80 mg Qd. Passed speech/swallow eval and advanced to pureed diet. [facility] was consulted and evaluated the patient recommending outpatient follow up with Dr. Goldstein and stroke clinic. At this time patient declines proceeding with treatment for stable aneurysm.    PM&R consulted for admission to  for acute inpatient rehabilitation. Prior to admission, patient was independent     PTA, she was ambulation using  straight cane and required assistance from children with ADLs. Resides w/ children; in a house with 3 steps to enter and none inside    Patient is a good candidate for admission to acute inpatient rehabilitation for R cerebellar and L central kiara CVA with right sided hemiplegia (dominant) with comorbidities of DM2, HTN, dyslipidemia, and atrial fibrilation requiring hospital level supervision. She was deemed medically stable for discharge to  on 4/19/22 where she will undergo intensive restorative therapies 3 hours a day for at least 5 days a week with the goal of regaining the patients independence and discharging her home with family care   (19 Apr 2022 14:28)    TODAY'S SUBJECTIVE & REVIEW OF SYMPTOMS:  Patient seen this AM by the rehabilitation team. No acute events overnight. Unable to obtain [place]  who speaks patient's specific dialect.  Patient appears comfortable this am. VSS. No longer has cough. Patient is saturating at 93% on room air. Patient is pending discharge today    CLOF: S/PA for bed mobility and ModA with transfers. Ambulates 30'x4 with RW PA.  ModA for lying to sitting, Max A for stand pivot transfer and bed to commode. Dependent with toileting, S with eating       Review of Systems:   Constitutional:    [ x ] WNL           [   ] poor appetite   [   ] insomnia   [   ] tired  Cardio:               [ x ] WNL           [   ] CP   [   ] SIFUENTES   [   ] palpitations              Resp:                 [ x ] WNL           [   ] SOB   [   ] cough   [   ] wheezing  GI:                     [ x ] WNL           [   ] constipation   [   ] diarrhea   [   ] abdominal pain   [   ] nausea   [   ] emesis                               :                    [ x ] WNL           [   ] ECHOLS  [   ] dysuria   [   ] difficulty voiding            Endo:                [ x ] WNL          [   ] polyuria   [   ] temperature intolerance                Skin:                 [ x ] WNL          [   ] pain   [   ] wound   [   ] rash  MSK:                [ x ] WNL          [   ] muscle pain   [   ] joint pain/ stiffness   [   ] muscle tenderness   [   ] swelling Neuro:               [   ] WNL          [   ] HA   [   ] change in vision   [   ] tremor   [ x ] weakness right sided   [ x ]dysphagia             Cognitive:           [   ] WNL           [ x ]confusion    Psych:               [ x ] WNL           [   ] hallucinations   [   ]agitation   [   ] delusion   [   ]depression    PHYSICAL EXAM  Vital Signs Last 24 Hrs  T(C): 35.8 (09 May 2022 05:54), Max: 36.3 (08 May 2022 14:40)  T(F): 96.5 (09 May 2022 05:54), Max: 97.4 (08 May 2022 14:40)  HR: 89 (09 May 2022 05:54) (72 - 89)  BP: 145/81 (09 May 2022 05:54) (134/88 - 170/95)  BP(mean): --  RR: 18 (09 May 2022 05:54) (18 - 18)  SpO2: --    General:[ x  ] NAD, Resting Comfortable,   [   ] other:                                HEENT: [  x ] NC/AT, EOMI, PERRL , Normal Conjunctivae,   [   ] other:  Cardio: [   ] RRR, no murmur   [ x  ] other:  irregularly irregular rhythm                            Pulm: [ x  ] No Respiratory Distress,  Lungs CTAB,   [   ] other:                       Abdomen: [ x  ]ND/NT, Soft,   [   ] other:    : [  x ] NO ECHOLS CATHETER, [   ] ECHOLS CATHETER- no meatal tear, no discharge, [   ] other:                                            MSK: [   x] No joint swelling, Full ROM,   [   ] other:                                         Ext: [  x ]No C/C/E, No calf tenderness,   [   ]other:    Skin: [ x  ]intact,   [   ] other:                                                                   Neurological Examination:  Cognitive: [    ] AAO x 3,   [   x ]  other: Alert and oriented to person and place. Not oriented to date or situation                                                                      Attention:  [  x  ] intact,   [    ]  other:                            Mood/Affect: [ x   ] wnl,    [    ]  other:                                                                             Communication: [    ]Fluent, no dysarthria, following commands:  [ x   ] other:  mild dysarthria  CN II - XII:  [  x  ] intact,  [    ] other:                                                                                        Motor:   RIGHT UE: [   ] WNL,  [ x  ] other: 4/5  LEFT    UE: [  x ] WNL,  [   ] other:  RIGHT LE: [   ] WNL,  [ x  ] other: 4/5  LEFT    LE: [x   ] WNL,  [   ] other:    Tone: [  x  ] wnl,   [    ]  other:  Coordination:   [   x ] intact,   [    ] other:                                                                           Sensory: [  x  ] Intact to light touch,   [    ] other:    MEDICATIONS  (STANDING):  amLODIPine   Tablet 2.5 milliGRAM(s) Oral at bedtime  apixaban 5 milliGRAM(s) Oral every 12 hours  aspirin  chewable 81 milliGRAM(s) Oral daily  atorvastatin 80 milliGRAM(s) Oral at bedtime  dextrose 50% Injectable 25 Gram(s) IV Push once  glucagon  Injectable 1 milliGRAM(s) IntraMuscular once  lisinopril 5 milliGRAM(s) Oral at bedtime  metFORMIN 500 milliGRAM(s) Oral two times a day with meals  metoprolol tartrate 25 milliGRAM(s) Oral two times a day  pantoprazole    Tablet 40 milliGRAM(s) Oral before breakfast  sertraline 25 milliGRAM(s) Oral daily  sodium chloride 0.45%. 1000 milliLiter(s) (75 mL/Hr) IV Continuous <Continuous>    MEDICATIONS  (PRN):  acetaminophen     Tablet .. 650 milliGRAM(s) Oral every 6 hours PRN Temp greater or equal to 38C (100.4F), Mild Pain (1 - 3)  aluminum hydroxide/magnesium hydroxide/simethicone Suspension 30 milliLiter(s) Oral every 4 hours PRN Dyspepsia  magnesium hydroxide Suspension 30 milliLiter(s) Oral daily PRN Constipation  melatonin 5 milliGRAM(s) Oral at bedtime PRN Insomnia      RECENT LABS/IMAGING                          11.9   7.78  )-----------( 234      ( 09 May 2022 07:34 )             36.4     05-09    140  |  99  |  26<H>  ----------------------------<  127<H>  4.7   |  33<H>  |  1.1    Ca    9.7      09 May 2022 07:34    TPro  6.1  /  Alb  3.7  /  TBili  0.3  /  DBili  x   /  AST  13  /  ALT  15  /  AlkPhos  76  05-09        POCT Blood Glucose.: 124 mg/dL (05-09-22 @ 07:24)  POCT Blood Glucose.: 135 mg/dL (05-08-22 @ 07:30)  POCT Blood Glucose.: 163 mg/dL (05-07-22 @ 07:05)  POCT Blood Glucose.: 118 mg/dL (05-06-22 @ 11:35)  POCT Blood Glucose.: 128 mg/dL (05-06-22 @ 07:07)   Patient is a 81y old  Female who presents with a chief complaint of Right cerebellar and Left central kiara CVA with right hemiplegia (dominant), Aphasia, Dysarthria (19 Apr 2022 14:28)      HPI:  Patient is a 80 yo [place] speaking F with PMHx of HTN, HLD, DM, left MCA aneurysm, depression, who presented to the ED after an unwitnessed fall on 4/12/22. Patient denied having any urinary or bowel incontinence and stated feeling weak for a few days and being unable to stand on her right leg. Work up included a CT C-spine which showed no acute fractures. 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 MRI brain showed acute ischemic stroke of left central kiara (penetrating pontine arteries off basilar) and subacute ischemic in right cerebellum (R SCA territory). Subtle right hemiparesis on was noted on exam, and patient was transferred to stroke unit for further monitoring.     Patient found to be in atrial fibrillation Cardiology consulted a-fib and decision for anticoagulation. Patient started on Eliquis 5mg BID and atorvastatin 80 mg Qd. Passed speech/swallow eval and advanced to pureed diet. [facility] was consulted and evaluated the patient recommending outpatient follow up with Dr. Goldstein and stroke clinic. At this time patient declines proceeding with treatment for stable aneurysm.    PM&R consulted for admission to  for acute inpatient rehabilitation. Prior to admission, patient was independent     PTA, she was ambulation using  straight cane and required assistance from children with ADLs. Resides w/ children; in a house with 3 steps to enter and none inside    Patient is a good candidate for admission to acute inpatient rehabilitation for R cerebellar and L central kiara CVA with right sided hemiplegia (dominant) with comorbidities of DM2, HTN, dyslipidemia, and atrial fibrilation requiring hospital level supervision. She was deemed medically stable for discharge to  on 4/19/22 where she will undergo intensive restorative therapies 3 hours a day for at least 5 days a week with the goal of regaining the patients independence and discharging her home with family care   (19 Apr 2022 14:28)    TODAY'S SUBJECTIVE & REVIEW OF SYMPTOMS:  Patient seen this AM by the rehabilitation team. No acute events overnight. Unable to obtain [place]  who speaks patient's specific dialect.  Patient appears comfortable this am. VSS. No longer has cough. Patient is saturating at 93% on room air. Patient is pending discharge today. Patient's grandson is coming in this afternoon and will translate.    CLOF: S/PA for bed mobility and ModA with transfers. Ambulates 30'x4 with RW PA.  ModA for lying to sitting, Max A for stand pivot transfer to commode. Dependent with toileting, S with eating       Review of Systems:   Constitutional:    [ x ] WNL           [   ] poor appetite   [   ] insomnia   [   ] tired  Cardio:               [ x ] WNL           [   ] CP   [   ] SIFUENTES   [   ] palpitations              Resp:                 [ x ] WNL           [   ] SOB   [   ] cough   [   ] wheezing  GI:                     [ x ] WNL           [   ] constipation   [   ] diarrhea   [   ] abdominal pain   [   ] nausea   [   ] emesis                               :                    [ x ] WNL           [   ] ECHOLS  [   ] dysuria   [   ] difficulty voiding            Endo:                [ x ] WNL          [   ] polyuria   [   ] temperature intolerance                Skin:                 [ x ] WNL          [   ] pain   [   ] wound   [   ] rash  MSK:                [ x ] WNL          [   ] muscle pain   [   ] joint pain/ stiffness   [   ] muscle tenderness   [   ] swelling Neuro:               [   ] WNL          [   ] HA   [   ] change in vision   [   ] tremor   [ x ] weakness right sided   [ x ]dysphagia             Cognitive:           [   ] WNL           [ x ]confusion    Psych:               [ x ] WNL           [   ] hallucinations   [   ]agitation   [   ] delusion   [   ]depression    PHYSICAL EXAM  Vital Signs Last 24 Hrs  T(C): 35.8 (09 May 2022 05:54), Max: 36.3 (08 May 2022 14:40)  T(F): 96.5 (09 May 2022 05:54), Max: 97.4 (08 May 2022 14:40)  HR: 89 (09 May 2022 05:54) (72 - 89)  BP: 145/81 (09 May 2022 05:54) (134/88 - 170/95)  BP(mean): --  RR: 18 (09 May 2022 05:54) (18 - 18)  SpO2: --    General:[ x  ] NAD, Resting Comfortable,   [   ] other:                                HEENT: [  x ] NC/AT, EOMI, PERRL , Normal Conjunctivae,   [   ] other:  Cardio: [   ] RRR, no murmur   [ x  ] other:  irregularly irregular rhythm                            Pulm: [ x  ] No Respiratory Distress,  Lungs CTAB,   [   ] other:                       Abdomen: [ x  ]ND/NT, Soft,   [   ] other:    : [  x ] NO ECHOLS CATHETER, [   ] ECHOLS CATHETER- no meatal tear, no discharge, [   ] other:                                            MSK: [   x] No joint swelling, Full ROM,   [   ] other:                                         Ext: [  x ]No C/C/E, No calf tenderness,   [   ]other:    Skin: [ x  ]intact,   [   ] other:                                                                   Neurological Examination:  Cognitive: [    ] AAO x 3,   [   x ]  other: Alert and oriented to person and place. Not oriented to date or situation                                                                      Attention:  [  x  ] intact,   [    ]  other:                            Mood/Affect: [ x   ] wnl,    [    ]  other:                                                                             Communication: [    ]Fluent, no dysarthria, following commands:  [ x   ] other:  mild dysarthria  CN II - XII:  [  x  ] intact,  [    ] other:                                                                                        Motor:   RIGHT UE: [   ] WNL,  [ x  ] other: 4/5  LEFT    UE: [  x ] WNL,  [   ] other:  RIGHT LE: [   ] WNL,  [ x  ] other: 4/5  LEFT    LE: [x   ] WNL,  [   ] other:    Tone: [  x  ] wnl,   [    ]  other:  Coordination:   [   x ] intact,   [    ] other:                                                                           Sensory: [  x  ] Intact to light touch,   [    ] other:    MEDICATIONS  (STANDING):  amLODIPine   Tablet 2.5 milliGRAM(s) Oral at bedtime  apixaban 5 milliGRAM(s) Oral every 12 hours  aspirin  chewable 81 milliGRAM(s) Oral daily  atorvastatin 80 milliGRAM(s) Oral at bedtime  dextrose 50% Injectable 25 Gram(s) IV Push once  glucagon  Injectable 1 milliGRAM(s) IntraMuscular once  lisinopril 5 milliGRAM(s) Oral at bedtime  metFORMIN 500 milliGRAM(s) Oral two times a day with meals  metoprolol tartrate 25 milliGRAM(s) Oral two times a day  pantoprazole    Tablet 40 milliGRAM(s) Oral before breakfast  sertraline 25 milliGRAM(s) Oral daily  sodium chloride 0.45%. 1000 milliLiter(s) (75 mL/Hr) IV Continuous <Continuous>    MEDICATIONS  (PRN):  acetaminophen     Tablet .. 650 milliGRAM(s) Oral every 6 hours PRN Temp greater or equal to 38C (100.4F), Mild Pain (1 - 3)  aluminum hydroxide/magnesium hydroxide/simethicone Suspension 30 milliLiter(s) Oral every 4 hours PRN Dyspepsia  magnesium hydroxide Suspension 30 milliLiter(s) Oral daily PRN Constipation  melatonin 5 milliGRAM(s) Oral at bedtime PRN Insomnia      RECENT LABS/IMAGING                          11.9   7.78  )-----------( 234      ( 09 May 2022 07:34 )             36.4     05-09    140  |  99  |  26<H>  ----------------------------<  127<H>  4.7   |  33<H>  |  1.1    Ca    9.7      09 May 2022 07:34    TPro  6.1  /  Alb  3.7  /  TBili  0.3  /  DBili  x   /  AST  13  /  ALT  15  /  AlkPhos  76  05-09        POCT Blood Glucose.: 124 mg/dL (05-09-22 @ 07:24)  POCT Blood Glucose.: 135 mg/dL (05-08-22 @ 07:30)  POCT Blood Glucose.: 163 mg/dL (05-07-22 @ 07:05)  POCT Blood Glucose.: 118 mg/dL (05-06-22 @ 11:35)  POCT Blood Glucose.: 128 mg/dL (05-06-22 @ 07:07)

## 2022-05-09 NOTE — DISCHARGE NOTE PROVIDER - CARE PROVIDERS DIRECT ADDRESSES
,DirectAddress_Unknown,[email or web address],DirectAddress_Unknown,DirectAddress_Unknown

## 2022-05-09 NOTE — DISCHARGE NOTE PROVIDER - NSDCMRMEDTOKEN_GEN_ALL_CORE_FT
acetaminophen 325 mg oral tablet: 2 tab(s) orally every 6 hours, As needed, for pain or fever  apixaban 5 mg oral tablet: 1 tab(s) orally every 12 hours  atorvastatin 80 mg oral tablet: 1 tab(s) orally once a day (at bedtime)  lisinopril 5 mg oral tablet: 1 tab(s) orally once a day   melatonin 5 mg oral tablet: 1 tab(s) orally once a day (at bedtime), As needed, Insomnia  metoprolol tartrate 25 mg oral tablet: 1 tab(s) orally 2 times a day   acetaminophen 325 mg oral tablet: 2 tab(s) orally every 6 hours, As needed, for pain or fever  amLODIPine 2.5 mg oral tablet: 1 tab(s) orally once a day (at bedtime)  apixaban 5 mg oral tablet: 1 tab(s) orally every 12 hours  aspirin 81 mg oral tablet, chewable: 1 tab(s) orally once a day  atorvastatin 80 mg oral tablet: 1 tab(s) orally once a day (at bedtime)  lisinopril 5 mg oral tablet: 1 tab(s) orally once a day   metFORMIN 500 mg oral tablet: 1 tab(s) orally 2 times a day (with meals)  metoprolol tartrate 25 mg oral tablet: 1 tab(s) orally 2 times a day  pantoprazole 40 mg oral delayed release tablet: 1 tab(s) orally once a day (before a meal)  sertraline 25 mg oral tablet: 1 tab(s) orally once a day  test strips (per patient&#x27;s insurance): 1 application subcutaneously 4 times a day. ** Compatible with patient&#x27;s glucometer **

## 2022-05-09 NOTE — PROGRESS NOTE ADULT - ATTENDING SUPERVISION STATEMENT
Resident

## 2022-05-09 NOTE — PROGRESS NOTE ADULT - ASSESSMENT
ASSESSMENT/PLAN:  82 yo F with an acute ischemic infarct in the right cerebellum and left central kiara with right hemiparesis (dominant), dysphagia, dysarthria and aphasia with comorbidities of DM-2, a fib, HTN and  HLD. She warrants acute rehab with 3 hours of daily therapies including PT, OT and speech and close physiatry is monitoring for her CVA which has impacting her swallow and communication. Given her CVA and multiple significant comorbidities close physiatry follow up is needed and will reduce the risk of her requiring rehospitalization.   -Rehab of right cerebellar and Left central kiara CVA with right hemiparesis (dominant), Aphasia, Dysarthria  -Requires acute rehab with PT, OT, Speech, neuropsychology.     #Acute ischemic stroke of left central kiara (penetrating pontine arteries off basilar) and subacute ischemic in right cerebellum (R SCA territory).  - CT Head reviewed, no acute intracranial hemorrhage, stable exam since CT in 11/2021. 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 Moderate stenosis involving the right clinoid segment of the ICA due to atherosclerotic calcification. Mild stenosis of the bilateral proximal internal carotid arteries due to mixed calcified and noncalcified atherosclerotic plaque  - MRI Brain reviewed, showed diffusion abnormality in central aspect of the kiara and R cerebellum  - Fount to have atrial fiib, ASA/Plavix Dced. - Eliquis continued as 5mg BID.   - Continue Atorvastatin 80 mg daily  - Monitor BP closely and avoid hypotension  - Goal SBP <150  - [facility] evaluated the patient and recommended the aneurysm is stable, follow up as outpatient with Dr. 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  - rate controlled    #HTN- BP was  low on Nifedipine  - Goal SBP <150  - lisinopril 5 mg qhs  - metoprolol tartrate 25 mg po bid  - stopped NIFEdipine 30 mg po daily and monitor but BP systolic up to 186 at times. Started Amlodipine 2.5 mg and monitor  - stable, monitor    # SOB/ wet cough x 3 days. Viral URI, Corona Virus positive, SARS negative 5/2  - Spo2 was 89-90% on RA  - 2L NC started on 5/2/22,   - 5.2.22 CXR: increasing right basilar opacity   - Speech therapy asked to reeval for possible recurrent aspiration pneumonitis, barium swallow 5/3 improved from previous with Pureed and thin liquid. Needs 1 to 1 sit for pacing/ multiple swallows  - O2 was 94% on room air today and O2 was discontinued. Cough improving. She is alert and cooperative.    #NIDDM  - A1c 6.4  - Metformin 500 bid  - controlled. Discontinue fingersticks    #HLD  - Continue Atorvastatin 80 mg qhs    -Pain control:   Tylenol prn    #Dysphagia  - Diet, Pureed:   Consistent Carbohydrate Evening Snack  DASH/TLC Sodium & Cholesterol Restricted  Thin Liquids     Precautions / PROPHYLAXIS:      - Falls    - Ortho: Weight bearing status: WBAT      - DVT prophylaxis: Eliquis   ASSESSMENT/PLAN:  80 yo F with an acute ischemic infarct in the right cerebellum and left central kiara with right hemiparesis (dominant), dysphagia, dysarthria and aphasia with comorbidities of DM-2, a fib, HTN and  HLD. She warrants acute rehab with 3 hours of daily therapies including PT, OT and speech and close physiatry is monitoring for her CVA which has impacting her swallow and communication. Given her CVA and multiple significant comorbidities close physiatry follow up is needed and will reduce the risk of her requiring rehospitalization.   -Rehab of right cerebellar and Left central kiara CVA with right hemiparesis (dominant), Aphasia, Dysarthria    #Acute ischemic stroke of left central kiara (penetrating pontine arteries off basilar) and subacute ischemic in right cerebellum (R SCA territory).  - CT Head reviewed, no acute intracranial hemorrhage, stable exam since CT in 11/2021. 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 Moderate stenosis involving the right clinoid segment of the ICA due to atherosclerotic calcification. Mild stenosis of the bilateral proximal internal carotid arteries due to mixed calcified and noncalcified atherosclerotic plaque  - MRI Brain reviewed, showed diffusion abnormality in central aspect of the kiara and R cerebellum  - Fount to have atrial fiib, ASA/Plavix Dced. - Eliquis continued as 5mg BID.   - Continue Atorvastatin 80 mg daily and Eliquis  - Monitor BP closely and avoid hypotension  - Goal SBP <150  - [facility] evaluated the patient and recommended the aneurysm is stable, follow up as outpatient with Dr. 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  - rate controlled  - f/u PMD    #HTN- BP was  low on Nifedipine  - Goal SBP <150  - lisinopril 5 mg qhs  - metoprolol tartrate 25 mg po bid  - stopped NIFEdipine 30 mg po daily and monitor but BP systolic up to 186 at times. Started Amlodipine 2.5 mg  - stable, f/u PMD    # SOB/ wet cough x 3 days. Viral URI, Corona Virus positive, SARS negative 5/2  - Spo2 was 89-90% on RA  - 2L NC started on 5/2/22,   - 5.2.22 CXR: increasing right basilar opacity   - Speech therapy asked to reeval for possible recurrent aspiration pneumonitis, barium swallow 5/3 improved from previous with Pureed and thin liquid. Needs 1 to 1 sit for pacing/ multiple swallows  - O2 was 94% on room air today and O2 was discontinued. Cough improving. She is alert and cooperative.    #NIDDM  - A1c 6.4  - Metformin 500 bid  - controlled. Discontinue fingersticks    #HLD  - Continue Atorvastatin 80 mg qhs    #Dysphagia  - Diet, Pureed with thin liquids  Consistent Carbohydrate Evening Snack  DASH/TLC Sodium & Cholesterol Restricted

## 2022-05-09 NOTE — DISCHARGE NOTE PROVIDER - HOSPITAL COURSE
· Subjective and Objective:   Patient is a 81y old  Female who presents with a chief complaint of Right cerebellar and Left central kiara CVA with right hemiplegia (dominant), Aphasia, Dysarthria (19 Apr 2022 14:28)      HPI:  Patient is a 80 yo Zimbabwean speaking F with PMHx of HTN, HLD, DM, left MCA aneurysm, depression, who presented to the ED after an unwitnessed fall on 4/12/22. Patient denied having any urinary or bowel incontinence and stated feeling weak for a few days and being unable to stand on her right leg. Work up included a CT C-spine which showed no acute fractures. 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 MRI brain showed acute ischemic stroke of left central kiara (penetrating pontine arteries off basilar) and subacute ischemic in right cerebellum (R SCA territory). Subtle right hemiparesis on was noted on exam, and patient was transferred to stroke unit for further monitoring.     Patient found to be in atrial fibrillation Cardiology consulted a-fib and decision for anticoagulation. Patient started on Eliquis 5mg BID and atorvastatin 80 mg Qd. Passed speech/swallow eval and advanced to pureed diet. [facility] was consulted and evaluated the patient recommending outpatient follow up with Dr. Goldstein and stroke clinic. At this time patient declines proceeding with treatment for stable aneurysm.    PM&R consulted for admission to  for acute inpatient rehabilitation. Prior to admission, patient was independent     PTA, she was ambulation using  straight cane and required assistance from children with ADLs. Resides w/ children; in a house with 3 steps to enter and none inside    Patient is a good candidate for admission to acute inpatient rehabilitation for R cerebellar and L central kiara CVA with right sided hemiplegia (dominant) with comorbidities of DM2, HTN, dyslipidemia, and atrial fibrilation requiring hospital level supervision. She was deemed medically stable for discharge to  on 4/19/22 where she will undergo intensive restorative therapies 3 hours a day for at least 5 days a week with the goal of regaining the patients independence and discharging her home with family care   (19 Apr 2022 14:28)  Patient seen this AM by the rehabilitation team. No acute events overnight. Unable to obtain Zimbabwean  who speaks patient's specific dialect.  Patient appears comfortable this am. VSS. No longer has cough. Patient is saturating at 93% on room air. Patient is pending discharge today    CLOF: S/PA for bed mobility and ModA with transfers. Ambulates 30'x4 with RW PA.  ModA for lying to sitting, Max A for stand pivot transfer and bed to commode. Dependent with toileting, S with eating    ASSESSMENT/PLAN:  80 yo F with an acute ischemic infarct in the right cerebellum and left central kiara with right hemiparesis (dominant), dysphagia, dysarthria and aphasia with comorbidities of DM-2, a fib, HTN and  HLD. She warrants acute rehab with 3 hours of daily therapies including PT, OT and speech and close physiatry is monitoring for her CVA which has impacting her swallow and communication. Given her CVA and multiple significant comorbidities close physiatry follow up is needed and will reduce the risk of her requiring rehospitalization.   -Rehab of right cerebellar and Left central kiara CVA with right hemiparesis (dominant), Aphasia, Dysarthria  -Requires acute rehab with PT, OT, Speech, neuropsychology.     #Patient in rehab for Acute ischemic stroke of left central kiara (penetrating pontine arteries off basilar) and subacute ischemic in right cerebellum (R SCA territory).  - CT Head reviewed, no acute intracranial hemorrhage, stable exam since CT in 11/2021. 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 Moderate stenosis involving the right clinoid segment of the ICA due to atherosclerotic calcification. Mild stenosis of the bilateral proximal internal carotid arteries due to mixed calcified and noncalcified atherosclerotic plaque  - MRI Brain reviewed, showed diffusion abnormality in central aspect of the kiara and R cerebellum  - Fount to have atrial fiib, ASA/Plavix Dced. - Eliquis continued as 5mg BID.   - Continue Atorvastatin 80 mg daily  - Monitor BP closely and avoid hypotension  - Goal SBP <150  - [facility] evaluated the patient and recommended the aneurysm is stable, follow up as outpatient with Dr. 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  - rate controlled    #HTN- BP was  low on Nifedipine  - Goal SBP <150  - lisinopril 5 mg qhs  - metoprolol tartrate 25 mg po bid  - stopped NIFEdipine 30 mg po daily and monitor but BP systolic up to 186 at times. Started Amlodipine 2.5 mg and monitor  - stable, monitor    # SOB/ wet cough x 3 days. Viral URI, Corona Virus positive, SARS negative 5/2  - Spo2 was 89-90% on RA  - 2L NC started on 5/2/22,   - 5.2.22 CXR: increasing right basilar opacity   - Speech therapy asked to reeval for possible recurrent aspiration pneumonitis, barium swallow 5/3 improved from previous with Pureed and thin liquid. Needs 1 to 1 sit for pacing/ multiple swallows  - O2 was 94% on room air today and O2 was discontinued. Cough improving. She is alert and cooperative.    #NIDDM  - A1c 6.4  - Metformin 500 bid  - controlled. Discontinue fingersticks    #HLD  - Continue Atorvastatin 80 mg qhs    -Pain control:   Tylenol prn    #Dysphagia  - Diet, Pureed:   Consistent Carbohydrate Evening Snack  DASH/TLC Sodium & Cholesterol Restricted  Thin Liquids     Precautions / PROPHYLAXIS:      - Falls    - Ortho: Weight bearing status: WBAT      - DVT prophylaxis: Eliquis       · Subjective and Objective:   Patient is a 81y old  Female who presents with a chief complaint of Right cerebellar and Left central kiara CVA with right hemiplegia (dominant), Aphasia, Dysarthria (19 Apr 2022 14:28)      HPI:  Patient is a 82 yo Lithuanian speaking F with PMHx of HTN, HLD, DM, left MCA aneurysm, depression, who presented to the ED after an unwitnessed fall on 4/12/22. Patient denied having any urinary or bowel incontinence and stated feeling weak for a few days and being unable to stand on her right leg. Work up included a CT C-spine which showed no acute fractures. 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 MRI brain showed acute ischemic stroke of left central kiara (penetrating pontine arteries off basilar) and subacute ischemic in right cerebellum (R SCA territory). Subtle right hemiparesis on was noted on exam, and patient was transferred to stroke unit for further monitoring.     Patient found to be in atrial fibrillation Cardiology consulted a-fib and decision for anticoagulation. Patient started on Eliquis 5mg BID and atorvastatin 80 mg Qd. Passed speech/swallow eval and advanced to pureed diet. [facility] was consulted and evaluated the patient recommending outpatient follow up with Dr. Goldstein and stroke clinic. At this time patient declines proceeding with treatment for stable aneurysm.    PM&R consulted for admission to  for acute inpatient rehabilitation. Prior to admission, patient was independent     PTA, she was ambulation using  straight cane and required assistance from children with ADLs. Resides w/ children; in a house with 3 steps to enter and none inside    Patient is a good candidate for admission to acute inpatient rehabilitation for R cerebellar and L central kiara CVA with right sided hemiplegia (dominant) with comorbidities of DM2, HTN, dyslipidemia, and atrial fibrilation requiring hospital level supervision. She was deemed medically stable for discharge to  on 4/19/22 where she will undergo intensive restorative therapies 3 hours a day for at least 5 days a week with the goal of regaining the patients independence and discharging her home with family care   (19 Apr 2022 14:28)  Patient seen this AM by the rehabilitation team. No acute events overnight. Unable to obtain Lithuanian  who speaks patient's specific dialect.  Patient appears comfortable this am. VSS. No longer has cough. Patient is saturating at 93% on room air. Patient is pending discharge today    CLOF: S/PA for bed mobility and ModA with transfers. Ambulates 30'x4 with RW PA.  ModA for lying to sitting, Max A for stand pivot transfer and bed to commode. Dependent with toileting, S with eating    ASSESSMENT/PLAN:  82 yo F with an acute ischemic infarct in the right cerebellum and left central kiara with right hemiparesis (dominant), dysphagia, dysarthria and aphasia with comorbidities of DM-2, a fib, HTN and  HLD. She warrants acute rehab with 3 hours of daily therapies including PT, OT and speech and close physiatry is monitoring for her CVA which has impacting her swallow and communication. Given her CVA and multiple significant comorbidities close physiatry follow up is needed and will reduce the risk of her requiring rehospitalization.   -Rehab of right cerebellar and Left central kiara CVA with right hemiparesis (dominant), Aphasia, Dysarthria  -Requires acute rehab with PT, OT, Speech, neuropsychology.     #Patient in rehab for Acute ischemic stroke of left central kiara (penetrating pontine arteries off basilar) and subacute ischemic in right cerebellum (R SCA territory).  - CT Head reviewed, no acute intracranial hemorrhage, stable exam since CT in 11/2021. 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 Moderate stenosis involving the right clinoid segment of the ICA due to atherosclerotic calcification. Mild stenosis of the bilateral proximal internal carotid arteries due to mixed calcified and noncalcified atherosclerotic plaque  - MRI Brain reviewed, showed diffusion abnormality in central aspect of the kiara and R cerebellum  - Fount to have atrial fiib, ASA/Plavix Dced. - Eliquis continued as 5mg BID.   - Continue Atorvastatin 80 mg daily  - Monitor BP closely and avoid hypotension  - Goal SBP <150  - [facility] evaluated the patient and recommended the aneurysm is stable, follow up as outpatient with Dr. 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  - rate controlled    #HTN- BP was  low on Nifedipine  - Goal SBP <150  - lisinopril 5 mg qhs  - metoprolol tartrate 25 mg po bid  - stopped NIFEdipine 30 mg po daily and monitor but BP systolic up to 186 at times. Started Amlodipine 2.5 mg and monitor  - stable, monitor    # SOB/ wet cough x 3 days. Viral URI, Corona Virus positive, SARS negative 5/2  - Spo2 was 89-90% on RA  - 2L NC started on 5/2/22,   - 5.2.22 CXR: increasing right basilar opacity   - Speech therapy asked to reeval for possible recurrent aspiration pneumonitis, barium swallow 5/3 improved from previous with Pureed and thin liquid. Needs 1 to 1 sit for pacing/ multiple swallows  - O2 was 94% on room air today and O2 was discontinued. Cough improving. She is alert and cooperative.    #NIDDM  - A1c 6.4  - Metformin 500 bid  - controlled. Discontinue fingersticks    #HLD  - Continue Atorvastatin 80 mg qhs    -Pain control:   Tylenol prn    #Dysphagia  - Diet, Pureed:   Consistent Carbohydrate Evening Snack  DASH/TLC Sodium & Cholesterol Restricted  Thin Liquids     Precautions / PROPHYLAXIS:      - Falls    - Ortho: Weight bearing status: WBAT      - DVT prophylaxis: Eliquis    today she is being discharged home with all meds rx [facility] and with instructions to follow up with all her medical team ..

## 2022-05-09 NOTE — DISCHARGE NOTE PROVIDER - CARE PROVIDER_API CALL
Maxi Ac)  Neurology  [address] [place] [address]  Phone: [phone]  Fax: [phone]  Follow Up Time:     Rajinder Byers)  Neurology; Vascular Neurology  [address] [place] [address], [address]  [place], [place] [id number]  Phone: [phone]  Fax: [phone]  Follow Up Time:     Cliff Ramirez ()  Cardiovascular Disease; Internal Medicine  [address] [place] [address]  Phone: [phone]  Fax: [phone]  Follow Up Time:     Magdalena Cardoza  [facility]  [address] [place] [address]  Phone: [phone]  Fax: [phone]  Follow Up Time:

## 2022-05-09 NOTE — PROGRESS NOTE ADULT - PROVIDER SPECIALTY LIST ADULT
Neuropsychology
Physiatry
Physiatry
Rehab Medicine
Physiatry
Physiatry
Rehab Medicine
Neuropsychology
Rehab Medicine

## 2022-05-09 NOTE — DISCHARGE NOTE PROVIDER - NSDCFUADDAPPT_GEN_ALL_CORE_FT
A neuro stroke clinic appointment with JAMAL HUTCHINSON  (who works with dr Ac )made on geri 15 th at 4:15 pm , if there is any change the office will contact you to bump up the appointment.

## 2022-05-09 NOTE — DISCHARGE NOTE PROVIDER - INSTRUCTIONS
On pureed diet  with thin liquids  double swallow with sips after each bite , Need extra time to swallow after each bite .  need 1 to 1 supervision  , need protein  powder prosource  gelatin sugar free 2 per day  meet  protein intake . oral care after each meal .

## 2022-05-09 NOTE — DISCHARGE NOTE PROVIDER - PROVIDER TOKENS
PROVIDER:[TOKEN:[[id number]]],PROVIDER:[TOKEN:[[id number]]],PROVIDER:[TOKEN:[[id number]]],PROVIDER:[TOKEN:[[id number]]]

## 2022-05-09 NOTE — DISCHARGE NOTE PROVIDER - NSDCCPCAREPLAN_GEN_ALL_CORE_FT
PRINCIPAL DISCHARGE DIAGNOSIS  Diagnosis: Ischemic stroke  Assessment and Plan of Treatment: You were treated for a stroke , strated on asa and blood thinner   elequis since you had atrial fib / irregular heart beat ,  please watch for recurrence of stroke  and to get immediate medical attention if new changes in health , watch for beeding  from sanywhere in body,m like black tarry stools, dixxiness , letharginess  new weakness in either side of body . continue taking meds and follow up with neurologist in 2 weeks , neuro stroke clinic with JOSE HUTCHINSON or dr CEBALLOS( team  will call you with applointment ,      SECONDARY DISCHARGE DIAGNOSES  Diagnosis: Hypertension  Assessment and Plan of Treatment: Your blood pressure medicine Nifedipin estopped due to low blood pressure and added amlodipine 2.5 mg , yuou are on metoprol for  heart rate control and blood pressure and on lisinopril and amlodipin efor better blood pressure control and cardiac protection . Please take meds as advised  by the doctor  ( this Instruction sheet )  and change diet to low salt, low fat and low cholesterol diet ,  Please follow up with your medical doctor in 2 to 4 weeks  .  Pleas econtinue physical therapy as tolerated , YOU need 24/7 help at home , never to leave you alone  at home . need assistance with all activities of daily living .    Diagnosis: Atrial fibrillation  Assessment and Plan of Treatment: Newly detected this time,    A cardiologist  evaluated you and placed on special medication to control hewart rate metoprolol and blood thinner elequis . pleas efollow instruction as above , follow up with your cardiologist dr James Coronado in 2 weeks .    Diagnosis: Upper respiratory infection  Assessment and Plan of Treatment: DUe to Viral infection., its   not due to Sars covis 2 infection . you needed oxygen requirment , now oxygenating very well in room air, no need for oxygen treatment    Diagnosis: Diabetes mellitus  Assessment and Plan of Treatment: Your  HbA!c ( a test det  determines if you have diabetes or not , came back 6.4 means you are starting with Diabetes , a medicine called metformin started on you , please consume a diet that is carbohydrate consistent , low sodium an dlow fat as written above .  avoid or decrease intake od added sugary foods, and fruits that is high in sugar , eat lot of fiber  and drink more fluid , and continue physical theraoy as advised    Diagnosis: H/O aneurysm  Assessment and Plan of Treatment: You have left MCA brain aneurysm ,  it is 11 mm  and as seen by neuro endovascular docor who specialises in this kind of treatment advised you to gor surgery to treat and repair this ,  Family needs some time for this or not wanting to go for it at this time, however please follow up with dr Little in 2 to 4 weeks  for periodic MRI  brainto  watvh this.  you need to keep your blood pressure under well control to prevent complication like rupture and brain bleed . take all meds ads advised to control blood pressure . .

## 2022-05-09 NOTE — DISCHARGE NOTE PROVIDER - NSDCFUSCHEDAPPT_GEN_ALL_CORE_FT
[facility]  NEUROLOGY [address] [place] [address]  Scheduled Appointment: 06/15/2022

## 2022-05-09 NOTE — DISCHARGE NOTE PROVIDER - NSDCFUADDINST_GEN_ALL_CORE_FT
**Please carry these paperwork with you for all medical appointments  and show to all health care providers who are  involved in his care.**

## 2022-05-11 ENCOUNTER — APPOINTMENT (OUTPATIENT)
Dept: NEUROLOGY | Facility: CLINIC | Age: 82
End: 2022-05-11

## 2022-05-13 NOTE — CHART NOTE - NSCHARTNOTEFT_GEN_A_CORE
t.         #Acute ischemic stroke of left central kiara (penetrating pontine arteries off basilar) and subacute ischemic in right cerebellum (R SCA territory).  - CT Head reviewed, no acute intracranial hemorrhage, stable exam since CT in 11/2021. 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 Moderate stenosis involving the right clinoid segment of the ICA due to atherosclerotic calcification. Mild stenosis of the bilateral proximal internal carotid arteries due to mixed calcified and noncalcified atherosclerotic plaque  - MRI Brain reviewed, showed diffusion abnormality in central aspect of the kiara and R cerebellum  - Fount to have atrial fiib, ASA/Plavix Dced. - Eliquis continued as 5mg BID.   - Continue Atorvastatin 80 mg daily and Eliquis  - Monitor BP closely and avoid hypotension  - Goal SBP <150  - [facility] evaluated the patient and recommended the aneurysm is stable, follow up as outpatient with Dr. 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 Viral URI, Corona Virus positive, SARS negative 5/2  - Spo2 was 89-90% on RA  - 2L NC started on 5/2/22,   - 5.2.22 CXR: increasing right basilar opacity   - .   possible recurrent aspiration pneumonitis  Speech therapy asked to reeval for possible recurrent aspiration pneumonitis, barium swallow 5/3 improved from previous with Pureed and thin liquid. Needs 1 to 1 sit for pacing/ multiple swallows.. His xrays showed left bacilar atelectasis and rt basilar opacity , could be asp pneumonitis , pt is encouraged to use incentive spirometry  10 times an hour while awake .   and her oxygenation improved .  no antibiotic at this time needed .   - O2 was 94% on room air today and O2 was discontinued. Cough improving. She is alert and cooperative. Note written after discharge  for adding diagnosis possible pneumonitis _-    #Acute ischemic stroke of left central kiara (penetrating pontine arteries off basilar) and subacute ischemic in right cerebellum (R SCA territory).  - CT Head reviewed, no acute intracranial hemorrhage, stable exam since CT in 11/2021. 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 Moderate stenosis involving the right clinoid segment of the ICA due to atherosclerotic calcification. Mild stenosis of the bilateral proximal internal carotid arteries due to mixed calcified and noncalcified atherosclerotic plaque  - MRI Brain reviewed, showed diffusion abnormality in central aspect of the kiara and R cerebellum  - Fount to have atrial fiib, ASA/Plavix Dced. - Eliquis continued as 5mg BID.   - Continue Atorvastatin 80 mg daily and Eliquis  - Monitor BP closely and avoid hypotension  - Goal SBP <150  - [facility] evaluated the patient and recommended the aneurysm is stable, follow up as outpatient with Dr. 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 Viral URI, Corona Virus positive, SARS negative 5/2  - Spo2 was 89-90% on RA  - 2L NC started on 5/2/22,   - 5.2.22 CXR: increasing right basilar opacity   - .   possible recurrent aspiration pneumonitis  Speech therapy asked to reeval for possible recurrent aspiration pneumonitis, barium swallow 5/3 improved from previous with Pureed and thin liquid. Needs 1 to 1 sit for pacing/ multiple swallows.. His xrays showed left bacilar atelectasis and rt basilar opacity , could be asp pneumonitis , pt is encouraged to use incentive spirometry  10 times an hour while awake .   and her oxygenation improved .  no antibiotic at this time needed .   - O2 was 94% on room air today and O2 was discontinued. Cough improving. She is alert and cooperative.

## 2022-05-16 DIAGNOSIS — I69.322 DYSARTHRIA FOLLOWING CEREBRAL INFARCTION: ICD-10-CM

## 2022-05-16 DIAGNOSIS — J06.9 ACUTE UPPER RESPIRATORY INFECTION, UNSPECIFIED: ICD-10-CM

## 2022-05-16 DIAGNOSIS — E11.9 TYPE 2 DIABETES MELLITUS WITHOUT COMPLICATIONS: ICD-10-CM

## 2022-05-16 DIAGNOSIS — I69.321 DYSPHASIA FOLLOWING CEREBRAL INFARCTION: ICD-10-CM

## 2022-05-16 DIAGNOSIS — Z87.891 PERSONAL HISTORY OF NICOTINE DEPENDENCE: ICD-10-CM

## 2022-05-16 DIAGNOSIS — I69.320 APHASIA FOLLOWING CEREBRAL INFARCTION: ICD-10-CM

## 2022-05-16 DIAGNOSIS — E78.5 HYPERLIPIDEMIA, UNSPECIFIED: ICD-10-CM

## 2022-05-16 DIAGNOSIS — F03.90 UNSPECIFIED DEMENTIA WITHOUT BEHAVIORAL DISTURBANCE: ICD-10-CM

## 2022-05-16 DIAGNOSIS — I48.91 UNSPECIFIED ATRIAL FIBRILLATION: ICD-10-CM

## 2022-05-16 DIAGNOSIS — J69.0 PNEUMONITIS DUE TO INHALATION OF FOOD AND VOMIT: ICD-10-CM

## 2022-05-16 DIAGNOSIS — F32.A DEPRESSION, UNSPECIFIED: ICD-10-CM

## 2022-05-16 DIAGNOSIS — Z79.84 LONG TERM (CURRENT) USE OF ORAL HYPOGLYCEMIC DRUGS: ICD-10-CM

## 2022-05-16 DIAGNOSIS — R06.09 OTHER FORMS OF DYSPNEA: ICD-10-CM

## 2022-05-16 DIAGNOSIS — I10 ESSENTIAL (PRIMARY) HYPERTENSION: ICD-10-CM

## 2022-05-16 DIAGNOSIS — I69.351 HEMIPLEGIA AND HEMIPARESIS FOLLOWING CEREBRAL INFARCTION AFFECTING RIGHT DOMINANT SIDE: ICD-10-CM

## 2022-05-16 DIAGNOSIS — Z91.81 HISTORY OF FALLING: ICD-10-CM

## 2022-05-16 DIAGNOSIS — Z79.01 LONG TERM (CURRENT) USE OF ANTICOAGULANTS: ICD-10-CM

## 2022-05-16 DIAGNOSIS — B97.29 OTHER CORONAVIRUS AS THE CAUSE OF DISEASES CLASSIFIED ELSEWHERE: ICD-10-CM

## 2022-06-28 ENCOUNTER — APPOINTMENT (OUTPATIENT)
Dept: NEUROLOGY | Facility: CLINIC | Age: 82
End: 2022-06-28
Payer: MEDICAID

## 2022-06-28 VITALS
HEART RATE: 59 BPM | SYSTOLIC BLOOD PRESSURE: 148 MMHG | WEIGHT: 140 LBS | DIASTOLIC BLOOD PRESSURE: 86 MMHG | OXYGEN SATURATION: 96 % | BODY MASS INDEX: 23.32 KG/M2 | HEIGHT: 65 IN | TEMPERATURE: 97.6 F

## 2022-06-28 PROCEDURE — 99072 ADDL SUPL MATRL&STAF TM PHE: CPT

## 2022-06-28 PROCEDURE — 99215 OFFICE O/P EST HI 40 MIN: CPT

## 2022-06-28 RX ORDER — ASPIRIN 81 MG/1
81 TABLET, CHEWABLE ORAL
Qty: 60 | Refills: 0 | Status: DISCONTINUED | COMMUNITY
Start: 2022-05-09

## 2022-06-28 NOTE — HISTORY OF PRESENT ILLNESS
[FreeTextEntry1] : Patient is 80 yo RH woman, Irish speaking, with PMHx of HTN, HLD, DM, hx of 11mm Left MCA aneurysm s/p diagnostic cerebral DSA in 6/2021 with Dr. Byers who presents as HFU from 4/12/22 for acute ischemic stroke of left central kiara (penetrating pontine arteries off basilar) and subacute ischemic in right cerebellum (R SCA territory), etiology likely Afib.  \par \par She presented after fall, likely from standing per son. He was woken up at 3AM to mother calling for help. He found her sitting on the floor, awake, no evidence of incontinence. Pt couldn’t state events around fall. Found to have R hemiparesis.  \par \par Seen by Dr. Bullard, found to have brisk reflexes. Recommended EEG and neuropathy w/u as well as MRH and c-spine.  \par \par -REEG 4/13 NEG\par -MRI H: Limited and incomplete examination reveals increased DWI signal within the central aspect of the kiara as well as the right cerebellar hemisphere likely representing acute ischemic change\par -CTA head/neck without evidence of significant flow limiting stenosis. Noted again left MCA bifurcation aneurysm measuring 1cm, stable from 06/2021. \par -Regarding ANEURYSM—last diagnostic cerebral DSA 6/2021 with Dr. Byers. L MCA 11 mm aneurysm is unchanged on recent CTA report. NI Team advised that open surgery may be the best option to treat her aneurysm bc of multiple branches coming off of it. Pt and son decided not to proceed with tx at this time.  \par \par **Found new onset Afib, confirmed by Cardiology. Started on Eliquis 5 BID (CHADSVASc 7). Cardiology recommended to Stop ASA 81 and use Plavix 75 for secondary stroke prevention.  \par \par She was sent home on Eliquis 5 BID and Lipitor 80. Today, son states she is not on ASA or Plavix, which I agree due to high risk for ICH.  \par \par  \par A1c 6.4  \par \par Today she has main c/o of R-sided weakness. Grandson is helping to take care of her. PTA she was walking with walker and cane, can do most ADLs independently, but NOW needs assistance with all ADLs. She cannot walk independently bc of balance issues. Mood is generally unchanged, sometimes sad. \par

## 2022-06-28 NOTE — ASSESSMENT
[FreeTextEntry1] : Patient is 82 yo RH woman, Ethiopian speaking, with PMHx of HTN, HLD, DM, hx of 11mm Left MCA aneurysm s/p diagnostic cerebral DSA in 6/2021 with Dr. Byers who presents as HFU from 4/12/22 for acute ischemic stroke of left central kiara (penetrating pontine arteries off basilar) and subacute ischemic in right cerebellum (R SCA territory), etiology likely Afib. mRS is 4 today and with residual R hemiparesis no drift, not oriented to time, and speech delay/mild dysarthria, NIHSS 2. \par \par PLAN: \par -OT for for gait and RH fine motor\par -PT for R hemiparesis \par -Speech Therapy \par -F/u in  Clinic for intracranial aneurysm. Emphasized BP goal <140/80. \par -C/w Lipitor 80 and repeat lipid panel now \par -C/w Eliquis 5 BID per cardiology, F/u with cardiology, notify them for spontaneous bleeding/bruising \par -VNS referral\par -Nutritionsit and Rehab Medicine referrals\par -F/u with me in 3 mo  \par \par Aggressive risk factor modification should be continued for secondary stroke prevention, consisting of intensive blood pressure control and cholesterol monitoring. I encouraged the patient to discuss these important issues with her primary care doctor.  \par \par I have reviewed the goals of stroke risk factor modification. I counseled the patient on measures to reduce stroke risk, including the importance of medication compliance, risk factor control, exercise, healthy diet and avoidance of smoking. I reviewed stroke warning signs and symptoms and appropriate actions to take if such occur.\par

## 2022-06-28 NOTE — REASON FOR VISIT
[Follow-Up: _____] : a [unfilled] follow-up visit [Family Member] : family member [FreeTextEntry1] : for likely cardioembolic stroke

## 2022-06-28 NOTE — PHYSICAL EXAM
[FreeTextEntry1] : Focal neurological exam:\par \par MS: Awake, alert, oriented to person and place, not to time. Bright affect. Follows commands. \par \par Language: Mild dysarthria and speech delay. \par \par CNs 2 - 12 intact. EOMI no nystagmus, no diplopia. VFF. No facial asymmetry b/l, full eye closure strength b/l. Hearing grossly normal. Head turning & shoulder shrug intact b/l. Tongue midline, normal movements, no atrophy.\par \par Motor: Normal muscle bulk & tone. No noticeable tremor or seizure. RUE 4+/5, RLE 4+5. LUE/LLE 5-/5. \par \par Reflexes: DTR of biceps 1+, knees absent  \par \par Sensation: Intact to LT, temperature and vibration b/l throughout\par \par Cortical: No extinction\par \par Coordination: No dysmetria to FTN.\par \par Gait: Deferred bc in wheelchair.\par \par mRS 4\par NIHSS 2 for dysarthria and not to month, (but no drift) \par \par \par \par \par

## 2022-10-14 ENCOUNTER — APPOINTMENT (OUTPATIENT)
Dept: NEUROLOGY | Facility: CLINIC | Age: 82
End: 2022-10-14

## 2022-10-14 VITALS
WEIGHT: 150 LBS | SYSTOLIC BLOOD PRESSURE: 176 MMHG | DIASTOLIC BLOOD PRESSURE: 105 MMHG | HEART RATE: 87 BPM | BODY MASS INDEX: 24.99 KG/M2 | OXYGEN SATURATION: 98 % | HEIGHT: 65 IN | TEMPERATURE: 97.8 F

## 2022-10-14 PROCEDURE — 99214 OFFICE O/P EST MOD 30 MIN: CPT

## 2022-10-14 NOTE — PHYSICAL EXAM
[FreeTextEntry1] : \par Focal neurological exam:\par \par MS: Awake, alert, oriented to person and place, not to time. Bright affect. Follows commands. \par \par Language: Mild dysarthria and speech delay. \par \par CNs 2 - 12 intact. EOMI no nystagmus, no diplopia. VFF. No facial asymmetry b/l, full eye closure strength b/l. Hearing grossly normal. Head turning & shoulder shrug intact b/l. Tongue midline, normal movements, no atrophy.\par \par Motor: Normal muscle bulk & tone. No noticeable tremor or seizure. RUE 5-/5, RLE 4+/5; LUE/LLE 5-/5. \par \par Reflexes: DTR of biceps 1+, knees absent \par \par Sensation: Intact to LT, temperature and vibration b/l throughout\par \par Cortical: No extinction\par \par Coordination: No dysmetria to FTN.\par \par Gait: Deferred bc in wheelchair.\par \par mRS 4\par NIHSS 2 for dysarthria and not to month, (but no drift)

## 2022-10-14 NOTE — HISTORY OF PRESENT ILLNESS
[FreeTextEntry1] : Patient is 80 yo RH woman, Korean speaking, with PMHx of HTN, HLD, DM, hx of 11mm Left MCA aneurysm s/p diagnostic cerebral DSA in 6/2021 w/o f/u in [facility], who presents as HFU from 4/12/22 for acute ischemic stroke of left central kiara (penetrating pontine arteries off basilar) and subacute ischemic in right cerebellum (R SCA territory), etiology likely Afib. Last visit, mRS was 4 with residual R hemiparesis no drift, not oriented to time, and speech delay/mild dysarthria, NIHSS 2.  \par \par Last LDL was 108,  \par BP in office 176/105, at home SBP was 140. Endorses salty diet at times.\par Completed OT/PT/VNS who no longer come bc of insurance issues, but speech and motor sx improved per son\par She doesn't want to go see doctors anymore \par Son is aware that uncontrolled BP is a RF for aneurysm rupture. He does want to see an NI specialist to consult. \par Patient has emotional/crying spells. \par Endorses having Mediterranena diet. \par \par

## 2022-10-14 NOTE — ASSESSMENT
[FreeTextEntry1] : Patient is 82 yo RH woman, Armenian speaking, with PMHx of HTN, HLD, DM, hx of 11mm Left MCA aneurysm s/p diagnostic cerebral DSA in 6/2021 w/o f/u in  Clinic, who presents as HFU from 4/12/22 for acute ischemic stroke of left central kiara (penetrating pontine arteries off basilar) and subacute ischemic in right cerebellum (R SCA territory), etiology likely Afib. Neuro exam is stable to improved. mRS remains 4/NIHSS 2 and RUE/RLE weakness seems slightly better. \par \par PLAN:  \par -Renewal of OT/PT/ST/VNS\par -F/u w/ Dr. Mercado for intracranial aneurysm. Emphasized BP goal <140/80.  \par -C/w Lipitor 80 for now, LDL goal 40-70. Weight loss and healthy diet to decrease TGL.  \par -C/w Eliquis 5 BID per cardiology\par -F/u with Cardiology/PCP for HTN mgmt \par -F/u with PCP for crying spells\par -F/u with me in 6 mo \par \par Aggressive risk factor modification should be continued for secondary stroke prevention, consisting of intensive blood pressure control and cholesterol monitoring. I encouraged the patient to discuss these important issues with her primary care doctor.  \par \par I have reviewed the goals of stroke risk factor modification. I counseled the patient on measures to reduce stroke risk, including the importance of medication compliance, risk factor control, exercise, healthy diet and avoidance of smoking. I reviewed stroke warning signs and symptoms and appropriate actions to take if such occur.\par \par Instructed patient to call 911 or report to ED if any acute neurological changes occur, such as having severe, sudden, unusual headache or BEFAST symptoms\par

## 2022-10-20 ENCOUNTER — APPOINTMENT (OUTPATIENT)
Dept: CARDIOLOGY | Facility: CLINIC | Age: 82
End: 2022-10-20
Payer: MEDICAID

## 2022-10-20 VITALS
TEMPERATURE: 97.8 F | WEIGHT: 150 LBS | HEIGHT: 65 IN | BODY MASS INDEX: 24.99 KG/M2 | HEART RATE: 93 BPM | SYSTOLIC BLOOD PRESSURE: 140 MMHG | DIASTOLIC BLOOD PRESSURE: 78 MMHG

## 2022-10-20 PROCEDURE — 99072 ADDL SUPL MATRL&STAF TM PHE: CPT

## 2022-10-20 PROCEDURE — 99214 OFFICE O/P EST MOD 30 MIN: CPT

## 2022-10-20 PROCEDURE — 93000 ELECTROCARDIOGRAM COMPLETE: CPT

## 2022-10-20 RX ORDER — SERTRALINE 25 MG/1
25 TABLET, FILM COATED ORAL DAILY
Refills: 0 | Status: ACTIVE | COMMUNITY

## 2022-10-20 NOTE — PHYSICAL EXAM
[Well Nourished] : well nourished [No Acute Distress] : no acute distress [Frail] : frail [Normal Conjunctiva] : normal conjunctiva [Normal Venous Pressure] : normal venous pressure [No Carotid Bruit] : no carotid bruit [Normal S1, S2] : normal S1, S2 [No Rub] : no rub [No Gallop] : no gallop [Murmur] : murmur [Clear Lung Fields] : clear lung fields [Good Air Entry] : good air entry [No Respiratory Distress] : no respiratory distress  [Soft] : abdomen soft [Non Tender] : non-tender [No Masses/organomegaly] : no masses/organomegaly [Normal Bowel Sounds] : normal bowel sounds [No Edema] : no edema [No Cyanosis] : no cyanosis [No Clubbing] : no clubbing [No Varicosities] : no varicosities [No Rash] : no rash [No Skin Lesions] : no skin lesions [Moves all extremities] : moves all extremities [No Focal Deficits] : no focal deficits [Normal Speech] : normal speech [Alert and Oriented] : alert and oriented [Normal memory] : normal memory [de-identified] : Grade I/VI systolic murmur at RSB [de-identified] : Patient is unable to ambulate independently. She is in a wheelchair.

## 2022-10-20 NOTE — HISTORY OF PRESENT ILLNESS
[FreeTextEntry1] : CVA\par Patient cannot ambulate independently\par Atrial fibrillation\par Hypertension\par Hyperlipidemia\par DM\par Former cigarette smoker\par Left MCA aneurysm patient declined to have her aneurysm clipped.\par Patient denies a history of MI, CHF, familial history of heart disease

## 2022-10-20 NOTE — REASON FOR VISIT
[FreeTextEntry1] : 82 year old woman from [facility] diagnosed with a left MCA aneurysm and a prior CVA left central Jb distribution and a subacute ischemic stroke in in the right cerebellum presents for initial Cardiology evaluation after being hospitalized in the Stroke unit at [facility] in April 2022.. She was found to be in atrial fibrillation and started on eliquis. She is accompanied by her son who acted as .

## 2022-10-20 NOTE — REVIEW OF SYSTEMS
[Tremor] : a tremor was seen [Weakness] : weakness [Limb Weakness (Paresis)] : limb weakness (Paresis) [Negative] : Heme/Lymph

## 2022-10-20 NOTE — DISCUSSION/SUMMARY
[FreeTextEntry1] : Patient's BP is well controlled on today's initial office visit.\par She is in normal sinus rhythm on today's 12 lead EKG\par Patient was instructed to target her T. Cholesterol to less than 200 mg/dl and LDL cholesterol to less than 100 mg/dl.\par Maintain present  medications. \par Patient was advised to repeat a BMP, CBC , fasting lipid profile and hepatic panel.\par TTE\par Carotid duplex doppler.\par Conservative medical therapy as per patient's and her son's wishes.\par She is followed by Neurology and her PCP.\par RV in 2 weeks.\par

## 2022-11-03 ENCOUNTER — APPOINTMENT (OUTPATIENT)
Dept: CARDIOLOGY | Facility: CLINIC | Age: 82
End: 2022-11-03

## 2022-11-14 ENCOUNTER — APPOINTMENT (OUTPATIENT)
Dept: CARDIOLOGY | Facility: CLINIC | Age: 82
End: 2022-11-14

## 2022-11-23 ENCOUNTER — APPOINTMENT (OUTPATIENT)
Dept: CARDIOLOGY | Facility: CLINIC | Age: 82
End: 2022-11-23

## 2023-03-09 ENCOUNTER — INPATIENT (INPATIENT)
Facility: HOSPITAL | Age: 83
LOS: 1 days | Discharge: HOME HEALTH | DRG: 194 | End: 2023-03-11
Attending: INTERNAL MEDICINE | Admitting: INTERNAL MEDICINE
Payer: MEDICAID

## 2023-03-09 VITALS
WEIGHT: 220.02 LBS | TEMPERATURE: 98 F | SYSTOLIC BLOOD PRESSURE: 209 MMHG | DIASTOLIC BLOOD PRESSURE: 98 MMHG | OXYGEN SATURATION: 94 % | HEART RATE: 71 BPM | RESPIRATION RATE: 20 BRPM

## 2023-03-09 DIAGNOSIS — E87.70 FLUID OVERLOAD, UNSPECIFIED: ICD-10-CM

## 2023-03-09 DIAGNOSIS — Z98.890 OTHER SPECIFIED POSTPROCEDURAL STATES: Chronic | ICD-10-CM

## 2023-03-09 DIAGNOSIS — I10 ESSENTIAL (PRIMARY) HYPERTENSION: ICD-10-CM

## 2023-03-09 LAB
ALBUMIN SERPL ELPH-MCNC: 4 G/DL — SIGNIFICANT CHANGE UP (ref 3.5–5.2)
ALP SERPL-CCNC: 85 U/L — SIGNIFICANT CHANGE UP (ref 30–115)
ALT FLD-CCNC: 15 U/L — SIGNIFICANT CHANGE UP (ref 0–41)
ANION GAP SERPL CALC-SCNC: 10 MMOL/L — SIGNIFICANT CHANGE UP (ref 7–14)
APTT BLD: 39.2 SEC — SIGNIFICANT CHANGE UP (ref 27–39.2)
AST SERPL-CCNC: 15 U/L — SIGNIFICANT CHANGE UP (ref 0–41)
BASE EXCESS BLDV CALC-SCNC: 3.7 MMOL/L — HIGH (ref -2–3)
BASOPHILS # BLD AUTO: 0.02 K/UL — SIGNIFICANT CHANGE UP (ref 0–0.2)
BASOPHILS NFR BLD AUTO: 0.3 % — SIGNIFICANT CHANGE UP (ref 0–1)
BILIRUB SERPL-MCNC: 0.2 MG/DL — SIGNIFICANT CHANGE UP (ref 0.2–1.2)
BLD GP AB SCN SERPL QL: SIGNIFICANT CHANGE UP
BUN SERPL-MCNC: 31 MG/DL — HIGH (ref 10–20)
CA-I SERPL-SCNC: 1.26 MMOL/L — SIGNIFICANT CHANGE UP (ref 1.15–1.33)
CALCIUM SERPL-MCNC: 9.4 MG/DL — SIGNIFICANT CHANGE UP (ref 8.4–10.5)
CHLORIDE SERPL-SCNC: 102 MMOL/L — SIGNIFICANT CHANGE UP (ref 98–110)
CO2 SERPL-SCNC: 29 MMOL/L — SIGNIFICANT CHANGE UP (ref 17–32)
CREAT SERPL-MCNC: 1 MG/DL — SIGNIFICANT CHANGE UP (ref 0.7–1.5)
EGFR: 56 ML/MIN/1.73M2 — LOW
EOSINOPHIL # BLD AUTO: 0.39 K/UL — SIGNIFICANT CHANGE UP (ref 0–0.7)
EOSINOPHIL NFR BLD AUTO: 5.7 % — SIGNIFICANT CHANGE UP (ref 0–8)
FLUAV AG NPH QL: SIGNIFICANT CHANGE UP
FLUBV AG NPH QL: SIGNIFICANT CHANGE UP
GAS PNL BLDV: 135 MMOL/L — LOW (ref 136–145)
GAS PNL BLDV: SIGNIFICANT CHANGE UP
GAS PNL BLDV: SIGNIFICANT CHANGE UP
GLUCOSE SERPL-MCNC: 124 MG/DL — HIGH (ref 70–99)
HCO3 BLDV-SCNC: 32 MMOL/L — HIGH (ref 22–29)
HCT VFR BLD CALC: 40.6 % — SIGNIFICANT CHANGE UP (ref 37–47)
HCT VFR BLDA CALC: 38 % — LOW (ref 39–51)
HGB BLD CALC-MCNC: 12.8 G/DL — SIGNIFICANT CHANGE UP (ref 12.6–17.4)
HGB BLD-MCNC: 12.6 G/DL — SIGNIFICANT CHANGE UP (ref 12–16)
IMM GRANULOCYTES NFR BLD AUTO: 0.3 % — SIGNIFICANT CHANGE UP (ref 0.1–0.3)
INR BLD: 1.31 RATIO — HIGH (ref 0.65–1.3)
LACTATE BLDV-MCNC: 1.1 MMOL/L — SIGNIFICANT CHANGE UP (ref 0.5–2)
LYMPHOCYTES # BLD AUTO: 1.12 K/UL — LOW (ref 1.2–3.4)
LYMPHOCYTES # BLD AUTO: 16.4 % — LOW (ref 20.5–51.1)
MAGNESIUM SERPL-MCNC: 2 MG/DL — SIGNIFICANT CHANGE UP (ref 1.8–2.4)
MCHC RBC-ENTMCNC: 28.1 PG — SIGNIFICANT CHANGE UP (ref 27–31)
MCHC RBC-ENTMCNC: 31 G/DL — LOW (ref 32–37)
MCV RBC AUTO: 90.6 FL — SIGNIFICANT CHANGE UP (ref 81–99)
MONOCYTES # BLD AUTO: 0.57 K/UL — SIGNIFICANT CHANGE UP (ref 0.1–0.6)
MONOCYTES NFR BLD AUTO: 8.3 % — SIGNIFICANT CHANGE UP (ref 1.7–9.3)
NEUTROPHILS # BLD AUTO: 4.72 K/UL — SIGNIFICANT CHANGE UP (ref 1.4–6.5)
NEUTROPHILS NFR BLD AUTO: 69 % — SIGNIFICANT CHANGE UP (ref 42.2–75.2)
NRBC # BLD: 0 /100 WBCS — SIGNIFICANT CHANGE UP (ref 0–0)
NT-PROBNP SERPL-SCNC: 886 PG/ML — HIGH (ref 0–300)
PCO2 BLDV: 63 MMHG — HIGH (ref 39–42)
PH BLDV: 7.31 — LOW (ref 7.32–7.43)
PLATELET # BLD AUTO: 210 K/UL — SIGNIFICANT CHANGE UP (ref 130–400)
PO2 BLDV: 21 MMHG — SIGNIFICANT CHANGE UP
POTASSIUM BLDV-SCNC: 5.2 MMOL/L — HIGH (ref 3.5–5.1)
POTASSIUM SERPL-MCNC: 5.4 MMOL/L — HIGH (ref 3.5–5)
POTASSIUM SERPL-SCNC: 5.4 MMOL/L — HIGH (ref 3.5–5)
PROT SERPL-MCNC: 6.4 G/DL — SIGNIFICANT CHANGE UP (ref 6–8)
PROTHROM AB SERPL-ACNC: 15 SEC — HIGH (ref 9.95–12.87)
RBC # BLD: 4.48 M/UL — SIGNIFICANT CHANGE UP (ref 4.2–5.4)
RBC # FLD: 13.5 % — SIGNIFICANT CHANGE UP (ref 11.5–14.5)
RSV RNA NPH QL NAA+NON-PROBE: SIGNIFICANT CHANGE UP
SAO2 % BLDV: 33.2 % — SIGNIFICANT CHANGE UP
SARS-COV-2 RNA SPEC QL NAA+PROBE: SIGNIFICANT CHANGE UP
SODIUM SERPL-SCNC: 141 MMOL/L — SIGNIFICANT CHANGE UP (ref 135–146)
TROPONIN T SERPL-MCNC: 0.08 NG/ML — CRITICAL HIGH
WBC # BLD: 6.84 K/UL — SIGNIFICANT CHANGE UP (ref 4.8–10.8)
WBC # FLD AUTO: 6.84 K/UL — SIGNIFICANT CHANGE UP (ref 4.8–10.8)

## 2023-03-09 PROCEDURE — 99223 1ST HOSP IP/OBS HIGH 75: CPT

## 2023-03-09 PROCEDURE — 71045 X-RAY EXAM CHEST 1 VIEW: CPT | Mod: 26

## 2023-03-09 PROCEDURE — 85610 PROTHROMBIN TIME: CPT

## 2023-03-09 PROCEDURE — 84484 ASSAY OF TROPONIN QUANT: CPT

## 2023-03-09 PROCEDURE — 82962 GLUCOSE BLOOD TEST: CPT

## 2023-03-09 PROCEDURE — 84100 ASSAY OF PHOSPHORUS: CPT

## 2023-03-09 PROCEDURE — 85025 COMPLETE CBC W/AUTO DIFF WBC: CPT

## 2023-03-09 PROCEDURE — 93306 TTE W/DOPPLER COMPLETE: CPT

## 2023-03-09 PROCEDURE — 82553 CREATINE MB FRACTION: CPT

## 2023-03-09 PROCEDURE — 99285 EMERGENCY DEPT VISIT HI MDM: CPT

## 2023-03-09 PROCEDURE — 36415 COLL VENOUS BLD VENIPUNCTURE: CPT

## 2023-03-09 PROCEDURE — 93010 ELECTROCARDIOGRAM REPORT: CPT

## 2023-03-09 PROCEDURE — 83036 HEMOGLOBIN GLYCOSYLATED A1C: CPT

## 2023-03-09 PROCEDURE — 80053 COMPREHEN METABOLIC PANEL: CPT

## 2023-03-09 PROCEDURE — 94660 CPAP INITIATION&MGMT: CPT

## 2023-03-09 PROCEDURE — 80048 BASIC METABOLIC PNL TOTAL CA: CPT

## 2023-03-09 PROCEDURE — 83735 ASSAY OF MAGNESIUM: CPT

## 2023-03-09 PROCEDURE — 97163 PT EVAL HIGH COMPLEX 45 MIN: CPT | Mod: GP

## 2023-03-09 PROCEDURE — 85730 THROMBOPLASTIN TIME PARTIAL: CPT

## 2023-03-09 PROCEDURE — 82550 ASSAY OF CK (CPK): CPT

## 2023-03-09 PROCEDURE — 93970 EXTREMITY STUDY: CPT | Mod: 26

## 2023-03-09 PROCEDURE — 80061 LIPID PANEL: CPT

## 2023-03-09 PROCEDURE — 99497 ADVNCD CARE PLAN 30 MIN: CPT | Mod: 25

## 2023-03-09 RX ORDER — FUROSEMIDE 40 MG
40 TABLET ORAL DAILY
Refills: 0 | Status: DISCONTINUED | OUTPATIENT
Start: 2023-03-09 | End: 2023-03-10

## 2023-03-09 RX ORDER — AMLODIPINE BESYLATE 2.5 MG/1
2.5 TABLET ORAL AT BEDTIME
Refills: 0 | Status: DISCONTINUED | OUTPATIENT
Start: 2023-03-09 | End: 2023-03-09

## 2023-03-09 RX ORDER — ATORVASTATIN CALCIUM 80 MG/1
1 TABLET, FILM COATED ORAL
Qty: 0 | Refills: 1 | DISCHARGE

## 2023-03-09 RX ORDER — AMLODIPINE BESYLATE 2.5 MG/1
5 TABLET ORAL DAILY
Refills: 0 | Status: DISCONTINUED | OUTPATIENT
Start: 2023-03-10 | End: 2023-03-11

## 2023-03-09 RX ORDER — LINAGLIPTIN 5 MG/1
1 TABLET, FILM COATED ORAL
Qty: 0 | Refills: 0 | DISCHARGE

## 2023-03-09 RX ORDER — HYDROCORTISONE 1 %
1 OINTMENT (GRAM) TOPICAL DAILY
Refills: 0 | Status: DISCONTINUED | OUTPATIENT
Start: 2023-03-09 | End: 2023-03-11

## 2023-03-09 RX ORDER — ACETAMINOPHEN 500 MG
650 TABLET ORAL EVERY 6 HOURS
Refills: 0 | Status: DISCONTINUED | OUTPATIENT
Start: 2023-03-09 | End: 2023-03-11

## 2023-03-09 RX ORDER — HYDRALAZINE HCL 50 MG
10 TABLET ORAL ONCE
Refills: 0 | Status: COMPLETED | OUTPATIENT
Start: 2023-03-09 | End: 2023-03-09

## 2023-03-09 RX ORDER — SODIUM ZIRCONIUM CYCLOSILICATE 10 G/10G
10 POWDER, FOR SUSPENSION ORAL ONCE
Refills: 0 | Status: COMPLETED | OUTPATIENT
Start: 2023-03-09 | End: 2023-03-09

## 2023-03-09 RX ORDER — SERTRALINE 25 MG/1
25 TABLET, FILM COATED ORAL DAILY
Refills: 0 | Status: DISCONTINUED | OUTPATIENT
Start: 2023-03-09 | End: 2023-03-11

## 2023-03-09 RX ORDER — METOPROLOL TARTRATE 50 MG
25 TABLET ORAL
Refills: 0 | Status: DISCONTINUED | OUTPATIENT
Start: 2023-03-09 | End: 2023-03-11

## 2023-03-09 RX ORDER — ASPIRIN/CALCIUM CARB/MAGNESIUM 324 MG
81 TABLET ORAL DAILY
Refills: 0 | Status: DISCONTINUED | OUTPATIENT
Start: 2023-03-09 | End: 2023-03-11

## 2023-03-09 RX ORDER — APIXABAN 2.5 MG/1
5 TABLET, FILM COATED ORAL EVERY 12 HOURS
Refills: 0 | Status: DISCONTINUED | OUTPATIENT
Start: 2023-03-09 | End: 2023-03-11

## 2023-03-09 RX ORDER — FUROSEMIDE 40 MG
40 TABLET ORAL ONCE
Refills: 0 | Status: COMPLETED | OUTPATIENT
Start: 2023-03-09 | End: 2023-03-09

## 2023-03-09 RX ORDER — ATORVASTATIN CALCIUM 80 MG/1
20 TABLET, FILM COATED ORAL AT BEDTIME
Refills: 0 | Status: DISCONTINUED | OUTPATIENT
Start: 2023-03-09 | End: 2023-03-11

## 2023-03-09 RX ORDER — LISINOPRIL 2.5 MG/1
5 TABLET ORAL DAILY
Refills: 0 | Status: DISCONTINUED | OUTPATIENT
Start: 2023-03-09 | End: 2023-03-11

## 2023-03-09 RX ADMIN — SODIUM ZIRCONIUM CYCLOSILICATE 10 GRAM(S): 10 POWDER, FOR SUSPENSION ORAL at 21:31

## 2023-03-09 RX ADMIN — APIXABAN 5 MILLIGRAM(S): 2.5 TABLET, FILM COATED ORAL at 21:32

## 2023-03-09 RX ADMIN — Medication 40 MILLIGRAM(S): at 15:04

## 2023-03-09 RX ADMIN — Medication 10 MILLIGRAM(S): at 16:45

## 2023-03-09 RX ADMIN — AMLODIPINE BESYLATE 2.5 MILLIGRAM(S): 2.5 TABLET ORAL at 21:32

## 2023-03-09 RX ADMIN — ATORVASTATIN CALCIUM 20 MILLIGRAM(S): 80 TABLET, FILM COATED ORAL at 21:34

## 2023-03-09 NOTE — H&P ADULT - HISTORY OF PRESENT ILLNESS
82F Moroccan speaking PMH HTN, HLD, DM, left MCA aneurysm, CVA presents for bilateral leg swelling. Discussed with son laney. 4 days ago noticed increased swelling in the legs. Additionally noticed they were red. Denies any shortness of breath, chest pain, palpitations, fever, chills, cough.  Patient does not have a history of heart failure, saw caridologist in the past but not sure of the name. Denies getting short of breath on exertion but states that she has poor ambulation 2/2 stroke. This morning patients blood pressure was elevated in the 180s systolic so they decided to come to the ED.     In the ED vitals   Labs show K 5.4, trop 0.08,   Was given lasix 40mg IV push and hydralazine 10mg IV push.  82F Macedonian speaking PMH HTN, HLD, DM, left MCA aneurysm, CVA presents for bilateral leg swelling. Discussed with son laney. 4 days ago noticed increased swelling in the legs. Additionally noticed they were red. Denies any shortness of breath, chest pain, palpitations, fever, chills, cough.  Patient does not have a history of heart failure, saw caridologist in the past but not sure of the name. Denies getting short of breath on exertion but states that she has poor ambulation 2/2 stroke. This morning patients blood pressure was elevated in the 180s systolic so they decided to come to the ED.     In the ED vitals T 98.4, HR 70, /98, SPO2 94% on RA  Labs show K 5.4, trop 0.08,   CXR: pulm venous congestion, no consolidation.  Was given lasix 40mg IV push and hydralazine 10mg IV push.

## 2023-03-09 NOTE — H&P ADULT - NSHPLABSRESULTS_GEN_ALL_CORE
LABS/RADIOLOGY RESULTS:                          12.6   6.84  )-----------( 210      ( 09 Mar 2023 14:41 )             40.6   03-09    141  |  102  |  31<H>  ----------------------------<  124<H>  5.4<H>   |  29  |  1.0    Ca    9.4      09 Mar 2023 14:41  Mg     2.0     03-09    TPro  6.4  /  Alb  4.0  /  TBili  0.2  /  DBili  x   /  AST  15  /  ALT  15  /  AlkPhos  85  03-09  PT/INR - ( 09 Mar 2023 14:41 )   PT: 15.00 sec;   INR: 1.31 ratio         PTT - ( 09 Mar 2023 14:41 )  PTT:39.2 secBlood Cultures

## 2023-03-09 NOTE — H&P ADULT - ATTENDING COMMENTS
82 YO F w/ a PMH of HTN, HLD, DM2, left MCA aneurysm, and depression who was BIBEMS for eval of worsening B/L LE swelling for the past x 5 days. Associated with elevated BP. - SOB, - orthopnea, - non-productive cough. Denies any fevers/chills, CP, palpitations, N/V/D, ABD pain, dysuria, headaches, or rashes.    In the ED, Chest X-ray shows no acute process. B/L LE duplex shows no acute process. BNP elevated. Started on IV Lasix and Hydralazine in the ED. Pt was hypoxic to 92% in the ED, started on BiPAP.    FMHx:  -No family Hx of early cardiac death, CAD, asthma, or genetic disorders identified    Physical exam shows pt in NAD. VSS, afebrile, not hypoxic on BiPAP. A&Ox3. Non-focal neuro exam. Muscle strength/sensation intact. CTA B/L w/ no W/C/R. RRR, no M/G/R. ABD is soft and non-tender, normoactive BSs. B/L LEs with 2+ pitting edema. There is B/L LE circumferential erythematous skin changes noted over the mid-shin and extending inferiorly to the mid-foot, + warmth. Labs and radiology as above.    B/L LE swelling + Acute hypoxic respiratory failure due to new-onset heart failure (unknown type). IV Lasix and transition to PO. Echo. Monitor daily weights, Is&Os, and diet/fluid restriction. BMP in the AM. BiPAP overnight. Restart home meds. Cardio consult.    Hypertensive urgency (resolved). BP currently 135/65. Restart home meds. Monitor VSs.    B/L LE venous stasis dermatitis; no sepsis present on admission. IV Lasix and transition to PO. A1c/Lipids. ESR/CRP. Elevation of LEs. Compression wraps with ACE bandages. Topical CSs. Silver Sulfadiazine on open ulcerations. PRN pain meds. Wash LEs daily with soap and water. Elevate LEs. FU official LE doppler results. On discharge, give pt an Rx for static compression wraps and vascular Out-pt FU.    Hyperkalemia. Treat now. Repeat BMP in the AM.    Elevated troponin from HF exacerbation, doubt ACS. Serial cardiac enzymes and EKGs.    Hx of HLD, DM2, left MCA aneurysm, and depression. Restart home meds, except as stated above. DVT PPX. Inform PCP of pt's admission to hospital. My note supersedes the residents note.    Spoke to family: Son (Mac Hoffman) at bedside, provided interpretation at the pt's request    Date seen by Attending: 3/9/23

## 2023-03-09 NOTE — H&P ADULT - NSHPREVIEWOFSYSTEMS_GEN_ALL_CORE
REVIEW OF SYSTEMS:    CONSTITUTIONAL: No fever, weight loss, or fatigue  EYES: No eye pain, visual disturbances, or discharge  ENMT:  No difficulty hearing, tinnitus, vertigo; No sinus or throat pain  NECK: No pain or stiffness  BREASTS: No pain, masses, or nipple discharge  RESPIRATORY: No cough, wheezing, chills or hemoptysis; No shortness of breath  CARDIOVASCULAR: see hpi   GASTROINTESTINAL: No abdominal or epigastric pain. No nausea, vomiting, or hematemesis; No diarrhea or constipation. No melena or hematochezia.  GENITOURINARY: No dysuria, frequency, hematuria, or incontinence  NEUROLOGICAL: No headaches, memory loss, loss of strength, numbness, or tremors  SKIN: No itching, burning, rashes, or lesions   LYMPH NODES: No enlarged glands  ENDOCRINE: No heat or cold intolerance; No hair loss  MUSCULOSKELETAL: No joint pain or swelling; No muscle, back, or extremity pain  PSYCHIATRIC: No depression, anxiety, mood swings, or difficulty sleeping  HEME/LYMPH: No easy bruising, or bleeding gums  ALLERGY AND IMMUNOLOGIC: No hives or eczema

## 2023-03-09 NOTE — H&P ADULT - CONVERSATION DETAILS
***the pt speaks Bengali but the Son (Mac Hoffman) at bedside, provided interpretation at the pt's request      The pt wishes to be FULL CODE with no limitations in medical interventions.

## 2023-03-09 NOTE — ED PROVIDER NOTE - CARE PLAN
1 Principal Discharge DX:	Fluid overload  Secondary Diagnosis:	Elevated systolic blood pressure reading with diagnosis of hypertension

## 2023-03-09 NOTE — ED ADULT TRIAGE NOTE - CHIEF COMPLAINT QUOTE
Pt BIBA for high blood pressure and bilateral lower extremity edema since Sunday. Pt has no complaints at this time.

## 2023-03-09 NOTE — ED PROVIDER NOTE - PROGRESS NOTE DETAILS
Summary:   1. Normal global left ventricular systolic function.   2. LV Ejection Fraction by Harrington's Method with a biplane EF of 68 %.   3. Normal left ventricular internal cavity size.   4. The left ventricular diastolic function could not be assessed in this   study.   5. Normal left atrial size.   6. Normal right atrial size.   7. Mild thickening and calcification of the anterior and posterior   mitral valve leaflets.   8. Moderate mitral annular calcification.   9. No evidence of mitral valve regurgitation.  10. Mild tricuspid regurgitation.  11. Estimated pulmonary artery systolic pressure is 42.3 mmHg assuming a   right atrial pressure of 8 mmHg, which is consistent with mild pulmonary   hypertension.  12. Pulmonary hypertension is present.  13. LA volume Index is 23.8 ml/m² ml/m2.  14. Mitral valve mean gradient is 3.7 mmHg consistent with mild mitral   stenosis.  15. Peak transaortic gradient equals 9.5 mmHg, mean transaortic gradient   equals 4.3 mmHg, the calculated aortic valve area equals 2.00 cm² by the   continuity equation consistent with mild aortic stenosis.    PHYSICIAN INTERPRETATION:  Left Ventricle: The left ventricular internal cavity size is normal. Left   ventricular wall thickness is normal. There is no left ventricular   hypertrophy. Global LV systolic function was normal. The left ventricular   diastolic function could not be assessed in this study. 93% on RA, placed on 2L NC with improvement to 96% had been seen by dr. bhakta during last admission 10/22

## 2023-03-09 NOTE — ED PROVIDER NOTE - OBJECTIVE STATEMENT
83 yo f with pmh of cva, afib on eliquis, dm, htn, hld, BIBA with c/o elevated bp and b/l leg swelling.  noted elevated bp this morning sbp 180s.  noted progressive leg swelling x few days.  son at bedside and is hcp.  says he is not aware of her being on any water pills.  cannot recall her cardiologist.  had a follow appt a few weeks ago but transport never showed up.  no reports of fever, chills, n/v/d, cp.  pt denies sob.  difficulty with ambulation at baseline.  mild leg pain b/l.

## 2023-03-09 NOTE — ED ADULT NURSE NOTE - NSIMPLEMENTINTERV_GEN_ALL_ED
Implemented All Fall with Harm Risk Interventions:  [place] to call system. Call bell, personal items and telephone within reach. Instruct patient to call for assistance. Room bathroom lighting operational. Non-slip footwear when patient is off stretcher. Physically safe environment: no spills, clutter or unnecessary equipment. Stretcher in lowest position, wheels locked, appropriate side rails in place. Provide visual cue, wrist band, yellow gown, etc. Monitor gait and stability. Monitor for mental status changes and reorient to person, place, and time. Review medications for side effects contributing to fall risk. Reinforce activity limits and safety measures with patient and family. Provide visual clues: red socks.

## 2023-03-09 NOTE — ED PROVIDER NOTE - PHYSICAL EXAMINATION
VITAL SIGNS: I have reviewed nursing notes and confirm.  CONSTITUTIONAL: Well-developed; well-nourished; in no acute distress.  SKIN: Skin exam is warm and dry, no acute rash.  HEAD: Normocephalic; atraumatic.  EYES: PERRL, EOM intact; conjunctiva and sclera clear.  ENT: No nasal discharge; airway clear. TMs clear.  NECK: Supple; non tender.  CARD: S1, S2 normal; no murmurs, gallops, or rubs. Regular rate and rhythm.  RESP: bibasilar rales, decreased bs R base  ABD: Normal bowel sounds; soft; non-distended; non-tender;  EXT: Normal ROM. +b/l pitting edema 3+, mild erythema of RLE, b/l temp equal  NEURO: aox3, cn2-12 grossly normal, 5/5 strength all ext, sensation intact,   PSYCH: Cooperative, appropriate.

## 2023-03-09 NOTE — ED PROVIDER NOTE - CLINICAL SUMMARY MEDICAL DECISION MAKING FREE TEXT BOX
Pt with fluid overload, mildly hypoxic, hypertensive, no acute distress.  placed on nc with resolution of hypoxia.  started on iv lasix.  trop 0.08, nonischemic ekg.  MAR requested to start bipap due to acidosis.  will admit to telemetry.  Labs and EKG were ordered and reviewed.  Imaging was ordered and reviewed by me.  Appropriate medications for patient's presenting complaints were ordered and effects were reassessed.  Patient's records (prior hospital, ED visit, and/or nursing home notes if available) were reviewed.  Additional history was obtained from EMS, family, and/or PCP (where available).  Escalation to admission/observation was considered.  Patient requires inpatient hospitalization - monitored setting.

## 2023-03-09 NOTE — H&P ADULT - ASSESSMENT
82F Wagner speaking [facility] HTN, HLD, DM, AFib on eliquis, left MCA aneurysm, CVA presents for bilateral leg swelling.     # new onset CHF exacerbation   #HTN urgency   - s/p hydralazine 10mg IV push in ED  - c/w home amlodipine 5mg, lisinopril 5mg, metoprolol 25mg BID  - Admit to telemetry  - Echo: 68%, mild TR, MS, AS  - Pro-BNP:  886  - Troponin:0.08  - Chest X-ray:  - Cardiology consult pending  - Strict intake and outputs, daily weights  - Repeat echocardiogram  - Supplemental oxygen PRN  - c/w lasix 40mg IV qd for now, monitor clinical improvement and adjust diuresis     #venous stasis dermatitis   - hydrocortisone cream, wrap legs, elevate     #hyperkalemia - will give lokelma now. Repeat BMP     #chronic  Afib - c/w eliquis 5mg BID  #DM- hold oral medications.  monitor FS, if FS>180 start basal/bolus. check A1c  #Depression- continue with sertraline     #Hx of L MCA aneurysm   #Hx CVA  #HLD  - c/w asa and atorvastatin     #DVT PPx: qliquis   #DASH  #Acitivity: AAT  DIspo: from home    82F Wagner speaking [facility] HTN, HLD, DM, AFib on eliquis, left MCA aneurysm, CVA presents for bilateral leg swelling.     #new onset CHF exacerbation   #HTN urgency   - s/p hydralazine 10mg IV push in ED  - c/w home amlodipine 5mg, lisinopril 5mg, metoprolol 25mg BID  - Admit to telemetry  - Echo: 68%, mild TR, MS, AS  - Pro-BNP:  886  - Troponin:0.08  - Chest X-ray: pulm venous congestion, no consolidation.  - Cardiology consult pending  - Strict intake and outputs, daily weights  - Repeat echocardiogram  - Supplemental oxygen PRN  - c/w lasix 40mg IV qd for now, monitor clinical improvement and adjust diuresis     #venous stasis dermatitis   - hydrocortisone cream, wrap legs, elevate     #hyperkalemia - will give lokelma now. Repeat BMP     #chronic  Afib - c/w eliquis 5mg BID  #DM- hold oral medications.  monitor FS, if FS>180 start basal/bolus. check A1c  #Depression- continue with sertraline     #Hx of L MCA aneurysm   #Hx CVA  #HLD  - c/w asa and atorvastatin     #DVT PPx: eliquis   #DASH  #Acitivity: AAT  DIspo: from home

## 2023-03-09 NOTE — H&P ADULT - NSHPPHYSICALEXAM_GEN_ALL_CORE
VITALS:   T(C): 36.9 (03-09-23 @ 13:43), Max: 36.9 (03-09-23 @ 13:43)  HR: 78 (03-09-23 @ 16:51) (71 - 78)  BP: 210/98 (03-09-23 @ 16:43) (209/98 - 222/111)  RR: 20 (03-09-23 @ 13:43) (20 - 20)  SpO2: 97% (03-09-23 @ 16:51) (94% - 97%)    GENERAL: NAD, lying in bed comfortably  HEAD:  Atraumatic, normocephalic  EYES: EOMI, PERRLA, conjunctiva and sclera clear  ENT: Moist mucous membranes  NECK: Supple, no JVD  HEART: Regular rate and rhythm, no murmurs, rubs, or gallops  LUNGS: Unlabored respirations.  Clear to auscultation bilaterally, no crackles, wheezing, or rhonchi  ABDOMEN: Soft, nontender, nondistended, +BS  EXTREMITIES: 2+ peripheral pulses bilaterally. No clubbing, cyanosis, or edema  NERVOUS SYSTEM:  A&Ox3, no focal deficits   SKIN: No rashes or lesions

## 2023-03-10 LAB
A1C WITH ESTIMATED AVERAGE GLUCOSE RESULT: 6.8 % — HIGH (ref 4–5.6)
ALBUMIN SERPL ELPH-MCNC: 3.9 G/DL — SIGNIFICANT CHANGE UP (ref 3.5–5.2)
ALP SERPL-CCNC: 83 U/L — SIGNIFICANT CHANGE UP (ref 30–115)
ALT FLD-CCNC: 13 U/L — SIGNIFICANT CHANGE UP (ref 0–41)
ANION GAP SERPL CALC-SCNC: 16 MMOL/L — HIGH (ref 7–14)
ANION GAP SERPL CALC-SCNC: 8 MMOL/L — SIGNIFICANT CHANGE UP (ref 7–14)
APTT BLD: 43.4 SEC — HIGH (ref 27–39.2)
AST SERPL-CCNC: 13 U/L — SIGNIFICANT CHANGE UP (ref 0–41)
BASOPHILS # BLD AUTO: 0.03 K/UL — SIGNIFICANT CHANGE UP (ref 0–0.2)
BASOPHILS NFR BLD AUTO: 0.4 % — SIGNIFICANT CHANGE UP (ref 0–1)
BILIRUB SERPL-MCNC: 0.5 MG/DL — SIGNIFICANT CHANGE UP (ref 0.2–1.2)
BUN SERPL-MCNC: 27 MG/DL — HIGH (ref 10–20)
BUN SERPL-MCNC: 29 MG/DL — HIGH (ref 10–20)
CALCIUM SERPL-MCNC: 9.1 MG/DL — SIGNIFICANT CHANGE UP (ref 8.4–10.5)
CALCIUM SERPL-MCNC: 9.6 MG/DL — SIGNIFICANT CHANGE UP (ref 8.4–10.5)
CHLORIDE SERPL-SCNC: 102 MMOL/L — SIGNIFICANT CHANGE UP (ref 98–110)
CHLORIDE SERPL-SCNC: 97 MMOL/L — LOW (ref 98–110)
CHOLEST SERPL-MCNC: 167 MG/DL — SIGNIFICANT CHANGE UP
CK MB CFR SERPL CALC: 2.1 NG/ML — SIGNIFICANT CHANGE UP (ref 0.6–6.3)
CK MB CFR SERPL CALC: 2.7 NG/ML — SIGNIFICANT CHANGE UP (ref 0.6–6.3)
CK SERPL-CCNC: 46 U/L — SIGNIFICANT CHANGE UP (ref 0–225)
CO2 SERPL-SCNC: 21 MMOL/L — SIGNIFICANT CHANGE UP (ref 17–32)
CO2 SERPL-SCNC: 34 MMOL/L — HIGH (ref 17–32)
CREAT SERPL-MCNC: 1 MG/DL — SIGNIFICANT CHANGE UP (ref 0.7–1.5)
CREAT SERPL-MCNC: 1.1 MG/DL — SIGNIFICANT CHANGE UP (ref 0.7–1.5)
EGFR: 50 ML/MIN/1.73M2 — LOW
EGFR: 56 ML/MIN/1.73M2 — LOW
EOSINOPHIL # BLD AUTO: 0.31 K/UL — SIGNIFICANT CHANGE UP (ref 0–0.7)
EOSINOPHIL NFR BLD AUTO: 3.9 % — SIGNIFICANT CHANGE UP (ref 0–8)
ESTIMATED AVERAGE GLUCOSE: 148 MG/DL — HIGH (ref 68–114)
GLUCOSE BLDC GLUCOMTR-MCNC: 109 MG/DL — HIGH (ref 70–99)
GLUCOSE BLDC GLUCOMTR-MCNC: 158 MG/DL — HIGH (ref 70–99)
GLUCOSE BLDC GLUCOMTR-MCNC: 223 MG/DL — HIGH (ref 70–99)
GLUCOSE SERPL-MCNC: 125 MG/DL — HIGH (ref 70–99)
GLUCOSE SERPL-MCNC: 134 MG/DL — HIGH (ref 70–99)
HCT VFR BLD CALC: 40.5 % — SIGNIFICANT CHANGE UP (ref 37–47)
HDLC SERPL-MCNC: 55 MG/DL — SIGNIFICANT CHANGE UP
HGB BLD-MCNC: 12.6 G/DL — SIGNIFICANT CHANGE UP (ref 12–16)
IMM GRANULOCYTES NFR BLD AUTO: 0.1 % — SIGNIFICANT CHANGE UP (ref 0.1–0.3)
INR BLD: 1.72 RATIO — HIGH (ref 0.65–1.3)
LIPID PNL WITH DIRECT LDL SERPL: 85 MG/DL — SIGNIFICANT CHANGE UP
LYMPHOCYTES # BLD AUTO: 1.27 K/UL — SIGNIFICANT CHANGE UP (ref 1.2–3.4)
LYMPHOCYTES # BLD AUTO: 16 % — LOW (ref 20.5–51.1)
MCHC RBC-ENTMCNC: 28.1 PG — SIGNIFICANT CHANGE UP (ref 27–31)
MCHC RBC-ENTMCNC: 31.1 G/DL — LOW (ref 32–37)
MCV RBC AUTO: 90.2 FL — SIGNIFICANT CHANGE UP (ref 81–99)
MONOCYTES # BLD AUTO: 0.69 K/UL — HIGH (ref 0.1–0.6)
MONOCYTES NFR BLD AUTO: 8.7 % — SIGNIFICANT CHANGE UP (ref 1.7–9.3)
NEUTROPHILS # BLD AUTO: 5.65 K/UL — SIGNIFICANT CHANGE UP (ref 1.4–6.5)
NEUTROPHILS NFR BLD AUTO: 70.9 % — SIGNIFICANT CHANGE UP (ref 42.2–75.2)
NON HDL CHOLESTEROL: 112 MG/DL — SIGNIFICANT CHANGE UP
NRBC # BLD: 0 /100 WBCS — SIGNIFICANT CHANGE UP (ref 0–0)
PLATELET # BLD AUTO: 207 K/UL — SIGNIFICANT CHANGE UP (ref 130–400)
POTASSIUM SERPL-MCNC: 4.7 MMOL/L — SIGNIFICANT CHANGE UP (ref 3.5–5)
POTASSIUM SERPL-MCNC: 4.9 MMOL/L — SIGNIFICANT CHANGE UP (ref 3.5–5)
POTASSIUM SERPL-SCNC: 4.7 MMOL/L — SIGNIFICANT CHANGE UP (ref 3.5–5)
POTASSIUM SERPL-SCNC: 4.9 MMOL/L — SIGNIFICANT CHANGE UP (ref 3.5–5)
PROT SERPL-MCNC: 6.5 G/DL — SIGNIFICANT CHANGE UP (ref 6–8)
PROTHROM AB SERPL-ACNC: 19.9 SEC — HIGH (ref 9.95–12.87)
RBC # BLD: 4.49 M/UL — SIGNIFICANT CHANGE UP (ref 4.2–5.4)
RBC # FLD: 13.5 % — SIGNIFICANT CHANGE UP (ref 11.5–14.5)
SODIUM SERPL-SCNC: 139 MMOL/L — SIGNIFICANT CHANGE UP (ref 135–146)
SODIUM SERPL-SCNC: 139 MMOL/L — SIGNIFICANT CHANGE UP (ref 135–146)
TRIGL SERPL-MCNC: 132 MG/DL — SIGNIFICANT CHANGE UP
TROPONIN T SERPL-MCNC: 0.07 NG/ML — CRITICAL HIGH
TROPONIN T SERPL-MCNC: 0.08 NG/ML — CRITICAL HIGH
TROPONIN T SERPL-MCNC: 0.09 NG/ML — CRITICAL HIGH
TROPONIN T SERPL-MCNC: 0.09 NG/ML — CRITICAL HIGH
WBC # BLD: 7.96 K/UL — SIGNIFICANT CHANGE UP (ref 4.8–10.8)
WBC # FLD AUTO: 7.96 K/UL — SIGNIFICANT CHANGE UP (ref 4.8–10.8)

## 2023-03-10 PROCEDURE — 93306 TTE W/DOPPLER COMPLETE: CPT | Mod: 26

## 2023-03-10 PROCEDURE — 99233 SBSQ HOSP IP/OBS HIGH 50: CPT

## 2023-03-10 RX ORDER — FUROSEMIDE 40 MG
40 TABLET ORAL DAILY
Refills: 0 | Status: DISCONTINUED | OUTPATIENT
Start: 2023-03-11 | End: 2023-03-11

## 2023-03-10 RX ORDER — FUROSEMIDE 40 MG
40 TABLET ORAL
Refills: 0 | Status: DISCONTINUED | OUTPATIENT
Start: 2023-03-10 | End: 2023-03-10

## 2023-03-10 RX ADMIN — APIXABAN 5 MILLIGRAM(S): 2.5 TABLET, FILM COATED ORAL at 05:45

## 2023-03-10 RX ADMIN — Medication 25 MILLIGRAM(S): at 05:44

## 2023-03-10 RX ADMIN — LISINOPRIL 5 MILLIGRAM(S): 2.5 TABLET ORAL at 05:45

## 2023-03-10 RX ADMIN — Medication 1 APPLICATION(S): at 20:12

## 2023-03-10 RX ADMIN — ATORVASTATIN CALCIUM 20 MILLIGRAM(S): 80 TABLET, FILM COATED ORAL at 22:51

## 2023-03-10 RX ADMIN — SERTRALINE 25 MILLIGRAM(S): 25 TABLET, FILM COATED ORAL at 12:41

## 2023-03-10 RX ADMIN — Medication 81 MILLIGRAM(S): at 12:41

## 2023-03-10 RX ADMIN — Medication 25 MILLIGRAM(S): at 17:43

## 2023-03-10 RX ADMIN — Medication 40 MILLIGRAM(S): at 05:44

## 2023-03-10 RX ADMIN — Medication 40 MILLIGRAM(S): at 13:07

## 2023-03-10 RX ADMIN — AMLODIPINE BESYLATE 5 MILLIGRAM(S): 2.5 TABLET ORAL at 05:45

## 2023-03-10 RX ADMIN — APIXABAN 5 MILLIGRAM(S): 2.5 TABLET, FILM COATED ORAL at 17:43

## 2023-03-10 NOTE — PROGRESS NOTE ADULT - ASSESSMENT
82F Greek speaking [facility] HTN, HLD, DM, AFib on eliquis, left MCA aneurysm, CVA presents for bilateral leg swelling.     #Acute HFpEF 2/2 NSTEMI type 2 (demand from HTN) 2/2 HTN emergency   - s/p hydralazine 10mg IV push in ED  - c/w home amlodipine 5mg, lisinopril 5mg, metoprolol 25mg BID  - Admit to telemetry  - Echo: 68%, mild TR, MS, AS  - Pro-BNP:  886  - Troponin:0.08-->0.09-->0.09, CKMB 2.7  - Chest X-ray: pulm venous congestion, no consolidation.  - Strict intake and outputs, daily weights  - Repeat echocardiogram  - Supplemental oxygen PRN  - c/w lasix 40mg IV BID--> oral lasix 40 mg po in am   - cardiology consult   - trend trops     #venous stasis dermatitis   - hydrocortisone cream, wrap legs, elevate     #hyperkalemia - will give lokelma now. Repeat BMP     #chronic  Afib - c/w eliquis 5mg BID  #DM- hold oral medications.  monitor FS, if FS>180 start basal/bolus. A1c- 6.8  #Depression- continue with sertraline     #Hx of L MCA aneurysm   #Hx CVA  #HLD  - c/w asa and atorvastatin     #Progress Note Handoff  Pending (specify):  follow up cardiology, cardiac telemonitoring, trend trops   Family discussion: house staff updated pt family  Disposition: home, SNF, check PT   Decision to admit the pt is based on acuity as above   High risk given above acuity and comorbidities, pt questionable NSTEMI, cardiology,labs reviewed

## 2023-03-10 NOTE — PROGRESS NOTE ADULT - SUBJECTIVE AND OBJECTIVE BOX
Patient is a 82y old  Female who presents with a chief complaint of CHF exacerbation, hypertensive urgency (03-09-23)      Pt seen and examined at bedside. No CP or SOB.       PAST MEDICAL & SURGICAL HISTORY:  Type II diabetes mellitus  Hypertension  Dyslipidemia  Recurrent falls  History of hernia surgery        VITAL SIGNS (Last 24 hrs):  T(C): 36.6 (03-10-23 @ 15:25), Max: 36.6 (03-10-23 @ 00:15)  HR: 90 (03-10-23 @ 15:25) (74 - 90)  BP: 128/58 (03-10-23 @ 15:25) (128/58 - 170/72)  RR: 18 (03-10-23 @ 15:25) (18 - 20)  SpO2: 95% (03-10-23 @ 15:25) (95% - 100%)      PHYSICAL EXAM:  GENERAL: NAD, well-developed  HEAD:  Atraumatic, Normocephalic  EYES: EOMI, PERRLA, conjunctiva and sclera clear  NECK: Supple, No JVD  CHEST/LUNG: Clear to auscultation bilaterally; No wheeze  HEART: Regular rate and rhythm; No murmurs, rubs, or gallops  ABDOMEN: Soft, Nontender, Nondistended; Bowel sounds present  EXTREMITIES:  2+ Peripheral Pulses, No clubbing, cyanosis, or edema  PSYCH: Alert   NEUROLOGY: non-focal  SKIN: No rashes or lesions    Labs Reviewed  Spoke to patient in regards to abnormal labs.    CBC Full  -  ( 10 Mar 2023 08:29 )  WBC Count : 7.96 K/uL  Hemoglobin : 12.6 g/dL  Hematocrit : 40.5 %  Platelet Count - Automated : 207 K/uL  Mean Cell Volume : 90.2 fL  Mean Cell Hemoglobin : 28.1 pg  Mean Cell Hemoglobin Concentration : 31.1 g/dL  Auto Neutrophil # : 5.65 K/uL  Auto Lymphocyte # : 1.27 K/uL  Auto Monocyte # : 0.69 K/uL  Auto Eosinophil # : 0.31 K/uL  Auto Basophil # : 0.03 K/uL  Auto Neutrophil % : 70.9 %  Auto Lymphocyte % : 16.0 %  Auto Monocyte % : 8.7 %  Auto Eosinophil % : 3.9 %  Auto Basophil % : 0.4 %    BMP:    03-10 @ 08:29    Blood Urea Nitrogen - 27  Calcium - 9.6  Carbond Dioxide - 34  Chloride - 97  Creatinine - 1.0  Glucose - 125  Potassium - 4.7  Sodium - 139      Hemoglobin A1c -   PT/INR - ( 10 Mar 2023 08:29 )   PT: 19.90 sec;   INR: 1.72 ratio         PTT - ( 10 Mar 2023 08:29 )  PTT:43.4 sec  Urine Culture:        COVID Labs  CRP:      D-Dimer:      Imaging reviewed independently and reviewed read  < from: [facility] Duplex Lower Ext Vein Scan, Bilat (03.09.23 @ 15:52) >  IMPRESSION:  No evidence of deep venous thrombosis in either lower extremity.    < end of copied text >    < from: TTE Echo Complete w/o Contrast w/ Doppler (03.10.23 @ 16:31) >  Summary:   1. Normal global left ventricular systolic function. LV Ejection   Fraction by Harrington's Method with a biplane EF of 60 %.   2. Grade I diastolic dysfunction.   3. Normal right ventricular size and function.   4. Normal left atrial size.   5. Moderate mitral annular calcification with thickening and   calcification of the anterior and posterior mitral valve leaflets. Mean   gradient is 4.4mmHg at 86bpm.   6. Sclerotic aortic valve with normal opening.   7. Borderline pulmonary hypertension (PASP = 38mmHg).   8. No pericardial effusion.    MEDICATIONS  (STANDING):  amLODIPine   Tablet 5 milliGRAM(s) Oral daily  apixaban 5 milliGRAM(s) Oral every 12 hours  aspirin  chewable 81 milliGRAM(s) Oral daily  atorvastatin 20 milliGRAM(s) Oral at bedtime  furosemide   Injectable 40 milliGRAM(s) IV Push two times a day  hydrocortisone 1% Cream 1 Application(s) Topical daily  lisinopril 5 milliGRAM(s) Oral daily  metoprolol tartrate 25 milliGRAM(s) Oral two times a day  sertraline 25 milliGRAM(s) Oral daily    MEDICATIONS  (PRN):  acetaminophen     Tablet .. 650 milliGRAM(s) Oral every 6 hours PRN Temp greater or equal to 38C (100.4F), Mild Pain (1 - 3)

## 2023-03-11 ENCOUNTER — TRANSCRIPTION ENCOUNTER (OUTPATIENT)
Age: 83
End: 2023-03-11

## 2023-03-11 VITALS
SYSTOLIC BLOOD PRESSURE: 149 MMHG | OXYGEN SATURATION: 96 % | RESPIRATION RATE: 18 BRPM | TEMPERATURE: 97 F | HEART RATE: 73 BPM | DIASTOLIC BLOOD PRESSURE: 70 MMHG

## 2023-03-11 LAB
ALBUMIN SERPL ELPH-MCNC: 3.7 G/DL — SIGNIFICANT CHANGE UP (ref 3.5–5.2)
ALP SERPL-CCNC: 76 U/L — SIGNIFICANT CHANGE UP (ref 30–115)
ALT FLD-CCNC: 13 U/L — SIGNIFICANT CHANGE UP (ref 0–41)
ANION GAP SERPL CALC-SCNC: 15 MMOL/L — HIGH (ref 7–14)
AST SERPL-CCNC: 14 U/L — SIGNIFICANT CHANGE UP (ref 0–41)
BASOPHILS # BLD AUTO: 0.03 K/UL — SIGNIFICANT CHANGE UP (ref 0–0.2)
BASOPHILS NFR BLD AUTO: 0.4 % — SIGNIFICANT CHANGE UP (ref 0–1)
BILIRUB SERPL-MCNC: 0.4 MG/DL — SIGNIFICANT CHANGE UP (ref 0.2–1.2)
BUN SERPL-MCNC: 34 MG/DL — HIGH (ref 10–20)
CALCIUM SERPL-MCNC: 9.4 MG/DL — SIGNIFICANT CHANGE UP (ref 8.4–10.5)
CHLORIDE SERPL-SCNC: 95 MMOL/L — LOW (ref 98–110)
CK MB CFR SERPL CALC: 3.6 NG/ML — SIGNIFICANT CHANGE UP (ref 0.6–6.3)
CO2 SERPL-SCNC: 29 MMOL/L — SIGNIFICANT CHANGE UP (ref 17–32)
CREAT SERPL-MCNC: 1.1 MG/DL — SIGNIFICANT CHANGE UP (ref 0.7–1.5)
EGFR: 50 ML/MIN/1.73M2 — LOW
EOSINOPHIL # BLD AUTO: 0.12 K/UL — SIGNIFICANT CHANGE UP (ref 0–0.7)
EOSINOPHIL NFR BLD AUTO: 1.5 % — SIGNIFICANT CHANGE UP (ref 0–8)
GLUCOSE BLDC GLUCOMTR-MCNC: 150 MG/DL — HIGH (ref 70–99)
GLUCOSE BLDC GLUCOMTR-MCNC: 247 MG/DL — HIGH (ref 70–99)
GLUCOSE SERPL-MCNC: 148 MG/DL — HIGH (ref 70–99)
HCT VFR BLD CALC: 38.4 % — SIGNIFICANT CHANGE UP (ref 37–47)
HGB BLD-MCNC: 12.5 G/DL — SIGNIFICANT CHANGE UP (ref 12–16)
IMM GRANULOCYTES NFR BLD AUTO: 0.4 % — HIGH (ref 0.1–0.3)
LYMPHOCYTES # BLD AUTO: 0.71 K/UL — LOW (ref 1.2–3.4)
LYMPHOCYTES # BLD AUTO: 9.1 % — LOW (ref 20.5–51.1)
MAGNESIUM SERPL-MCNC: 1.6 MG/DL — LOW (ref 1.8–2.4)
MCHC RBC-ENTMCNC: 28.4 PG — SIGNIFICANT CHANGE UP (ref 27–31)
MCHC RBC-ENTMCNC: 32.6 G/DL — SIGNIFICANT CHANGE UP (ref 32–37)
MCV RBC AUTO: 87.3 FL — SIGNIFICANT CHANGE UP (ref 81–99)
MONOCYTES # BLD AUTO: 0.51 K/UL — SIGNIFICANT CHANGE UP (ref 0.1–0.6)
MONOCYTES NFR BLD AUTO: 6.6 % — SIGNIFICANT CHANGE UP (ref 1.7–9.3)
NEUTROPHILS # BLD AUTO: 6.36 K/UL — SIGNIFICANT CHANGE UP (ref 1.4–6.5)
NEUTROPHILS NFR BLD AUTO: 82 % — HIGH (ref 42.2–75.2)
NRBC # BLD: 0 /100 WBCS — SIGNIFICANT CHANGE UP (ref 0–0)
PHOSPHATE SERPL-MCNC: 3.8 MG/DL — SIGNIFICANT CHANGE UP (ref 2.1–4.9)
PLATELET # BLD AUTO: 206 K/UL — SIGNIFICANT CHANGE UP (ref 130–400)
POTASSIUM SERPL-MCNC: 3.9 MMOL/L — SIGNIFICANT CHANGE UP (ref 3.5–5)
POTASSIUM SERPL-SCNC: 3.9 MMOL/L — SIGNIFICANT CHANGE UP (ref 3.5–5)
PROT SERPL-MCNC: 6.2 G/DL — SIGNIFICANT CHANGE UP (ref 6–8)
RBC # BLD: 4.4 M/UL — SIGNIFICANT CHANGE UP (ref 4.2–5.4)
RBC # FLD: 13.4 % — SIGNIFICANT CHANGE UP (ref 11.5–14.5)
SODIUM SERPL-SCNC: 139 MMOL/L — SIGNIFICANT CHANGE UP (ref 135–146)
TROPONIN T SERPL-MCNC: 0.07 NG/ML — CRITICAL HIGH
WBC # BLD: 7.76 K/UL — SIGNIFICANT CHANGE UP (ref 4.8–10.8)
WBC # FLD AUTO: 7.76 K/UL — SIGNIFICANT CHANGE UP (ref 4.8–10.8)

## 2023-03-11 PROCEDURE — 99239 HOSP IP/OBS DSCHRG MGMT >30: CPT

## 2023-03-11 PROCEDURE — 99222 1ST HOSP IP/OBS MODERATE 55: CPT

## 2023-03-11 RX ORDER — METOPROLOL TARTRATE 50 MG
50 TABLET ORAL DAILY
Refills: 0 | Status: DISCONTINUED | OUTPATIENT
Start: 2023-03-11 | End: 2023-03-11

## 2023-03-11 RX ORDER — MAGNESIUM SULFATE 500 MG/ML
2 VIAL (ML) INJECTION ONCE
Refills: 0 | Status: DISCONTINUED | OUTPATIENT
Start: 2023-03-11 | End: 2023-03-11

## 2023-03-11 RX ORDER — FUROSEMIDE 40 MG
1 TABLET ORAL
Qty: 30 | Refills: 2
Start: 2023-03-11 | End: 2023-06-08

## 2023-03-11 RX ORDER — METOPROLOL TARTRATE 50 MG
1 TABLET ORAL
Qty: 30 | Refills: 3
Start: 2023-03-11 | End: 2023-07-08

## 2023-03-11 RX ORDER — LISINOPRIL 2.5 MG/1
5 TABLET ORAL DAILY
Refills: 0 | Status: DISCONTINUED | OUTPATIENT
Start: 2023-03-11 | End: 2023-03-11

## 2023-03-11 RX ORDER — LISINOPRIL 2.5 MG/1
1 TABLET ORAL
Qty: 30 | Refills: 2
Start: 2023-03-11 | End: 2023-06-08

## 2023-03-11 RX ADMIN — AMLODIPINE BESYLATE 5 MILLIGRAM(S): 2.5 TABLET ORAL at 05:54

## 2023-03-11 RX ADMIN — APIXABAN 5 MILLIGRAM(S): 2.5 TABLET, FILM COATED ORAL at 05:54

## 2023-03-11 RX ADMIN — Medication 25 MILLIGRAM(S): at 05:55

## 2023-03-11 RX ADMIN — Medication 40 MILLIGRAM(S): at 05:54

## 2023-03-11 RX ADMIN — LISINOPRIL 5 MILLIGRAM(S): 2.5 TABLET ORAL at 05:55

## 2023-03-11 NOTE — PHYSICAL THERAPY INITIAL EVALUATION ADULT - DIAGNOSIS, PT EVAL
Debility secondary Fluid overload; Elevated systolic blood pressure reading with diagnosis of hypertension

## 2023-03-11 NOTE — DISCHARGE NOTE PROVIDER - NSDCMRMEDTOKEN_GEN_ALL_CORE_FT
acetaminophen 325 mg oral tablet: 2 tab(s) orally every 6 hours, As needed, for pain or fever  amLODIPine 2.5 mg oral tablet: 1 tab(s) orally once a day (at bedtime)  apixaban 5 mg oral tablet: 1 tab(s) orally every 12 hours  aspirin 81 mg oral tablet, chewable: 1 tab(s) orally once a day  atorvastatin 20 mg oral tablet: 1 tab(s) orally once a day  furosemide 40 mg oral tablet: 1 tab(s) orally once a day  lisinopril 10 mg oral tablet: 1 tab(s) orally once a day   metFORMIN 500 mg oral tablet: 1 tab(s) orally 2 times a day (with meals)  pantoprazole 40 mg oral delayed release tablet: 1 tab(s) orally once a day (before a meal)  sertraline 25 mg oral tablet: 1 tab(s) orally once a day  test strips (per patient&#x27;s insurance): 1 application subcutaneously 4 times a day. ** Compatible with patient&#x27;s glucometer **  Toprol-XL 50 mg oral tablet, extended release: 1 tab(s) orally once a day   Tradjenta 5 mg oral tablet: 1 tab(s) orally once a day

## 2023-03-11 NOTE — PHYSICAL THERAPY INITIAL EVALUATION ADULT - ADDITIONAL COMMENTS
Pt lives with son with 4 steps to enter in house. Pt ambulates with assist using RW to bed/wheelchair/toilet transfers at home as per son.

## 2023-03-11 NOTE — DISCHARGE NOTE PROVIDER - NSDCCPCAREPLAN_GEN_ALL_CORE_FT
PRINCIPAL DISCHARGE DIAGNOSIS  Diagnosis: Acute on chronic heart failure with preserved ejection fraction (HFpEF)  Assessment and Plan of Treatment: Heart failure (HF) is a condition that does not allow your heart to fill or pump properly. Not enough oxygen in your blood gets to your organs and tissues. HF can occur in the right side, the left side, or both lower chambers of your heart. HF is often caused by damage or injury to your heart. The damage may be caused by heart attack, other heart conditions, or high blood pressure. HF is a long-term condition that tends to get worse over time. It is important to manage your health to improve your quality of life. HF can be worsened by heavy alcohol use, smoking, diabetes that is not controlled, or obesity.  You had a complete workup with echo, and heart markers done and you were seen by a cardiologist. Please followup with Cardiology doctor as instructed as op   Please call your doctor if you experience shortness of breath or have more difficultybreathing, increased swelling of your feet, ankles, hands or abdomen, feeling tired with normal activity or experiencing dizziness or fainting, trouble sleeping or waking up feeling short of breath or coughing, chest pain or pressure, weight gain of 3-5 pounds over 2-3 days.        SECONDARY DISCHARGE DIAGNOSES  Diagnosis: Elevated systolic blood pressure reading with diagnosis of hypertension  Assessment and Plan of Treatment:

## 2023-03-11 NOTE — CONSULT NOTE ADULT - ATTENDING COMMENTS
Agree with above. On my exam, patient is lying flat but continues to have LLE (L > R). Can be discharged on 5 days of Lasix 40 mg BID followed by 40 mg daily. Otherwise, all recommendations as documented by fellow.

## 2023-03-11 NOTE — CONSULT NOTE ADULT - SUBJECTIVE AND OBJECTIVE BOX
Outpt cardiologist:    HPI:  82F Wagner speaking [facility] HTN, HLD, DM, left MCA aneurysm, CVA presents for bilateral leg swelling. Discussed with son laney. 4 days ago noticed increased swelling in the legs. Additionally noticed they were red. Denies any shortness of breath, chest pain, palpitations, fever, chills, cough.  Patient does not have a history of heart failure, saw caridologist in the past but not sure of the name. Denies getting short of breath on exertion but states that she has poor ambulation 2/2 stroke. This morning patients blood pressure was elevated in the 180s systolic so they decided to come to the ED.     In the ED vitals T 98.4, HR 70, /98, SPO2 94% on RA  Labs show K 5.4, trop 0.08,   CXR: pulm venous congestion, no consolidation.  Was given lasix 40mg IV push and hydralazine 10mg IV push.  (09 Mar 2023 18:18)      ---  Cardiology Fellow notes:    Pt here with complaints of worsening b/l LE edema over the past few days. She denies exertional symptoms, but also reports she has not been ambulating much since her CVA  On arrival, she was noted to have  (as per documentation in the notes, Max documented BP is SBP 150mmHg)    PAST MEDICAL & SURGICAL HISTORY  Type II diabetes mellitus    Hypertension    Dyslipidemia    Recurrent falls    History of hernia surgery        FAMILY HISTORY:  FAMILY HISTORY:  FHx: diabetes mellitus        SOCIAL HISTORY:  Social History:  Denies smoking, ETOH, rec drug use. (09 Mar 2023 18:18)      ALLERGIES:  No Known Allergies      MEDICATIONS:  amLODIPine   Tablet 5 milliGRAM(s) Oral daily  apixaban 5 milliGRAM(s) Oral every 12 hours  aspirin  chewable 81 milliGRAM(s) Oral daily  atorvastatin 20 milliGRAM(s) Oral at bedtime  furosemide    Tablet 40 milliGRAM(s) Oral daily  hydrocortisone 1% Cream 1 Application(s) Topical daily  lisinopril 5 milliGRAM(s) Oral daily  metoprolol tartrate 25 milliGRAM(s) Oral two times a day  sertraline 25 milliGRAM(s) Oral daily    PRN:  acetaminophen     Tablet .. 650 milliGRAM(s) Oral every 6 hours PRN      HOME MEDICATIONS:  Home Medications:  acetaminophen 325 mg oral tablet: 2 tab(s) orally every 6 hours, As needed, for pain or fever (09 May 2022 08:04)  atorvastatin 20 mg oral tablet: 1 tab(s) orally once a day (09 Mar 2023 18:35)  Tradjenta 5 mg oral tablet: 1 tab(s) orally once a day (09 Mar 2023 18:36)      VITALS:   T(F): 97.8 (03-10 @ 22:51), Max: 98.4 ( @ 13:43)  HR: 85 (03-10 @ 22:51) (71 - 90)  BP: 140/78 (03-10 @ 22:51) (128/58 - 222/111)  BP(mean): 83 (03-10 @ 15:25) (83 - 83)  RR: 18 (03-10 @ 22:51) (18 - 20)  SpO2: 96% (03-10 @ 22:51) (94% - 100%)    I&O's Summary      REVIEW OF SYSTEMS:  CONSTITUTIONAL: No weakness, fevers or chills  HEENT: No visual changes, neck/ear pain  RESPIRATORY: No cough, sob  CARDIOVASCULAR: See HPI  GASTROINTESTINAL: No abdominal pain. No nausea, vomiting, diarrhea   GENITOURINARY: No dysuria, frequency or hematuria  NEUROLOGICAL: No new focal deficits  SKIN: No new rashes    PHYSICAL EXAM:  General: Not in distress.  Non-toxic appearing.   HEENT: EOMI  Cardio: regular, S1, S2, no murmur  Pulm: B/L BS.  No wheezing / crackles / rales  Abdomen: Soft, non-tender, non-distended. Normoactive bowel sounds  Extremities: LE edema 2+  Neuro: A&O x3. No focal deficits    LABS:                        12.6   7.96  )-----------( 207      ( 10 Mar 2023 08:29 )             40.5     03-10    139  |  97<L>  |  27<H>  ----------------------------<  125<H>  4.7   |  34<H>  |  1.0    Ca    9.6      10 Mar 2023 08:29  Mg     2.0     03-09    TPro  6.5  /  Alb  3.9  /  TBili  0.5  /  DBili  x   /  AST  13  /  ALT  13  /  AlkPhos  83  03-10    PT/INR - ( 10 Mar 2023 08:29 )   PT: 19.90 sec;   INR: 1.72 ratio         PTT - ( 10 Mar 2023 08:29 )  PTT:43.4 sec  Troponin T, Serum: 0.07 ng/mL *HH* (03-10-23 @ 21:42)  Creatine Kinase, Serum: 46 U/L (03-10-23 @ 11:37)  Troponin T, Serum: 0.09 ng/mL *HH* (03-10-23 @ 11:37)  Troponin T, Serum: 0.09 ng/mL *HH* (03-10-23 @ 08:29)    CARDIAC MARKERS ( 10 Mar 2023 21:42 )  x     / 0.07 ng/mL / x     / x     / 2.1 ng/mL  CARDIAC MARKERS ( 10 Mar 2023 11:37 )  x     / 0.09 ng/mL / 46 U/L / x     / 2.7 ng/mL  CARDIAC MARKERS ( 10 Mar 2023 08:29 )  x     / 0.09 ng/mL / x     / x     / x      CARDIAC MARKERS ( 10 Mar 2023 00:55 )  x     / 0.08 ng/mL / x     / x     / x      CARDIAC MARKERS ( 09 Mar 2023 14:41 )  x     / 0.08 ng/mL / x     / x     / x            Troponin trend:      03-10 Chol 167 LDL -- HDL 55 Trig 132      RADIOLOGY:  -CXR:  -TTE:  < from: TTE Echo Complete w/o Contrast w/ Doppler (03.10.23 @ 16:31) >  Summary:   1. Normal global left ventricular systolic function. LV Ejection   Fraction by Harrington's Method with a biplane EF of 60 %.   2. Grade I diastolic dysfunction.   3. Normal right ventricular size and function.   4. Normal left atrial size.   5. Moderate mitral annular calcification with thickening and   calcification of the anterior and posterior mitral valve leaflets. Mean   gradient is 4.4mmHg at 86bpm.   6. Sclerotic aortic valve with normal opening.   7. Borderline pulmonary hypertension (PASP = 38mmHg).   8. No pericardial effusion.    < end of copied text >    -CCTA:  -STRESS TEST:  -CATHETERIZATION:  -OTHER:  < from: VA Duplex Lower Ext Vein Scan, Bilat (23 @ 15:52) >    IMPRESSION:  No evidence of deep venous thrombosis in either lower extremity.    < end of copied text >    < from: Xray Chest 1 View-PORTABLE IMMEDIATE (Xray Chest 1 View-PORTABLE IMMEDIATE .) (23 @ 14:50) >      Impression:    No consolidation, effusion or pneumothorax. Mild pulmonary venous   congestion    < end of copied text >      EC Lead ECG:   Ventricular Rate 70 BPM    Atrial Rate 70 BPM    QRS Duration 96 ms    Q-T Interval 412 ms    QTC Calculation(Bazett) 444 ms    R Axis 56 degrees    T Axis 83 degrees    Diagnosis Line Sinus rhythm with frequent Premature supraventricular complexes  Low voltage QRS  Borderline ECG    Confirmed by Clayton Valdez ([id number]) on 3/9/2023 7:21:44 PM ( @ 16:06)      TELEMETRY EVENTS:

## 2023-03-11 NOTE — DISCHARGE NOTE PROVIDER - CARE PROVIDERS DIRECT ADDRESSES
[email or web address]

## 2023-03-11 NOTE — PHYSICAL THERAPY INITIAL EVALUATION ADULT - GENERAL OBSERVATIONS, REHAB EVAL
Pt encountered in semi-fung position in bed, +IV, +tele, Korean speaking, A & O x 2, requested son Mac who is present at b/s to provide translation. Pt requires Max A in bed mobility, Max A sit/stand transfer and poor standing balance. Pt is non-ambulatory at this time secondary weakness and impaired balance. As per son pt is wheelchair bound at baseline and walked 10 ft using RW with assist at home. Pt/family is very supportive and requesting DC home with services. Pt will benefit from skilled PT 3-5x/wk for thera ex, functional mobility, balance and gait training.

## 2023-03-11 NOTE — DISCHARGE NOTE PROVIDER - ATTENDING ATTESTATION STATEMENT
I have personally seen and examined the patient. I have collaborated with and supervised the
Name band;

## 2023-03-11 NOTE — DISCHARGE NOTE NURSING/CASE MANAGEMENT/SOCIAL WORK - NSDCVIVACCINE_GEN_ALL_CORE_FT
Tdap; 05-Nov-2021 10:36; Bernardo Markham (OZ); [employer]; [id number] (Exp. Date: 09-Sep-2023); IntraMuscular; Deltoid Right.; 0.5 milliLiter(s); VIS (VIS Published: 09-May-2013, VIS Presented: 05-Nov-2021);

## 2023-03-11 NOTE — DISCHARGE NOTE PROVIDER - CARE PROVIDER_API CALL
Clayton Valdez (MD)  Cardiovascular Disease; Internal Medicine; Interventional Cardiology  [address] [place] [address]  Phone: [phone]  Fax: [phone]  Follow Up Time: 1-3 days    [place] [address]  Phone: [phone]  Fax: [phone]  Follow Up Time: 1 week

## 2023-03-11 NOTE — DISCHARGE NOTE NURSING/CASE MANAGEMENT/SOCIAL WORK - PATIENT PORTAL LINK FT
You can access the FollowMyHealth Patient Portal offered by [facility] by registering at the following website: [email or web address]. By joining [facility]’s FollowMyHealth portal, you will also be able to view your health information using other applications (apps) compatible with our system.

## 2023-03-11 NOTE — DISCHARGE NOTE PROVIDER - NSDCFUADDAPPT_GEN_ALL_CORE_FT
Please followup with you primary care doctor in 2 weeks and update on the hospitalization and the recent events. Go over with your primary care doctor over the medications you were discharged on  APPTS ARE READY TO BE MADE: [ ] YES    Best Family or Patient Contact (if needed):    Additional Information about above appointments (if needed):    1: Clayton Valdez   2:   Magdalena Cardoza   3:     Other comments or requests:

## 2023-03-11 NOTE — CONSULT NOTE ADULT - ASSESSMENT
#Subacute decompensation of HFpEF secondary to uncontrolled hypertension - resolving  #AF - [id number] 6 (CVA, Age, sex, HTN)    - C/w IV diuresis one more day. Switch to Lasix 40mg PO QD  - BP control with Amlodipine, Losartan  - ELiquis 5mg bid for anticoagulation.   - c/w Toprol 25mg #Reported decompensation of HFpEF secondary to uncontrolled hypertension - Pt euvolemic on exam today  #AF - [id number] 6 (CVA, Age, sex, HTN)    - Switch to PO duiuresis Lasix 40mg PO QD  - BP control with Amlodipine, Losartan - now controlled  - ELiquis 5mg bid for anticoagulation.   - c/w Toprol 25mg  - Cardiology team to sign off  #Reported decompensation of HFpEF secondary to uncontrolled hypertension - Pt euvolemic on exam today  #AF - [id number] 6 (CVA, Age, sex, HTN)    - Switch to PO duiuresis Lasix 40mg PO QD  - Increase Lisinopril to 10mg QD. C/w AMlodipine 5mg QD  - If continues to have recurrent edema, consider discontinuation of CCB  - ELiquis 5mg bid for anticoagulation.   - Transition metoprolol tartrate to Toprol 50mgXL  - Cardiology team to sign off

## 2023-03-11 NOTE — DISCHARGE NOTE PROVIDER - PROVIDER TOKENS
PROVIDER:[TOKEN:[[id number]],FOLLOWUP:[1-3 days]],PROVIDER:[TOKEN:[[id number]],FOLLOWUP:[1 week]]

## 2023-03-11 NOTE — DISCHARGE NOTE PROVIDER - HOSPITAL COURSE
HPI:  82F Kiswahili speaking PMH HTN, HLD, DM, left MCA aneurysm, CVA presents for bilateral leg swelling. Discussed with son baseline. 4 days PTP noticed increased swelling in the legs. Additionally noticed they were red. Denies any shortness of breath, chest pain, palpitations, fever, chills, cough.  Patient does not have a history of heart failure, saw cardiologist in the past but not sure of the name. Denies getting short of breath on exertion but states that she has poor ambulation 2/2 stroke. This morning patients blood pressure was elevated in the 180s systolic so they decided to come to the ED.   In the ED vitals T 98.4, HR 70, /98, SPO2 94% on RA  Labs show K 5.4, trop 0.08,   CXR: pulm venous congestion, no consolidation.  Was given lasix 40mg IV push and hydralazine 10mg IV push.  (09 Mar 2023 18:18)     Patient was admitted for Acute HFpEF and  NSTEMI type 2 (demand from HTN) secondary to  HTN emergency, Echo: 60%, mild TR, MS, AS, Pro-BNP:  886. trop 0.08 stable CKMB 2.7. Cardiology saw the patient will discharge on lisinopril metoprolol     venous stasis dermatitis: hydrocortisone cream, wrap legs, elevate   hyperkalemia - resolved   chronic  Afib - given eliquis 5mg BID  DM- hold oral medications.  monitor FS, if FS>180 start basal/bolus. A1c- 6.8  Depression- continue with sertraline   Hx of L MCA aneurysm   Hx CVA  HLD: given asa and atorvastatin     Patient was seen and examined this morning at bedside and is stable for discharge.  He is to be discharged on the prescribed medications  He is to follow-up with his PCP

## 2023-03-11 NOTE — DISCHARGE NOTE PROVIDER - NSDCFUSCHEDAPPT_GEN_ALL_CORE_FT
[facility]  NEUROLOGY [address] [place] [address]  Scheduled Appointment: 04/14/2023

## 2023-03-11 NOTE — PHYSICAL THERAPY INITIAL EVALUATION ADULT - PERTINENT HX OF CURRENT PROBLEM, REHAB EVAL
82F Vietnamese speaking PMH HTN, HLD, DM, left MCA aneurysm, CVA presents for bilateral leg swelling. Discussed with son laney. 4 days ago noticed increased swelling in the legs. Additionally noticed they were red. Denies any shortness of breath, chest pain, palpitations, fever, chills, cough.  Patient does not have a history of heart failure, saw caridologist in the past but not sure of the name. Denies getting short of breath on exertion but states that she has poor ambulation 2/2 stroke. This morning patients blood pressure was elevated in the 180s systolic so they decided to come to the ED.

## 2023-03-11 NOTE — DISCHARGE NOTE PROVIDER - ATTENDING DISCHARGE PHYSICAL EXAMINATION:
Attending attestation  Attending DC note  Pt seen and examined at bedside. No cp or sob  vitals, labs, exam stable  Hospital course as above.  Plan dw pt and agreed to plan  Medically cleared for DC. Med recc completed.  RALF resident. Spent 32 mins on case

## 2023-03-16 DIAGNOSIS — I24.8 OTHER FORMS OF ACUTE ISCHEMIC HEART DISEASE: ICD-10-CM

## 2023-03-16 DIAGNOSIS — I87.2 VENOUS INSUFFICIENCY (CHRONIC) (PERIPHERAL): ICD-10-CM

## 2023-03-16 DIAGNOSIS — I11.0 HYPERTENSIVE HEART DISEASE WITH HEART FAILURE: ICD-10-CM

## 2023-03-16 DIAGNOSIS — Z86.73 PERSONAL HISTORY OF TRANSIENT ISCHEMIC ATTACK (TIA), AND CEREBRAL INFARCTION WITHOUT RESIDUAL DEFICITS: ICD-10-CM

## 2023-03-16 DIAGNOSIS — I67.1 CEREBRAL ANEURYSM, NONRUPTURED: ICD-10-CM

## 2023-03-16 DIAGNOSIS — I27.20 PULMONARY HYPERTENSION, UNSPECIFIED: ICD-10-CM

## 2023-03-16 DIAGNOSIS — Z87.891 PERSONAL HISTORY OF NICOTINE DEPENDENCE: ICD-10-CM

## 2023-03-16 DIAGNOSIS — Z91.81 HISTORY OF FALLING: ICD-10-CM

## 2023-03-16 DIAGNOSIS — Z79.01 LONG TERM (CURRENT) USE OF ANTICOAGULANTS: ICD-10-CM

## 2023-03-16 DIAGNOSIS — I48.20 CHRONIC ATRIAL FIBRILLATION, UNSPECIFIED: ICD-10-CM

## 2023-03-16 DIAGNOSIS — I50.33 ACUTE ON CHRONIC DIASTOLIC (CONGESTIVE) HEART FAILURE: ICD-10-CM

## 2023-03-16 DIAGNOSIS — E78.5 HYPERLIPIDEMIA, UNSPECIFIED: ICD-10-CM

## 2023-03-16 DIAGNOSIS — R09.02 HYPOXEMIA: ICD-10-CM

## 2023-03-16 DIAGNOSIS — Z79.84 LONG TERM (CURRENT) USE OF ORAL HYPOGLYCEMIC DRUGS: ICD-10-CM

## 2023-03-16 DIAGNOSIS — E11.9 TYPE 2 DIABETES MELLITUS WITHOUT COMPLICATIONS: ICD-10-CM

## 2023-03-16 DIAGNOSIS — Z98.890 OTHER SPECIFIED POSTPROCEDURAL STATES: ICD-10-CM

## 2023-03-16 DIAGNOSIS — I16.0 HYPERTENSIVE URGENCY: ICD-10-CM

## 2023-03-16 DIAGNOSIS — E87.5 HYPERKALEMIA: ICD-10-CM

## 2023-03-16 DIAGNOSIS — Z79.82 LONG TERM (CURRENT) USE OF ASPIRIN: ICD-10-CM

## 2023-03-16 DIAGNOSIS — F32.A DEPRESSION, UNSPECIFIED: ICD-10-CM

## 2023-03-27 ENCOUNTER — RESULT CHARGE (OUTPATIENT)
Age: 83
End: 2023-03-27

## 2023-03-27 ENCOUNTER — APPOINTMENT (OUTPATIENT)
Age: 83
End: 2023-03-27
Payer: MEDICAID

## 2023-03-27 VITALS
WEIGHT: 144 LBS | HEART RATE: 79 BPM | HEIGHT: 65 IN | SYSTOLIC BLOOD PRESSURE: 140 MMHG | BODY MASS INDEX: 23.99 KG/M2 | DIASTOLIC BLOOD PRESSURE: 80 MMHG

## 2023-03-27 DIAGNOSIS — Z87.891 PERSONAL HISTORY OF NICOTINE DEPENDENCE: ICD-10-CM

## 2023-03-27 DIAGNOSIS — Z87.898 PERSONAL HISTORY OF OTHER SPECIFIED CONDITIONS: ICD-10-CM

## 2023-03-27 DIAGNOSIS — Z83.3 FAMILY HISTORY OF DIABETES MELLITUS: ICD-10-CM

## 2023-03-27 PROCEDURE — 93000 ELECTROCARDIOGRAM COMPLETE: CPT

## 2023-03-27 PROCEDURE — 99214 OFFICE O/P EST MOD 30 MIN: CPT

## 2023-03-27 RX ORDER — METFORMIN HYDROCHLORIDE 500 MG/1
500 TABLET, COATED ORAL TWICE DAILY
Qty: 180 | Refills: 0 | Status: ACTIVE | COMMUNITY

## 2023-03-27 RX ORDER — AMLODIPINE BESYLATE 2.5 MG/1
2.5 TABLET ORAL DAILY
Refills: 0 | Status: DISCONTINUED | COMMUNITY
End: 2023-03-27

## 2023-03-27 RX ORDER — AMLODIPINE BESYLATE 2.5 MG/1
2.5 TABLET ORAL DAILY
Qty: 90 | Refills: 1 | Status: ACTIVE | COMMUNITY
Start: 2023-03-27 | End: 1900-01-01

## 2023-03-27 RX ORDER — LINAGLIPTIN 5 MG/1
5 TABLET, FILM COATED ORAL DAILY
Qty: 30 | Refills: 5 | Status: ACTIVE | COMMUNITY

## 2023-03-27 RX ORDER — LISINOPRIL 10 MG/1
10 TABLET ORAL DAILY
Qty: 90 | Refills: 3 | Status: ACTIVE | COMMUNITY

## 2023-03-27 RX ORDER — NIFEDIPINE 30 MG/1
30 TABLET, EXTENDED RELEASE ORAL
Refills: 0 | Status: DISCONTINUED | COMMUNITY
End: 2023-03-27

## 2023-03-27 RX ORDER — METOPROLOL TARTRATE 50 MG/1
50 TABLET, FILM COATED ORAL
Qty: 360 | Refills: 3 | Status: DISCONTINUED | COMMUNITY
End: 2023-03-27

## 2023-03-27 RX ORDER — LISINOPRIL 5 MG/1
5 TABLET ORAL DAILY
Refills: 0 | Status: DISCONTINUED | COMMUNITY
End: 2023-03-27

## 2023-03-27 RX ORDER — PANTOPRAZOLE 40 MG/1
40 TABLET, DELAYED RELEASE ORAL DAILY
Qty: 90 | Refills: 2 | Status: ACTIVE | COMMUNITY

## 2023-03-27 RX ORDER — ATORVASTATIN CALCIUM 80 MG/1
80 TABLET, FILM COATED ORAL
Refills: 0 | Status: DISCONTINUED | COMMUNITY
End: 2023-03-27

## 2023-03-27 RX ORDER — APIXABAN 5 MG/1
5 TABLET, FILM COATED ORAL
Qty: 60 | Refills: 5 | Status: ACTIVE | COMMUNITY

## 2023-03-27 RX ORDER — ATORVASTATIN CALCIUM 20 MG/1
20 TABLET, FILM COATED ORAL DAILY
Qty: 90 | Refills: 3 | Status: ACTIVE | COMMUNITY

## 2023-03-27 RX ORDER — METOPROLOL SUCCINATE 50 MG/1
50 TABLET, EXTENDED RELEASE ORAL DAILY
Refills: 6 | Status: ACTIVE | COMMUNITY

## 2023-03-27 NOTE — PHYSICAL EXAM
[Well Nourished] : well nourished [No Acute Distress] : no acute distress [Frail] : frail [Normal Conjunctiva] : normal conjunctiva [No Carotid Bruit] : no carotid bruit [Normal S1, S2] : normal S1, S2 [No Murmur] : no murmur [Clear Lung Fields] : clear lung fields [Good Air Entry] : good air entry [Soft] : abdomen soft [Non Tender] : non-tender [No Rash] : no rash [de-identified] : wheelchair bound [de-identified] : no JVD [de-identified] : wheelchair [de-identified] : 1+ edema

## 2023-03-27 NOTE — REVIEW OF SYSTEMS
[Lower Ext Edema] : lower extremity edema [Negative] : Heme/Lymph [SOB] : no shortness of breath [Dyspnea on exertion] : not dyspnea during exertion [Chest Discomfort] : no chest discomfort [Leg Claudication] : no intermittent leg claudication [Palpitations] : no palpitations [Orthopnea] : no orthopnea [Syncope] : no syncope

## 2023-03-27 NOTE — ASSESSMENT
[FreeTextEntry1] : 81 y/o female with PAF, diastolic CHF, HTN\par \par Prior records reviewed\par Hospital records reviewed\par Volume status better\par \par C/w Eliquis for CVA prevention\par \par BP control\par \par Furosemide for volume control for diastolic CHF\par Discussed use with son\par low salt diet\par C/w BB, ACE-I\par \par If stable, f/u in 6 months Breath sounds clear and equal bilaterally.

## 2023-03-27 NOTE — HISTORY OF PRESENT ILLNESS
[FreeTextEntry1] : Pt is 81 year old  Marshallese speaking Female with PMH HTN, HL, DM, left MCA aneurysm, CVA, A fib.  S/p hospitalization due to BLE edema and inflammation. Pt is accompanied by son who translated from Marshallese.   . CXR: pulm venous congestion.  Pt was tx with IV lasix.  TTE  3/10/23 EF  60 %,. Grade I diastolic dysfunction.\par Sclerotic aortic valve with normal opening,  Borderline pulm HTN, Venous Duplex BLEs - no DVT \par Pt denies chest pain, no SOB, no palpitations. \par  \par \par

## 2023-04-03 NOTE — ED PROVIDER NOTE - IV ALTEPLASE ADMIN OUTSIDE HIDDEN
"Name: Heaven De Santiago      : 2015      MRN: [id number]  Encounter Provider: Sylvain Wheeler MD  Encounter Date: 4/3/2023   Encounter department: [address] [place] [address]     1  Diarrhea, unspecified type  Assessment & Plan:  Improving  1 episode of NBNB emesis, 3 episodes of nonbloody watery diarrhea  No abdominal pain, fever  Exam: Soft, bowel sounds positive, nontender to palpation in all quadrants  Per mother- \" large amount of chocolate intake with other food yesterday night \"  Most likely appears to be dyspepsia vs less likely viral    ER precautions reviewed  Advised adequate hydration  Letter provided for school  RTO precautions reviewed             Subjective      5year-old female presents today with mother for symptoms of vomiting and diarrhea  1 episode of NBNB emesis this morning, 3-4 episodes of nonbloody diarrhea, watery  Mother reports patient ate different kinds of food including chocolate all at the same time  Denies abdominal pain, fevers  No recent sick contact  Review of Systems   Constitutional: Negative for chills and fever  HENT: Negative for ear pain and sore throat  Eyes: Negative for pain and visual disturbance  Respiratory: Negative for cough and shortness of breath  Cardiovascular: Negative for chest pain and palpitations  Gastrointestinal: Positive for diarrhea  Negative for abdominal pain, anal bleeding, blood in stool, nausea and vomiting  Genitourinary: Negative for dysuria and hematuria  Musculoskeletal: Negative for back pain and gait problem  Skin: Negative for color change and rash  Neurological: Negative for syncope and headaches  All other systems reviewed and are negative        Current Outpatient Medications on File Prior to Visit   Medication Sig   • Methylphenidate HCl 5 MG/5ML SOLN Take 2 5 to 5 ML daily by mouth after breakfast   • Methylphenidate HCl 5 MG/5ML SOLN Take 2 5 ML to 5 0 ML " "daily by mouth after breakfast Do not start before March 30, 2023  • [START ON 4/27/2023] Methylphenidate HCl 5 MG/5ML SOLN Take 2 5ML to 5 ML by mouth daily after breakfast Do not start before April 27, 2023  Objective     BP (!) 95/63 (BP Location: Right arm, Patient Position: Sitting, Cuff Size: Child)   Pulse 102   Temp 97 1 °F (36 2 °C) (Temporal)   Resp 16   Ht 4' 2\" (1 27 m)   Wt 25 kg (55 lb 3 2 oz)   SpO2 100%   BMI 15 52 kg/m²     Physical Exam  Vitals reviewed  Constitutional:       General: She is active  She is not in acute distress  Appearance: Normal appearance  She is well-developed and normal weight  She is not toxic-appearing  HENT:      Head: Normocephalic and atraumatic  Mouth/Throat:      Mouth: Mucous membranes are moist    Cardiovascular:      Rate and Rhythm: Normal rate  Pulmonary:      Effort: Pulmonary effort is normal  No respiratory distress  Abdominal:      General: Abdomen is flat  Bowel sounds are normal  There is no distension  Palpations: Abdomen is soft  There is no mass  Tenderness: There is no abdominal tenderness  There is no guarding  Musculoskeletal:         General: Normal range of motion  Skin:     General: Skin is warm and dry  Neurological:      Mental Status: She is alert        Gait: Gait normal    Psychiatric:         Mood and Affect: Mood normal          Behavior: Behavior normal        Kandice Marie MD  " show

## 2023-04-14 ENCOUNTER — APPOINTMENT (OUTPATIENT)
Dept: NEUROLOGY | Facility: CLINIC | Age: 83
End: 2023-04-14
Payer: MEDICAID

## 2023-04-14 VITALS
TEMPERATURE: 97.5 F | BODY MASS INDEX: 24.16 KG/M2 | DIASTOLIC BLOOD PRESSURE: 88 MMHG | OXYGEN SATURATION: 97 % | HEART RATE: 63 BPM | WEIGHT: 145 LBS | HEIGHT: 65 IN | SYSTOLIC BLOOD PRESSURE: 139 MMHG

## 2023-04-14 PROCEDURE — 99214 OFFICE O/P EST MOD 30 MIN: CPT

## 2023-04-16 NOTE — REASON FOR VISIT
[Follow-Up: _____] : a [unfilled] follow-up visit [Family Member] : family member [Other: _____] : [unfilled] [FreeTextEntry1] : stroke

## 2023-04-16 NOTE — PHYSICAL EXAM
[FreeTextEntry1] : Focal neurological exam:\par \par MS: Awake, alert, oriented to person. Emotionally labile. Follows commands. \par \par Language: Minimal speech. \par \par CNs 2 - 12 intact. EOMI no nystagmus, no diplopia. No facial asymmetry b/l. Tongue midline, normal movements, no atrophy.\par \par Motor: Normal muscle bulk & tone. No noticeable tremor or seizure. No pronator drift. Muscle strength of b/l UE symmetric. \par \par Cortical: No extinction\par \par Coordination: No dysmetria to FTN.\par \par Gait: Deferred. In wheelchair. \par \par \par \par \par

## 2023-04-16 NOTE — ASSESSMENT
[FreeTextEntry1] : Patient is 80 yo RH woman, Welsh speaking, from [facility] (came in 2016) with PMHx of HTN, HLD, DM, hx of 11mm Left MCA aneurysm s/p diagnostic cerebral DSA in 6/2021 who was offered open clipping to treat the aneurysm but does not want to undergo treatment. She presents for f/u for her likely cardioembolic stroke from 4/2022 for which she is on Eliquis and [facility] discussion.  \par \par PLAN: \par -Emphasized BP goal <140/80. BP today 139/88 \par -C/w Lipitor 80 \par -C/w Eliquis 5 BID per cardiology\par -F/u with PCP for crying spells and LE swelling \par -Will send referral for Geriatric Psychiatry \par -F/u in 6 mo
0

## 2023-04-16 NOTE — HISTORY OF PRESENT ILLNESS
[FreeTextEntry1] : Patient is 80 yo RH woman, Czech speaking, from [facility] (came in 2016) with PMHx of HTN, HLD, DM, hx of 11mm Left MCA aneurysm s/p diagnostic cerebral DSA in 6/2021 who was offered open clipping to treat the aneurysm but does not want to undergo treatment. She presents for f/u for her acute ischemic stroke of left central kiara (penetrating pontine arteries off basilar) and subacute ischemic in right cerebellum (R SCA territory), etiology likely Afib, and she continues on Eliquis. \par \par Patient is emotional today, and so is her son. However, per son, his mom is doing well, better than before. She feeds herself and can ambulate independently. \par \par She's fearful of getting aneurysm clipped bc her cousin in age 70s passed away during the procedure. \par She has 4 sisters all with motion sickness. \par Son came to [place] in 2000 and pt came in 2016. This was also the last time son went back to home country. \par \par We had some GOC discussion. I suggested discussion about advance directives. \par \par Son is taking care of patient directly and both prefer it that way. \par \par \par \par

## 2023-07-03 ENCOUNTER — APPOINTMENT (OUTPATIENT)
Dept: CARDIOLOGY | Facility: CLINIC | Age: 83
End: 2023-07-03
Payer: MEDICAID

## 2023-07-03 ENCOUNTER — RESULT CHARGE (OUTPATIENT)
Age: 83
End: 2023-07-03

## 2023-07-03 VITALS
WEIGHT: 145 LBS | BODY MASS INDEX: 24.16 KG/M2 | HEIGHT: 65 IN | DIASTOLIC BLOOD PRESSURE: 72 MMHG | SYSTOLIC BLOOD PRESSURE: 144 MMHG | HEART RATE: 67 BPM

## 2023-07-03 DIAGNOSIS — E78.5 HYPERLIPIDEMIA, UNSPECIFIED: ICD-10-CM

## 2023-07-03 DIAGNOSIS — E11.9 TYPE 2 DIABETES MELLITUS W/OUT COMPLICATIONS: ICD-10-CM

## 2023-07-03 DIAGNOSIS — I10 ESSENTIAL (PRIMARY) HYPERTENSION: ICD-10-CM

## 2023-07-03 DIAGNOSIS — I48.91 UNSPECIFIED ATRIAL FIBRILLATION: ICD-10-CM

## 2023-07-03 PROCEDURE — 99213 OFFICE O/P EST LOW 20 MIN: CPT

## 2023-07-03 PROCEDURE — 93000 ELECTROCARDIOGRAM COMPLETE: CPT

## 2023-07-03 NOTE — PHYSICAL EXAM
[Well Nourished] : well nourished [No Acute Distress] : no acute distress [Frail] : frail [Normal Conjunctiva] : normal conjunctiva [No Carotid Bruit] : no carotid bruit [Normal S1, S2] : normal S1, S2 [No Murmur] : no murmur [Clear Lung Fields] : clear lung fields [Good Air Entry] : good air entry [Soft] : abdomen soft [Non Tender] : non-tender [No Rash] : no rash [de-identified] : wheelchair bound [de-identified] : no JVD [de-identified] : wheelchair [de-identified] : 1+ edema

## 2023-07-03 NOTE — HISTORY OF PRESENT ILLNESS
[FreeTextEntry1] : Pt is 82  year old  Turkish speaking Female with PMH HTN, HL, DM, left MCA aneurysm, CVA, A fib.  S/p hospitalization due to BLE edema and inflammation. Pt is accompanied by son who translated from Turkish.   . CXR: pulm venous congestion.  Pt was tx with IV lasix.  TTE  3/10/23 EF  60 %,. Grade I diastolic dysfunction.\par Sclerotic aortic valve with normal opening,  Borderline pulm HTN, Venous Duplex BLEs - no DVT \par Pt denies chest pain, no SOB, no palpitations.  Pts son stated that pt is feeling better \par 04/24/23 Chol 165 LDL 82 Trig 170 HgbA1C 6.8 \par  \par \par

## 2023-07-03 NOTE — REASON FOR VISIT
[CV Risk Factors and Non-Cardiac Disease] : CV risk factors and non-cardiac disease [Hypertension] : hypertension [Source: ______] : History obtained from [unfilled]

## 2023-07-03 NOTE — REVIEW OF SYSTEMS
vicks vapor rub to great toenail twice a day x 4 months  Asa 81mg each am; risk vs benefit discussed  Schedule lab 9/2017  Schedule minor surg for skin tags @ axilla  Consider CT Ca++ score SCREEN of heart: 288-8000; CT scheduling; confirm $50  Exercise  Fish  Michele red  Low cholesterol diet  Call if desire repair of hernia  Back, hip, neck exercise  Xray: C/spine; Ls pine  Same usual medication  Follow up 3-4 months     [Lower Ext Edema] : lower extremity edema [Negative] : Heme/Lymph [SOB] : no shortness of breath [Dyspnea on exertion] : not dyspnea during exertion [Chest Discomfort] : no chest discomfort [Leg Claudication] : no intermittent leg claudication [Palpitations] : no palpitations [Orthopnea] : no orthopnea [Syncope] : no syncope

## 2023-07-03 NOTE — ASSESSMENT
[FreeTextEntry1] : 83 y/o female with PAF, diastolic CHF, HTN\par \par Prior records reviewed\par Hospital records reviewed\par Volume status better\par \par C/w Eliquis for CVA prevention\par \par BP control\par \par Furosemide for volume control for diastolic CHF - change to 1 QD\par Discussed use with son\par low salt diet\par C/w BB, ACE-I\par \par If stable, f/u in 6 months

## 2023-08-08 NOTE — PATIENT PROFILE ADULT - LANGUAGE ASSISTANCE NEEDED
details… normal/cranial nerves II-XII intact/sensation intact No-Patient/Caregiver offered and refused free interpretation services.

## 2023-09-01 ENCOUNTER — EMERGENCY (EMERGENCY)
Facility: HOSPITAL | Age: 83
LOS: 0 days | Discharge: HOME | End: 2023-09-01
Attending: STUDENT IN AN ORGANIZED HEALTH CARE EDUCATION/TRAINING PROGRAM
Payer: MEDICAID

## 2023-09-01 VITALS
RESPIRATION RATE: 19 BRPM | TEMPERATURE: 98 F | HEART RATE: 90 BPM | OXYGEN SATURATION: 99 % | SYSTOLIC BLOOD PRESSURE: 193 MMHG | DIASTOLIC BLOOD PRESSURE: 86 MMHG

## 2023-09-01 DIAGNOSIS — S01.21XA LACERATION WITHOUT FOREIGN BODY OF NOSE, INITIAL ENCOUNTER: ICD-10-CM

## 2023-09-01 DIAGNOSIS — Z86.69 PERSONAL HISTORY OF OTHER DISEASES OF THE NERVOUS SYSTEM AND SENSE ORGANS: ICD-10-CM

## 2023-09-01 DIAGNOSIS — I48.91 UNSPECIFIED ATRIAL FIBRILLATION: ICD-10-CM
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
## 2023-09-01 DIAGNOSIS — Z98.890 OTHER SPECIFIED POSTPROCEDURAL STATES: ICD-10-CM

## 2023-09-01 DIAGNOSIS — S09.90XA UNSPECIFIED INJURY OF HEAD, INITIAL ENCOUNTER: ICD-10-CM

## 2023-09-01 DIAGNOSIS — Z79.84 LONG TERM (CURRENT) USE OF ORAL HYPOGLYCEMIC DRUGS: ICD-10-CM

## 2023-09-01 DIAGNOSIS — I25.2 OLD MYOCARDIAL INFARCTION: ICD-10-CM

## 2023-09-01 DIAGNOSIS — R79.89 OTHER SPECIFIED ABNORMAL FINDINGS OF BLOOD CHEMISTRY: ICD-10-CM

## 2023-09-01 DIAGNOSIS — E78.5 HYPERLIPIDEMIA, UNSPECIFIED: ICD-10-CM

## 2023-09-01 DIAGNOSIS — Z79.82 LONG TERM (CURRENT) USE OF ASPIRIN: ICD-10-CM

## 2023-09-01 DIAGNOSIS — I50.30 UNSPECIFIED DIASTOLIC (CONGESTIVE) HEART FAILURE: ICD-10-CM
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
## 2023-09-01 DIAGNOSIS — Z04.3 ENCOUNTER FOR EXAMINATION AND OBSERVATION FOLLOWING OTHER ACCIDENT: ICD-10-CM

## 2023-09-01 DIAGNOSIS — I69.951 HEMIPLEGIA AND HEMIPARESIS FOLLOWING UNSPECIFIED CEREBROVASCULAR DISEASE AFFECTING RIGHT DOMINANT SIDE: ICD-10-CM

## 2023-09-01 DIAGNOSIS — Z23 ENCOUNTER FOR IMMUNIZATION: ICD-10-CM

## 2023-09-01 DIAGNOSIS — Z79.01 LONG TERM (CURRENT) USE OF ANTICOAGULANTS: ICD-10-CM

## 2023-09-01 DIAGNOSIS — I11.0 HYPERTENSIVE HEART DISEASE WITH HEART FAILURE: ICD-10-CM

## 2023-09-01 DIAGNOSIS — Z98.890 OTHER SPECIFIED POSTPROCEDURAL STATES: Chronic | ICD-10-CM

## 2023-09-01 DIAGNOSIS — E11.9 TYPE 2 DIABETES MELLITUS WITHOUT COMPLICATIONS: ICD-10-CM

## 2023-09-01 LAB
ALBUMIN SERPL ELPH-MCNC: 4.1 G/DL — SIGNIFICANT CHANGE UP (ref 3.5–5.2)
ALP SERPL-CCNC: 84 U/L — SIGNIFICANT CHANGE UP (ref 30–115)
ALT FLD-CCNC: 13 U/L — SIGNIFICANT CHANGE UP (ref 0–41)
ANION GAP SERPL CALC-SCNC: 12 MMOL/L — SIGNIFICANT CHANGE UP (ref 7–14)
APTT BLD: 31 SEC — SIGNIFICANT CHANGE UP (ref 27–39.2)
AST SERPL-CCNC: 15 U/L — SIGNIFICANT CHANGE UP (ref 0–41)
BASOPHILS # BLD AUTO: 0.03 K/UL — SIGNIFICANT CHANGE UP (ref 0–0.2)
BASOPHILS NFR BLD AUTO: 0.3 % — SIGNIFICANT CHANGE UP (ref 0–1)
BILIRUB SERPL-MCNC: 0.4 MG/DL — SIGNIFICANT CHANGE UP (ref 0.2–1.2)
BUN SERPL-MCNC: 29 MG/DL — HIGH (ref 10–20)
CALCIUM SERPL-MCNC: 9.3 MG/DL — SIGNIFICANT CHANGE UP (ref 8.4–10.5)
CHLORIDE SERPL-SCNC: 101 MMOL/L — SIGNIFICANT CHANGE UP (ref 98–110)
CO2 SERPL-SCNC: 25 MMOL/L — SIGNIFICANT CHANGE UP (ref 17–32)
CREAT SERPL-MCNC: 1.1 MG/DL — SIGNIFICANT CHANGE UP (ref 0.7–1.5)
EGFR: 50 ML/MIN/1.73M2 — LOW
EOSINOPHIL # BLD AUTO: 0.16 K/UL — SIGNIFICANT CHANGE UP (ref 0–0.7)
EOSINOPHIL NFR BLD AUTO: 1.6 % — SIGNIFICANT CHANGE UP (ref 0–8)
GLUCOSE SERPL-MCNC: 155 MG/DL — HIGH (ref 70–99)
HCT VFR BLD CALC: 41.2 % — SIGNIFICANT CHANGE UP (ref 37–47)
HGB BLD-MCNC: 13.1 G/DL — SIGNIFICANT CHANGE UP (ref 12–16)
IMM GRANULOCYTES NFR BLD AUTO: 0.2 % — SIGNIFICANT CHANGE UP (ref 0.1–0.3)
INR BLD: 0.95 RATIO — SIGNIFICANT CHANGE UP (ref 0.65–1.3)
LACTATE SERPL-SCNC: 1.6 MMOL/L — SIGNIFICANT CHANGE UP (ref 0.7–2)
LIDOCAIN IGE QN: 39 U/L — SIGNIFICANT CHANGE UP (ref 7–60)
LYMPHOCYTES # BLD AUTO: 0.84 K/UL — LOW (ref 1.2–3.4)
LYMPHOCYTES # BLD AUTO: 8.2 % — LOW (ref 20.5–51.1)
MCHC RBC-ENTMCNC: 28.5 PG — SIGNIFICANT CHANGE UP (ref 27–31)
MCHC RBC-ENTMCNC: 31.8 G/DL — LOW (ref 32–37)
MCV RBC AUTO: 89.8 FL — SIGNIFICANT CHANGE UP (ref 81–99)
MONOCYTES # BLD AUTO: 0.69 K/UL — HIGH (ref 0.1–0.6)
MONOCYTES NFR BLD AUTO: 6.7 % — SIGNIFICANT CHANGE UP (ref 1.7–9.3)
NEUTROPHILS # BLD AUTO: 8.49 K/UL — HIGH (ref 1.4–6.5)
NEUTROPHILS NFR BLD AUTO: 83 % — HIGH (ref 42.2–75.2)
NRBC # BLD: 0 /100 WBCS — SIGNIFICANT CHANGE UP (ref 0–0)
NT-PROBNP SERPL-SCNC: 1124 PG/ML — HIGH (ref 0–300)
PLATELET # BLD AUTO: 212 K/UL — SIGNIFICANT CHANGE UP (ref 130–400)
PMV BLD: 10.7 FL — HIGH (ref 7.4–10.4)
POTASSIUM SERPL-MCNC: 4.4 MMOL/L — SIGNIFICANT CHANGE UP (ref 3.5–5)
POTASSIUM SERPL-SCNC: 4.4 MMOL/L — SIGNIFICANT CHANGE UP (ref 3.5–5)
PROT SERPL-MCNC: 6.6 G/DL — SIGNIFICANT CHANGE UP (ref 6–8)
PROTHROM AB SERPL-ACNC: 10.8 SEC — SIGNIFICANT CHANGE UP (ref 9.95–12.87)
RBC # BLD: 4.59 M/UL — SIGNIFICANT CHANGE UP (ref 4.2–5.4)
RBC # FLD: 13.8 % — SIGNIFICANT CHANGE UP (ref 11.5–14.5)
SODIUM SERPL-SCNC: 138 MMOL/L — SIGNIFICANT CHANGE UP (ref 135–146)
TROPONIN T SERPL-MCNC: 0.06 NG/ML — CRITICAL HIGH
TROPONIN T SERPL-MCNC: 0.06 NG/ML — CRITICAL HIGH
WBC # BLD: 10.23 K/UL — SIGNIFICANT CHANGE UP (ref 4.8–10.8)
WBC # FLD AUTO: 10.23 K/UL — SIGNIFICANT CHANGE UP (ref 4.8–10.8)

## 2023-09-01 PROCEDURE — 76705 ECHO EXAM OF ABDOMEN: CPT

## 2023-09-01 PROCEDURE — 84484 ASSAY OF TROPONIN QUANT: CPT

## 2023-09-01 PROCEDURE — 76705 ECHO EXAM OF ABDOMEN: CPT | Mod: 26

## 2023-09-01 PROCEDURE — 80053 COMPREHEN METABOLIC PANEL: CPT

## 2023-09-01 PROCEDURE — 99291 CRITICAL CARE FIRST HOUR: CPT | Mod: 25

## 2023-09-01 PROCEDURE — 83880 ASSAY OF NATRIURETIC PEPTIDE: CPT

## 2023-09-01 PROCEDURE — 74177 CT ABD & PELVIS W/CONTRAST: CPT | Mod: MA

## 2023-09-01 PROCEDURE — 70450 CT HEAD/BRAIN W/O DYE: CPT | Mod: 26,MA

## 2023-09-01 PROCEDURE — 82962 GLUCOSE BLOOD TEST: CPT

## 2023-09-01 PROCEDURE — 70486 CT MAXILLOFACIAL W/O DYE: CPT | Mod: 26,MA

## 2023-09-01 PROCEDURE — 71260 CT THORAX DX C+: CPT | Mod: 26,MA

## 2023-09-01 PROCEDURE — 71045 X-RAY EXAM CHEST 1 VIEW: CPT | Mod: 26

## 2023-09-01 PROCEDURE — 90471 IMMUNIZATION ADMIN: CPT

## 2023-09-01 PROCEDURE — 99285 EMERGENCY DEPT VISIT HI MDM: CPT

## 2023-09-01 PROCEDURE — 70486 CT MAXILLOFACIAL W/O DYE: CPT | Mod: MA

## 2023-09-01 PROCEDURE — 99284 EMERGENCY DEPT VISIT MOD MDM: CPT

## 2023-09-01 PROCEDURE — 83690 ASSAY OF LIPASE: CPT

## 2023-09-01 PROCEDURE — 83605 ASSAY OF LACTIC ACID: CPT

## 2023-09-01 PROCEDURE — 85730 THROMBOPLASTIN TIME PARTIAL: CPT

## 2023-09-01 PROCEDURE — 71045 X-RAY EXAM CHEST 1 VIEW: CPT

## 2023-09-01 PROCEDURE — 93010 ELECTROCARDIOGRAM REPORT: CPT

## 2023-09-01 PROCEDURE — 93005 ELECTROCARDIOGRAM TRACING: CPT

## 2023-09-01 PROCEDURE — 85610 PROTHROMBIN TIME: CPT

## 2023-09-01 PROCEDURE — 90715 TDAP VACCINE 7 YRS/> IM: CPT

## 2023-09-01 PROCEDURE — 72125 CT NECK SPINE W/O DYE: CPT | Mod: 26,MA

## 2023-09-01 PROCEDURE — 72170 X-RAY EXAM OF PELVIS: CPT

## 2023-09-01 PROCEDURE — 85025 COMPLETE CBC W/AUTO DIFF WBC: CPT

## 2023-09-01 PROCEDURE — 72170 X-RAY EXAM OF PELVIS: CPT | Mod: 26

## 2023-09-01 PROCEDURE — 36415 COLL VENOUS BLD VENIPUNCTURE: CPT

## 2023-09-01 PROCEDURE — 70450 CT HEAD/BRAIN W/O DYE: CPT | Mod: MA

## 2023-09-01 PROCEDURE — 72125 CT NECK SPINE W/O DYE: CPT | Mod: MA

## 2023-09-01 PROCEDURE — 74177 CT ABD & PELVIS W/CONTRAST: CPT | Mod: 26,MA

## 2023-09-01 PROCEDURE — 71260 CT THORAX DX C+: CPT | Mod: MA

## 2023-09-01 RX ORDER — TETANUS TOXOID, REDUCED DIPHTHERIA TOXOID AND ACELLULAR PERTUSSIS VACCINE, ADSORBED 5; 2.5; 8; 8; 2.5 [IU]/.5ML; [IU]/.5ML; UG/.5ML; UG/.5ML; UG/.5ML
0.5 SUSPENSION INTRAMUSCULAR ONCE
Refills: 0 | Status: COMPLETED | OUTPATIENT
Start: 2023-09-01 | End: 2023-09-01

## 2023-09-01 RX ORDER — SODIUM CHLORIDE 9 MG/ML
1000 INJECTION INTRAMUSCULAR; INTRAVENOUS; SUBCUTANEOUS ONCE
Refills: 0 | Status: DISCONTINUED | OUTPATIENT
Start: 2023-09-01 | End: 2023-09-01

## 2023-09-01 RX ADMIN — TETANUS TOXOID, REDUCED DIPHTHERIA TOXOID AND ACELLULAR PERTUSSIS VACCINE, ADSORBED 0.5 MILLILITER(S): 5; 2.5; 8; 8; 2.5 SUSPENSION INTRAMUSCULAR at 09:42

## 2023-09-01 NOTE — ED PROVIDER NOTE - PROVIDER TOKENS
FREE:[LAST:[your primary care doctor],PHONE:[(   )    -],FAX:[(   )    -],FOLLOWUP:[Routine],ESTABLISHEDPATIENT:[T]]

## 2023-09-01 NOTE — ED PROVIDER NOTE - CARE PROVIDER_API CALL
your primary care doctor,   Phone: (   )    -  Fax: (   )    -  Established Patient  Follow Up Time: Routine

## 2023-09-01 NOTE — ED PROVIDER NOTE - PHYSICAL EXAMINATION
VITAL SIGNS: I have reviewed nursing notes and confirm.  CONSTITUTIONAL: elderly female in nad  SKIN: Skin exam is warm and dry  HEAD:1.5 Centimeter linear laceration across upper nasal bridge without step-off or deformity or active bleeding.  Otherwise NCATNo barrett sign or raccoon eyes  EYES: PERRL, EOM intact; conjunctiva and sclera clear.  ENT: MMM. No nasal discharge; airway clear.   NECK: Supple; non tender. No midline C spine ttp  CARD: S1, S2 normal; no murmurs, gallops, or rubs. Regular rate and rhythm. 2+ distal pulses  RESP: Normal respiratory effort, no tachypnea or distress. Lungs CTAB, no wheezes, rales or rhonchi.  chest wall non-tender  back with no midline c/t/l/s spinal or paraspinal ttp  pelvis stable  ABD: soft, NT/ND.  EXT: Normal ROM. No clubbing, cyanosis or edema.  Neuro: A&Ox3, normal speech, CN II-XII intact, FROM & strength 5/5 x4 extremities.   PSYCH: Cooperative, appropriate.

## 2023-09-01 NOTE — CONSULT NOTE ADULT - ASSESSMENT
ASSESSMENT:  82yF w/ PMHx of Chronic Afib on Eliquis, NSTEMI, HFPEF, L MCA Anerusym, DM, HLD, HTN, CVA with residual right leg weakness, who was seen as Trauma Alert s/p fall on Eliquis, +HT, -LOC, +AC.  Trauma assessment in ED: ABCs intact , GCS 14 , AAOx2 (baseline according to son). External signs of trauma include: left sided forehead laceration approximately 2cm, abrasions and swelling to the forehead and left upper eyelid area.      Injuries identified: Pending    PLAN:     -Final plans pending traumatic workup  -Trauma Imaging: CXR, Pelvic Xray, CT Head,  CT C-spine, CT Chest, CT Abd/Pelvis  - Trauma Labs to include: CBC, BMP, Coags, T&S, UA, EtOH level      Disposition pending results of above labs and imaging  Above plan discussed with Trauma attending, Dr. La , patient, patient family, and ED team  --------------------------------------------------------------------------------------     ASSESSMENT:  82yF w/ PMHx of Chronic Afib on Eliquis, NSTEMI, HFPEF, L MCA Anerusym, DM, HLD, HTN, CVA with residual right leg weakness, who was seen as Trauma Alert s/p fall on Eliquis, +HT, -LOC, +AC.  Trauma assessment in ED: ABCs intact , GCS 14 , AAOx2 (baseline according to son). External signs of trauma include: left sided forehead laceration approximately 2cm, abrasions and swelling to the forehead and left upper eyelid area.      Injuries identified: No acute traumatic injuries identified    PLAN:     -No acute traumatic injuries identitifed  -No further traumatic workup needed  -Disposition per ED    Disposition pending results of above labs and imaging  Above plan discussed with Trauma attending, Dr. La , patient, patient family, and ED team  --------------------------------------------------------------------------------------

## 2023-09-01 NOTE — ED ADULT NURSE NOTE - NSFALLHARMRISKINTERV_ED_ALL_ED
Assistance OOB with selected safe patient handling equipment if applicable/Assistance with ambulation/Communicate risk of Fall with Harm to all staff, patient, and family/Encourage patient to sit up slowly, dangle for a short time, stand at bedside before walking/Monitor gait and stability/Provide patient with walking aids/Provide visual cue: red socks, yellow wristband, yellow gown, etc/Reinforce activity limits and safety measures with patient and family/Use of alarms - bed, stretcher, chair and/or video monitoring/Bed in lowest position, wheels locked, appropriate side rails in place/Call bell, personal items and telephone in reach/Instruct patient to call for assistance before getting out of bed/chair/stretcher/Non-slip footwear applied when patient is off stretcher/[place] to call system/Physically safe environment - no spills, clutter or unnecessary equipment/Purposeful Proactive Rounding/Room/bathroom lighting operational, light cord in reach Assistance OOB with selected safe patient handling equipment if applicable/Assistance with ambulation/Communicate risk of Fall with Harm to all staff, patient, and family/Monitor gait and stability/Provide patient with walking aids/Provide visual cue: red socks, yellow wristband, yellow gown, etc/Reinforce activity limits and safety measures with patient and family/Bed in lowest position, wheels locked, appropriate side rails in place/Call bell, personal items and telephone in reach/Instruct patient to call for assistance before getting out of bed/chair/stretcher/Non-slip footwear applied when patient is off stretcher/[place] to call system/Physically safe environment - no spills, clutter or unnecessary equipment/Purposeful Proactive Rounding/Room/bathroom lighting operational, light cord in reach

## 2023-09-01 NOTE — ED PROVIDER NOTE - NSFOLLOWUPINSTRUCTIONS_ED_ALL_ED_FT
Fall Prevention in the Home, Adult  Falls can cause injuries and can happen to people of all ages. There are many things you can do to make your home safe and to help prevent falls. Ask for help when making these changes.    What actions can I take to prevent falls?  General Instructions    Use good lighting in all rooms. Replace any light bulbs that burn out.  Turn on the lights in dark areas. Use night-lights.  Keep items that you use often in easy-to-reach places. Lower the shelves around your home if needed.  Set up your furniture so you have a clear path. Avoid moving your furniture around.  Do not have throw rugs or other things on the floor that can make you trip.  Avoid walking on wet floors.  If any of your floors are uneven, fix them.  Add color or contrast paint or tape to clearly ольга and help you see:  Grab bars or handrails.  First and last steps of staircases.  Where the edge of each step is.  If you use a stepladder:  Make sure that it is fully opened. Do not climb a closed stepladder.  Make sure the sides of the stepladder are locked in place.  Ask someone to hold the stepladder while you use it.  Know where your pets are when moving through your home.  What can I do in the bathroom?        Keep the floor dry. Clean up any water on the floor right away.  Remove soap buildup in the tub or shower.  Use nonskid mats or decals on the floor of the tub or shower.  Attach bath mats securely with double-sided, nonslip rug tape.  If you need to sit down in the shower, use a plastic, nonslip stool.  Install grab bars by the toilet and in the tub and shower. Do not use towel bars as grab bars.  What can I do in the bedroom?    Make sure that you have a light by your bed that is easy to reach.  Do not use any sheets or blankets for your bed that hang to the floor.  Have a firm chair with side arms that you can use for support when you get dressed.  What can I do in the kitchen?    Clean up any spills right away.  If you need to reach something above you, use a step stool with a grab bar.  Keep electrical cords out of the way.  Do not use floor polish or wax that makes floors slippery.  What can I do with my stairs?    Do not leave any items on the stairs.  Make sure that you have a light switch at the top and the bottom of the stairs.  Make sure that there are handrails on both sides of the stairs. Fix handrails that are broken or loose.  Install nonslip stair treads on all your stairs.  Avoid having throw rugs at the top or bottom of the stairs.  Choose a carpet that does not hide the edge of the steps on the stairs.  Check carpeting to make sure that it is firmly attached to the stairs. Fix carpet that is loose or worn.  What can I do on the outside of my home?    Use bright outdoor lighting.  Fix the edges of walkways and driveways and fix any cracks.  Remove anything that might make you trip as you walk through a door, such as a raised step or threshold.  Trim any bushes or trees on paths to your home.  Check to see if handrails are loose or broken and that both sides of all steps have handrails.  Install guardrails along the edges of any raised decks and porches.  Clear paths of anything that can make you trip, such as tools or rocks.  Have leaves, snow, or ice cleared regularly.  Use sand or salt on paths during winter.  Clean up any spills in your garage right away. This includes grease or oil spills.  What other actions can I take?    Wear shoes that:  Have a low heel. Do not wear high heels.  Have rubber bottoms.  Feel good on your feet and fit well.  Are closed at the toe. Do not wear open-toe sandals.  Use tools that help you move around if needed. These include:  Canes.  Walkers.  Scooters.  Crutches.  Review your medicines with your doctor. Some medicines can make you feel dizzy. This can increase your chance of falling.  Ask your doctor what else you can do to help prevent falls.    Where to find more information  Centers for Disease Control and PreventionANJU: www.cdc.gov  [employer] [place] [employer]: www.thelma.nih.gov  Contact a doctor if:  You are afraid of falling at home.  You feel weak, drowsy, or dizzy at home.  You fall at home.  Summary  There are many simple things that you can do to make your home safe and to help prevent falls.  Ways to make your home safe include removing things that can make you trip and installing grab bars in the bathroom.  Ask for help when making these changes in your home.  This information is not intended to replace advice given to you by your health care provider. Make sure you discuss any questions you have with your health care provider.

## 2023-09-01 NOTE — ED PROVIDER NOTE - PATIENT PORTAL LINK FT
You can access the FollowMyHealth Patient Portal offered by [facility] by registering at the following website: [email or web address]. By joining "[employer]’s FollowMyHealth portal, you will also be able to view your health information using other applications (apps) compatible with our system.

## 2023-09-01 NOTE — ED PROVIDER NOTE - CLINICAL SUMMARY MEDICAL DECISION MAKING FREE TEXT BOX
81 y/o F BIBEMS after fall on Eliquis. Pt speaks Wolof and son was at bedside to translate. She had an unwitnessed fall this morning when son was making breakfast. She got up out of bed and fell, hitting her face on her walker. She was found bent over, and son helped her up.  exam showed laceration on nasal bridge, possible foreign body but in examination seems like an old calcification no foreign body visualized on direct examination of the wound, otherwise imaging none acute, wound repaired using strill strip ( pt preference, shallow wound) labs elevate troponin, similar to past, repeat was not elevated.  pt cleared by trauma, pt clinically stable for discharge. son prefers to take home. pt walking with walker stable for discharge.

## 2023-09-01 NOTE — ED PROVIDER NOTE - OBJECTIVE STATEMENT
83 y/o F BIBEMS after fall on Eliquis. Pt speaks Khmer and son was at bedside to translate. She had an unwitnessed fall this morning when son was making breakfast. She got up out of bed and fell, hitting her face on her walker. She was found on her back, and son helped her up. 81 y/o F BIBEMS after fall on Eliquis. Pt speaks Kiswahili and son was at bedside to translate. She had an unwitnessed fall this morning when son was making breakfast. She got up out of bed and fell, hitting her face on her walker. She was found bent over, and son helped her up. 83 y/o F BIBEMS after fall on Eliquis. Pt speaks Belarusian and son was at bedside to translate. She had an unwitnessed fall this morning when son was making breakfast. She got up out of bed and fell, hitting her face on her walker. She was found bent over, and son helped her up. Here pt denies any complaints of pain or SOB. She denies CP, SOB, palpitations or any prodromal SX, syncope, abdominal pain, n/v, extremity numbness, weakness, paresthesias.

## 2023-09-01 NOTE — CONSULT NOTE ADULT - SUBJECTIVE AND OBJECTIVE BOX
TRAUMA ACTIVATION LEVEL:  ALERT    ACTIVATED BY:  ED**  INTUBATED: NO**      MECHANISM OF INJURY:   [] Blunt     [] MVC	  [x] Fall	  [] Pedestrian Struck	  [] Motorcycle     [] Assault     [] Bicycle collision    [] Sports injury    [] Penetrating    [] Gun Shot Wound      [] Stab Wound    GCS: 15 	E: 4	V: 5	M: 6    HPI:    82yF w/ PMHx of Chronic Afib on Eliquis, NSTEMI, HFPEF, L MCA Anerusym, DM, HLD, HTN, CVA with residual right leg weakness, who was seen as Trauma Alert s/p fall on Eliquis, +HT, -LOC, +AC. The patient is Icelandic speaking and presents with her son who reports that the patient sustained a witnessed fall earlier this morning after getting up out of bed she tripped and fell. The patient normally ambulated with a rolling walker and has not attempted ambulating since the fall.   Trauma assessment in ED: ABCs intact , GCS 14 , AAOx2. External signs of trauma include: left sided forehead laceration approximately 2cm, abrasions and swelling to the forehead and left upper eyelid area.     PAST MEDICAL & SURGICAL HISTORY:  Type II diabetes mellitus      Hypertension      Dyslipidemia      Recurrent falls      History of hernia surgery          Allergies    No Known Allergies    Intolerances        Home Medications:  acetaminophen 325 mg oral tablet: 2 tab(s) orally every 6 hours, As needed, for pain or fever (09 May 2022 08:04)  atorvastatin 20 mg oral tablet: 1 tab(s) orally once a day (09 Mar 2023 18:35)  Tradjenta 5 mg oral tablet: 1 tab(s) orally once a day (09 Mar 2023 18:36)      ROS: 10-system review is otherwise negative except HPI above.      Primary Survey:    A - airway intact  B - bilateral breath sounds and good chest rise  C - palpable pulses in all extremities  D - GCS 15 on arrival, CISNEROS  Exposure obtained    Vital Signs Last 24 Hrs  T(C): 36.8 (01 Sep 2023 08:37), Max: 36.8 (01 Sep 2023 08:37)  T(F): 98.3 (01 Sep 2023 08:37), Max: 98.3 (01 Sep 2023 08:37)  HR: 90 (01 Sep 2023 08:37) (90 - 90)  BP: 193/86 (01 Sep 2023 08:37) (193/86 - 193/86)  BP(mean): --  RR: 19 (01 Sep 2023 08:37) (19 - 19)  SpO2: 99% (01 Sep 2023 08:37) (99% - 99%)        Secondary Survey:   General: NAD  HEENT: Normocephalic, atraumatic, EOMI, PEERLA. left forehead laceration 2cm no active bleeding, surrounding area of abrasions and swelling of the upper eyelid area.   Neck: Soft, midline trachea. no c-spine tenderness  Chest: No chest wall tenderness, no subcutaneous emphysema   Cardiac: S1, S2, RRR  Respiratory: Bilateral breath sounds, clear and equal bilaterally  Abdomen: Soft, non-distended, non-tender, no rebound, no guarding.  Groin: Normal appearing, pelvis stable   Ext:  Moving b/l upper and lower extremities. Palpable Radial b/l UE, b/l DP palpable in LE.   Back: No T/L/S spine tenderness, No palpable runoff/stepoff/deformity  Rectal:  good tone    Labs:  CAPILLARY BLOOD GLUCOSE      POCT Blood Glucose.: 148 mg/dL (01 Sep 2023 08:43)          RADIOLOGY & ADDITIONAL STUDIES:  ---------------------------------------------------------------------------------------  Pending   TRAUMA ACTIVATION LEVEL:  ALERT    ACTIVATED BY:  ED**  INTUBATED: NO**      MECHANISM OF INJURY:   [] Blunt     [] MVC	  [x] Fall	  [] Pedestrian Struck	  [] Motorcycle     [] Assault     [] Bicycle collision    [] Sports injury    [] Penetrating    [] Gun Shot Wound      [] Stab Wound    GCS: 15 	E: 4	V: 5	M: 6    HPI:    82yF w/ PMHx of Chronic Afib on Eliquis, NSTEMI, HFPEF, L MCA Anerusym, DM, HLD, HTN, CVA with residual right leg weakness, who was seen as Trauma Alert s/p fall on Eliquis, +HT, -LOC, +AC. The patient is Greenlandic speaking and presents with her son who reports that the patient sustained a witnessed fall earlier this morning after getting up out of bed she tripped and fell. The patient normally ambulated with a rolling walker and has not attempted ambulating since the fall.   Trauma assessment in ED: ABCs intact , GCS 14 , AAOx2. External signs of trauma include: left sided forehead laceration approximately 2cm, abrasions and swelling to the forehead and left upper eyelid area.     PAST MEDICAL & SURGICAL HISTORY:  Type II diabetes mellitus      Hypertension      Dyslipidemia      Recurrent falls      History of hernia surgery          Allergies    No Known Allergies    Intolerances        Home Medications:  acetaminophen 325 mg oral tablet: 2 tab(s) orally every 6 hours, As needed, for pain or fever (09 May 2022 08:04)  atorvastatin 20 mg oral tablet: 1 tab(s) orally once a day (09 Mar 2023 18:35)  Tradjenta 5 mg oral tablet: 1 tab(s) orally once a day (09 Mar 2023 18:36)      ROS: 10-system review is otherwise negative except HPI above.      Primary Survey:    A - airway intact  B - bilateral breath sounds and good chest rise  C - palpable pulses in all extremities  D - GCS 15 on arrival, CISNEROS  Exposure obtained    Vital Signs Last 24 Hrs  T(C): 36.8 (01 Sep 2023 08:37), Max: 36.8 (01 Sep 2023 08:37)  T(F): 98.3 (01 Sep 2023 08:37), Max: 98.3 (01 Sep 2023 08:37)  HR: 90 (01 Sep 2023 08:37) (90 - 90)  BP: 193/86 (01 Sep 2023 08:37) (193/86 - 193/86)  BP(mean): --  RR: 19 (01 Sep 2023 08:37) (19 - 19)  SpO2: 99% (01 Sep 2023 08:37) (99% - 99%)        Secondary Survey:   General: NAD  HEENT: Normocephalic, atraumatic, EOMI, PEERLA. left forehead laceration 2cm no active bleeding, surrounding area of abrasions and swelling of the upper eyelid area.   Neck: Soft, midline trachea. no c-spine tenderness  Chest: No chest wall tenderness, no subcutaneous emphysema   Cardiac: S1, S2, RRR  Respiratory: Bilateral breath sounds, clear and equal bilaterally  Abdomen: Soft, non-distended, non-tender, no rebound, no guarding.  Groin: Normal appearing, pelvis stable   Ext:  Moving b/l upper and lower extremities. Palpable Radial b/l UE, b/l DP palpable in LE.   Back: No T/L/S spine tenderness, No palpable runoff/stepoff/deformity  Rectal:  good tone    Labs:  CAPILLARY BLOOD GLUCOSE      POCT Blood Glucose.: 148 mg/dL (01 Sep 2023 08:43)          RADIOLOGY & ADDITIONAL STUDIES:  ---------------------------------------------------------------------------------------  < from: CT Abdomen and Pelvis w/ IV Cont (09.01.23 @ 09:28) >  IMPRESSION:    No evidence for acute intra-abdominal or intrathoracic trauma.  < from: CT Chest w/ IV Cont (09.01.23 @ 09:27) >    No evidence for acute intra-abdominal or intrathoracic trauma.    < from: CT Maxillofacial No Cont (09.01.23 @ 09:22) >  1.  Forehead, left periorbital and perinasal soft tissue swelling and   subcutaneous emphysema consistent with laceration.  Several punctate   cutaneous densities noted in the perinasal region, recommend clinical   correlation for foreign bodies.    2.  No evidence of acute facial fracture or globe injury.    < end of copied text >    < from: CT Cervical Spine No Cont (09.01.23 @ 09:17) >  1.  No evidence of acute cervical spine fracture or subluxation.    2.  Multilevel degenerative changes as described, overall stable.    < end of copied text >

## 2023-09-01 NOTE — ED PROVIDER NOTE - PROGRESS NOTE DETAILS
ALYCE: pan scan negative for injury. troponin elevated but has been higher in past, repeat is not rising, ekg is normal. Discussed With son at bedside who states he helps patient walk with a walker at home and he does not want her to be admitted to the hospital.  Is refusing admission.  Will discharge home with outpatient follow-up.  Supportive care return precautions advised.  Son agrees with plan

## 2023-09-01 NOTE — ED PROVIDER NOTE - ATTENDING CONTRIBUTION TO CARE
I have personally performed a history and physical exam on this patient and personally directed the management of the patient.  83 y/o F BIBEMS after fall on Eliquis. Pt speaks Guatemalan and son was at bedside to translate. She had an unwitnessed fall this morning when son was making breakfast. She got up out of bed and fell, hitting her face on her walker. She was found bent over, and son helped her up.  CON: appears stated age, pleasant, no acute distress, HENMT: normocephalic, 1-2 cm laceration on nasal bridge, no active bleeding, anicteric, no conjunctival injection,  CV: regular rhythm, distal pulses intact, RESP: no acute respiratory distress, no stridor, breathing comfortably on RA , GI:  soft, nontender, no rebound, no guarding, SKIN: no wounds MSK: no deformities, NEURO: no gross motor or sensory deficit   will send labs, pan scan, trauma alert and pain pain and wound management and tetanus booster and reevaluate I have personally performed a history and physical exam on this patient and personally directed the management of the patient.  81 y/o F BIBEMS after fall on Eliquis. Pt speaks St [place] and son was at bedside to translate. She had an unwitnessed fall this morning when son was making breakfast. She got up out of bed and fell, hitting her face on her walker. She was found bent over, and son helped her up.  CON: appears stated age, pleasant, no acute distress, HENMT: normocephalic, 1-2 cm laceration on nasal bridge, no active bleeding, anicteric, no conjunctival injection,  CV: regular rhythm, distal pulses intact, RESP: no acute respiratory distress, no stridor, breathing comfortably on RA , GI:  soft, nontender, no rebound, no guarding, SKIN: no wounds MSK: no deformities, NEURO: no gross motor or sensory deficit   will send labs, pan scan, activate trauma alert and pain and wound management and tetanus booster and reevaluate

## 2023-09-01 NOTE — ED ADULT NURSE NOTE - OBJECTIVE STATEMENT
Presented to ED c/o falling forward when walking with her walker. States she hit her head. Denies pain. BIBEMS from home c/o falling forward when walking with her walker. States she hit her head. Denies pain. BIBEMS from home c/o falling forward when walking with her walker. States she hit her head, denies LOC, denies pain. Pt on Eliquis.

## 2023-10-27 ENCOUNTER — APPOINTMENT (OUTPATIENT)
Dept: NEUROLOGY | Facility: CLINIC | Age: 83
End: 2023-10-27
Payer: MEDICAID

## 2023-10-27 VITALS
DIASTOLIC BLOOD PRESSURE: 90 MMHG | OXYGEN SATURATION: 97 % | TEMPERATURE: 98.5 F | HEIGHT: 65 IN | SYSTOLIC BLOOD PRESSURE: 193 MMHG | WEIGHT: 145 LBS | BODY MASS INDEX: 24.16 KG/M2 | HEART RATE: 64 BPM

## 2023-10-27 DIAGNOSIS — I67.1 CEREBRAL ANEURYSM, NONRUPTURED: ICD-10-CM

## 2023-10-27 DIAGNOSIS — F48.2 PSEUDOBULBAR AFFECT: ICD-10-CM

## 2023-10-27 DIAGNOSIS — I63.9 CEREBRAL INFARCTION, UNSPECIFIED: ICD-10-CM

## 2023-10-27 PROCEDURE — 99214 OFFICE O/P EST MOD 30 MIN: CPT

## 2023-10-27 RX ORDER — ASPIRIN 81 MG
81 TABLET, DELAYED RELEASE (ENTERIC COATED) ORAL DAILY
Refills: 0 | Status: DISCONTINUED | COMMUNITY
End: 2023-10-27

## 2023-12-20 NOTE — PHYSICAL THERAPY INITIAL EVALUATION ADULT - SITTING BALANCE: DYNAMIC
What Type Of Note Output Would You Prefer (Optional)?: Standard Output Hpi Title: Evaluation of Skin Lesions poor plus

## 2024-02-19 NOTE — ED ADULT NURSE NOTE - CAS TRG GENERAL NORM CIRC DET
Strong peripheral pulses Pending sale to [facility]: [facility] for follow up primary care appointment contacted on your behalf, they will call with you within 24-48 hours to schedule an appointment -- the contact information for this clinic is [phone].

## 2024-04-15 ENCOUNTER — RX RENEWAL (OUTPATIENT)
Age: 84
End: 2024-04-15

## 2024-04-15 RX ORDER — FUROSEMIDE 40 MG/1
40 TABLET ORAL DAILY
Qty: 90 | Refills: 3 | Status: ACTIVE | COMMUNITY
Start: 1900-01-01 | End: 1900-01-01

## 2024-04-17 NOTE — ED ADULT NURSE NOTE - NSICDXPASTSURGICALHX_GEN_ALL_CORE_FT
Procedure:  COLONOSCOPY    Relevant Problems   ENDO   (+) Hypothyroidism      PULMONARY   (+) VIJI (obstructive sleep apnea)      Other   (+) History of DVT (deep vein thrombosis)        Physical Exam    Airway    Mallampati score: II  TM Distance: >3 FB  Neck ROM: full     Dental       Cardiovascular  Rhythm: regular, Rate: normal    Pulmonary   Breath sounds clear to auscultation    Other Findings        Anesthesia Plan  ASA Score- 2     Anesthesia Type- IV sedation with anesthesia with ASA Monitors.         Additional Monitors:     Airway Plan:            Plan Factors-    Chart reviewed.        Patient is not a current smoker.              Induction- intravenous.    Postoperative Plan-     Informed Consent- Anesthetic plan and risks discussed with patient.  I personally reviewed this patient with the CRNA. Discussed and agreed on the Anesthesia Plan with the CRNA..                 PAST SURGICAL HISTORY:  History of hernia surgery

## 2024-05-01 ENCOUNTER — APPOINTMENT (OUTPATIENT)
Dept: NEUROLOGY | Facility: CLINIC | Age: 84
End: 2024-05-01

## 2024-10-21 NOTE — PHYSICAL THERAPY INITIAL EVALUATION ADULT - LEVEL OF INDEPENDENCE: SUPINE/SIT, REHAB EVAL
maximum assist (25% patients effort) You can access the FollowMyHealth Patient Portal offered by [facility] by registering at the following website: [email or web address]. By joining [employer]’s FollowMyHealth portal, you will also be able to view your health information using other applications (apps) compatible with our system. You can access the FollowMyHealth Patient Portal offered by [facility] by registering at the following website: [email or web address]. By joining [employer]’s FollowMyHealth portal, you will also be able to view your health information using other applications (apps) compatible with our system.